# Patient Record
Sex: FEMALE | Race: WHITE | URBAN - METROPOLITAN AREA
[De-identification: names, ages, dates, MRNs, and addresses within clinical notes are randomized per-mention and may not be internally consistent; named-entity substitution may affect disease eponyms.]

---

## 2017-03-01 ENCOUNTER — OUTPATIENT (OUTPATIENT)
Dept: OUTPATIENT SERVICES | Facility: HOSPITAL | Age: 77
LOS: 1 days | Discharge: HOME | End: 2017-03-01
Payer: MEDICARE

## 2017-03-01 VITALS
SYSTOLIC BLOOD PRESSURE: 137 MMHG | OXYGEN SATURATION: 100 % | WEIGHT: 123.9 LBS | DIASTOLIC BLOOD PRESSURE: 84 MMHG | RESPIRATION RATE: 16 BRPM | HEIGHT: 64 IN | HEART RATE: 74 BPM | TEMPERATURE: 98 F

## 2017-03-01 DIAGNOSIS — Z01.818 ENCOUNTER FOR OTHER PREPROCEDURAL EXAMINATION: ICD-10-CM

## 2017-03-01 DIAGNOSIS — M06.9 RHEUMATOID ARTHRITIS, UNSPECIFIED: ICD-10-CM

## 2017-03-01 DIAGNOSIS — F41.9 ANXIETY DISORDER, UNSPECIFIED: ICD-10-CM

## 2017-03-01 DIAGNOSIS — M17.12 UNILATERAL PRIMARY OSTEOARTHRITIS, LEFT KNEE: ICD-10-CM

## 2017-03-01 DIAGNOSIS — Z98.890 OTHER SPECIFIED POSTPROCEDURAL STATES: Chronic | ICD-10-CM

## 2017-03-01 DIAGNOSIS — Z98.49 CATARACT EXTRACTION STATUS, UNSPECIFIED EYE: Chronic | ICD-10-CM

## 2017-03-01 DIAGNOSIS — Z98.89 OTHER SPECIFIED POSTPROCEDURAL STATES: Chronic | ICD-10-CM

## 2017-03-01 DIAGNOSIS — I51.81 TAKOTSUBO SYNDROME: ICD-10-CM

## 2017-03-01 DIAGNOSIS — Z90.89 ACQUIRED ABSENCE OF OTHER ORGANS: Chronic | ICD-10-CM

## 2017-03-01 DIAGNOSIS — F32.9 MAJOR DEPRESSIVE DISORDER, SINGLE EPISODE, UNSPECIFIED: ICD-10-CM

## 2017-03-01 DIAGNOSIS — I48.91 UNSPECIFIED ATRIAL FIBRILLATION: ICD-10-CM

## 2017-03-01 DIAGNOSIS — I25.10 ATHEROSCLEROTIC HEART DISEASE OF NATIVE CORONARY ARTERY WITHOUT ANGINA PECTORIS: ICD-10-CM

## 2017-03-01 DIAGNOSIS — Z98.62 PERIPHERAL VASCULAR ANGIOPLASTY STATUS: Chronic | ICD-10-CM

## 2017-03-01 DIAGNOSIS — Z90.5 ACQUIRED ABSENCE OF KIDNEY: Chronic | ICD-10-CM

## 2017-03-01 LAB
ABO RH CONFIRMATION: SIGNIFICANT CHANGE UP
ANION GAP SERPL CALC-SCNC: 10 MMOL/L — SIGNIFICANT CHANGE UP (ref 5–17)
APPEARANCE UR: CLEAR — SIGNIFICANT CHANGE UP
BASOPHILS # BLD AUTO: 0.1 K/UL — SIGNIFICANT CHANGE UP (ref 0–0.2)
BASOPHILS NFR BLD AUTO: 1 % — SIGNIFICANT CHANGE UP (ref 0–2)
BILIRUB UR-MCNC: NEGATIVE — SIGNIFICANT CHANGE UP
BUN SERPL-MCNC: 22 MG/DL — SIGNIFICANT CHANGE UP (ref 7–23)
CALCIUM SERPL-MCNC: 9.6 MG/DL — SIGNIFICANT CHANGE UP (ref 8.5–10.1)
CHLORIDE SERPL-SCNC: 102 MMOL/L — SIGNIFICANT CHANGE UP (ref 96–108)
CO2 SERPL-SCNC: 26 MMOL/L — SIGNIFICANT CHANGE UP (ref 22–31)
COLOR SPEC: YELLOW — SIGNIFICANT CHANGE UP
CREAT SERPL-MCNC: 1.06 MG/DL — SIGNIFICANT CHANGE UP (ref 0.5–1.3)
DIFF PNL FLD: NEGATIVE — SIGNIFICANT CHANGE UP
EOSINOPHIL # BLD AUTO: 0.3 K/UL — SIGNIFICANT CHANGE UP (ref 0–0.5)
EOSINOPHIL NFR BLD AUTO: 4 % — SIGNIFICANT CHANGE UP (ref 0–6)
GLUCOSE SERPL-MCNC: 90 MG/DL — SIGNIFICANT CHANGE UP (ref 70–99)
GLUCOSE UR QL: NEGATIVE MG/DL — SIGNIFICANT CHANGE UP
HCT VFR BLD CALC: 41.3 % — SIGNIFICANT CHANGE UP (ref 34.5–45)
HGB BLD-MCNC: 13.8 G/DL — SIGNIFICANT CHANGE UP (ref 11.5–15.5)
KETONES UR-MCNC: NEGATIVE — SIGNIFICANT CHANGE UP
LEUKOCYTE ESTERASE UR-ACNC: NEGATIVE — SIGNIFICANT CHANGE UP
LYMPHOCYTES # BLD AUTO: 1 K/UL — SIGNIFICANT CHANGE UP (ref 1–3.3)
LYMPHOCYTES # BLD AUTO: 12.5 % — LOW (ref 13–44)
MCHC RBC-ENTMCNC: 31.7 PG — SIGNIFICANT CHANGE UP (ref 27–34)
MCHC RBC-ENTMCNC: 33.5 GM/DL — SIGNIFICANT CHANGE UP (ref 32–36)
MCV RBC AUTO: 94.6 FL — SIGNIFICANT CHANGE UP (ref 80–100)
MONOCYTES # BLD AUTO: 0.7 K/UL — SIGNIFICANT CHANGE UP (ref 0–0.9)
MONOCYTES NFR BLD AUTO: 8.5 % — SIGNIFICANT CHANGE UP (ref 2–14)
MRSA PCR RESULT.: SIGNIFICANT CHANGE UP
NEUTROPHILS # BLD AUTO: 6 K/UL — SIGNIFICANT CHANGE UP (ref 1.8–7.4)
NEUTROPHILS NFR BLD AUTO: 74 % — SIGNIFICANT CHANGE UP (ref 43–77)
NITRITE UR-MCNC: NEGATIVE — SIGNIFICANT CHANGE UP
PH UR: 5 — SIGNIFICANT CHANGE UP (ref 4.8–8)
PLATELET # BLD AUTO: 216 K/UL — SIGNIFICANT CHANGE UP (ref 150–400)
POTASSIUM SERPL-MCNC: 4.3 MMOL/L — SIGNIFICANT CHANGE UP (ref 3.5–5.3)
POTASSIUM SERPL-SCNC: 4.3 MMOL/L — SIGNIFICANT CHANGE UP (ref 3.5–5.3)
PROT UR-MCNC: NEGATIVE MG/DL — SIGNIFICANT CHANGE UP
RBC # BLD: 4.37 M/UL — SIGNIFICANT CHANGE UP (ref 3.8–5.2)
RBC # FLD: 11.3 % — SIGNIFICANT CHANGE UP (ref 10.3–14.5)
S AUREUS DNA NOSE QL NAA+PROBE: SIGNIFICANT CHANGE UP
SODIUM SERPL-SCNC: 138 MMOL/L — SIGNIFICANT CHANGE UP (ref 135–145)
SP GR SPEC: 1.01 — SIGNIFICANT CHANGE UP (ref 1.01–1.02)
TYPE + AB SCN PNL BLD: SIGNIFICANT CHANGE UP
UROBILINOGEN FLD QL: NEGATIVE MG/DL — SIGNIFICANT CHANGE UP
WBC # BLD: 8.1 K/UL — SIGNIFICANT CHANGE UP (ref 3.8–10.5)
WBC # FLD AUTO: 8.1 K/UL — SIGNIFICANT CHANGE UP (ref 3.8–10.5)

## 2017-03-01 PROCEDURE — 93010 ELECTROCARDIOGRAM REPORT: CPT

## 2017-03-01 NOTE — H&P PST ADULT - FAMILY HISTORY
Father  Still living? No  Family history of MI (myocardial infarction), Age at diagnosis: Age Unknown     Mother  Still living? No  Family history of other heart disease, Age at diagnosis: Age Unknown

## 2017-03-01 NOTE — H&P PST ADULT - PSH
H/O breast biopsy  right?  H/O partial nephrectomy  right 2006  History of cataract surgery, unspecified laterality  b/l 2016  S/P angioplasty  1 stent 1998  S/P colonoscopy    S/P tonsillectomy  1948

## 2017-03-01 NOTE — H&P PST ADULT - ASSESSMENT
75 yo female scheduled for  left TKR on 3/7/17 with Dr winters    1. Labs as per surgeon  2. EKG  3. Medical clearance with PCP Dr Damian  4. discussed EZ sponges, mupirocin & day of procedure instructions  5. Cardiac clearance with Dr Ellis. instructed to discuss stop date for coumadin  6. Stat PT/INR on admit.

## 2017-03-01 NOTE — H&P PST ADULT - PMH
Anxiety disorder    Atrial fibrillation    CAD (coronary artery disease)    Depressive disorder    HTN (hypertension)    Kidney carcinoma    Knee pain, left    OP (osteoporosis)    RA (rheumatoid arthritis)    Stented coronary artery    Tachycardia    Takotsubo syndrome    Urge incontinence of urine

## 2017-03-01 NOTE — H&P PST ADULT - HISTORY OF PRESENT ILLNESS
77 yo female presents to PST prior to proposed procedure. c/o left knee pain x 5 years that increases with stairs & sitting> standing. States "i fall sometimes". Reports consulting with Dr Davis for xrays. Reports knee pain 0/10 at rest.   Now for said procedure.

## 2017-03-06 ENCOUNTER — RESULT REVIEW (OUTPATIENT)
Age: 77
End: 2017-03-06

## 2017-03-06 RX ORDER — OXYCODONE HYDROCHLORIDE 5 MG/1
10 TABLET ORAL ONCE
Qty: 0 | Refills: 0 | Status: DISCONTINUED | OUTPATIENT
Start: 2017-03-07 | End: 2017-03-07

## 2017-03-06 RX ORDER — OXYCODONE HYDROCHLORIDE 5 MG/1
5 TABLET ORAL EVERY 6 HOURS
Qty: 0 | Refills: 0 | Status: DISCONTINUED | OUTPATIENT
Start: 2017-03-07 | End: 2017-03-09

## 2017-03-06 RX ORDER — OXYCODONE HYDROCHLORIDE 5 MG/1
10 TABLET ORAL EVERY 12 HOURS
Qty: 0 | Refills: 0 | Status: DISCONTINUED | OUTPATIENT
Start: 2017-03-07 | End: 2017-03-09

## 2017-03-06 RX ORDER — SODIUM CHLORIDE 9 MG/ML
1000 INJECTION, SOLUTION INTRAVENOUS
Qty: 0 | Refills: 0 | Status: DISCONTINUED | OUTPATIENT
Start: 2017-03-07 | End: 2017-03-07

## 2017-03-06 RX ORDER — CELECOXIB 200 MG/1
200 CAPSULE ORAL
Qty: 0 | Refills: 0 | Status: DISCONTINUED | OUTPATIENT
Start: 2017-03-07 | End: 2017-03-09

## 2017-03-06 RX ORDER — OXYCODONE HYDROCHLORIDE 5 MG/1
10 TABLET ORAL EVERY 6 HOURS
Qty: 0 | Refills: 0 | Status: DISCONTINUED | OUTPATIENT
Start: 2017-03-07 | End: 2017-03-09

## 2017-03-06 RX ORDER — CELECOXIB 200 MG/1
200 CAPSULE ORAL ONCE
Qty: 0 | Refills: 0 | Status: COMPLETED | OUTPATIENT
Start: 2017-03-07 | End: 2017-03-07

## 2017-03-06 RX ORDER — ACETAMINOPHEN 500 MG
650 TABLET ORAL ONCE
Qty: 0 | Refills: 0 | Status: COMPLETED | OUTPATIENT
Start: 2017-03-07 | End: 2017-03-07

## 2017-03-06 RX ORDER — HYDROMORPHONE HYDROCHLORIDE 2 MG/ML
0.5 INJECTION INTRAMUSCULAR; INTRAVENOUS; SUBCUTANEOUS
Qty: 0 | Refills: 0 | Status: DISCONTINUED | OUTPATIENT
Start: 2017-03-07 | End: 2017-03-09

## 2017-03-06 RX ORDER — ACETAMINOPHEN 500 MG
650 TABLET ORAL EVERY 6 HOURS
Qty: 0 | Refills: 0 | Status: DISCONTINUED | OUTPATIENT
Start: 2017-03-07 | End: 2017-03-09

## 2017-03-07 ENCOUNTER — INPATIENT (INPATIENT)
Facility: HOSPITAL | Age: 77
LOS: 1 days | Discharge: NURSING, REHAB OR LONG-TERM CARE | End: 2017-03-09
Attending: ORTHOPAEDIC SURGERY | Admitting: ORTHOPAEDIC SURGERY
Payer: MEDICARE

## 2017-03-07 ENCOUNTER — TRANSCRIPTION ENCOUNTER (OUTPATIENT)
Age: 77
End: 2017-03-07

## 2017-03-07 VITALS
RESPIRATION RATE: 16 BRPM | TEMPERATURE: 98 F | DIASTOLIC BLOOD PRESSURE: 88 MMHG | HEART RATE: 83 BPM | SYSTOLIC BLOOD PRESSURE: 126 MMHG | WEIGHT: 123.9 LBS | OXYGEN SATURATION: 100 % | HEIGHT: 64 IN

## 2017-03-07 DIAGNOSIS — Z98.62 PERIPHERAL VASCULAR ANGIOPLASTY STATUS: Chronic | ICD-10-CM

## 2017-03-07 DIAGNOSIS — Z98.890 OTHER SPECIFIED POSTPROCEDURAL STATES: Chronic | ICD-10-CM

## 2017-03-07 DIAGNOSIS — Z98.89 OTHER SPECIFIED POSTPROCEDURAL STATES: Chronic | ICD-10-CM

## 2017-03-07 DIAGNOSIS — Z90.89 ACQUIRED ABSENCE OF OTHER ORGANS: Chronic | ICD-10-CM

## 2017-03-07 DIAGNOSIS — Z90.5 ACQUIRED ABSENCE OF KIDNEY: Chronic | ICD-10-CM

## 2017-03-07 DIAGNOSIS — M25.562 PAIN IN LEFT KNEE: ICD-10-CM

## 2017-03-07 DIAGNOSIS — Z98.49 CATARACT EXTRACTION STATUS, UNSPECIFIED EYE: Chronic | ICD-10-CM

## 2017-03-07 LAB
ANION GAP SERPL CALC-SCNC: 10 MMOL/L — SIGNIFICANT CHANGE UP (ref 5–17)
BUN SERPL-MCNC: 23 MG/DL — SIGNIFICANT CHANGE UP (ref 7–23)
CALCIUM SERPL-MCNC: 8.6 MG/DL — SIGNIFICANT CHANGE UP (ref 8.5–10.1)
CHLORIDE SERPL-SCNC: 106 MMOL/L — SIGNIFICANT CHANGE UP (ref 96–108)
CO2 SERPL-SCNC: 24 MMOL/L — SIGNIFICANT CHANGE UP (ref 22–31)
CREAT SERPL-MCNC: 1.21 MG/DL — SIGNIFICANT CHANGE UP (ref 0.5–1.3)
GLUCOSE SERPL-MCNC: 122 MG/DL — HIGH (ref 70–99)
HCT VFR BLD CALC: 39.1 % — SIGNIFICANT CHANGE UP (ref 34.5–45)
HGB BLD-MCNC: 13 G/DL — SIGNIFICANT CHANGE UP (ref 11.5–15.5)
INR BLD: 1.07 RATIO — SIGNIFICANT CHANGE UP (ref 0.88–1.16)
INR BLD: 1.12 RATIO — SIGNIFICANT CHANGE UP (ref 0.88–1.16)
MCHC RBC-ENTMCNC: 31.5 PG — SIGNIFICANT CHANGE UP (ref 27–34)
MCHC RBC-ENTMCNC: 33.3 GM/DL — SIGNIFICANT CHANGE UP (ref 32–36)
MCV RBC AUTO: 94.7 FL — SIGNIFICANT CHANGE UP (ref 80–100)
PLATELET # BLD AUTO: 214 K/UL — SIGNIFICANT CHANGE UP (ref 150–400)
POTASSIUM SERPL-MCNC: 4 MMOL/L — SIGNIFICANT CHANGE UP (ref 3.5–5.3)
POTASSIUM SERPL-SCNC: 4 MMOL/L — SIGNIFICANT CHANGE UP (ref 3.5–5.3)
PROTHROM AB SERPL-ACNC: 11.8 SEC — SIGNIFICANT CHANGE UP (ref 10–13.1)
PROTHROM AB SERPL-ACNC: 12.3 SEC — SIGNIFICANT CHANGE UP (ref 10–13.1)
RBC # BLD: 4.13 M/UL — SIGNIFICANT CHANGE UP (ref 3.8–5.2)
RBC # FLD: 11.6 % — SIGNIFICANT CHANGE UP (ref 10.3–14.5)
SODIUM SERPL-SCNC: 140 MMOL/L — SIGNIFICANT CHANGE UP (ref 135–145)
WBC # BLD: 6.5 K/UL — SIGNIFICANT CHANGE UP (ref 3.8–10.5)
WBC # FLD AUTO: 6.5 K/UL — SIGNIFICANT CHANGE UP (ref 3.8–10.5)

## 2017-03-07 PROCEDURE — 99223 1ST HOSP IP/OBS HIGH 75: CPT

## 2017-03-07 PROCEDURE — 88305 TISSUE EXAM BY PATHOLOGIST: CPT | Mod: 26

## 2017-03-07 PROCEDURE — 73560 X-RAY EXAM OF KNEE 1 OR 2: CPT | Mod: 26,LT

## 2017-03-07 RX ORDER — TRANEXAMIC ACID 100 MG/ML
562 INJECTION, SOLUTION INTRAVENOUS ONCE
Qty: 0 | Refills: 0 | Status: DISCONTINUED | OUTPATIENT
Start: 2017-03-07 | End: 2017-03-09

## 2017-03-07 RX ORDER — PANTOPRAZOLE SODIUM 20 MG/1
40 TABLET, DELAYED RELEASE ORAL
Qty: 0 | Refills: 0 | Status: DISCONTINUED | OUTPATIENT
Start: 2017-03-07 | End: 2017-03-09

## 2017-03-07 RX ORDER — HEPARIN SODIUM 5000 [USP'U]/ML
5000 INJECTION INTRAVENOUS; SUBCUTANEOUS EVERY 8 HOURS
Qty: 0 | Refills: 0 | Status: DISCONTINUED | OUTPATIENT
Start: 2017-03-08 | End: 2017-03-09

## 2017-03-07 RX ORDER — INFLUENZA VIRUS VACCINE 15; 15; 15; 15 UG/.5ML; UG/.5ML; UG/.5ML; UG/.5ML
0.5 SUSPENSION INTRAMUSCULAR ONCE
Qty: 0 | Refills: 0 | Status: DISCONTINUED | OUTPATIENT
Start: 2017-03-07 | End: 2017-03-09

## 2017-03-07 RX ORDER — ACETAMINOPHEN 500 MG
650 TABLET ORAL EVERY 6 HOURS
Qty: 0 | Refills: 0 | Status: DISCONTINUED | OUTPATIENT
Start: 2017-03-07 | End: 2017-03-09

## 2017-03-07 RX ORDER — ONDANSETRON 8 MG/1
4 TABLET, FILM COATED ORAL EVERY 6 HOURS
Qty: 0 | Refills: 0 | Status: DISCONTINUED | OUTPATIENT
Start: 2017-03-07 | End: 2017-03-09

## 2017-03-07 RX ORDER — SENNA PLUS 8.6 MG/1
2 TABLET ORAL AT BEDTIME
Qty: 0 | Refills: 0 | Status: DISCONTINUED | OUTPATIENT
Start: 2017-03-07 | End: 2017-03-09

## 2017-03-07 RX ORDER — MAGNESIUM HYDROXIDE 400 MG/1
30 TABLET, CHEWABLE ORAL DAILY
Qty: 0 | Refills: 0 | Status: DISCONTINUED | OUTPATIENT
Start: 2017-03-07 | End: 2017-03-09

## 2017-03-07 RX ORDER — POLYETHYLENE GLYCOL 3350 17 G/17G
17 POWDER, FOR SOLUTION ORAL DAILY
Qty: 0 | Refills: 0 | Status: DISCONTINUED | OUTPATIENT
Start: 2017-03-07 | End: 2017-03-09

## 2017-03-07 RX ORDER — ONDANSETRON 8 MG/1
4 TABLET, FILM COATED ORAL ONCE
Qty: 0 | Refills: 0 | Status: DISCONTINUED | OUTPATIENT
Start: 2017-03-07 | End: 2017-03-07

## 2017-03-07 RX ORDER — MORPHINE SULFATE 50 MG/1
2 CAPSULE, EXTENDED RELEASE ORAL EVERY 4 HOURS
Qty: 0 | Refills: 0 | Status: DISCONTINUED | OUTPATIENT
Start: 2017-03-07 | End: 2017-03-09

## 2017-03-07 RX ORDER — SERTRALINE 25 MG/1
25 TABLET, FILM COATED ORAL DAILY
Qty: 0 | Refills: 0 | Status: DISCONTINUED | OUTPATIENT
Start: 2017-03-07 | End: 2017-03-09

## 2017-03-07 RX ORDER — CEFAZOLIN SODIUM 1 G
2000 VIAL (EA) INJECTION EVERY 6 HOURS
Qty: 0 | Refills: 0 | Status: COMPLETED | OUTPATIENT
Start: 2017-03-07 | End: 2017-03-08

## 2017-03-07 RX ORDER — DIGOXIN 250 MCG
0.12 TABLET ORAL DAILY
Qty: 0 | Refills: 0 | Status: DISCONTINUED | OUTPATIENT
Start: 2017-03-07 | End: 2017-03-09

## 2017-03-07 RX ORDER — ASCORBIC ACID 60 MG
500 TABLET,CHEWABLE ORAL
Qty: 0 | Refills: 0 | Status: DISCONTINUED | OUTPATIENT
Start: 2017-03-07 | End: 2017-03-09

## 2017-03-07 RX ORDER — OXYCODONE HYDROCHLORIDE 5 MG/1
5 TABLET ORAL EVERY 4 HOURS
Qty: 0 | Refills: 0 | Status: DISCONTINUED | OUTPATIENT
Start: 2017-03-07 | End: 2017-03-09

## 2017-03-07 RX ORDER — OXYCODONE HYDROCHLORIDE 5 MG/1
10 TABLET ORAL EVERY 4 HOURS
Qty: 0 | Refills: 0 | Status: DISCONTINUED | OUTPATIENT
Start: 2017-03-07 | End: 2017-03-09

## 2017-03-07 RX ORDER — DOCUSATE SODIUM 100 MG
100 CAPSULE ORAL THREE TIMES A DAY
Qty: 0 | Refills: 0 | Status: DISCONTINUED | OUTPATIENT
Start: 2017-03-07 | End: 2017-03-09

## 2017-03-07 RX ORDER — SODIUM CHLORIDE 9 MG/ML
1000 INJECTION, SOLUTION INTRAVENOUS
Qty: 0 | Refills: 0 | Status: DISCONTINUED | OUTPATIENT
Start: 2017-03-07 | End: 2017-03-09

## 2017-03-07 RX ORDER — WARFARIN SODIUM 2.5 MG/1
5 TABLET ORAL DAILY
Qty: 0 | Refills: 0 | Status: DISCONTINUED | OUTPATIENT
Start: 2017-03-07 | End: 2017-03-09

## 2017-03-07 RX ORDER — PROCHLORPERAZINE MALEATE 5 MG
10 TABLET ORAL ONCE
Qty: 0 | Refills: 0 | Status: DISCONTINUED | OUTPATIENT
Start: 2017-03-07 | End: 2017-03-08

## 2017-03-07 RX ADMIN — OXYCODONE HYDROCHLORIDE 10 MILLIGRAM(S): 5 TABLET ORAL at 13:07

## 2017-03-07 RX ADMIN — Medication 100 MILLIGRAM(S): at 22:24

## 2017-03-07 RX ADMIN — CELECOXIB 200 MILLIGRAM(S): 200 CAPSULE ORAL at 13:08

## 2017-03-07 RX ADMIN — Medication 1 TABLET(S): at 22:27

## 2017-03-07 RX ADMIN — Medication 0.12 MILLIGRAM(S): at 22:27

## 2017-03-07 RX ADMIN — Medication 2.5 MILLIGRAM(S): at 22:27

## 2017-03-07 RX ADMIN — Medication 650 MILLIGRAM(S): at 13:08

## 2017-03-07 RX ADMIN — SENNA PLUS 2 TABLET(S): 8.6 TABLET ORAL at 22:26

## 2017-03-07 RX ADMIN — Medication 100 MILLIGRAM(S): at 22:26

## 2017-03-07 RX ADMIN — WARFARIN SODIUM 5 MILLIGRAM(S): 2.5 TABLET ORAL at 22:26

## 2017-03-07 RX ADMIN — SODIUM CHLORIDE 75 MILLILITER(S): 9 INJECTION, SOLUTION INTRAVENOUS at 19:17

## 2017-03-07 NOTE — PROGRESS NOTE ADULT - SUBJECTIVE AND OBJECTIVE BOX
pt seen at bedside pain controlled. denies CP, SOB, N/V.     PE Left knee   dressing c/d/i   drain intact   compartments soft non tender   decreased sensory/motor function secondary to adductor canal block per anesthesia   cap refill brisk   DP intact/ PT dopplerable

## 2017-03-07 NOTE — DISCHARGE NOTE ADULT - MEDICATION SUMMARY - MEDICATIONS TO TAKE
I will START or STAY ON the medications listed below when I get home from the hospital:    Percocet 5/325 oral tablet  -- 1 tab(s) by mouth every 4-6 hours prn pain  -- Indication: For pain    enalapril 2.5 mg oral tablet  -- 1 tab(s) by mouth once a day  -- Indication: For home med    digoxin 125 mcg (0.125 mg) oral tablet  -- 1 tab(s) by mouth once a day  -- Indication: For home med    warfarin 5 mg oral tablet  -- 1 tab(s) by mouth once a day  5mg  for 4 days a week  -- Indication: For DVT PPx    warfarin 5 mg oral tablet  -- 0.5 tab(s) by mouth once a day  (2.5 mg) 3 days a week  -- Indication: For DVT PPx    sertraline 25 mg oral tablet  -- 1 tab(s) by mouth once a day  -- Indication: For home med    ALPRAZolam 0.25 mg oral tablet  -- 1 tab(s) by mouth 4 times a day, As Needed  -- Indication: For home med    pantoprazole 40 mg oral delayed release tablet  -- 1 tab(s) by mouth once a day  -- Indication: For home med I will START or STAY ON the medications listed below when I get home from the hospital:    Percocet 5/325 oral tablet  -- 1 tab(s) by mouth every 4-6 hours prn pain  -- Indication: For pain    enalapril 2.5 mg oral tablet  -- 1 tab(s) by mouth once a day  -- Indication: For home med    digoxin 125 mcg (0.125 mg) oral tablet  -- 1 tab(s) by mouth once a day  -- Indication: For home med    warfarin 5 mg oral tablet  -- 1 tab(s) by mouth once a day  5mg  for 4 days a week  -- Indication: For DVT PPx    sertraline 25 mg oral tablet  -- 1 tab(s) by mouth once a day  -- Indication: For home med    ALPRAZolam 0.25 mg oral tablet  -- 1 tab(s) by mouth 4 times a day, As Needed  -- Indication: For home med    pantoprazole 40 mg oral delayed release tablet  -- 1 tab(s) by mouth once a day  -- Indication: For home med I will START or STAY ON the medications listed below when I get home from the hospital:    oxyCODONE 5 mg oral tablet  -- 1 tab(s) by mouth every 4 hours, As needed, Mild Pain  -- Indication: For pain    oxyCODONE 10 mg oral tablet  -- 1 tab(s) by mouth every 4 hours, As needed, Moderate Pain  -- Indication: For pain    enalapril 2.5 mg oral tablet  -- 1 tab(s) by mouth once a day  -- Indication: For prior med    digoxin 125 mcg (0.125 mg) oral tablet  -- 1 tab(s) by mouth once a day  -- Indication: For prior med    heparin  --   Continue heparin 5,000 units SQ Q8hr until INR 1.75 or >  -- Indication: For prior med    warfarin 5 mg oral tablet  -- 1 tab(s) by mouth once a day, adjust per INR. Goal range 1.75-2.75  -- Indication: For prior med    sertraline 25 mg oral tablet  -- 1 tab(s) by mouth once a day  -- Indication: For prior med    ALPRAZolam 0.25 mg oral tablet  -- 1 tab(s) by mouth 4 times a day, As Needed  -- Indication: For prior med    pantoprazole 40 mg oral delayed release tablet  -- 1 tab(s) by mouth once a day (before a meal)  -- Indication: For prior med

## 2017-03-07 NOTE — CONSULT NOTE ADULT - ASSESSMENT
77 yo female S/P Left TKR, POD#1, H/O afib on long term coumadin, Balaji vasc #5    Plan: resume coumadin 5 mg po tonight, adjust per INR  daily CBC, BMP, PT/INR  In am start heparin 5000 units sq q 8 h until INR > 2.0 x 2 days  Venodynes BLE  INC mobility as chris    Thank you for the consult, will follow

## 2017-03-07 NOTE — DISCHARGE NOTE ADULT - PATIENT PORTAL LINK FT
“You can access the Follow[facility] Patient Portal, offered by [facility], by registering with the following website: [email or web address]”

## 2017-03-07 NOTE — DISCHARGE NOTE ADULT - PLAN OF CARE
Return to ADLs 1.	Pain Control  2.	Walking with full weight bearing as tolerated, with assistive devices (walker/Cane as Needed)  3.	DVT Prophylaxis per anticoagulation team recommendations  4.	PT as needed  5.	Follow up with Dr. Davis as Outpatient in 10-14 Days after Discharge from the Hospital or Rehab. Call Office For Appointment.   6.	Remove Dressing Staples Post-Op Day 14  7.	Ice affected area as Needed  8.	Keep Dressing Clean and dry.

## 2017-03-07 NOTE — DISCHARGE NOTE ADULT - CARE PROVIDER_API CALL
Darien Davis (MD), Orthopaedic Surgery  [address] [place] [address]  Phone: [phone]  Fax: [phone]

## 2017-03-07 NOTE — DISCHARGE NOTE ADULT - CARE PLAN
Principal Discharge DX:	Knee pain, left  Goal:	Return to ADLs  Instructions for follow-up, activity and diet:	1.	Pain Control  2.	Walking with full weight bearing as tolerated, with assistive devices (walker/Cane as Needed)  3.	DVT Prophylaxis per anticoagulation team recommendations  4.	PT as needed  5.	Follow up with Dr. Davis as Outpatient in 10-14 Days after Discharge from the Hospital or Rehab. Call Office For Appointment.   6.	Remove Dressing Staples Post-Op Day 14  7.	Ice affected area as Needed  8.	Keep Dressing Clean and dry.

## 2017-03-07 NOTE — CONSULT NOTE ADULT - SUBJECTIVE AND OBJECTIVE BOX
HPI  75yo/F with PMH CAD s/p stents, HTN, hyperlipidemia, afib presented for elective LT TKR. Medical consult called for postop medical management.    PMH- as above  PSH- cardiac stents  Soc hx- denies smoking, no alcohol    3/7/17 pt seen in PACU, stable    Vital Signs Last 24 Hrs  T(C): 36.5, Max: 36.5 (03-07 @ 12:17)  T(F): 97.7, Max: 97.7 (03-07 @ 12:17)  HR: 83 (83 - 83)  BP: 126/88 (126/88 - 126/88)  BP(mean): --  RR: 16 (16 - 16)  SpO2: 100% (100% - 100%)    PT/INR - ( 07 Mar 2017 12:15 )   PT: 11.8 sec;   INR: 1.07 ratio      PHYSICAL EXAM:    GENERAL: NAD, well-groomed, well-developed  HEAD:  Atraumatic, Normocephalic  EYES: EOMI, PERRLA, conjunctiva and sclera clear  HEENT: Moist mucous membranes  NECK: Supple, No JVD  NERVOUS SYSTEM:  Alert & Oriented X3, Motor Strength 5/5 B/L upper and lower extremities; DTRs 2+ intact and symmetric  CHEST/LUNG: Clear to auscultation bilaterally; No rales, rhonchi, wheezing, or rubs  HEART: Regular rate and rhythm; No murmurs, rubs, or gallops  ABDOMEN: Soft, Nontender, Nondistended; Bowel sounds present  GENITOURINARY- Voiding, no palpable bladder  EXTREMITIES:  2+ Peripheral Pulses, No clubbing, cyanosis, or edema  MUSCULOSKELTAL- No muscle tenderness, Muscle tone normal, No joint tenderness, no Joint swelling, Joint range of motion-normal  SKIN-no rash, no lesion  CNS- sleeping postop      lactated ringers. 1000milliLiter(s) IV Continuous <Continuous>  ondansetron Injectable 4milliGRAM(s) IV Push once PRN  acetaminophen   Tablet. 650milliGRAM(s) Oral every 6 hours  oxyCODONE ER Tablet 10milliGRAM(s) Oral every 12 hours  celecoxib 200milliGRAM(s) Oral with breakfast  oxyCODONE IR 5milliGRAM(s) Oral every 6 hours PRN  oxyCODONE IR 10milliGRAM(s) Oral every 6 hours PRN  HYDROmorphone  Injectable 0.5milliGRAM(s) IV Push every 3 hours PRN  tranexamic acid IVPB 562milliGRAM(s) IV Intermittent once  tranexamic acid IVPB 562milliGRAM(s) IV Intermittent once  influenza   Vaccine 0.5milliLiter(s) IntraMuscular once      Assessment  75yo/F with PMH CAD s/p stents, HTN, hyperlipidemia, afib presented for elective LT TKR. Medical consult called for postop medical management.    Plan  #S/p LT TKR  Ortho f/u appreciated  Pain meds prn  Monitor HH  Bowel regimen  Incentive spirometry  PT as tolerated  AC consult    #CAD s/p stents  Resume card meds    #HTN/hyperlipidemia  Stable    #Dispo- thank you for consult, will follow with you

## 2017-03-07 NOTE — DISCHARGE NOTE ADULT - HOSPITAL COURSE
The patient is a 76 year old female status post elective total knee Arthroplasty to the left knee after failing outpatient nonoperative conservative management.  Patient presented to [facility] after being medically cleared for an elective surgical procedure. The patient was taken to the operating room on date mentioned above. Prophylactic antibiotics were started before the procedure and continued for 24 hours.  There were no complications during the procedure and patient tolerated the procedure well.  The patient was transferred to recovery room in stable condition and subsequently to surgical floor.  Patient was placed on Heparin/Coumadin for anticoagulation.  All home medications were continued.  The patient received physical therapy daily and daily labs were followed.  The dressing was kept clean, dry, intact.  The rest of the hospital stay was unremarkable.

## 2017-03-07 NOTE — CONSULT NOTE ADULT - SUBJECTIVE AND OBJECTIVE BOX
HPI:      Patient is a 76y old  Female who presents with a chief complaint of in left knee pain, H/O OA now S/P "left knee replacement" (07 Mar 2017 12:23)      Consulted by Dr. Davis    for VTE prophylaxis, risk stratification, and anticoagulation management.    PAST MEDICAL & SURGICAL HISTORY:  Takotsubo syndrome  Stented coronary artery  CAD (coronary artery disease)  Knee pain, left  Tachycardia  Atrial fibrillation  Urge incontinence of urine  Depressive disorder  Anxiety disorder  Kidney carcinoma  HTN (hypertension)  RA (rheumatoid arthritis)  OP (osteoporosis)  S/P colonoscopy  H/O breast biopsy: right?  S/P tonsillectomy: 1948  History of cataract surgery, unspecified laterality: b/l 2016  H/O partial nephrectomy: right 2006  S/P angioplasty: 1 stent 1998          CrCl: 38.67    Caprini VTE Risk Score: #9      [id number] Score: #5    IMPROVE Bleeding Risk Score: #2.5    Falls Risk:   High (X  )  Mod (  )  Low (  )      FAMILY HISTORY:  Family history of other heart disease (Mother)  Family history of MI (myocardial infarction) (Father)    Denies any personal or familial history of clotting or bleeding disorders.    Allergies    penicillin (Hives)    Intolerances        REVIEW OF SYSTEMS    (  )Fever	     (  )Constipation	(  )SOB				(  )Headache	(  )Dysuria  (  )Chills	     (  )Melena	(  )Dyspnea present on exertion	                    (  )Dizziness                    (  )Polyuria  (  )Nausea	     (  )Hematochezia	(  )Cough			                    (  )Syncope   	(  )Hematuria  (  )Vomiting    (  )Chest Pain	(  )Wheezing			(  )Weakness  (  )Diarrhea     (  )Palpitations	(  )Anorexia			(  )Myalgia    All other review of systems negative: Yes    PHYSICAL EXAM:    Constitutional: Appears Well    Neurological: A& O x 3    Skin: Warm    Respiratory and Thorax: normal effort; Breath sounds: normal; No rales/wheezing/rhonchi  	  Cardiovascular: S1, S2, regular, NMBR	    Gastrointestinal: BS + x 4Q, nontender	    Genitourinary:  Bladder nondistended, nontender    Musculoskeletal:   General Right:   no muscle/joint tenderness,   normal tone, no joint swelling,   ROM: full	    General Left:   = muscle/joint tenderness,   normal tone, no joint swelling,   ROM: limited      Knee:  Right: Incision: ; Dressing CDI; Drain: hemovac ; Type of drng.: serosang.; Amt. of drng: small                   Lower extrems:   Right: no calf tenderness              negative akin's sign               + pedal pulses    Left:   no calf tenderness              negative akin's sign               + pedal pulses                PT/INR - ( 07 Mar 2017 12:15 )   PT: 11.8 sec;   INR: 1.07 ratio         				    MEDICATIONS  (STANDING):  lactated ringers. 1000milliLiter(s) IV Continuous <Continuous>  acetaminophen   Tablet. 650milliGRAM(s) Oral every 6 hours  oxyCODONE ER Tablet 10milliGRAM(s) Oral every 12 hours  celecoxib 200milliGRAM(s) Oral with breakfast  tranexamic acid IVPB 562milliGRAM(s) IV Intermittent once  tranexamic acid IVPB 562milliGRAM(s) IV Intermittent once  influenza   Vaccine 0.5milliLiter(s) IntraMuscular once          DVT Prophylaxis:  LMWH                   (  )  Heparin SQ           ( X )  Coumadin             (X  )  Xarelto                  (  )  Eliquis                   (  )  Venodynes           ( X )  Ambulation          (X  )  UFH                       (  )  Contraindicated  (  )

## 2017-03-08 LAB
ANION GAP SERPL CALC-SCNC: 7 MMOL/L — SIGNIFICANT CHANGE UP (ref 5–17)
BUN SERPL-MCNC: 22 MG/DL — SIGNIFICANT CHANGE UP (ref 7–23)
CALCIUM SERPL-MCNC: 8.5 MG/DL — SIGNIFICANT CHANGE UP (ref 8.5–10.1)
CHLORIDE SERPL-SCNC: 100 MMOL/L — SIGNIFICANT CHANGE UP (ref 96–108)
CO2 SERPL-SCNC: 27 MMOL/L — SIGNIFICANT CHANGE UP (ref 22–31)
CREAT SERPL-MCNC: 1.1 MG/DL — SIGNIFICANT CHANGE UP (ref 0.5–1.3)
GLUCOSE SERPL-MCNC: 112 MG/DL — HIGH (ref 70–99)
HCT VFR BLD CALC: 34.2 % — LOW (ref 34.5–45)
HGB BLD-MCNC: 11.3 G/DL — LOW (ref 11.5–15.5)
INR BLD: 1.14 RATIO — SIGNIFICANT CHANGE UP (ref 0.88–1.16)
MCHC RBC-ENTMCNC: 31.2 PG — SIGNIFICANT CHANGE UP (ref 27–34)
MCHC RBC-ENTMCNC: 32.9 GM/DL — SIGNIFICANT CHANGE UP (ref 32–36)
MCV RBC AUTO: 94.7 FL — SIGNIFICANT CHANGE UP (ref 80–100)
PLATELET # BLD AUTO: 218 K/UL — SIGNIFICANT CHANGE UP (ref 150–400)
POTASSIUM SERPL-MCNC: 4.2 MMOL/L — SIGNIFICANT CHANGE UP (ref 3.5–5.3)
POTASSIUM SERPL-SCNC: 4.2 MMOL/L — SIGNIFICANT CHANGE UP (ref 3.5–5.3)
PROTHROM AB SERPL-ACNC: 12.6 SEC — SIGNIFICANT CHANGE UP (ref 10–13.1)
RBC # BLD: 3.62 M/UL — LOW (ref 3.8–5.2)
RBC # FLD: 11.5 % — SIGNIFICANT CHANGE UP (ref 10.3–14.5)
SODIUM SERPL-SCNC: 134 MMOL/L — LOW (ref 135–145)
WBC # BLD: 10.5 K/UL — SIGNIFICANT CHANGE UP (ref 3.8–10.5)
WBC # FLD AUTO: 10.5 K/UL — SIGNIFICANT CHANGE UP (ref 3.8–10.5)

## 2017-03-08 PROCEDURE — 99233 SBSQ HOSP IP/OBS HIGH 50: CPT

## 2017-03-08 RX ORDER — PROCHLORPERAZINE MALEATE 5 MG
10 TABLET ORAL ONCE
Qty: 0 | Refills: 0 | Status: COMPLETED | OUTPATIENT
Start: 2017-03-08 | End: 2017-03-08

## 2017-03-08 RX ADMIN — HEPARIN SODIUM 5000 UNIT(S): 5000 INJECTION INTRAVENOUS; SUBCUTANEOUS at 22:16

## 2017-03-08 RX ADMIN — ONDANSETRON 4 MILLIGRAM(S): 8 TABLET, FILM COATED ORAL at 08:44

## 2017-03-08 RX ADMIN — Medication 100 MILLIGRAM(S): at 06:14

## 2017-03-08 RX ADMIN — Medication 500 MILLIGRAM(S): at 06:17

## 2017-03-08 RX ADMIN — Medication 650 MILLIGRAM(S): at 17:20

## 2017-03-08 RX ADMIN — POLYETHYLENE GLYCOL 3350 17 GRAM(S): 17 POWDER, FOR SOLUTION ORAL at 13:18

## 2017-03-08 RX ADMIN — Medication 100 MILLIGRAM(S): at 13:17

## 2017-03-08 RX ADMIN — Medication 650 MILLIGRAM(S): at 13:18

## 2017-03-08 RX ADMIN — ONDANSETRON 4 MILLIGRAM(S): 8 TABLET, FILM COATED ORAL at 03:37

## 2017-03-08 RX ADMIN — PANTOPRAZOLE SODIUM 40 MILLIGRAM(S): 20 TABLET, DELAYED RELEASE ORAL at 06:15

## 2017-03-08 RX ADMIN — OXYCODONE HYDROCHLORIDE 5 MILLIGRAM(S): 5 TABLET ORAL at 17:25

## 2017-03-08 RX ADMIN — ONDANSETRON 4 MILLIGRAM(S): 8 TABLET, FILM COATED ORAL at 14:04

## 2017-03-08 RX ADMIN — OXYCODONE HYDROCHLORIDE 10 MILLIGRAM(S): 5 TABLET ORAL at 11:03

## 2017-03-08 RX ADMIN — Medication 100 MILLIGRAM(S): at 22:15

## 2017-03-08 RX ADMIN — Medication 1 TABLET(S): at 13:17

## 2017-03-08 RX ADMIN — Medication 650 MILLIGRAM(S): at 06:17

## 2017-03-08 RX ADMIN — Medication 10 MILLIGRAM(S): at 11:02

## 2017-03-08 RX ADMIN — CELECOXIB 200 MILLIGRAM(S): 200 CAPSULE ORAL at 13:18

## 2017-03-08 RX ADMIN — Medication 100 MILLIGRAM(S): at 03:37

## 2017-03-08 RX ADMIN — Medication 1 TABLET(S): at 06:18

## 2017-03-08 RX ADMIN — HEPARIN SODIUM 5000 UNIT(S): 5000 INJECTION INTRAVENOUS; SUBCUTANEOUS at 13:18

## 2017-03-08 RX ADMIN — Medication 1 TABLET(S): at 22:16

## 2017-03-08 RX ADMIN — OXYCODONE HYDROCHLORIDE 10 MILLIGRAM(S): 5 TABLET ORAL at 06:15

## 2017-03-08 RX ADMIN — SERTRALINE 25 MILLIGRAM(S): 25 TABLET, FILM COATED ORAL at 13:17

## 2017-03-08 RX ADMIN — WARFARIN SODIUM 5 MILLIGRAM(S): 2.5 TABLET ORAL at 22:16

## 2017-03-08 RX ADMIN — MORPHINE SULFATE 2 MILLIGRAM(S): 50 CAPSULE, EXTENDED RELEASE ORAL at 15:28

## 2017-03-08 RX ADMIN — HEPARIN SODIUM 5000 UNIT(S): 5000 INJECTION INTRAVENOUS; SUBCUTANEOUS at 06:15

## 2017-03-08 RX ADMIN — Medication 2.5 MILLIGRAM(S): at 17:20

## 2017-03-08 RX ADMIN — SODIUM CHLORIDE 75 MILLILITER(S): 9 INJECTION, SOLUTION INTRAVENOUS at 02:19

## 2017-03-08 RX ADMIN — Medication 0.12 MILLIGRAM(S): at 17:20

## 2017-03-08 RX ADMIN — Medication 500 MILLIGRAM(S): at 17:19

## 2017-03-08 NOTE — PROGRESS NOTE ADULT - SUBJECTIVE AND OBJECTIVE BOX
Patient seen and examined. Pain controlled. Resting comfortably. Block still in effect.      Vital Signs Last 24 Hrs  T(C): 36.4, Max: 36.6 (03-07 @ 16:15)  T(F): 97.6, Max: 97.9 (03-07 @ 16:15)  HR: 75 (75 - 108)  BP: 124/79 (93/59 - 129/77)  BP(mean): --  RR: 16 (13 - 24)  SpO2: 96% (96% - 100%)      Gen: NAD  LLE   KI and Ace wrap removed  dressing c/d/i   + HMV  compartments soft non tender   decreased sensory/motor function secondary to adductor canal block per anesthesia   cap refill brisk   DP intact

## 2017-03-08 NOTE — PROGRESS NOTE ADULT - PROBLEM SELECTOR PLAN 1
pain control  WBAT  PT   DVT ppx  post op abx   venodynes  incentive spirometer  follow labs   follow sensory motor exam
therapy   WBAT LLE   pain control   DVT ppx  post op abx   venodynes  incentive spirometer  follow labs   follow sensory motor exam

## 2017-03-08 NOTE — PROGRESS NOTE ADULT - SUBJECTIVE AND OBJECTIVE BOX
77yo/F with PMH CAD s/p stents, HTN, hyperlipidemia, Afib presented for elective LT TKR. Medical consult called for postop medical management.    3.8: pod#1  c/o pain in the left foot, no cp, no sob            REVIEW OF SYSTEMS:    CONSTITUTIONAL: No weakness, No fevers or chills  ENT: No ear ache, No sorethroat  NECK: No pain, No stiffness  RESPIRATORY: No cough, No wheezing, No hemoptysis; No dyspnea  CARDIOVASCULAR: No chest pain, No palpitations  GASTROINTESTINAL: No abd pain, No nausea, No vomiting, No hematemesis, No diarrhea or constipation. No melena, No hematochezia.  GENITOURINARY: No dysuria, No  hematuria  NEUROLOGICAL: No diplopia, No paresthesia, No motor dysfunction  SKIN: No rashes, or lesions   PSYCH: no anxiety, no suicidal ideation    All other review of systems is negative unless indicated above    Vital Signs Last 24 Hrs  T(C): 36.6, Max: 36.6 (03-07 @ 16:15)  T(F): 97.8, Max: 97.9 (03-07 @ 16:15)  HR: 78 (75 - 108)  BP: 104/71 (93/59 - 129/77)  BP(mean): --  RR: 18 (13 - 24)  SpO2: 96% (96% - 100%)    PHYSICAL EXAM:    GENERAL: NAD, Well nourished  HEENT:  NC/AT, EOMI, PERRLA, No scleral icterus, Moist mucous membranes  NECK: Supple, No JVD  CNS:  Alert & Oriented X3, Motor Strength 5/5 B/L upper and lower extremities; DTRs 2+ intact   LUNG: Normal Breath sounds, Clear to auscultation bilaterally, No rales, No rhonchi, No wheezing  HEART: RRR; No murmurs, No rubs  ABDOMEN: +BS, ST/ND/NT  GENITOURINARY- Voiding, Bladder not distended  EXTREMITIES:  2+ Peripheral Pulses, No clubbing, No cyanosis, No tibial edema  MUSCULOSKELTAL: Lt knee surgical dressing on, decreased ROM rt Knee  VAGINAL: deferred  RECTAL: deferred, not indicated  BREAST: deferred                          11.3   10.5  )-----------( 218      ( 08 Mar 2017 05:56 )             34.2     08 Mar 2017 05:56    134    |  100    |  22     ----------------------------<  112    4.2     |  27     |  1.10     Ca    8.5        08 Mar 2017 05:56      MEDICATIONS  (STANDING):  acetaminophen   Tablet. 650milliGRAM(s) Oral every 6 hours  oxyCODONE ER Tablet 10milliGRAM(s) Oral every 12 hours  celecoxib 200milliGRAM(s) Oral with breakfast  tranexamic acid IVPB 562milliGRAM(s) IV Intermittent once  tranexamic acid IVPB 562milliGRAM(s) IV Intermittent once  influenza   Vaccine 0.5milliLiter(s) IntraMuscular once  warfarin 5milliGRAM(s) Oral daily  heparin  Injectable 5000Unit(s) SubCutaneous every 8 hours  enalapril 2.5milliGRAM(s) Oral daily  digoxin     Tablet 0.125milliGRAM(s) Oral daily  sertraline 25milliGRAM(s) Oral daily  pantoprazole    Tablet 40milliGRAM(s) Oral before breakfast  lactated ringers. 1000milliLiter(s) IV Continuous <Continuous>  acetaminophen   Tablet. 650milliGRAM(s) Oral every 6 hours  calcium carbonate 1250 mG + Vitamin D (OsCal 500 + D) 1Tablet(s) Oral three times a day  polyethylene glycol 3350 17Gram(s) Oral daily  docusate sodium 100milliGRAM(s) Oral three times a day  multivitamin 1Tablet(s) Oral daily  ascorbic acid 500milliGRAM(s) Oral two times a day      A/P:    1. Lt TKR:  Analgesia prn with opiates  DVT prophy: Heparin Sq and coumadin loading  PT evaluation  Routine labs in Am  Incentive Spirometry    2. PAfib: rate controlled  cw Digoxin  Coumadin reloading    3. Anxiety:  Zoloft    4. Htn: stable  cw ACEinh

## 2017-03-08 NOTE — PHYSICAL THERAPY INITIAL EVALUATION ADULT - GENERAL OBSERVATIONS, REHAB EVAL
The pt was pleasant and cooperative, received on 2N, supine and with c/o left knee discomfort and nausea. IV, venodynes and pillow positioned under the left heel. The pt agreed to participate in therex review and PT eval in bed but requested to attempt ambulation at a later time.

## 2017-03-08 NOTE — PROGRESS NOTE ADULT - SUBJECTIVE AND OBJECTIVE BOX
HPI:  Patient is a 76y old  Female who presents with a chief complaint of in left knee pain, H/O OA now S/P "left knee replacement" (07 Mar 2017 12:23)  pt s/p left tkr on 3-7-17.  Hx of CORBY Waldron on coumadin.    Consulted by Dr. Davis    for VTE prophylaxis, risk stratification, and anticoagulation management.    PAST MEDICAL & SURGICAL HISTORY:  Takotsubo syndrome  Stented coronary artery  CAD (coronary artery disease)  Knee pain, left  Tachycardia  Atrial fibrillation  Urge incontinence of urine  Depressive disorder  Anxiety disorder  Kidney carcinoma  HTN (hypertension)  RA (rheumatoid arthritis)  OP (osteoporosis)  S/P colonoscopy  H/O breast biopsy: right?  S/P tonsillectomy: 1948  History of cataract surgery, unspecified laterality: b/l 2016  H/O partial nephrectomy: right 2006  S/P angioplasty: 1 stent 1998    CrCl: 38.67    Jenniferi VTE Risk Score: #9  [id number] Score: #5    IMPROVE Bleeding Risk Score: #2.5  3-8-17 pt seen at bedside daughter present.  discussed her anticoagulation with coumadin overlapping with hep sq.  pt v/u and state she has been on coumadin for a while will reinforce as needed.  Falls Risk:   High (X  )  Mod (  )  Low (  )      FAMILY HISTORY:  Family history of other heart disease (Mother)  Family history of MI (myocardial infarction) (Father)    Denies any personal or familial history of clotting or bleeding disorders.    Allergies    penicillin (Hives)    Intolerances        REVIEW OF SYSTEMS    (  )Fever	     (  )Constipation	(  )SOB				(  )Headache	(  )Dysuria  (  )Chills	     (  )Melena	(  )Dyspnea present on exertion	                    (  )Dizziness                    (  )Polyuria  (  )Nausea	     (  )Hematochezia	(  )Cough			                    (  )Syncope   	(  )Hematuria  (  )Vomiting    (  )Chest Pain	(  )Wheezing			(  )Weakness  (  )Diarrhea     (  )Palpitations	(  )Anorexia			(  )Myalgia    All other review of systems negative: Yes    PHYSICAL EXAM:    Constitutional: Appears Well    Neurological: A& O x 3    Skin: Warm    Respiratory and Thorax: normal effort; Breath sounds: normal; No rales/wheezing/rhonchi  	  Cardiovascular: S1, S2, regular, NMBR	    Gastrointestinal: BS + x 4Q, nontender	    Genitourinary:  Bladder nondistended, nontender    Musculoskeletal:   General Right:   no muscle/joint tenderness,   normal tone, no joint swelling,   ROM: full	    General Left:   = muscle/joint tenderness,   normal tone, no joint swelling,   ROM: limited      Knee:  Right: Incision: ; Dressing CDI; Drain: hemovac ; Type of drng.: serosang.; Amt. of drng: small                   Lower extrems:   Right: no calf tenderness              negative akin's sign               + pedal pulses    Left:   no calf tenderness              negative akin's sign               + pedal pulses                            11.3   10.5  )-----------( 218      ( 08 Mar 2017 05:56 )             34.2       08 Mar 2017 05:56    134    |  100    |  22     ----------------------------<  112    4.2     |  27     |  1.10     Ca    8.5        08 Mar 2017 05:56        PT/INR - ( 08 Mar 2017 07:59 )   PT: 12.6 sec;   INR: 1.14 ratio      PT/INR - ( 07 Mar 2017 12:15 )   PT: 11.8 sec;   INR: 1.07 ratio      MEDICATIONS  (STANDING):  acetaminophen   Tablet. 650milliGRAM(s) Oral every 6 hours  oxyCODONE ER Tablet 10milliGRAM(s) Oral every 12 hours  celecoxib 200milliGRAM(s) Oral with breakfast  tranexamic acid IVPB 562milliGRAM(s) IV Intermittent once  tranexamic acid IVPB 562milliGRAM(s) IV Intermittent once  influenza   Vaccine 0.5milliLiter(s) IntraMuscular once  warfarin 5milliGRAM(s) Oral daily  heparin  Injectable 5000Unit(s) SubCutaneous every 8 hours  enalapril 2.5milliGRAM(s) Oral daily  digoxin     Tablet 0.125milliGRAM(s) Oral daily  sertraline 25milliGRAM(s) Oral daily  pantoprazole    Tablet 40milliGRAM(s) Oral before breakfast  lactated ringers. 1000milliLiter(s) IV Continuous <Continuous>  acetaminophen   Tablet. 650milliGRAM(s) Oral every 6 hours  calcium carbonate 1250 mG + Vitamin D (OsCal 500 + D) 1Tablet(s) Oral three times a day  polyethylene glycol 3350 17Gram(s) Oral daily  docusate sodium 100milliGRAM(s) Oral three times a day  multivitamin 1Tablet(s) Oral daily  ascorbic acid 500milliGRAM(s) Oral two times a day      DVT Prophylaxis:  LMWH                   (  )  Heparin SQ           ( X )  Coumadin             (X  )  Xarelto                  (  )  Eliquis                   (  )  Venodynes           ( X )  Ambulation          (X  )  UFH                       (  )  Contraindicated  (  )

## 2017-03-08 NOTE — PROGRESS NOTE ADULT - ASSESSMENT
77 yo female S/P Left TKR, POD#1, H/O afib on long term coumadin, Balaji vasc #5  pt states she normally take 5mg daily and 2.5mg twice a week of coumadin.    Plan: resume coumadin 5 mg po tonight, adjust per INR  daily CBC, BMP, PT/INR  In am start heparin 5000 units sq q 8 h until INR > 2.0 x 2 days  Vendilcia BLE  [facility] mobility as chris    will cont to follow

## 2017-03-08 NOTE — PHYSICAL THERAPY INITIAL EVALUATION ADULT - PERTINENT HX OF CURRENT PROBLEM, REHAB EVAL
Progressive left knee pain with a hx of multiple falls every month while home. The pt and her dtr reports that the pt lives alone at home with several steps to negotiate.

## 2017-03-09 VITALS — HEART RATE: 92 BPM

## 2017-03-09 LAB
ANION GAP SERPL CALC-SCNC: 10 MMOL/L — SIGNIFICANT CHANGE UP (ref 5–17)
APTT BLD: 53.3 SEC — HIGH (ref 27.5–37.4)
BUN SERPL-MCNC: 15 MG/DL — SIGNIFICANT CHANGE UP (ref 7–23)
CALCIUM SERPL-MCNC: 8.9 MG/DL — SIGNIFICANT CHANGE UP (ref 8.5–10.1)
CHLORIDE SERPL-SCNC: 96 MMOL/L — SIGNIFICANT CHANGE UP (ref 96–108)
CO2 SERPL-SCNC: 29 MMOL/L — SIGNIFICANT CHANGE UP (ref 22–31)
CREAT SERPL-MCNC: 1.03 MG/DL — SIGNIFICANT CHANGE UP (ref 0.5–1.3)
GLUCOSE SERPL-MCNC: 93 MG/DL — SIGNIFICANT CHANGE UP (ref 70–99)
HCT VFR BLD CALC: 36.8 % — SIGNIFICANT CHANGE UP (ref 34.5–45)
HGB BLD-MCNC: 12.1 G/DL — SIGNIFICANT CHANGE UP (ref 11.5–15.5)
INR BLD: 1.68 RATIO — HIGH (ref 0.88–1.16)
MCHC RBC-ENTMCNC: 31.1 PG — SIGNIFICANT CHANGE UP (ref 27–34)
MCHC RBC-ENTMCNC: 33 GM/DL — SIGNIFICANT CHANGE UP (ref 32–36)
MCV RBC AUTO: 94.2 FL — SIGNIFICANT CHANGE UP (ref 80–100)
PLATELET # BLD AUTO: 211 K/UL — SIGNIFICANT CHANGE UP (ref 150–400)
POTASSIUM SERPL-MCNC: 4 MMOL/L — SIGNIFICANT CHANGE UP (ref 3.5–5.3)
POTASSIUM SERPL-SCNC: 4 MMOL/L — SIGNIFICANT CHANGE UP (ref 3.5–5.3)
PROTHROM AB SERPL-ACNC: 18.6 SEC — HIGH (ref 10–13.1)
RBC # BLD: 3.9 M/UL — SIGNIFICANT CHANGE UP (ref 3.8–5.2)
RBC # FLD: 11.6 % — SIGNIFICANT CHANGE UP (ref 10.3–14.5)
SODIUM SERPL-SCNC: 135 MMOL/L — SIGNIFICANT CHANGE UP (ref 135–145)
SURGICAL PATHOLOGY FINAL REPORT - CH: SIGNIFICANT CHANGE UP
WBC # BLD: 6.8 K/UL — SIGNIFICANT CHANGE UP (ref 3.8–10.5)
WBC # FLD AUTO: 6.8 K/UL — SIGNIFICANT CHANGE UP (ref 3.8–10.5)

## 2017-03-09 PROCEDURE — 99233 SBSQ HOSP IP/OBS HIGH 50: CPT

## 2017-03-09 RX ORDER — SERTRALINE 25 MG/1
1 TABLET, FILM COATED ORAL
Qty: 0 | Refills: 0 | COMMUNITY
Start: 2017-03-09

## 2017-03-09 RX ORDER — HEPARIN SODIUM 5000 [USP'U]/ML
5000 INJECTION INTRAVENOUS; SUBCUTANEOUS
Qty: 0 | Refills: 0 | COMMUNITY
Start: 2017-03-09

## 2017-03-09 RX ORDER — PANTOPRAZOLE SODIUM 20 MG/1
1 TABLET, DELAYED RELEASE ORAL
Qty: 0 | Refills: 0 | COMMUNITY

## 2017-03-09 RX ORDER — WARFARIN SODIUM 2.5 MG/1
1 TABLET ORAL
Qty: 0 | Refills: 0 | COMMUNITY
Start: 2017-03-09

## 2017-03-09 RX ORDER — DIGOXIN 250 MCG
1 TABLET ORAL
Qty: 0 | Refills: 0 | COMMUNITY
Start: 2017-03-09

## 2017-03-09 RX ORDER — WARFARIN SODIUM 2.5 MG/1
1 TABLET ORAL
Qty: 0 | Refills: 0 | COMMUNITY

## 2017-03-09 RX ORDER — PANTOPRAZOLE SODIUM 20 MG/1
1 TABLET, DELAYED RELEASE ORAL
Qty: 0 | Refills: 0 | DISCHARGE
Start: 2017-03-09

## 2017-03-09 RX ORDER — OXYCODONE HYDROCHLORIDE 5 MG/1
1 TABLET ORAL
Qty: 0 | Refills: 0 | COMMUNITY
Start: 2017-03-09

## 2017-03-09 RX ORDER — WARFARIN SODIUM 2.5 MG/1
1 TABLET ORAL
Qty: 0 | Refills: 0 | DISCHARGE
Start: 2017-03-09

## 2017-03-09 RX ORDER — DIGOXIN 250 MCG
1 TABLET ORAL
Qty: 0 | Refills: 0 | DISCHARGE
Start: 2017-03-09

## 2017-03-09 RX ORDER — DIGOXIN 250 MCG
1 TABLET ORAL
Qty: 0 | Refills: 0 | COMMUNITY

## 2017-03-09 RX ORDER — WARFARIN SODIUM 2.5 MG/1
0.5 TABLET ORAL
Qty: 0 | Refills: 0 | COMMUNITY

## 2017-03-09 RX ORDER — ALPRAZOLAM 0.25 MG
1 TABLET ORAL
Qty: 0 | Refills: 0 | COMMUNITY

## 2017-03-09 RX ORDER — SERTRALINE 25 MG/1
1 TABLET, FILM COATED ORAL
Qty: 0 | Refills: 0 | COMMUNITY

## 2017-03-09 RX ADMIN — CELECOXIB 200 MILLIGRAM(S): 200 CAPSULE ORAL at 11:25

## 2017-03-09 RX ADMIN — HEPARIN SODIUM 5000 UNIT(S): 5000 INJECTION INTRAVENOUS; SUBCUTANEOUS at 05:47

## 2017-03-09 RX ADMIN — OXYCODONE HYDROCHLORIDE 5 MILLIGRAM(S): 5 TABLET ORAL at 13:20

## 2017-03-09 RX ADMIN — PANTOPRAZOLE SODIUM 40 MILLIGRAM(S): 20 TABLET, DELAYED RELEASE ORAL at 05:45

## 2017-03-09 RX ADMIN — OXYCODONE HYDROCHLORIDE 5 MILLIGRAM(S): 5 TABLET ORAL at 09:10

## 2017-03-09 RX ADMIN — Medication 650 MILLIGRAM(S): at 11:25

## 2017-03-09 RX ADMIN — Medication 1 TABLET(S): at 11:25

## 2017-03-09 RX ADMIN — Medication 100 MILLIGRAM(S): at 05:44

## 2017-03-09 RX ADMIN — OXYCODONE HYDROCHLORIDE 5 MILLIGRAM(S): 5 TABLET ORAL at 17:10

## 2017-03-09 RX ADMIN — Medication 100 MILLIGRAM(S): at 13:20

## 2017-03-09 RX ADMIN — Medication 1 TABLET(S): at 05:45

## 2017-03-09 RX ADMIN — ONDANSETRON 4 MILLIGRAM(S): 8 TABLET, FILM COATED ORAL at 09:10

## 2017-03-09 RX ADMIN — Medication 650 MILLIGRAM(S): at 05:52

## 2017-03-09 RX ADMIN — Medication 650 MILLIGRAM(S): at 00:38

## 2017-03-09 RX ADMIN — Medication 650 MILLIGRAM(S): at 00:40

## 2017-03-09 RX ADMIN — Medication 2.5 MILLIGRAM(S): at 05:46

## 2017-03-09 RX ADMIN — POLYETHYLENE GLYCOL 3350 17 GRAM(S): 17 POWDER, FOR SOLUTION ORAL at 11:25

## 2017-03-09 RX ADMIN — Medication 650 MILLIGRAM(S): at 05:44

## 2017-03-09 RX ADMIN — SERTRALINE 25 MILLIGRAM(S): 25 TABLET, FILM COATED ORAL at 11:25

## 2017-03-09 RX ADMIN — Medication 500 MILLIGRAM(S): at 05:45

## 2017-03-09 RX ADMIN — HEPARIN SODIUM 5000 UNIT(S): 5000 INJECTION INTRAVENOUS; SUBCUTANEOUS at 13:20

## 2017-03-09 RX ADMIN — Medication 0.12 MILLIGRAM(S): at 05:46

## 2017-03-09 NOTE — PROGRESS NOTE ADULT - SUBJECTIVE AND OBJECTIVE BOX
75 yo/F with PMH CAD s/p stents, HTN, hyperlipidemia, Afib presented for elective LT TKR. Medical consult called for postop medical management.    3.8: pod#1  c/o pain in the left foot, no cp, no sob  3.9: pod#2, left foot pain has improfed    REVIEW OF SYSTEMS:    CONSTITUTIONAL: No weakness, No fevers or chills  ENT: No ear ache, No sorethroat  NECK: No pain, No stiffness  RESPIRATORY: No cough, No wheezing, No hemoptysis; No dyspnea  CARDIOVASCULAR: No chest pain, No palpitations  GASTROINTESTINAL: No abd pain, No nausea, No vomiting, No hematemesis, No diarrhea or constipation. No melena, No hematochezia.  GENITOURINARY: No dysuria, No  hematuria  NEUROLOGICAL: No diplopia, No paresthesia, No motor dysfunction  SKIN: No rashes, or lesions   PSYCH: no anxiety, no suicidal ideation    All other review of systems is negative unless indicated above    Vital Signs Last 24 Hrs  T(C): 36.9, Max: 36.9 (03-09 @ 05:38)  T(F): 98.5, Max: 98.5 (03-09 @ 05:38)  HR: 92 (62 - 92)  BP: 137/82 (106/55 - 137/82)  BP(mean): 68 (68 - 68)  RR: 16 (16 - 16)  SpO2: 96% (96% - 98%)    PHYSICAL EXAM:    GENERAL: NAD, Well nourished  HEENT:  NC/AT, EOMI, PERRLA, No scleral icterus, Moist mucous membranes  NECK: Supple, No JVD  CNS:  Alert & Oriented X3, Motor Strength 5/5 B/L upper and lower extremities; DTRs 2+ intact   LUNG: Normal Breath sounds, Clear to auscultation bilaterally, No rales, No rhonchi, No wheezing  HEART: RRR; No murmurs, No rubs  ABDOMEN: +BS, ST/ND/NT  GENITOURINARY- Voiding, Bladder not distended  EXTREMITIES:  2+ Peripheral Pulses, No clubbing, No cyanosis, No tibial edema  MUSCULOSKELTAL: L knee surgical dressing clean, decreased ROM  VAGINAL: deferred  RECTAL: deferred, not indicated  BREAST: deferred                          12.1   6.8   )-----------( 211      ( 09 Mar 2017 06:18 )             36.8     09 Mar 2017 06:18    135    |  96     |  15     ----------------------------<  93     4.0     |  29     |  1.03     Ca    8.9        09 Mar 2017 06:18        MEDICATIONS  (STANDING):  acetaminophen   Tablet. 650milliGRAM(s) Oral every 6 hours  oxyCODONE ER Tablet 10milliGRAM(s) Oral every 12 hours  celecoxib 200milliGRAM(s) Oral with breakfast  tranexamic acid IVPB 562milliGRAM(s) IV Intermittent once  tranexamic acid IVPB 562milliGRAM(s) IV Intermittent once  influenza   Vaccine 0.5milliLiter(s) IntraMuscular once  warfarin 5milliGRAM(s) Oral daily  heparin  Injectable 5000Unit(s) SubCutaneous every 8 hours  enalapril 2.5milliGRAM(s) Oral daily  digoxin     Tablet 0.125milliGRAM(s) Oral daily  sertraline 25milliGRAM(s) Oral daily  pantoprazole    Tablet 40milliGRAM(s) Oral before breakfast  lactated ringers. 1000milliLiter(s) IV Continuous <Continuous>  acetaminophen   Tablet. 650milliGRAM(s) Oral every 6 hours  calcium carbonate 1250 mG + Vitamin D (OsCal 500 + D) 1Tablet(s) Oral three times a day  polyethylene glycol 3350 17Gram(s) Oral daily  docusate sodium 100milliGRAM(s) Oral three times a day  multivitamin 1Tablet(s) Oral daily  ascorbic acid 500milliGRAM(s) Oral two times a day    MEDICATIONS  (PRN):  oxyCODONE IR 5milliGRAM(s) Oral every 6 hours PRN Mild Pain (1 - 3)  oxyCODONE IR 10milliGRAM(s) Oral every 6 hours PRN Moderate Pain (4 - 6)  HYDROmorphone  Injectable 0.5milliGRAM(s) IV Push every 3 hours PRN Severe Pain (7 - 10)  acetaminophen   Tablet 650milliGRAM(s) Oral every 6 hours PRN For Temp over 38.3 C (100.94 F)  oxyCODONE IR 5milliGRAM(s) Oral every 4 hours PRN Mild Pain  oxyCODONE IR 10milliGRAM(s) Oral every 4 hours PRN Moderate Pain  morphine  - Injectable 2milliGRAM(s) SubCutaneous every 4 hours PRN Severe Pain  aluminum hydroxide/magnesium hydroxide/simethicone Suspension 30milliLiter(s) Oral four times a day PRN Indigestion  ondansetron Injectable 4milliGRAM(s) IV Push every 6 hours PRN Nausea and/or Vomiting  magnesium hydroxide Suspension 30milliLiter(s) Oral daily PRN Constipation  bisacodyl Suppository 10milliGRAM(s) Rectal daily PRN If no bowel movement by postoperative day #2  senna 2Tablet(s) Oral at bedtime PRN Constipation        A/P:    1. Lt TKR: pod#2  Analgesia prn with opiates  DVT prophy: Heparin Sq and coumadin loading per anticoag team  PT evaluation  Incentive Spirometry    2. PAfib: rate controlled  cw Digoxin  Coumadin reloading; d/c heparin when INR>2    3. Anxiety:  Zoloft    4. Htn: stable  cw ACEinh

## 2017-03-09 NOTE — PROGRESS NOTE ADULT - SUBJECTIVE AND OBJECTIVE BOX
Pt S&E. Pain controlled. No acute events overnight  AVSS  Gen: NAD  LLE:  Dsg c/d/i  SILT L2-S1  +EHL/FHL/TA/Gastroc  DP+  Soft compartments, - calf ttp

## 2017-03-09 NOTE — PROGRESS NOTE ADULT - SUBJECTIVE AND OBJECTIVE BOX
HPI:  Patient is a 76y old  Female who presents with a chief complaint of in left knee pain, H/O OA now S/P "left knee replacement" (07 Mar 2017 12:23)  pt s/p left tkr on 3-7-17.  Hx of CORBY Waldron on coumadin.    Consulted by Dr. Davis    for VTE prophylaxis, risk stratification, and anticoagulation management.    PAST MEDICAL & SURGICAL HISTORY:  Takotsubo syndrome  Stented coronary artery  CAD (coronary artery disease)  Knee pain, left  Tachycardia  Atrial fibrillation  Urge incontinence of urine  Depressive disorder  Anxiety disorder  Kidney carcinoma  HTN (hypertension)  RA (rheumatoid arthritis)  OP (osteoporosis)  S/P colonoscopy  H/O breast biopsy: right?  S/P tonsillectomy: 1948  History of cataract surgery, unspecified laterality: b/l 2016  H/O partial nephrectomy: right 2006  S/P angioplasty: 1 stent 1998    CrCl: 38.67    [place] VTE Risk Score: #9  [id number] Score: #5    IMPROVE Bleeding Risk Score: #2.5  3-8-17 pt seen at bedside daughter present.  discussed her anticoagulation with coumadin overlapping with hep sq.  pt v/u and state she has been on coumadin for a while will reinforce as needed.  3-9-17: Patient seen at bedside still reporting significant amount of pain- to be dc'ed to rehab today- INR still subtherapeutic explained that will still need to overlap lovenox and coumarin until INR 2.0 or >    Falls Risk:   High (X  )  Mod (  )  Low (  )      FAMILY HISTORY:  Family history of other heart disease (Mother)  Family history of MI (myocardial infarction) (Father)    Denies any personal or familial history of clotting or bleeding disorders.    Allergies    penicillin (Hives)    Intolerances        REVIEW OF SYSTEMS    (  )Fever	     (  )Constipation	(  )SOB				(  )Headache	(  )Dysuria  (  )Chills	     (  )Melena	(  )Dyspnea present on exertion	                    (  )Dizziness                    (  )Polyuria  (  )Nausea	     (  )Hematochezia	(  )Cough			                    (  )Syncope   	(  )Hematuria  (  )Vomiting    (  )Chest Pain	(  )Wheezing			(  )Weakness  (  )Diarrhea     (  )Palpitations	(  )Anorexia			(  )Myalgia    All other review of systems negative: Yes    PHYSICAL EXAM:    Constitutional: Appears Well    Neurological: A& O x 3    Skin: Warm    Respiratory and Thorax: normal effort; Breath sounds: normal; No rales/wheezing/rhonchi  	  Cardiovascular: S1, S2, regular, NMBR	    Gastrointestinal: BS + x 4Q, nontender	    Genitourinary:  Bladder nondistended, nontender    Musculoskeletal:   General Right:   no muscle/joint tenderness,   normal tone, no joint swelling,   ROM: full	    General Left:   = muscle/joint tenderness,   normal tone, no joint swelling,   ROM: limited      Knee:  Right: Incision: ; Dressing CDI; Drain: hemovac ; Type of drng.: serosang.; Amt. of drng: small                   Lower extrems:   Right: no calf tenderness              negative akin's sign               + pedal pulses    Left:   no calf tenderness              negative akin's sign               + pedal pulses                                                 12.1   6.8   )-----------( 211      ( 09 Mar 2017 06:18 )             36.8       09 Mar 2017 06:18    135    |  96     |  15     ----------------------------<  93     4.0     |  29     |  1.03     Ca    8.9        09 Mar 2017 06:18        PT/INR - ( 09 Mar 2017 08:29 )   PT: 18.6 sec;   INR: 1.68 ratio         PTT - ( 09 Mar 2017 08:29 )  PTT:53.3 sec        PT/INR - ( 08 Mar 2017 07:59 )   PT: 12.6 sec;   INR: 1.14 ratio      PT/INR - ( 07 Mar 2017 12:15 )   PT: 11.8 sec;   INR: 1.07 ratio      MEDICATIONS  (STANDING):  acetaminophen   Tablet. 650milliGRAM(s) Oral every 6 hours  oxyCODONE ER Tablet 10milliGRAM(s) Oral every 12 hours  celecoxib 200milliGRAM(s) Oral with breakfast  tranexamic acid IVPB 562milliGRAM(s) IV Intermittent once  tranexamic acid IVPB 562milliGRAM(s) IV Intermittent once  influenza   Vaccine 0.5milliLiter(s) IntraMuscular once  warfarin 5milliGRAM(s) Oral daily  heparin  Injectable 5000Unit(s) SubCutaneous every 8 hours  enalapril 2.5milliGRAM(s) Oral daily  digoxin     Tablet 0.125milliGRAM(s) Oral daily  sertraline 25milliGRAM(s) Oral daily  pantoprazole    Tablet 40milliGRAM(s) Oral before breakfast  lactated ringers. 1000milliLiter(s) IV Continuous <Continuous>  acetaminophen   Tablet. 650milliGRAM(s) Oral every 6 hours  calcium carbonate 1250 mG + Vitamin D (OsCal 500 + D) 1Tablet(s) Oral three times a day  polyethylene glycol 3350 17Gram(s) Oral daily  docusate sodium 100milliGRAM(s) Oral three times a day  multivitamin 1Tablet(s) Oral daily  ascorbic acid 500milliGRAM(s) Oral two times a day      DVT Prophylaxis:  LMWH                   (  )  Heparin SQ           ( X )  Coumadin             (X  )  Xarelto                  (  )  Eliquis                   (  )  Venodynes           ( X )  Ambulation          (X  )  UFH                       (  )  Contraindicated  (  )

## 2017-03-09 NOTE — PROGRESS NOTE ADULT - ASSESSMENT
75 yo female S/P Left TKR, POD#1, H/O afib on long term coumadin, Balaji vasc #5  pt states she normally take 5mg daily and 2.5mg twice a week of coumadin.    Plan:   Cont. Coumadin 5 mg po tonight, adjust per INR  Lovenox 30 mg SQ BID until INR 2.0 or >  Enc ambulation    will cont to follow

## 2017-03-13 DIAGNOSIS — F41.9 ANXIETY DISORDER, UNSPECIFIED: ICD-10-CM

## 2017-03-13 DIAGNOSIS — M06.9 RHEUMATOID ARTHRITIS, UNSPECIFIED: ICD-10-CM

## 2017-03-13 DIAGNOSIS — M17.12 UNILATERAL PRIMARY OSTEOARTHRITIS, LEFT KNEE: ICD-10-CM

## 2017-03-13 DIAGNOSIS — Z95.5 PRESENCE OF CORONARY ANGIOPLASTY IMPLANT AND GRAFT: ICD-10-CM

## 2017-03-13 DIAGNOSIS — Z79.01 LONG TERM (CURRENT) USE OF ANTICOAGULANTS: ICD-10-CM

## 2017-03-13 DIAGNOSIS — Z88.0 ALLERGY STATUS TO PENICILLIN: ICD-10-CM

## 2017-03-13 DIAGNOSIS — I48.91 UNSPECIFIED ATRIAL FIBRILLATION: ICD-10-CM

## 2017-03-13 DIAGNOSIS — F32.9 MAJOR DEPRESSIVE DISORDER, SINGLE EPISODE, UNSPECIFIED: ICD-10-CM

## 2017-03-13 DIAGNOSIS — I51.81 TAKOTSUBO SYNDROME: ICD-10-CM

## 2017-03-13 DIAGNOSIS — I25.10 ATHEROSCLEROTIC HEART DISEASE OF NATIVE CORONARY ARTERY WITHOUT ANGINA PECTORIS: ICD-10-CM

## 2017-03-13 DIAGNOSIS — M81.0 AGE-RELATED OSTEOPOROSIS WITHOUT CURRENT PATHOLOGICAL FRACTURE: ICD-10-CM

## 2017-03-13 DIAGNOSIS — Z85.528 PERSONAL HISTORY OF OTHER MALIGNANT NEOPLASM OF KIDNEY: ICD-10-CM

## 2017-03-13 DIAGNOSIS — N39.41 URGE INCONTINENCE: ICD-10-CM

## 2017-03-29 ENCOUNTER — EMERGENCY (EMERGENCY)
Facility: HOSPITAL | Age: 77
LOS: 0 days | Discharge: HOME | End: 2017-03-29
Attending: EMERGENCY MEDICINE | Admitting: EMERGENCY MEDICINE
Payer: MEDICARE

## 2017-03-29 VITALS
DIASTOLIC BLOOD PRESSURE: 97 MMHG | OXYGEN SATURATION: 100 % | WEIGHT: 119.93 LBS | HEART RATE: 95 BPM | SYSTOLIC BLOOD PRESSURE: 132 MMHG | HEIGHT: 64 IN | TEMPERATURE: 98 F

## 2017-03-29 VITALS
DIASTOLIC BLOOD PRESSURE: 76 MMHG | HEART RATE: 86 BPM | OXYGEN SATURATION: 99 % | SYSTOLIC BLOOD PRESSURE: 129 MMHG | RESPIRATION RATE: 16 BRPM | TEMPERATURE: 98 F

## 2017-03-29 DIAGNOSIS — I25.10 ATHEROSCLEROTIC HEART DISEASE OF NATIVE CORONARY ARTERY WITHOUT ANGINA PECTORIS: ICD-10-CM

## 2017-03-29 DIAGNOSIS — S09.90XA UNSPECIFIED INJURY OF HEAD, INITIAL ENCOUNTER: ICD-10-CM

## 2017-03-29 DIAGNOSIS — Z96.652 PRESENCE OF LEFT ARTIFICIAL KNEE JOINT: ICD-10-CM

## 2017-03-29 DIAGNOSIS — I10 ESSENTIAL (PRIMARY) HYPERTENSION: ICD-10-CM

## 2017-03-29 DIAGNOSIS — Z90.89 ACQUIRED ABSENCE OF OTHER ORGANS: Chronic | ICD-10-CM

## 2017-03-29 DIAGNOSIS — Z79.899 OTHER LONG TERM (CURRENT) DRUG THERAPY: ICD-10-CM

## 2017-03-29 DIAGNOSIS — D68.9 COAGULATION DEFECT, UNSPECIFIED: ICD-10-CM

## 2017-03-29 DIAGNOSIS — Z98.890 OTHER SPECIFIED POSTPROCEDURAL STATES: Chronic | ICD-10-CM
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
## 2017-03-29 DIAGNOSIS — Z98.62 PERIPHERAL VASCULAR ANGIOPLASTY STATUS: Chronic | ICD-10-CM

## 2017-03-29 DIAGNOSIS — Z90.5 ACQUIRED ABSENCE OF KIDNEY: Chronic | ICD-10-CM

## 2017-03-29 DIAGNOSIS — Z98.89 OTHER SPECIFIED POSTPROCEDURAL STATES: Chronic | ICD-10-CM

## 2017-03-29 DIAGNOSIS — Z79.01 LONG TERM (CURRENT) USE OF ANTICOAGULANTS: ICD-10-CM

## 2017-03-29 DIAGNOSIS — Z98.49 CATARACT EXTRACTION STATUS, UNSPECIFIED EYE: Chronic | ICD-10-CM

## 2017-03-29 DIAGNOSIS — S93.402A SPRAIN OF UNSPECIFIED LIGAMENT OF LEFT ANKLE, INITIAL ENCOUNTER: ICD-10-CM

## 2017-03-29 DIAGNOSIS — I48.91 UNSPECIFIED ATRIAL FIBRILLATION: ICD-10-CM

## 2017-03-29 LAB
ALBUMIN SERPL ELPH-MCNC: 3.2 G/DL — LOW (ref 3.3–5)
ALP SERPL-CCNC: 77 U/L — SIGNIFICANT CHANGE UP (ref 40–120)
ALT FLD-CCNC: 32 U/L — SIGNIFICANT CHANGE UP (ref 12–78)
ANION GAP SERPL CALC-SCNC: 11 MMOL/L — SIGNIFICANT CHANGE UP (ref 5–17)
APTT BLD: 32.3 SEC — SIGNIFICANT CHANGE UP (ref 27.5–37.4)
AST SERPL-CCNC: 35 U/L — SIGNIFICANT CHANGE UP (ref 15–37)
BASOPHILS # BLD AUTO: 0.1 K/UL — SIGNIFICANT CHANGE UP (ref 0–0.2)
BASOPHILS NFR BLD AUTO: 1.1 % — SIGNIFICANT CHANGE UP (ref 0–2)
BILIRUB SERPL-MCNC: 0.4 MG/DL — SIGNIFICANT CHANGE UP (ref 0.2–1.2)
BUN SERPL-MCNC: 24 MG/DL — HIGH (ref 7–23)
CALCIUM SERPL-MCNC: 9.4 MG/DL — SIGNIFICANT CHANGE UP (ref 8.5–10.1)
CHLORIDE SERPL-SCNC: 104 MMOL/L — SIGNIFICANT CHANGE UP (ref 96–108)
CO2 SERPL-SCNC: 25 MMOL/L — SIGNIFICANT CHANGE UP (ref 22–31)
CREAT SERPL-MCNC: 1.19 MG/DL — SIGNIFICANT CHANGE UP (ref 0.5–1.3)
EOSINOPHIL # BLD AUTO: 0.2 K/UL — SIGNIFICANT CHANGE UP (ref 0–0.5)
EOSINOPHIL NFR BLD AUTO: 1.6 % — SIGNIFICANT CHANGE UP (ref 0–6)
GLUCOSE SERPL-MCNC: 98 MG/DL — SIGNIFICANT CHANGE UP (ref 70–99)
HCT VFR BLD CALC: 38.4 % — SIGNIFICANT CHANGE UP (ref 34.5–45)
HGB BLD-MCNC: 12.9 G/DL — SIGNIFICANT CHANGE UP (ref 11.5–15.5)
INR BLD: 1.64 RATIO — HIGH (ref 0.88–1.16)
LYMPHOCYTES # BLD AUTO: 1.4 K/UL — SIGNIFICANT CHANGE UP (ref 1–3.3)
LYMPHOCYTES # BLD AUTO: 14.1 % — SIGNIFICANT CHANGE UP (ref 13–44)
MCHC RBC-ENTMCNC: 31.3 PG — SIGNIFICANT CHANGE UP (ref 27–34)
MCHC RBC-ENTMCNC: 33.7 GM/DL — SIGNIFICANT CHANGE UP (ref 32–36)
MCV RBC AUTO: 92.9 FL — SIGNIFICANT CHANGE UP (ref 80–100)
MONOCYTES # BLD AUTO: 0.6 K/UL — SIGNIFICANT CHANGE UP (ref 0–0.9)
MONOCYTES NFR BLD AUTO: 5.6 % — SIGNIFICANT CHANGE UP (ref 2–14)
NEUTROPHILS # BLD AUTO: 7.6 K/UL — HIGH (ref 1.8–7.4)
NEUTROPHILS NFR BLD AUTO: 77.5 % — HIGH (ref 43–77)
PLATELET # BLD AUTO: 428 K/UL — HIGH (ref 150–400)
POTASSIUM SERPL-MCNC: 4 MMOL/L — SIGNIFICANT CHANGE UP (ref 3.5–5.3)
POTASSIUM SERPL-SCNC: 4 MMOL/L — SIGNIFICANT CHANGE UP (ref 3.5–5.3)
PROT SERPL-MCNC: 6.8 GM/DL — SIGNIFICANT CHANGE UP (ref 6–8.3)
PROTHROM AB SERPL-ACNC: 17.9 SEC — HIGH (ref 9.8–12.7)
RBC # BLD: 4.14 M/UL — SIGNIFICANT CHANGE UP (ref 3.8–5.2)
RBC # FLD: 11.8 % — SIGNIFICANT CHANGE UP (ref 10.3–14.5)
SODIUM SERPL-SCNC: 140 MMOL/L — SIGNIFICANT CHANGE UP (ref 135–145)
WBC # BLD: 9.8 K/UL — SIGNIFICANT CHANGE UP (ref 3.8–10.5)
WBC # FLD AUTO: 9.8 K/UL — SIGNIFICANT CHANGE UP (ref 3.8–10.5)

## 2017-03-29 PROCEDURE — 93010 ELECTROCARDIOGRAM REPORT: CPT

## 2017-03-29 PROCEDURE — 72125 CT NECK SPINE W/O DYE: CPT | Mod: 26

## 2017-03-29 PROCEDURE — 73610 X-RAY EXAM OF ANKLE: CPT | Mod: 26,RT

## 2017-03-29 PROCEDURE — 73562 X-RAY EXAM OF KNEE 3: CPT | Mod: 26,LT

## 2017-03-29 PROCEDURE — 70450 CT HEAD/BRAIN W/O DYE: CPT | Mod: 26

## 2017-03-29 PROCEDURE — 72220 X-RAY EXAM SACRUM TAILBONE: CPT | Mod: 26

## 2017-03-29 PROCEDURE — 99284 EMERGENCY DEPT VISIT MOD MDM: CPT

## 2017-03-29 PROCEDURE — 72190 X-RAY EXAM OF PELVIS: CPT | Mod: 26

## 2017-03-29 RX ORDER — ACETAMINOPHEN 500 MG
650 TABLET ORAL ONCE
Qty: 0 | Refills: 0 | Status: COMPLETED | OUTPATIENT
Start: 2017-03-29 | End: 2017-03-29

## 2017-03-29 RX ORDER — ONDANSETRON 8 MG/1
4 TABLET, FILM COATED ORAL ONCE
Qty: 0 | Refills: 0 | Status: COMPLETED | OUTPATIENT
Start: 2017-03-29 | End: 2017-03-29

## 2017-03-29 RX ADMIN — Medication 650 MILLIGRAM(S): at 12:38

## 2017-03-29 RX ADMIN — ONDANSETRON 4 MILLIGRAM(S): 8 TABLET, FILM COATED ORAL at 12:38

## 2017-03-29 NOTE — ED PROVIDER NOTE - MUSCULOSKELETAL, MLM
Spine appears normal, range of motion is not limited, no muscle or joint tenderness. No focal swelling. MAEx4. Spine appears normal, range of motion is not limited, no muscle or joint tenderness. No focal swelling. MAEx4. No midline CTL spine tenderness. No CVA tenderness.  Pelvis is stable and non-tender. Bilateral SLR 45-50 degrees without pain. Left knee with well-healing surgical scar, steri strips in place, no dehiscence, no tenderness, no effusion, AFROM. +Mild tenderness left lateral malleolus posterior aspect, AFROM with mild pain, no deformity.

## 2017-03-29 NOTE — ED PROVIDER NOTE - CONSTITUTIONAL, MLM
normal... Well appearing, well nourished elderly white female, awake, alert, oriented to person, place, time/situation and in no apparent distress. No respiratory discomfort. Well appearing, well nourished elderly white female, awake, alert, oriented to person, place, time/situation and in no apparent distress. No respiratory discomfort. No acutely ill.

## 2017-03-29 NOTE — ED PROVIDER NOTE - EYES, MLM
Clear bilaterally, pupils equal, round and reactive to light. EOMI. Clear bilaterally, pupils equal, round and reactive to light. EOMI. No raccoons sign.

## 2017-03-29 NOTE — ED PROVIDER NOTE - PROGRESS NOTE DETAILS
Dr. Lema:  Pt refused L ankle brace, Troncoso dressing.  I advised pt to use doughnut pillow when sitting down.

## 2017-03-29 NOTE — ED PROVIDER NOTE - DETAILS:
The scribe's documentation has been prepared under my direction and personally reviewed by me in its entirety. I confirm that the note above accurately reflects all work, treatment, procedures, and medical decision making performed by me (Dr. Lema).

## 2017-03-29 NOTE — ED PROVIDER NOTE - ENMT, MLM
Airway patent, Nasal mucosa clear. Mouth with normal mucosa. Throat has no vesicles, no oropharyngeal exudates and uvula is midline. Airway patent, Nasal mucosa clear. Mouth with slightly dry mucosa. Throat has no vesicles, no oropharyngeal exudates and uvula is midline. Negative battles sign. No clinical evidence of facial injury.

## 2017-03-29 NOTE — ED PROVIDER NOTE - NS ED MD SCRIBE ATTENDING SCRIBE SECTIONS
DISPOSITION/HISTORY OF PRESENT ILLNESS/RESULTS/REVIEW OF SYSTEMS/PAST MEDICAL/SURGICAL/SOCIAL HISTORY/PROGRESS NOTE/PHYSICAL EXAM

## 2017-03-29 NOTE — ED PROVIDER NOTE - CARDIAC, MLM
Normal rate, regular rhythm.  Heart sounds S1, S2.  No murmurs, rubs or gallops. Radial pulse is +2. Irregularly, irregular rate and rhythm.  Heart sounds S1, S2.  No murmurs, rubs or gallops. Radial pulse is +2.

## 2017-03-29 NOTE — ED PROVIDER NOTE - MEDICAL DECISION MAKING DETAILS
75 y/o F +afib on coumadin presents s/p minor head injury, recent left tkr with pain in coccyx and left ankle. CT head and c-spine, xr of left ankle, left knee, and sacral coccyx, check labs incliding inr, zofran, tylenol, observe and reassess

## 2017-03-29 NOTE — ED PROVIDER NOTE - OBJECTIVE STATEMENT
75 y/o F with hx of CAD with coronary stent, afib, HTN, kidney carcinoma s/p right partial nephrectomy, and s/p knee replacement presents to the ED 75 y/o F with hx of CAD with coronary stent, afib on coumadin, HTN, RA, kidney carcinoma s/p right partial nephrectomy, and s/p left knee replacement, discharged from rehab 2 days ago presents to the ED s/p mechanical fall. Pt was walking with her walker and went to  something from the floor and fell, hitting her head on the back of a sofa. Pt currently c/o left ankle pain and coccyx pain. She also notes mild nausea since going for CT in ED. Currently pt has no other complaints and denies LOC, HA, CP and SOB. PMD= Elfenbein. Cardiologist= Rhonda.

## 2017-03-29 NOTE — ED PROVIDER NOTE - DIAGNOSIS COUNSELING, MDM
conducted a detailed discussion... I had a detailed discussion with the patient and daughters regarding the historical points, exam findings, and any diagnostic results supporting the discharge diagnosis.

## 2017-03-29 NOTE — ED PROVIDER NOTE - CARE PLAN
Principal Discharge DX:	Minor head injury, initial encounter  Secondary Diagnosis:	Sprain of left ankle, unspecified ligament, initial encounter  Secondary Diagnosis:	INR (international normal ratio) abnormal

## 2017-03-29 NOTE — ED PROVIDER NOTE - SECONDARY DIAGNOSIS.
Sprain of left ankle, unspecified ligament, initial encounter INR (international normal ratio) abnormal

## 2017-10-13 ENCOUNTER — TRANSCRIPTION ENCOUNTER (OUTPATIENT)
Age: 77
End: 2017-10-13

## 2017-10-27 ENCOUNTER — TRANSCRIPTION ENCOUNTER (OUTPATIENT)
Age: 77
End: 2017-10-27

## 2017-11-08 ENCOUNTER — TRANSCRIPTION ENCOUNTER (OUTPATIENT)
Age: 77
End: 2017-11-08

## 2017-12-07 ENCOUNTER — EMERGENCY (EMERGENCY)
Facility: HOSPITAL | Age: 77
LOS: 0 days | Discharge: HOME | End: 2017-12-07
Attending: EMERGENCY MEDICINE | Admitting: EMERGENCY MEDICINE
Payer: MEDICARE

## 2017-12-07 VITALS
WEIGHT: 119.93 LBS | DIASTOLIC BLOOD PRESSURE: 102 MMHG | RESPIRATION RATE: 16 BRPM | OXYGEN SATURATION: 100 % | TEMPERATURE: 98 F | SYSTOLIC BLOOD PRESSURE: 162 MMHG | HEART RATE: 64 BPM

## 2017-12-07 VITALS
OXYGEN SATURATION: 100 % | SYSTOLIC BLOOD PRESSURE: 145 MMHG | RESPIRATION RATE: 16 BRPM | TEMPERATURE: 98 F | DIASTOLIC BLOOD PRESSURE: 97 MMHG | HEART RATE: 86 BPM

## 2017-12-07 DIAGNOSIS — Z98.89 OTHER SPECIFIED POSTPROCEDURAL STATES: Chronic | ICD-10-CM

## 2017-12-07 DIAGNOSIS — Z98.62 PERIPHERAL VASCULAR ANGIOPLASTY STATUS: Chronic | ICD-10-CM

## 2017-12-07 DIAGNOSIS — Z90.5 ACQUIRED ABSENCE OF KIDNEY: Chronic | ICD-10-CM

## 2017-12-07 DIAGNOSIS — Z98.890 OTHER SPECIFIED POSTPROCEDURAL STATES: Chronic | ICD-10-CM

## 2017-12-07 DIAGNOSIS — Z98.49 CATARACT EXTRACTION STATUS, UNSPECIFIED EYE: Chronic | ICD-10-CM

## 2017-12-07 DIAGNOSIS — Z90.89 ACQUIRED ABSENCE OF OTHER ORGANS: Chronic | ICD-10-CM

## 2017-12-07 LAB
ALBUMIN SERPL ELPH-MCNC: 3.8 G/DL — SIGNIFICANT CHANGE UP (ref 3.3–5)
ALP SERPL-CCNC: 87 U/L — SIGNIFICANT CHANGE UP (ref 40–120)
ALT FLD-CCNC: 36 U/L — SIGNIFICANT CHANGE UP (ref 12–78)
ANION GAP SERPL CALC-SCNC: 8 MMOL/L — SIGNIFICANT CHANGE UP (ref 5–17)
APPEARANCE UR: CLEAR — SIGNIFICANT CHANGE UP
APTT BLD: 37 SEC — SIGNIFICANT CHANGE UP (ref 27.5–37.4)
AST SERPL-CCNC: 32 U/L — SIGNIFICANT CHANGE UP (ref 15–37)
BACTERIA # UR AUTO: NEGATIVE — SIGNIFICANT CHANGE UP
BASOPHILS # BLD AUTO: 0.1 K/UL — SIGNIFICANT CHANGE UP (ref 0–0.2)
BASOPHILS NFR BLD AUTO: 1.1 % — SIGNIFICANT CHANGE UP (ref 0–2)
BILIRUB SERPL-MCNC: 0.5 MG/DL — SIGNIFICANT CHANGE UP (ref 0.2–1.2)
BILIRUB UR-MCNC: NEGATIVE — SIGNIFICANT CHANGE UP
BLD GP AB SCN SERPL QL: SIGNIFICANT CHANGE UP
BUN SERPL-MCNC: 23 MG/DL — SIGNIFICANT CHANGE UP (ref 7–23)
CALCIUM SERPL-MCNC: 9.9 MG/DL — SIGNIFICANT CHANGE UP (ref 8.5–10.1)
CHLORIDE SERPL-SCNC: 103 MMOL/L — SIGNIFICANT CHANGE UP (ref 96–108)
CO2 SERPL-SCNC: 26 MMOL/L — SIGNIFICANT CHANGE UP (ref 22–31)
COLOR SPEC: YELLOW — SIGNIFICANT CHANGE UP
CREAT SERPL-MCNC: 1.28 MG/DL — SIGNIFICANT CHANGE UP (ref 0.5–1.3)
DIFF PNL FLD: NEGATIVE — SIGNIFICANT CHANGE UP
EOSINOPHIL # BLD AUTO: 0.1 K/UL — SIGNIFICANT CHANGE UP (ref 0–0.5)
EOSINOPHIL NFR BLD AUTO: 1.1 % — SIGNIFICANT CHANGE UP (ref 0–6)
EPI CELLS # UR: SIGNIFICANT CHANGE UP
GLUCOSE SERPL-MCNC: 97 MG/DL — SIGNIFICANT CHANGE UP (ref 70–99)
GLUCOSE UR QL: NEGATIVE MG/DL — SIGNIFICANT CHANGE UP
HCT VFR BLD CALC: 47.7 % — HIGH (ref 34.5–45)
HGB BLD-MCNC: 15.8 G/DL — HIGH (ref 11.5–15.5)
INR BLD: 1.77 RATIO — HIGH (ref 0.88–1.16)
KETONES UR-MCNC: (no result)
LEUKOCYTE ESTERASE UR-ACNC: (no result)
LYMPHOCYTES # BLD AUTO: 2.3 K/UL — SIGNIFICANT CHANGE UP (ref 1–3.3)
LYMPHOCYTES # BLD AUTO: 30.7 % — SIGNIFICANT CHANGE UP (ref 13–44)
MCHC RBC-ENTMCNC: 30.3 PG — SIGNIFICANT CHANGE UP (ref 27–34)
MCHC RBC-ENTMCNC: 33.1 GM/DL — SIGNIFICANT CHANGE UP (ref 32–36)
MCV RBC AUTO: 91.7 FL — SIGNIFICANT CHANGE UP (ref 80–100)
MONOCYTES # BLD AUTO: 0.5 K/UL — SIGNIFICANT CHANGE UP (ref 0–0.9)
MONOCYTES NFR BLD AUTO: 6.9 % — SIGNIFICANT CHANGE UP (ref 2–14)
NEUTROPHILS # BLD AUTO: 4.5 K/UL — SIGNIFICANT CHANGE UP (ref 1.8–7.4)
NEUTROPHILS NFR BLD AUTO: 60.1 % — SIGNIFICANT CHANGE UP (ref 43–77)
NITRITE UR-MCNC: NEGATIVE — SIGNIFICANT CHANGE UP
PH UR: 7 — SIGNIFICANT CHANGE UP (ref 5–8)
PLATELET # BLD AUTO: 226 K/UL — SIGNIFICANT CHANGE UP (ref 150–400)
POTASSIUM SERPL-MCNC: 4.2 MMOL/L — SIGNIFICANT CHANGE UP (ref 3.5–5.3)
POTASSIUM SERPL-SCNC: 4.2 MMOL/L — SIGNIFICANT CHANGE UP (ref 3.5–5.3)
PROT SERPL-MCNC: 8.2 GM/DL — SIGNIFICANT CHANGE UP (ref 6–8.3)
PROT UR-MCNC: 15 MG/DL
PROTHROM AB SERPL-ACNC: 19.3 SEC — HIGH (ref 9.8–12.7)
RBC # BLD: 5.2 M/UL — SIGNIFICANT CHANGE UP (ref 3.8–5.2)
RBC # FLD: 11.6 % — SIGNIFICANT CHANGE UP (ref 10.3–14.5)
RBC CASTS # UR COMP ASSIST: NEGATIVE /HPF — SIGNIFICANT CHANGE UP (ref 0–4)
SODIUM SERPL-SCNC: 137 MMOL/L — SIGNIFICANT CHANGE UP (ref 135–145)
SP GR SPEC: 1 — LOW (ref 1.01–1.02)
TYPE + AB SCN PNL BLD: SIGNIFICANT CHANGE UP
UROBILINOGEN FLD QL: NEGATIVE MG/DL — SIGNIFICANT CHANGE UP
WBC # BLD: 7.5 K/UL — SIGNIFICANT CHANGE UP (ref 3.8–10.5)
WBC # FLD AUTO: 7.5 K/UL — SIGNIFICANT CHANGE UP (ref 3.8–10.5)
WBC UR QL: SIGNIFICANT CHANGE UP

## 2017-12-07 PROCEDURE — 70450 CT HEAD/BRAIN W/O DYE: CPT | Mod: 26

## 2017-12-07 PROCEDURE — 72131 CT LUMBAR SPINE W/O DYE: CPT | Mod: 26

## 2017-12-07 PROCEDURE — 72125 CT NECK SPINE W/O DYE: CPT | Mod: 26

## 2017-12-07 PROCEDURE — 71010: CPT | Mod: 26

## 2017-12-07 PROCEDURE — 99285 EMERGENCY DEPT VISIT HI MDM: CPT

## 2017-12-07 NOTE — ED PROVIDER NOTE - OBJECTIVE STATEMENT
Trauma alert called. Exam begun at 1507, approximately 20 minutes after Pt's arrival.78 y/o F with PSHx of left knee surgery and PMHx of OA, CAD, Afib on Coumadin presents to the ED with daughter at bedside s/p fall today with potential head trauma. Pt had surgery on her left knee in 03/2017. Pt was in the kitchen and states as she was opening her microwave she lost her balance and fell backwards hitting her head against tile floor. Denies LOC. Denies any bleeding. Pt states she had a HA initially after fall but is now resolved. Pt takes Coumadin and last had INR checked this morning, results pending. Pt was unable to stand up after fall and required assistance from EMS. +back pain and neck pain which is chronic. Trauma alert called. Exam begun at 1507, approximately 20 minutes after Pt's arrival.78 y/o F with PSHx of left knee surgery and PMHx of OA, CAD, Afib on Coumadin presents to the ED with daughter at bedside s/p fall today with potential head trauma. Pt had surgery on her left knee in 03/2017. Pt was in the kitchen and states as she was opening her microwave she lost her balance and fell backwards hitting her head against tile floor. Denies LOC. Denies any bleeding. Pt states she had a HA initially after fall but is now resolved. Pt takes Coumadin and last had INR checked this morning, results pending. Pt was unable to stand up after fall and required assistance from EMS. +back pain and neck pain which is chronic. Pt is unsure if pain is new or chronic.

## 2017-12-07 NOTE — ED PROVIDER NOTE - SKIN, MLM
Skin normal color for race, warm, dry and intact. No obvious external evidence of trauma. Skin normal color for race, warm, dry and intact.

## 2017-12-07 NOTE — ED PROVIDER NOTE - CARDIAC, MLM
Normal rate, regular rhythm.  Heart sounds S1, S2.  No murmurs, rubs or gallops. Normal rate, regular rhythm. Chest wall stable, non-tender.

## 2017-12-07 NOTE — ED PROVIDER NOTE - CONSTITUTIONAL, MLM
normal... Well appearing, well nourished elderly white F. Awake, alert, oriented to person, place, time/situation and in no apparent distress. Not acutely ill. Not in respiratory distress.

## 2017-12-07 NOTE — ED PROVIDER NOTE - MUSCULOSKELETAL, MLM
Back with no midline or posterior wall tenderness/deformity. Pelvis stable. MAEx4 with no focal tenderness or deformity and good FROM. Good SLR B/L. B/L hips AFROM without pain. Back with no midline or posterior wall tenderness/deformity. No step offs. No bruising. Pelvis stable, non-tender. MAEx4 with no focal tenderness or deformity and good FROM. Good SLR 45 degrees B/L. B/L hips AROM without pain.

## 2017-12-07 NOTE — ED PROVIDER NOTE - DIAGNOSIS COUNSELING, MDM
conducted a detailed discussion... I had a detailed discussion with the patient and daughter regarding the historical points, exam findings, and any diagnostic results supporting the discharge/admit diagnosis. I had a detailed discussion with the patient and daughter regarding the historical points, exam findings, and any diagnostic results supporting the discharge diagnosis.

## 2017-12-07 NOTE — ED PROVIDER NOTE - ENMT, MLM
Head is normocephalic. Airway patent, Nasal mucosa clear. Mouth with normal mucosa. Throat has no vesicles, no oropharyngeal exudates and uvula is midline. No Guerin's signs. Head is normocephalic. Airway patent, Nasal mucosa clear. Mouth with normal mucosa. Throat has no vesicles, no oropharyngeal exudates and uvula is midline. No Guerin's signs. +small localized scalp hematoma, overlying skin intact. Neck non-tender and supple.

## 2017-12-07 NOTE — ED PROVIDER NOTE - MEDICAL DECISION MAKING DETAILS
Elderly white F BIBA from home s/p lost balance, mechanical fall backwards hitting back of head. Pt on Warfarin. No LOC or neuro compromise. Exam unremarkable. Normal neuro exam.  Plan: trauma alert, labs, CT head, CXR, XR pelvis, observe and re-assess.

## 2017-12-07 NOTE — ED PROVIDER NOTE - CARE PLAN
Principal Discharge DX:	Injury of head, initial encounter  Secondary Diagnosis:	Back contusion, unspecified laterality, initial encounter  Secondary Diagnosis:	Fall, initial encounter

## 2017-12-07 NOTE — ED PROVIDER NOTE - PMH
Anxiety disorder    Atrial fibrillation    CAD (coronary artery disease)    CAD (coronary artery disease)    Depressive disorder    HTN (hypertension)    Kidney carcinoma    Knee pain, left    OP (osteoporosis)    RA (rheumatoid arthritis)    RA (rheumatoid arthritis)    Renal cancer, right    Stented coronary artery    Stented coronary artery    Tachycardia    Takotsubo syndrome    Urge incontinence of urine

## 2017-12-07 NOTE — ED PROVIDER NOTE - NEUROLOGICAL, MLM
Alert and oriented, no focal deficits, no motor or sensory deficits. Alert and oriented, no focal deficits, no motor or sensory deficits. Distal motor and sensory normal.

## 2017-12-08 DIAGNOSIS — M06.9 RHEUMATOID ARTHRITIS, UNSPECIFIED: ICD-10-CM

## 2017-12-08 DIAGNOSIS — Z85.528 PERSONAL HISTORY OF OTHER MALIGNANT NEOPLASM OF KIDNEY: ICD-10-CM

## 2017-12-08 DIAGNOSIS — R51 HEADACHE: ICD-10-CM

## 2017-12-08 DIAGNOSIS — M81.0 AGE-RELATED OSTEOPOROSIS WITHOUT CURRENT PATHOLOGICAL FRACTURE: ICD-10-CM

## 2017-12-08 DIAGNOSIS — Z79.82 LONG TERM (CURRENT) USE OF ASPIRIN: ICD-10-CM

## 2017-12-08 DIAGNOSIS — S30.0XXA CONTUSION OF LOWER BACK AND PELVIS, INITIAL ENCOUNTER: ICD-10-CM

## 2017-12-08 DIAGNOSIS — I51.81 TAKOTSUBO SYNDROME: ICD-10-CM

## 2017-12-08 DIAGNOSIS — Z98.61 CORONARY ANGIOPLASTY STATUS: ICD-10-CM

## 2017-12-08 DIAGNOSIS — Z98.41 CATARACT EXTRACTION STATUS, RIGHT EYE: ICD-10-CM

## 2017-12-08 DIAGNOSIS — I67.89 OTHER CEREBROVASCULAR DISEASE: ICD-10-CM
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
## 2017-12-08 DIAGNOSIS — F41.9 ANXIETY DISORDER, UNSPECIFIED: ICD-10-CM

## 2017-12-08 DIAGNOSIS — I10 ESSENTIAL (PRIMARY) HYPERTENSION: ICD-10-CM

## 2017-12-08 DIAGNOSIS — I25.10 ATHEROSCLEROTIC HEART DISEASE OF NATIVE CORONARY ARTERY WITHOUT ANGINA PECTORIS: ICD-10-CM

## 2017-12-08 DIAGNOSIS — Z98.42 CATARACT EXTRACTION STATUS, LEFT EYE: ICD-10-CM

## 2017-12-08 DIAGNOSIS — Z90.89 ACQUIRED ABSENCE OF OTHER ORGANS: ICD-10-CM

## 2017-12-08 DIAGNOSIS — Z79.01 LONG TERM (CURRENT) USE OF ANTICOAGULANTS: ICD-10-CM

## 2017-12-08 DIAGNOSIS — Z98.890 OTHER SPECIFIED POSTPROCEDURAL STATES: ICD-10-CM

## 2017-12-08 DIAGNOSIS — Z88.0 ALLERGY STATUS TO PENICILLIN: ICD-10-CM

## 2017-12-08 DIAGNOSIS — F32.9 MAJOR DEPRESSIVE DISORDER, SINGLE EPISODE, UNSPECIFIED: ICD-10-CM

## 2017-12-08 DIAGNOSIS — I48.91 UNSPECIFIED ATRIAL FIBRILLATION: ICD-10-CM

## 2017-12-08 DIAGNOSIS — Z88.8 ALLERGY STATUS TO OTHER DRUGS, MEDICAMENTS AND BIOLOGICAL SUBSTANCES STATUS: ICD-10-CM

## 2017-12-08 DIAGNOSIS — Z82.49 FAMILY HISTORY OF ISCHEMIC HEART DISEASE AND OTHER DISEASES OF THE CIRCULATORY SYSTEM: ICD-10-CM
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
## 2017-12-08 DIAGNOSIS — S09.90XA UNSPECIFIED INJURY OF HEAD, INITIAL ENCOUNTER: ICD-10-CM

## 2017-12-08 DIAGNOSIS — Z90.5 ACQUIRED ABSENCE OF KIDNEY: ICD-10-CM

## 2018-03-16 ENCOUNTER — TRANSCRIPTION ENCOUNTER (OUTPATIENT)
Age: 78
End: 2018-03-16

## 2018-03-30 ENCOUNTER — TRANSCRIPTION ENCOUNTER (OUTPATIENT)
Age: 78
End: 2018-03-30

## 2018-04-03 ENCOUNTER — APPOINTMENT (OUTPATIENT)
Dept: UROLOGY | Facility: CLINIC | Age: 78
End: 2018-04-03
Payer: MEDICARE

## 2018-04-03 DIAGNOSIS — R31.29 OTHER MICROSCOPIC HEMATURIA: ICD-10-CM

## 2018-04-03 PROCEDURE — 99214 OFFICE O/P EST MOD 30 MIN: CPT

## 2018-04-04 LAB
APPEARANCE: CLEAR
BACTERIA: NEGATIVE
BILIRUBIN URINE: NEGATIVE
BLOOD URINE: NEGATIVE
COLOR: ABNORMAL
CORE LAB FLUID CYTOLOGY: NORMAL
GLUCOSE QUALITATIVE U: NEGATIVE MG/DL
HYALINE CASTS: 9 /LPF
KETONES URINE: ABNORMAL
LEUKOCYTE ESTERASE URINE: ABNORMAL
MICROSCOPIC-UA: NORMAL
NITRITE URINE: NEGATIVE
PH URINE: 5
PROTEIN URINE: ABNORMAL MG/DL
RED BLOOD CELLS URINE: 8 /HPF
SPECIFIC GRAVITY URINE: 1.02
SQUAMOUS EPITHELIAL CELLS: 6 /HPF
UROBILINOGEN URINE: NEGATIVE MG/DL
WHITE BLOOD CELLS URINE: 15 /HPF

## 2018-04-06 ENCOUNTER — INPATIENT (INPATIENT)
Facility: HOSPITAL | Age: 78
LOS: 0 days | Discharge: HOME HEALTH | End: 2018-04-07
Attending: FAMILY MEDICINE | Admitting: FAMILY MEDICINE
Payer: MEDICARE

## 2018-04-06 VITALS
WEIGHT: 119.93 LBS | SYSTOLIC BLOOD PRESSURE: 145 MMHG | OXYGEN SATURATION: 100 % | HEIGHT: 64 IN | DIASTOLIC BLOOD PRESSURE: 79 MMHG | RESPIRATION RATE: 18 BRPM | TEMPERATURE: 99 F | HEART RATE: 74 BPM

## 2018-04-06 DIAGNOSIS — Z90.5 ACQUIRED ABSENCE OF KIDNEY: Chronic | ICD-10-CM

## 2018-04-06 DIAGNOSIS — Z98.890 OTHER SPECIFIED POSTPROCEDURAL STATES: Chronic | ICD-10-CM

## 2018-04-06 DIAGNOSIS — Z98.49 CATARACT EXTRACTION STATUS, UNSPECIFIED EYE: Chronic | ICD-10-CM

## 2018-04-06 DIAGNOSIS — Z90.89 ACQUIRED ABSENCE OF OTHER ORGANS: Chronic | ICD-10-CM

## 2018-04-06 DIAGNOSIS — Z98.62 PERIPHERAL VASCULAR ANGIOPLASTY STATUS: Chronic | ICD-10-CM

## 2018-04-06 DIAGNOSIS — Z98.89 OTHER SPECIFIED POSTPROCEDURAL STATES: Chronic | ICD-10-CM

## 2018-04-06 LAB
ALBUMIN SERPL ELPH-MCNC: 3.3 G/DL — SIGNIFICANT CHANGE UP (ref 3.3–5)
ALP SERPL-CCNC: 82 U/L — SIGNIFICANT CHANGE UP (ref 40–120)
ALT FLD-CCNC: 31 U/L — SIGNIFICANT CHANGE UP (ref 12–78)
ANION GAP SERPL CALC-SCNC: 8 MMOL/L — SIGNIFICANT CHANGE UP (ref 5–17)
APTT BLD: 27.7 SEC — SIGNIFICANT CHANGE UP (ref 27.5–37.4)
AST SERPL-CCNC: 22 U/L — SIGNIFICANT CHANGE UP (ref 15–37)
BASOPHILS # BLD AUTO: 0.04 K/UL — SIGNIFICANT CHANGE UP (ref 0–0.2)
BASOPHILS NFR BLD AUTO: 0.6 % — SIGNIFICANT CHANGE UP (ref 0–2)
BILIRUB SERPL-MCNC: 0.4 MG/DL — SIGNIFICANT CHANGE UP (ref 0.2–1.2)
BLD GP AB SCN SERPL QL: SIGNIFICANT CHANGE UP
BUN SERPL-MCNC: 35 MG/DL — HIGH (ref 7–23)
CALCIUM SERPL-MCNC: 9.3 MG/DL — SIGNIFICANT CHANGE UP (ref 8.5–10.1)
CHLORIDE SERPL-SCNC: 102 MMOL/L — SIGNIFICANT CHANGE UP (ref 96–108)
CK SERPL-CCNC: 79 U/L — SIGNIFICANT CHANGE UP (ref 26–192)
CO2 SERPL-SCNC: 27 MMOL/L — SIGNIFICANT CHANGE UP (ref 22–31)
CREAT SERPL-MCNC: 1.29 MG/DL — SIGNIFICANT CHANGE UP (ref 0.5–1.3)
DIGOXIN SERPL-MCNC: 0.85 NG/ML — SIGNIFICANT CHANGE UP (ref 0.8–2)
EOSINOPHIL # BLD AUTO: 0.09 K/UL — SIGNIFICANT CHANGE UP (ref 0–0.5)
EOSINOPHIL NFR BLD AUTO: 1.3 % — SIGNIFICANT CHANGE UP (ref 0–6)
GLUCOSE SERPL-MCNC: 85 MG/DL — SIGNIFICANT CHANGE UP (ref 70–99)
HCT VFR BLD CALC: 43.2 % — SIGNIFICANT CHANGE UP (ref 34.5–45)
HGB BLD-MCNC: 14.1 G/DL — SIGNIFICANT CHANGE UP (ref 11.5–15.5)
IMM GRANULOCYTES NFR BLD AUTO: 0.4 % — SIGNIFICANT CHANGE UP (ref 0–1.5)
INR BLD: 1.56 RATIO — HIGH (ref 0.88–1.16)
LYMPHOCYTES # BLD AUTO: 1.83 K/UL — SIGNIFICANT CHANGE UP (ref 1–3.3)
LYMPHOCYTES # BLD AUTO: 26 % — SIGNIFICANT CHANGE UP (ref 13–44)
MCHC RBC-ENTMCNC: 30.5 PG — SIGNIFICANT CHANGE UP (ref 27–34)
MCHC RBC-ENTMCNC: 32.6 GM/DL — SIGNIFICANT CHANGE UP (ref 32–36)
MCV RBC AUTO: 93.5 FL — SIGNIFICANT CHANGE UP (ref 80–100)
MONOCYTES # BLD AUTO: 0.75 K/UL — SIGNIFICANT CHANGE UP (ref 0–0.9)
MONOCYTES NFR BLD AUTO: 10.6 % — SIGNIFICANT CHANGE UP (ref 2–14)
NEUTROPHILS # BLD AUTO: 4.31 K/UL — SIGNIFICANT CHANGE UP (ref 1.8–7.4)
NEUTROPHILS NFR BLD AUTO: 61.1 % — SIGNIFICANT CHANGE UP (ref 43–77)
NRBC # BLD: 0 /100 WBCS — SIGNIFICANT CHANGE UP (ref 0–0)
PLATELET # BLD AUTO: 203 K/UL — SIGNIFICANT CHANGE UP (ref 150–400)
POTASSIUM SERPL-MCNC: 4.6 MMOL/L — SIGNIFICANT CHANGE UP (ref 3.5–5.3)
POTASSIUM SERPL-SCNC: 4.6 MMOL/L — SIGNIFICANT CHANGE UP (ref 3.5–5.3)
PROT SERPL-MCNC: 6.9 GM/DL — SIGNIFICANT CHANGE UP (ref 6–8.3)
PROTHROM AB SERPL-ACNC: 17 SEC — HIGH (ref 9.8–12.7)
RBC # BLD: 4.62 M/UL — SIGNIFICANT CHANGE UP (ref 3.8–5.2)
RBC # FLD: 12.4 % — SIGNIFICANT CHANGE UP (ref 10.3–14.5)
SODIUM SERPL-SCNC: 137 MMOL/L — SIGNIFICANT CHANGE UP (ref 135–145)
TROPONIN I SERPL-MCNC: <0.015 NG/ML — SIGNIFICANT CHANGE UP (ref 0.01–0.04)
TSH SERPL-MCNC: 0.95 UIU/ML — SIGNIFICANT CHANGE UP (ref 0.36–3.74)
TYPE + AB SCN PNL BLD: SIGNIFICANT CHANGE UP
WBC # BLD: 7.05 K/UL — SIGNIFICANT CHANGE UP (ref 3.8–10.5)
WBC # FLD AUTO: 7.05 K/UL — SIGNIFICANT CHANGE UP (ref 3.8–10.5)

## 2018-04-06 PROCEDURE — 70551 MRI BRAIN STEM W/O DYE: CPT | Mod: 26

## 2018-04-06 PROCEDURE — 99285 EMERGENCY DEPT VISIT HI MDM: CPT

## 2018-04-06 PROCEDURE — 70496 CT ANGIOGRAPHY HEAD: CPT | Mod: 26

## 2018-04-06 PROCEDURE — 71045 X-RAY EXAM CHEST 1 VIEW: CPT | Mod: 26

## 2018-04-06 PROCEDURE — 93010 ELECTROCARDIOGRAM REPORT: CPT

## 2018-04-06 RX ORDER — ATORVASTATIN CALCIUM 80 MG/1
20 TABLET, FILM COATED ORAL AT BEDTIME
Qty: 0 | Refills: 0 | Status: DISCONTINUED | OUTPATIENT
Start: 2018-04-06 | End: 2018-04-07

## 2018-04-06 RX ORDER — ENOXAPARIN SODIUM 100 MG/ML
60 INJECTION SUBCUTANEOUS
Qty: 0 | Refills: 0 | Status: DISCONTINUED | OUTPATIENT
Start: 2018-04-06 | End: 2018-04-07

## 2018-04-06 RX ORDER — DULOXETINE HYDROCHLORIDE 30 MG/1
20 CAPSULE, DELAYED RELEASE ORAL DAILY
Qty: 0 | Refills: 0 | Status: DISCONTINUED | OUTPATIENT
Start: 2018-04-06 | End: 2018-04-07

## 2018-04-06 RX ORDER — ACETAMINOPHEN 500 MG
650 TABLET ORAL EVERY 6 HOURS
Qty: 0 | Refills: 0 | Status: DISCONTINUED | OUTPATIENT
Start: 2018-04-06 | End: 2018-04-07

## 2018-04-06 RX ORDER — ASPIRIN/CALCIUM CARB/MAGNESIUM 324 MG
81 TABLET ORAL DAILY
Qty: 0 | Refills: 0 | Status: DISCONTINUED | OUTPATIENT
Start: 2018-04-06 | End: 2018-04-07

## 2018-04-06 RX ORDER — WARFARIN SODIUM 2.5 MG/1
7.5 TABLET ORAL ONCE
Qty: 0 | Refills: 0 | Status: COMPLETED | OUTPATIENT
Start: 2018-04-06 | End: 2018-04-06

## 2018-04-06 RX ORDER — ATENOLOL 25 MG/1
25 TABLET ORAL DAILY
Qty: 0 | Refills: 0 | Status: DISCONTINUED | OUTPATIENT
Start: 2018-04-06 | End: 2018-04-07

## 2018-04-06 RX ORDER — DIGOXIN 250 MCG
0.12 TABLET ORAL DAILY
Qty: 0 | Refills: 0 | Status: DISCONTINUED | OUTPATIENT
Start: 2018-04-06 | End: 2018-04-07

## 2018-04-06 RX ORDER — SERTRALINE 25 MG/1
25 TABLET, FILM COATED ORAL DAILY
Qty: 0 | Refills: 0 | Status: DISCONTINUED | OUTPATIENT
Start: 2018-04-06 | End: 2018-04-07

## 2018-04-06 RX ORDER — OXYBUTYNIN CHLORIDE 5 MG
5 TABLET ORAL DAILY
Qty: 0 | Refills: 0 | Status: DISCONTINUED | OUTPATIENT
Start: 2018-04-06 | End: 2018-04-07

## 2018-04-06 RX ORDER — PANTOPRAZOLE SODIUM 20 MG/1
40 TABLET, DELAYED RELEASE ORAL
Qty: 0 | Refills: 0 | Status: DISCONTINUED | OUTPATIENT
Start: 2018-04-06 | End: 2018-04-07

## 2018-04-06 RX ADMIN — Medication 81 MILLIGRAM(S): at 17:30

## 2018-04-06 RX ADMIN — ENOXAPARIN SODIUM 60 MILLIGRAM(S): 100 INJECTION SUBCUTANEOUS at 22:21

## 2018-04-06 RX ADMIN — WARFARIN SODIUM 7.5 MILLIGRAM(S): 2.5 TABLET ORAL at 22:21

## 2018-04-06 RX ADMIN — Medication 650 MILLIGRAM(S): at 13:37

## 2018-04-06 RX ADMIN — ATORVASTATIN CALCIUM 20 MILLIGRAM(S): 80 TABLET, FILM COATED ORAL at 22:21

## 2018-04-06 RX ADMIN — Medication 2.5 MILLIGRAM(S): at 17:31

## 2018-04-06 RX ADMIN — Medication 1 TABLET(S): at 17:30

## 2018-04-06 RX ADMIN — ATENOLOL 25 MILLIGRAM(S): 25 TABLET ORAL at 17:30

## 2018-04-06 RX ADMIN — Medication 0.12 MILLIGRAM(S): at 17:30

## 2018-04-06 NOTE — H&P ADULT - HISTORY OF PRESENT ILLNESS
78 yo female with PMH of A. fib (on coumadin), CAD s/p stent, HTN, RA (on prednisone), GERD, h/o left renal CA presents to ED with complaint of difficulty finding words. Pt states about 15-20 minutes prior to arrival to the ED she developed word finding difficulty. She knew what she wanted to say but could not get the words out. She was on her way to the [employer] [facility] when she was with her friend who is a EMT and was brought directly to the ED. She does not she had some blurry vision at the time and slightly slurred speech. No facial droop noted. Does have a mild posterior headache. No paresthesias or weakness in extremities. Denies any fevers, chills, chest pain, palpitations, abd pain, N/V, SOB, dysuria. All symptoms resolved while in ED. Episode last maybe 30-60 minutes as per patient. Of note pt was found to have a subtherapeutic INR of 1.24 on 4/5 and was advised to increase coumadin to 7.5mg for 2 nights. She took the increase dose of coumadin the night prior to admission.     In ED CT head negative negative for acute infarct. All symptoms resolved so no TPA was given.

## 2018-04-06 NOTE — ED PROVIDER NOTE - PROGRESS NOTE DETAILS
Cedric Dais on behalf of Attending Dr. Lu. Pt re-assessed. Pt states that speech is better than initial arrival in the ED. Cedric Dias on behalf of Attending Dr. Lu. Pt re-assessed. Expressive aphasia resolved. Speech normal. Pt seen by neuro. Hold off on tPA. If INR below 4.7 and sx return will administer tPA. Consider admission. Cedric Dias on behalf of Attending Dr. Lu. Pt re-assessed. Expressive aphasia resolved. Speech normal. Pt seen by neuro. Hold off on tPA. If INR below 1.7 and sx return will administer tPA. Consider admission. Cedric Dias on behalf of Attending Dr. Lu. Pt re-assessed. Expressive aphasia resolved. Speech normal. Pt seen by Dr. Wood, neuro. Hold off on tPA. If INR below 1.7 and sx return will administer tPA. Consider admission.

## 2018-04-06 NOTE — CONSULT NOTE ADULT - SUBJECTIVE AND OBJECTIVE BOX
Patient is a 77y old  Female who presents with a chief complaint of speech difficulties with expressive aphasia  called for Code stroke     HPI:  · HPI Objective Statement: Pt seen upon arrival at ED at 0950. 76 y/o F with PMHx of HTN, CAD, Afib on Coumadin, renal CA and RA BIB by daughter's friend and daughter for expressive aphasia started 15-20 minutes prior to arrival to the ED. Pt's friend is an EMT and noted expressive aphasia described as difficulty finding her words. Pt was driving to a [employer] with a friend of her daughter's when friend's daughter noted onset of sx. +HA. Pt takes Coumadin and last INR was last night at a level of 1.4. Pt was given a bigger dose of Coumadin. Denies hx of bleeding complications. Denies any recent trauma. Denies any hx of brain surgeries. Pt lives with her daughter s/p knee surgery. No PCP at this time. Pt scheduled to see Dr. Samson. Dr Devaughn Ellis, cardio at [facility]. Also seen by   Neurologist for memory problems last year had w/u including MRI's and other tests but not any meds given. Was told that she had mini strokes on MRI scan. Speech improved while examining her and pt able to talk better and c/o mild left sided headaches.	      PAST MEDICAL & SURGICAL HISTORY:  Takotsubo syndrome  Stented coronary artery  CAD (coronary artery disease)  Knee pain, left  Tachycardia  Atrial fibrillation  Urge incontinence of urine  Depressive disorder  Anxiety disorder  Kidney carcinoma  HTN (hypertension)  RA (rheumatoid arthritis)  OP (osteoporosis)  Renal cancer, right  RA (rheumatoid arthritis)  Stented coronary artery  CAD (coronary artery disease)  S/P colonoscopy  H/O breast biopsy: right?  S/P tonsillectomy: 1948  History of cataract surgery, unspecified laterality: b/l 2016  H/O partial nephrectomy: right 2006  S/P angioplasty: 1 stent 1998  S/P tonsillectomy  History of percutaneous coronary intervention: stent RCA      FAMILY HISTORY:  Family history of other heart disease (Mother)  Family history of MI (myocardial infarction) (Father)  No pertinent family history in first degree relatives      Social Hx:  Nonsmoker, no drug or alcohol use    MEDICATIONS  (STANDING):       Allergies    penicillin (Hives)  penicillin (Unknown)      statins (Muscle Pain)      ROS: Pertinent positives in HPI, all other ROS were reviewed and are negative.      Vital Signs Last 24 Hrs  T(C): 37.1 (06 Apr 2018 10:00), Max: 37.2 (06 Apr 2018 09:49)  T(F): 98.7 (06 Apr 2018 10:00), Max: 98.9 (06 Apr 2018 09:49)  HR: 89 (06 Apr 2018 12:01) (74 - 89)  BP: 137/99 (06 Apr 2018 12:01) (135/97 - 152/92)  BP(mean): --  RR: 20 (06 Apr 2018 12:01) (17 - 22)  SpO2: 100% (06 Apr 2018 12:01) (98% - 100%)        Constitutional: awake and alert.  HEENT: PERRLA, EOMI,   Neck: Supple.  Respiratory: Breath sounds are clear bilaterally  Cardiovascular: S1 and S2, irregular rhythm  Gastrointestinal: soft, nontender  Extremities:  no edema  Vascular:  Carotid Bruit - no  Musculoskeletal: no joint swelling/tenderness, no abnormal movements  Skin: No rashes    Neurological exam:  HF: A x O x 3. Anxious Appropriately interactive, normal affect. Speech fluent, No Aphasia  Naming /repetition intact   CN: ROB, EOMI, VFF, facial sensation normal, no NLFD, gag intact  Motor: No pronator drift, Strength 5/5 in all 4 ext, normal bulk and tone, no tremors.   Sens: Intact to light touch     Reflexes: Symmetric and normal . BJ 2+, BR 2+, KJ 2+, AJ 1+, downgoing toes b/l  Coord:  No FNFA, dysmetria .  Gait/Balance:  Cannot test    NIHSS: 0      Labs:   04-06    137  |  102  |  35<H>  ----------------------------<  85  4.6   |  27  |  1.29    Ca    9.3      06 Apr 2018 10:27    TPro  6.9  /  Alb  3.3  /  TBili  0.4  /  DBili  x   /  AST  22  /  ALT  31  /  AlkPhos  82  04-06                              14.1   7.05  )-----------( 203      ( 06 Apr 2018 10:27 )             43.2       Radiology:  - CT with CTA brain  Head:4/6/18  < from: CT Brain Stroke Protocol (04.06.18 @ 10:23) >  IMPRESSION:  Noncontrast head CT:   Stable exam. No acute intracranial hemorrhage, mass effect or evidence of   acute territorial infarct. Chronic findings as above.    CTA head and neck:   No major vessel occlusion, hemodynamically significant stenosis,   dissection or aneurysm.            No IV tpa given because… sx resolved rapidly

## 2018-04-06 NOTE — ED PROVIDER NOTE - OBJECTIVE STATEMENT
Pt seen upon arrival at ED at 0950. 78 y/o F with PMHx of HTN, CAD, Afib on Coumadin, renal CA and RA BIB by daughter's friend and daughter for expressive aphasia started 15-20 minutes prior to arrival to the ED. Pt's friend is an EMT and noted expressive aphasia described as difficulty finding her words. Pt was driving to a [employer] center with a friend of her daughter's when friend's daughter noted onset of sx. +HA. Pt takes Coumadin and last INR was last night at a level of 1.4. Pt was given a bigger dose of Coumadin. Denies hx of bleeding complications. Denies any recent trauma. Denies any hx of brain surgeries. Pt lives with her daughter s/p knee surgery. Pt seen upon arrival at ED at 0950. 78 y/o F with PMHx of HTN, CAD, Afib on Coumadin, renal CA and RA BIB by daughter's friend and daughter for expressive aphasia started 15-20 minutes prior to arrival to the ED. Pt's friend is an EMT and noted expressive aphasia described as difficulty finding her words. Pt was driving to a [employer] center with a friend of her daughter's when friend's daughter noted onset of sx. +HA. Pt takes Coumadin and last INR was last night at a level of 1.4. Pt was given a bigger dose of Coumadin. Denies hx of bleeding complications. Denies any recent trauma. Denies any hx of brain surgeries. Pt lives with her daughter s/p knee surgery. No PCP at this time. Pt scheduled to see Dr. Samson. Dr Devaughn Ellis, cardio at [facility].

## 2018-04-06 NOTE — ED PROVIDER NOTE - MEDICAL DECISION MAKING DETAILS
Code stroke protocol. Will check CT head w/o contrast and CTA head. Plan to check labs and re-assess.

## 2018-04-06 NOTE — ED ADULT TRIAGE NOTE - CHIEF COMPLAINT QUOTE
C/O difficulty finding words today. Patient reporting blurry vision x 20-30 minutes ago. Patient noted to be having a hard time talking.

## 2018-04-06 NOTE — H&P ADULT - FAMILY HISTORY
No pertinent family history in first degree relatives     Father  Still living? No  Family history of MI (myocardial infarction), Age at diagnosis: Age Unknown     Mother  Still living? No  Family history of other heart disease, Age at diagnosis: Age Unknown

## 2018-04-06 NOTE — CONSULT NOTE ADULT - ASSESSMENT
76 y/o F with PMHx of HTN, CAD, Afib on Coumadin, renal CA and RA BIB by daughter's friend and daughter for expressive aphasia with code stroke called with rapidly resolved sx   TPA was not given. as her NIH stroke scores were 0.   Most likely has TIA with sub therapeutic PT/INR 1.56  Admit to telemetry  REc Maintain adequate PT/INR bet 2.5-3.5   F/u with cardiology  MRI brain.  If no acute infarct bridge with Lovenox.  Correct underlying metabolic causes.  control HTN/BS.  adjust  statin.  F/u by On call neurology tomorrow.

## 2018-04-06 NOTE — H&P ADULT - ASSESSMENT
76 yo female with PMH of A. fib (on coumadin), CAD s/p stent, HTN, RA (on prednisone), GERD, h/o left renal CA presents to ED with complaint of difficulty finding words. Pt states about 15-20 minutes prior to arrival to the ED she developed word finding difficulty. She knew what she wanted to say but could not get the words out. She was on her way to the [employer] [place] when she was with her friend who is a EMT and was brought directly to the ED. She does not she had some blurry vision at the time and slightly slurred speech. No facial droop noted. Does have a mild posterior headache. No paresthesias or weakness in extremities. Denies any fevers, chills, chest pain, palpitations, abd pain, N/V, SOB, dysuria. All symptoms resolved while in ED. Episode last maybe 30-60 minutes as per patient. Of note pt was found to have a subtherapeutic INR of 1.24 on 4/5 and was advised to increase coumadin to 7.5mg for 2 nights. She took the increase dose of coumadin the night prior to admission.     In ED CT head negative negative for acute infarct. All symptoms resolved so no TPA was given.     #Expressive aphasia - resolved, likely TIA  - admit to med-surg  - CT head negative for acute infarct, CTA with no significant stenosis  - ASA daily  - start statin (pt listed with intolerance to statin, when questioned states she did have some body aches in the past but she did not believe it was from a statin)  - echo  - MRI head  - neuro checks q4h  - if symptoms recur pt candidate for TPA as INR <1.7 and no other contraindications    #A. fib  - currently rate controlled  - continue digoxin and atenolol  - check digoxin level  - continue coumadin with goal INR 2-3, will give increased dose of 7.5mg tonight (please redose in AM per INR), will bridge with lovenox until INR >2     #HTN  - continue enalapril, atenolol    #RA  - continue prednisone 5mg daily    #CAD s/p stent  - stable    #GERD  - continue PPI    #DVT prophylaxis  - on coumadin with lovenox bridging 78 yo female with PMH of A. fib (on coumadin), CAD s/p stent, HTN, RA (on prednisone), GERD, h/o left renal CA presents to ED with complaint of difficulty finding words. Pt states about 15-20 minutes prior to arrival to the ED she developed word finding difficulty. She knew what she wanted to say but could not get the words out. She was on her way to the [employer] group when she was with her friend who is a EMT and was brought directly to the ED. She does not she had some blurry vision at the time and slightly slurred speech. No facial droop noted. Does have a mild posterior headache. No paresthesias or weakness in extremities. Denies any fevers, chills, chest pain, palpitations, abd pain, N/V, SOB, dysuria. All symptoms resolved while in ED. Episode last maybe 30-60 minutes as per patient. Of note pt was found to have a subtherapeutic INR of 1.24 on 4/5 and was advised to increase coumadin to 7.5mg for 2 nights. She took the increase dose of coumadin the night prior to admission.     In ED CT head negative negative for acute infarct. All symptoms resolved so no TPA was given.     #Expressive aphasia - resolved, likely TIA  - admit to med-surg  - NIHSS = 0 at time of admission  - CT head negative for acute infarct, CTA with no significant stenosis  - ASA daily  - start statin (pt listed with intolerance to statin, when questioned states she did have some body aches in the past but she did not believe it was from a statin)  - echo  - MRI head  - neuro checks q4h  - if symptoms recur pt candidate for TPA as INR <1.7 and no other contraindications    #A. fib  - currently rate controlled  - continue digoxin and atenolol  - check digoxin level  - continue coumadin with goal INR 2-3, will give increased dose of 7.5mg tonight (please redose in AM per INR), will bridge with lovenox until INR >2 as no acute CVA on CT head (if acute CVA on MRI will stop lovenox and continue coumadin only)    #HTN  - continue enalapril, atenolol    #RA  - continue prednisone 5mg daily    #CAD s/p stent  - stable    #GERD  - continue PPI    #DVT prophylaxis  - on coumadin with lovenox bridging

## 2018-04-06 NOTE — H&P ADULT - PSH
H/O breast biopsy  right?  H/O partial nephrectomy  right 2006  History of cataract surgery, unspecified laterality  b/l 2016  History of percutaneous coronary intervention  stent RCA  S/P angioplasty  1 stent 1998  S/P colonoscopy    S/P tonsillectomy  1948  S/P tonsillectomy

## 2018-04-06 NOTE — H&P ADULT - NSHPPHYSICALEXAM_GEN_ALL_CORE
Vital Signs Last 24 Hrs  T(C): 37.1 (06 Apr 2018 10:00), Max: 37.2 (06 Apr 2018 09:49)  T(F): 98.7 (06 Apr 2018 10:00), Max: 98.9 (06 Apr 2018 09:49)  HR: 89 (06 Apr 2018 12:01) (74 - 89)  BP: 137/99 (06 Apr 2018 12:01) (135/97 - 152/92)  BP(mean): --  RR: 20 (06 Apr 2018 12:01) (17 - 22)  SpO2: 100% (06 Apr 2018 12:01) (98% - 100%)

## 2018-04-07 ENCOUNTER — TRANSCRIPTION ENCOUNTER (OUTPATIENT)
Age: 78
End: 2018-04-07

## 2018-04-07 VITALS
OXYGEN SATURATION: 99 % | HEART RATE: 54 BPM | SYSTOLIC BLOOD PRESSURE: 117 MMHG | RESPIRATION RATE: 18 BRPM | TEMPERATURE: 98 F | DIASTOLIC BLOOD PRESSURE: 72 MMHG

## 2018-04-07 LAB
ANION GAP SERPL CALC-SCNC: 7 MMOL/L — SIGNIFICANT CHANGE UP (ref 5–17)
BASOPHILS # BLD AUTO: 0.06 K/UL — SIGNIFICANT CHANGE UP (ref 0–0.2)
BASOPHILS NFR BLD AUTO: 0.9 % — SIGNIFICANT CHANGE UP (ref 0–2)
BUN SERPL-MCNC: 32 MG/DL — HIGH (ref 7–23)
CALCIUM SERPL-MCNC: 9 MG/DL — SIGNIFICANT CHANGE UP (ref 8.5–10.1)
CHLORIDE SERPL-SCNC: 105 MMOL/L — SIGNIFICANT CHANGE UP (ref 96–108)
CHOLEST SERPL-MCNC: 192 MG/DL — SIGNIFICANT CHANGE UP (ref 10–199)
CO2 SERPL-SCNC: 27 MMOL/L — SIGNIFICANT CHANGE UP (ref 22–31)
CREAT SERPL-MCNC: 1.19 MG/DL — SIGNIFICANT CHANGE UP (ref 0.5–1.3)
EOSINOPHIL # BLD AUTO: 0.28 K/UL — SIGNIFICANT CHANGE UP (ref 0–0.5)
EOSINOPHIL NFR BLD AUTO: 4.3 % — SIGNIFICANT CHANGE UP (ref 0–6)
GLUCOSE SERPL-MCNC: 89 MG/DL — SIGNIFICANT CHANGE UP (ref 70–99)
HBA1C BLD-MCNC: 6 % — HIGH (ref 4–5.6)
HCT VFR BLD CALC: 39.3 % — SIGNIFICANT CHANGE UP (ref 34.5–45)
HDLC SERPL-MCNC: 49 MG/DL — SIGNIFICANT CHANGE UP (ref 40–125)
HGB BLD-MCNC: 13.1 G/DL — SIGNIFICANT CHANGE UP (ref 11.5–15.5)
IMM GRANULOCYTES NFR BLD AUTO: 0.5 % — SIGNIFICANT CHANGE UP (ref 0–1.5)
INR BLD: 2.06 RATIO — HIGH (ref 0.88–1.16)
LIPID PNL WITH DIRECT LDL SERPL: 122 MG/DL — SIGNIFICANT CHANGE UP
LYMPHOCYTES # BLD AUTO: 2.21 K/UL — SIGNIFICANT CHANGE UP (ref 1–3.3)
LYMPHOCYTES # BLD AUTO: 34.2 % — SIGNIFICANT CHANGE UP (ref 13–44)
MCHC RBC-ENTMCNC: 31 PG — SIGNIFICANT CHANGE UP (ref 27–34)
MCHC RBC-ENTMCNC: 33.3 GM/DL — SIGNIFICANT CHANGE UP (ref 32–36)
MCV RBC AUTO: 92.9 FL — SIGNIFICANT CHANGE UP (ref 80–100)
MONOCYTES # BLD AUTO: 0.73 K/UL — SIGNIFICANT CHANGE UP (ref 0–0.9)
MONOCYTES NFR BLD AUTO: 11.3 % — SIGNIFICANT CHANGE UP (ref 2–14)
NEUTROPHILS # BLD AUTO: 3.16 K/UL — SIGNIFICANT CHANGE UP (ref 1.8–7.4)
NEUTROPHILS NFR BLD AUTO: 48.8 % — SIGNIFICANT CHANGE UP (ref 43–77)
NRBC # BLD: 0 /100 WBCS — SIGNIFICANT CHANGE UP (ref 0–0)
PLATELET # BLD AUTO: 203 K/UL — SIGNIFICANT CHANGE UP (ref 150–400)
POTASSIUM SERPL-MCNC: 4.3 MMOL/L — SIGNIFICANT CHANGE UP (ref 3.5–5.3)
POTASSIUM SERPL-SCNC: 4.3 MMOL/L — SIGNIFICANT CHANGE UP (ref 3.5–5.3)
PROTHROM AB SERPL-ACNC: 22.6 SEC — HIGH (ref 9.8–12.7)
RBC # BLD: 4.23 M/UL — SIGNIFICANT CHANGE UP (ref 3.8–5.2)
RBC # FLD: 12.6 % — SIGNIFICANT CHANGE UP (ref 10.3–14.5)
SODIUM SERPL-SCNC: 139 MMOL/L — SIGNIFICANT CHANGE UP (ref 135–145)
TOTAL CHOLESTEROL/HDL RATIO MEASUREMENT: 3.9 RATIO — SIGNIFICANT CHANGE UP (ref 3.3–7.1)
TRIGL SERPL-MCNC: 104 MG/DL — SIGNIFICANT CHANGE UP (ref 10–149)
WBC # BLD: 6.47 K/UL — SIGNIFICANT CHANGE UP (ref 3.8–10.5)
WBC # FLD AUTO: 6.47 K/UL — SIGNIFICANT CHANGE UP (ref 3.8–10.5)

## 2018-04-07 RX ORDER — WARFARIN SODIUM 2.5 MG/1
1 TABLET ORAL
Qty: 0 | Refills: 0 | COMMUNITY

## 2018-04-07 RX ORDER — ATORVASTATIN CALCIUM 80 MG/1
1 TABLET, FILM COATED ORAL
Qty: 30 | Refills: 0 | OUTPATIENT
Start: 2018-04-07 | End: 2018-05-06

## 2018-04-07 RX ADMIN — Medication 5 MILLIGRAM(S): at 11:17

## 2018-04-07 RX ADMIN — Medication 1 TABLET(S): at 11:17

## 2018-04-07 RX ADMIN — DULOXETINE HYDROCHLORIDE 20 MILLIGRAM(S): 30 CAPSULE, DELAYED RELEASE ORAL at 11:16

## 2018-04-07 RX ADMIN — Medication 2.5 MILLIGRAM(S): at 06:01

## 2018-04-07 RX ADMIN — Medication 81 MILLIGRAM(S): at 11:17

## 2018-04-07 RX ADMIN — Medication 0.12 MILLIGRAM(S): at 06:00

## 2018-04-07 RX ADMIN — PANTOPRAZOLE SODIUM 40 MILLIGRAM(S): 20 TABLET, DELAYED RELEASE ORAL at 06:00

## 2018-04-07 RX ADMIN — Medication 5 MILLIGRAM(S): at 06:01

## 2018-04-07 RX ADMIN — ENOXAPARIN SODIUM 60 MILLIGRAM(S): 100 INJECTION SUBCUTANEOUS at 06:00

## 2018-04-07 NOTE — DISCHARGE NOTE ADULT - CARE PROVIDER_API CALL
Flo Samson), Medicine  [address] [place] [address]  Phone: [phone]  Fax: [phone]

## 2018-04-07 NOTE — DISCHARGE NOTE ADULT - MEDICATION SUMMARY - MEDICATIONS TO CHANGE
I will SWITCH the dose or number of times a day I take the medications listed below when I get home from the hospital:    warfarin 5 mg oral tablet  -- 1 tab(s) by mouth once a day (at bedtime) -2.75 on monday and wednesday

## 2018-04-07 NOTE — DISCHARGE NOTE ADULT - MEDICATION SUMMARY - MEDICATIONS TO TAKE
I will START or STAY ON the medications listed below when I get home from the hospital:    predniSONE 5 mg oral tablet  -- 1 tab(s) by mouth once a day  -- Indication: For .    enalapril 2.5 mg oral tablet  -- 1 tab(s) by mouth once a day  -- Indication: For .    digoxin 125 mcg (0.125 mg) oral tablet  -- 1 tab(s) by mouth once a day  -- Indication: For .    warfarin 5 mg oral tablet  -- 1 tab(s) by mouth once a day (at bedtime)   -- Indication: For Take 5 mg for now    DULoxetine 20 mg oral delayed release capsule  -- 1 cap(s) by mouth once a day  -- Indication: For .    atorvastatin 20 mg oral tablet  -- 1 tab(s) by mouth once a day (at bedtime)  -- Indication: For ,    atenolol 25 mg oral tablet  -- 1 tab(s) by mouth once a day  -- Indication: For .    pantoprazole 40 mg oral delayed release tablet  -- 1 tab(s) by mouth once a day (before a meal)  -- Indication: For .    oxybutynin 5 mg/24 hours oral tablet, extended release  -- 1 tab(s) by mouth once a day  -- Indication: For .    Multiple Vitamins oral tablet  -- 1 tab(s) by mouth once a day  -- Indication: For .

## 2018-04-07 NOTE — DISCHARGE NOTE ADULT - CARE PLAN
Principal Discharge DX:	Aphasia  Goal:	resolved  Assessment and plan of treatment:	take 5 mg coumadin the next 2 days and have blood wk Mon to determine dosage; I will call Dr. Mendoaz but you must also call him and tell him that with an INR of 1.5 she had a TIA.

## 2018-04-07 NOTE — PHYSICAL THERAPY INITIAL EVALUATION ADULT - CRITERIA FOR SKILLED THERAPEUTIC INTERVENTIONS
impairments found/therapy frequency/anticipated equipment needs at discharge/anticipated discharge recommendation/risk reduction/prevention/rehab potential/functional limitations in following categories/predicted duration of therapy intervention

## 2018-04-07 NOTE — DISCHARGE NOTE ADULT - MEDICATION SUMMARY - MEDICATIONS TO STOP TAKING
I will STOP taking the medications listed below when I get home from the hospital:    warfarin 2.5 mg oral tablet  -- 1 tab(s) by mouth once a day tuesday, thursday, friday, sat, sun,

## 2018-04-07 NOTE — DISCHARGE NOTE ADULT - HOSPITAL COURSE
pt adm with aphasia while inr 1.5; was bridged, back to normal mri neg; case d/w daughter; message left on Dr. Samson cell: keep vka at 5 mg daily instead of the alternate dose; add statin if she can't chris can stop it; inr check Mon- VN and PT  at home pt refused rehab; PE is normal

## 2018-04-07 NOTE — DISCHARGE NOTE ADULT - NS AS DC STROKE ED MATERIALS
Stroke Education Booklet/Stroke Warning Signs and Symptoms/Risk Factors for Stroke/Prescribed Medications/Call 911 for Stroke/Need for Followup After Discharge

## 2018-04-07 NOTE — PHYSICAL THERAPY INITIAL EVALUATION ADULT - ADDITIONAL COMMENTS
pt reports stays primarily on ground floor, ~2 MIRIAN w/o rail-pt performs w/ assist, reports gait less steady lately (or since L TKR ~1 yr ago)

## 2018-04-07 NOTE — PROVIDER CONTACT NOTE (OTHER) - SITUATION
Spoke to Tiff Sue on-call for [place]. Spoke to Tiff @ [facility].  Vikram on-call for [place].  Cancelled Vikram. Patient already seen by [place].

## 2018-04-07 NOTE — DISCHARGE NOTE ADULT - PATIENT PORTAL LINK FT
You can access the [facility] Patient Portal, offered by [facility], by registering with the following website: [email or web address]

## 2018-04-07 NOTE — DISCHARGE NOTE ADULT - PLAN OF CARE
resolved take 5 mg coumadin the next 2 days and have blood wk Mon to determine dosage; I will call Dr. Mendoza but you must also call him and tell him that with an INR of 1.5 she had a TIA.

## 2018-04-07 NOTE — PHYSICAL THERAPY INITIAL EVALUATION ADULT - PERTINENT HX OF CURRENT PROBLEM, REHAB EVAL
complaint of difficulty finding words. (pt was found to have a subtherapeutic INR of 1.24 on 4/5 and was advised to increase coumadin.) CTH/CTA (-) ac findings, MRI: marked to severe diffuse involutional change with commensurate ventriculomegaly.

## 2018-04-09 LAB — BACTERIA UR CULT: NORMAL

## 2018-04-13 DIAGNOSIS — Z79.01 LONG TERM (CURRENT) USE OF ANTICOAGULANTS: ICD-10-CM

## 2018-04-13 DIAGNOSIS — M81.0 AGE-RELATED OSTEOPOROSIS WITHOUT CURRENT PATHOLOGICAL FRACTURE: ICD-10-CM

## 2018-04-13 DIAGNOSIS — Z90.5 ACQUIRED ABSENCE OF KIDNEY: ICD-10-CM

## 2018-04-13 DIAGNOSIS — M06.9 RHEUMATOID ARTHRITIS, UNSPECIFIED: ICD-10-CM

## 2018-04-13 DIAGNOSIS — I51.81 TAKOTSUBO SYNDROME: ICD-10-CM

## 2018-04-13 DIAGNOSIS — G45.9 TRANSIENT CEREBRAL ISCHEMIC ATTACK, UNSPECIFIED: ICD-10-CM

## 2018-04-13 DIAGNOSIS — Z85.528 PERSONAL HISTORY OF OTHER MALIGNANT NEOPLASM OF KIDNEY: ICD-10-CM

## 2018-04-13 DIAGNOSIS — Z88.0 ALLERGY STATUS TO PENICILLIN: ICD-10-CM

## 2018-04-13 DIAGNOSIS — K21.9 GASTRO-ESOPHAGEAL REFLUX DISEASE WITHOUT ESOPHAGITIS: ICD-10-CM

## 2018-04-13 DIAGNOSIS — F41.9 ANXIETY DISORDER, UNSPECIFIED: ICD-10-CM

## 2018-04-13 DIAGNOSIS — F32.9 MAJOR DEPRESSIVE DISORDER, SINGLE EPISODE, UNSPECIFIED: ICD-10-CM

## 2018-04-13 DIAGNOSIS — R47.01 APHASIA: ICD-10-CM

## 2018-04-13 DIAGNOSIS — I10 ESSENTIAL (PRIMARY) HYPERTENSION: ICD-10-CM

## 2018-04-13 DIAGNOSIS — I48.91 UNSPECIFIED ATRIAL FIBRILLATION: ICD-10-CM

## 2018-04-13 DIAGNOSIS — I25.10 ATHEROSCLEROTIC HEART DISEASE OF NATIVE CORONARY ARTERY WITHOUT ANGINA PECTORIS: ICD-10-CM

## 2018-04-13 DIAGNOSIS — Z95.5 PRESENCE OF CORONARY ANGIOPLASTY IMPLANT AND GRAFT: ICD-10-CM

## 2018-04-30 ENCOUNTER — FORM ENCOUNTER (OUTPATIENT)
Age: 78
End: 2018-04-30

## 2018-04-30 ENCOUNTER — EMERGENCY (EMERGENCY)
Facility: HOSPITAL | Age: 78
LOS: 0 days | Discharge: HOME | End: 2018-04-30
Attending: EMERGENCY MEDICINE | Admitting: EMERGENCY MEDICINE
Payer: MEDICARE

## 2018-04-30 VITALS — WEIGHT: 119.93 LBS

## 2018-04-30 VITALS
RESPIRATION RATE: 18 BRPM | TEMPERATURE: 98 F | HEART RATE: 60 BPM | SYSTOLIC BLOOD PRESSURE: 145 MMHG | OXYGEN SATURATION: 100 % | DIASTOLIC BLOOD PRESSURE: 96 MMHG

## 2018-04-30 DIAGNOSIS — M50.30 OTHER CERVICAL DISC DEGENERATION, UNSPECIFIED CERVICAL REGION: ICD-10-CM

## 2018-04-30 DIAGNOSIS — Z79.01 LONG TERM (CURRENT) USE OF ANTICOAGULANTS: ICD-10-CM

## 2018-04-30 DIAGNOSIS — Z04.3 ENCOUNTER FOR EXAMINATION AND OBSERVATION FOLLOWING OTHER ACCIDENT: ICD-10-CM

## 2018-04-30 DIAGNOSIS — Z88.0 ALLERGY STATUS TO PENICILLIN: ICD-10-CM

## 2018-04-30 DIAGNOSIS — Z88.8 ALLERGY STATUS TO OTHER DRUGS, MEDICAMENTS AND BIOLOGICAL SUBSTANCES: ICD-10-CM

## 2018-04-30 DIAGNOSIS — M81.0 AGE-RELATED OSTEOPOROSIS WITHOUT CURRENT PATHOLOGICAL FRACTURE: ICD-10-CM

## 2018-04-30 DIAGNOSIS — Z98.49 CATARACT EXTRACTION STATUS, UNSPECIFIED EYE: Chronic | ICD-10-CM

## 2018-04-30 DIAGNOSIS — Z98.62 PERIPHERAL VASCULAR ANGIOPLASTY STATUS: Chronic | ICD-10-CM

## 2018-04-30 DIAGNOSIS — I67.89 OTHER CEREBROVASCULAR DISEASE: ICD-10-CM

## 2018-04-30 DIAGNOSIS — Z98.61 CORONARY ANGIOPLASTY STATUS: ICD-10-CM

## 2018-04-30 DIAGNOSIS — Z90.5 ACQUIRED ABSENCE OF KIDNEY: Chronic | ICD-10-CM

## 2018-04-30 DIAGNOSIS — I25.10 ATHEROSCLEROTIC HEART DISEASE OF NATIVE CORONARY ARTERY WITHOUT ANGINA PECTORIS: ICD-10-CM

## 2018-04-30 DIAGNOSIS — I10 ESSENTIAL (PRIMARY) HYPERTENSION: ICD-10-CM

## 2018-04-30 DIAGNOSIS — I48.91 UNSPECIFIED ATRIAL FIBRILLATION: ICD-10-CM

## 2018-04-30 DIAGNOSIS — Z98.89 OTHER SPECIFIED POSTPROCEDURAL STATES: Chronic | ICD-10-CM

## 2018-04-30 DIAGNOSIS — Z85.528 PERSONAL HISTORY OF OTHER MALIGNANT NEOPLASM OF KIDNEY: ICD-10-CM

## 2018-04-30 DIAGNOSIS — Z90.89 ACQUIRED ABSENCE OF OTHER ORGANS: ICD-10-CM

## 2018-04-30 DIAGNOSIS — Z90.89 ACQUIRED ABSENCE OF OTHER ORGANS: Chronic | ICD-10-CM

## 2018-04-30 DIAGNOSIS — F41.9 ANXIETY DISORDER, UNSPECIFIED: ICD-10-CM

## 2018-04-30 DIAGNOSIS — M06.9 RHEUMATOID ARTHRITIS, UNSPECIFIED: ICD-10-CM

## 2018-04-30 DIAGNOSIS — Z98.890 OTHER SPECIFIED POSTPROCEDURAL STATES: Chronic | ICD-10-CM

## 2018-04-30 DIAGNOSIS — I51.81 TAKOTSUBO SYNDROME: ICD-10-CM

## 2018-04-30 DIAGNOSIS — Z82.49 FAMILY HISTORY OF ISCHEMIC HEART DISEASE AND OTHER DISEASES OF THE CIRCULATORY SYSTEM: ICD-10-CM

## 2018-04-30 DIAGNOSIS — F32.9 MAJOR DEPRESSIVE DISORDER, SINGLE EPISODE, UNSPECIFIED: ICD-10-CM

## 2018-04-30 DIAGNOSIS — Z86.73 PERSONAL HISTORY OF TRANSIENT ISCHEMIC ATTACK (TIA), AND CEREBRAL INFARCTION WITHOUT RESIDUAL DEFICITS: ICD-10-CM

## 2018-04-30 PROCEDURE — 93010 ELECTROCARDIOGRAM REPORT: CPT

## 2018-04-30 PROCEDURE — 70450 CT HEAD/BRAIN W/O DYE: CPT | Mod: 26

## 2018-04-30 PROCEDURE — 72125 CT NECK SPINE W/O DYE: CPT | Mod: 26

## 2018-04-30 PROCEDURE — 99285 EMERGENCY DEPT VISIT HI MDM: CPT

## 2018-04-30 PROCEDURE — 73562 X-RAY EXAM OF KNEE 3: CPT | Mod: 26,RT

## 2018-04-30 NOTE — ED ADULT NURSE NOTE - OBJECTIVE STATEMENT
unwitnessed mechanical fall in bathroom at doctors office, hit head on wall. Denies LOC, pt ambulatory after fall. Denies dizziness, HA, blurred vision.

## 2018-04-30 NOTE — ED ADULT TRIAGE NOTE - CHIEF COMPLAINT QUOTE
pt states at 1400 she had mechnical trip and fall and hit head, no LOC, no pain, pt on coumadin, pt denies neuro, motor and cognitive impairment. TA called at [phone]

## 2018-04-30 NOTE — ED PROVIDER NOTE - OBJECTIVE STATEMENT
76 y/o F with PMHx of Afib on Coumadin, CAD, HTN, RA and TIA a month ago presenting s/p mechanical fall at 1400 today. Pt normally ambulates with a walker. Pt was going to the bathroom when she fell backwards hitting the back of her head against the wall. Denies LOC. No neck pain. No chest pain. No pain in any extremities. No numbness or tingling. Pt ambulating normally s/p fall. Dr. Samson, PCP. 76 y/o F with PMHx of Afib on Coumadin, CAD, HTN, RA and TIA a month ago presenting s/p mechanical fall at 1400 today. Pt was going to the bathroom when she fell backwards hitting the back of her head against the wall. Denies LOC. No neck pain. No chest pain. No pain in any extremities. No numbness or tingling. Pt normally ambulates with a walker. Pt ambulating normally s/p fall. Dr. Samson, PCP. 78 y/o F with PMHx of Afib on Coumadin, CAD, HTN, RA and TIA a month ago presenting s/p mechanical fall at 1400 today. Pt was going to the bathroom when she fell backwards hitting the back of her head against the wall. Denies LOC. No neck pain. No chest pain. No pain in any extremities. No numbness or tingling. Pt normally ambulates with a walker. Pt ambulating normally s/p fall.

## 2018-05-01 ENCOUNTER — APPOINTMENT (OUTPATIENT)
Dept: UROLOGY | Facility: CLINIC | Age: 78
End: 2018-05-01
Payer: MEDICARE

## 2018-05-01 ENCOUNTER — OUTPATIENT (OUTPATIENT)
Dept: OUTPATIENT SERVICES | Facility: HOSPITAL | Age: 78
LOS: 1 days | End: 2018-05-01
Payer: MEDICARE

## 2018-05-01 ENCOUNTER — OUTPATIENT (OUTPATIENT)
Dept: OUTPATIENT SERVICES | Facility: HOSPITAL | Age: 78
LOS: 1 days | End: 2018-05-01

## 2018-05-01 ENCOUNTER — APPOINTMENT (OUTPATIENT)
Dept: CT IMAGING | Facility: IMAGING CENTER | Age: 78
End: 2018-05-01
Payer: MEDICARE

## 2018-05-01 VITALS
SYSTOLIC BLOOD PRESSURE: 143 MMHG | HEART RATE: 64 BPM | RESPIRATION RATE: 17 BRPM | DIASTOLIC BLOOD PRESSURE: 83 MMHG | TEMPERATURE: 97.8 F

## 2018-05-01 DIAGNOSIS — Z90.89 ACQUIRED ABSENCE OF OTHER ORGANS: Chronic | ICD-10-CM

## 2018-05-01 DIAGNOSIS — Z98.890 OTHER SPECIFIED POSTPROCEDURAL STATES: Chronic | ICD-10-CM

## 2018-05-01 DIAGNOSIS — Z98.89 OTHER SPECIFIED POSTPROCEDURAL STATES: Chronic | ICD-10-CM

## 2018-05-01 DIAGNOSIS — R35.0 FREQUENCY OF MICTURITION: ICD-10-CM

## 2018-05-01 DIAGNOSIS — Z98.62 PERIPHERAL VASCULAR ANGIOPLASTY STATUS: Chronic | ICD-10-CM

## 2018-05-01 DIAGNOSIS — Z98.49 CATARACT EXTRACTION STATUS, UNSPECIFIED EYE: Chronic | ICD-10-CM

## 2018-05-01 DIAGNOSIS — Z90.5 ACQUIRED ABSENCE OF KIDNEY: Chronic | ICD-10-CM

## 2018-05-01 DIAGNOSIS — C64.9 MALIGNANT NEOPLASM OF UNSPECIFIED KIDNEY, EXCEPT RENAL PELVIS: ICD-10-CM

## 2018-05-01 PROCEDURE — 74178 CT ABD&PLV WO CNTR FLWD CNTR: CPT | Mod: 26

## 2018-05-01 PROCEDURE — 52224 CYSTOSCOPY AND TREATMENT: CPT

## 2018-05-01 PROCEDURE — 74178 CT ABD&PLV WO CNTR FLWD CNTR: CPT

## 2018-05-02 DIAGNOSIS — R31.21 ASYMPTOMATIC MICROSCOPIC HEMATURIA: ICD-10-CM

## 2018-05-03 LAB — CORE LAB BIOPSY: NORMAL

## 2018-06-01 ENCOUNTER — EMERGENCY (EMERGENCY)
Facility: HOSPITAL | Age: 78
LOS: 0 days | Discharge: HOME | End: 2018-06-01
Attending: EMERGENCY MEDICINE | Admitting: EMERGENCY MEDICINE
Payer: MEDICARE

## 2018-06-01 VITALS
DIASTOLIC BLOOD PRESSURE: 85 MMHG | RESPIRATION RATE: 18 BRPM | HEART RATE: 56 BPM | OXYGEN SATURATION: 100 % | SYSTOLIC BLOOD PRESSURE: 171 MMHG | TEMPERATURE: 98 F

## 2018-06-01 VITALS — HEIGHT: 65 IN | WEIGHT: 119.93 LBS

## 2018-06-01 DIAGNOSIS — I48.91 UNSPECIFIED ATRIAL FIBRILLATION: ICD-10-CM

## 2018-06-01 DIAGNOSIS — Z90.89 ACQUIRED ABSENCE OF OTHER ORGANS: ICD-10-CM

## 2018-06-01 DIAGNOSIS — I10 ESSENTIAL (PRIMARY) HYPERTENSION: ICD-10-CM

## 2018-06-01 DIAGNOSIS — Z98.890 OTHER SPECIFIED POSTPROCEDURAL STATES: Chronic | ICD-10-CM

## 2018-06-01 DIAGNOSIS — Z85.528 PERSONAL HISTORY OF OTHER MALIGNANT NEOPLASM OF KIDNEY: ICD-10-CM

## 2018-06-01 DIAGNOSIS — I25.10 ATHEROSCLEROTIC HEART DISEASE OF NATIVE CORONARY ARTERY WITHOUT ANGINA PECTORIS: ICD-10-CM

## 2018-06-01 DIAGNOSIS — Z79.01 LONG TERM (CURRENT) USE OF ANTICOAGULANTS: ICD-10-CM

## 2018-06-01 DIAGNOSIS — Z98.89 OTHER SPECIFIED POSTPROCEDURAL STATES: Chronic | ICD-10-CM

## 2018-06-01 DIAGNOSIS — Z23 ENCOUNTER FOR IMMUNIZATION: ICD-10-CM

## 2018-06-01 DIAGNOSIS — Z98.61 CORONARY ANGIOPLASTY STATUS: ICD-10-CM

## 2018-06-01 DIAGNOSIS — M47.9 SPONDYLOSIS, UNSPECIFIED: ICD-10-CM

## 2018-06-01 DIAGNOSIS — Z90.89 ACQUIRED ABSENCE OF OTHER ORGANS: Chronic | ICD-10-CM

## 2018-06-01 DIAGNOSIS — Z90.5 ACQUIRED ABSENCE OF KIDNEY: ICD-10-CM

## 2018-06-01 DIAGNOSIS — M81.0 AGE-RELATED OSTEOPOROSIS WITHOUT CURRENT PATHOLOGICAL FRACTURE: ICD-10-CM

## 2018-06-01 DIAGNOSIS — S01.01XA LACERATION WITHOUT FOREIGN BODY OF SCALP, INITIAL ENCOUNTER: ICD-10-CM

## 2018-06-01 DIAGNOSIS — Z98.49 CATARACT EXTRACTION STATUS, UNSPECIFIED EYE: Chronic | ICD-10-CM

## 2018-06-01 DIAGNOSIS — I51.81 TAKOTSUBO SYNDROME: ICD-10-CM
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
## 2018-06-01 DIAGNOSIS — I67.89 OTHER CEREBROVASCULAR DISEASE: ICD-10-CM

## 2018-06-01 DIAGNOSIS — M06.80 OTHER SPECIFIED RHEUMATOID ARTHRITIS, UNSPECIFIED SITE: ICD-10-CM
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
## 2018-06-01 DIAGNOSIS — M50.30 OTHER CERVICAL DISC DEGENERATION, UNSPECIFIED CERVICAL REGION: ICD-10-CM

## 2018-06-01 DIAGNOSIS — Z98.62 PERIPHERAL VASCULAR ANGIOPLASTY STATUS: Chronic | ICD-10-CM

## 2018-06-01 DIAGNOSIS — Z90.5 ACQUIRED ABSENCE OF KIDNEY: Chronic | ICD-10-CM

## 2018-06-01 DIAGNOSIS — F32.9 MAJOR DEPRESSIVE DISORDER, SINGLE EPISODE, UNSPECIFIED: ICD-10-CM

## 2018-06-01 LAB
APTT BLD: 40.5 SEC — HIGH (ref 27.5–37.4)
INR BLD: 2.93 RATIO — HIGH (ref 0.88–1.16)
PROTHROM AB SERPL-ACNC: 32.3 SEC — HIGH (ref 9.8–12.7)

## 2018-06-01 PROCEDURE — 99285 EMERGENCY DEPT VISIT HI MDM: CPT | Mod: 25

## 2018-06-01 PROCEDURE — 12001 RPR S/N/AX/GEN/TRNK 2.5CM/<: CPT

## 2018-06-01 PROCEDURE — 70450 CT HEAD/BRAIN W/O DYE: CPT | Mod: 26

## 2018-06-01 PROCEDURE — 72190 X-RAY EXAM OF PELVIS: CPT | Mod: 26

## 2018-06-01 PROCEDURE — 72125 CT NECK SPINE W/O DYE: CPT | Mod: 26

## 2018-06-01 RX ORDER — TETANUS TOXOID, REDUCED DIPHTHERIA TOXOID AND ACELLULAR PERTUSSIS VACCINE, ADSORBED 5; 2.5; 8; 8; 2.5 [IU]/.5ML; [IU]/.5ML; UG/.5ML; UG/.5ML; UG/.5ML
0.5 SUSPENSION INTRAMUSCULAR ONCE
Qty: 0 | Refills: 0 | Status: COMPLETED | OUTPATIENT
Start: 2018-06-01 | End: 2018-06-01

## 2018-06-01 RX ORDER — ACETAMINOPHEN 500 MG
650 TABLET ORAL ONCE
Qty: 0 | Refills: 0 | Status: COMPLETED | OUTPATIENT
Start: 2018-06-01 | End: 2018-06-01

## 2018-06-01 RX ADMIN — TETANUS TOXOID, REDUCED DIPHTHERIA TOXOID AND ACELLULAR PERTUSSIS VACCINE, ADSORBED 0.5 MILLILITER(S): 5; 2.5; 8; 8; 2.5 SUSPENSION INTRAMUSCULAR at 11:12

## 2018-06-01 RX ADMIN — Medication 650 MILLIGRAM(S): at 11:27

## 2018-06-01 NOTE — ED ADULT NURSE REASSESSMENT NOTE - NS ED NURSE REASSESS COMMENT FT1
staples to head put by MD. pt was taken to the bathroom on w/c  with assistance.  pt has unsteady gait.

## 2018-06-01 NOTE — ED ADULT TRIAGE NOTE - CHIEF COMPLAINT QUOTE
pt ambulatory to ED for eval of head trauma after trip and fall when she was bending over to pick something from floor. No LOC. no dizziness. or lightheadedness. no changes to vision. pt is alert and oriented x3.

## 2018-06-01 NOTE — ED ADULT NURSE NOTE - OBJECTIVE STATEMENT
pt fell at home and hit her head on her recliner ,no loc. 2cm lac to the back of her head. Trauma alert d/c on arrival  by Dr Cabrera. ice applied.

## 2018-06-01 NOTE — ED PROVIDER NOTE - OBJECTIVE STATEMENT
76 y/o female with a PMHx of Afib on Coumadin, CAD, HTN, OP, and RA presents to the ED s/p mechanical fall today. Pt states she was trying to pick something up when she lost her balance and fell about 1 hour ago. Pt hit her head and is c/o laceration to head with pain. Denies LOC. No other complaints of pain at this time.

## 2018-06-01 NOTE — ED PROVIDER NOTE - SKIN, MLM
Skin normal color for race, warm, dry. No evidence of rash. +Laceration to L posterior occipital-parietal area.

## 2018-06-13 ENCOUNTER — INPATIENT (INPATIENT)
Facility: HOSPITAL | Age: 78
LOS: 2 days | Discharge: NURSING, REHAB OR LONG-TERM CARE | End: 2018-06-16
Attending: HOSPITALIST | Admitting: HOSPITALIST
Payer: MEDICARE

## 2018-06-13 VITALS — WEIGHT: 119.93 LBS | HEIGHT: 65 IN

## 2018-06-13 DIAGNOSIS — Z98.49 CATARACT EXTRACTION STATUS, UNSPECIFIED EYE: Chronic | ICD-10-CM

## 2018-06-13 DIAGNOSIS — Z90.89 ACQUIRED ABSENCE OF OTHER ORGANS: Chronic | ICD-10-CM

## 2018-06-13 DIAGNOSIS — Z98.62 PERIPHERAL VASCULAR ANGIOPLASTY STATUS: Chronic | ICD-10-CM

## 2018-06-13 DIAGNOSIS — Z98.890 OTHER SPECIFIED POSTPROCEDURAL STATES: Chronic | ICD-10-CM

## 2018-06-13 DIAGNOSIS — Z98.89 OTHER SPECIFIED POSTPROCEDURAL STATES: Chronic | ICD-10-CM

## 2018-06-13 DIAGNOSIS — Z90.5 ACQUIRED ABSENCE OF KIDNEY: Chronic | ICD-10-CM

## 2018-06-13 LAB
ADD ON TEST-SPECIMEN IN LAB: SIGNIFICANT CHANGE UP
ALBUMIN SERPL ELPH-MCNC: 3.4 G/DL — SIGNIFICANT CHANGE UP (ref 3.3–5)
ALP SERPL-CCNC: 80 U/L — SIGNIFICANT CHANGE UP (ref 40–120)
ALT FLD-CCNC: 39 U/L — SIGNIFICANT CHANGE UP (ref 12–78)
ANION GAP SERPL CALC-SCNC: 8 MMOL/L — SIGNIFICANT CHANGE UP (ref 5–17)
APPEARANCE UR: CLEAR — SIGNIFICANT CHANGE UP
AST SERPL-CCNC: 31 U/L — SIGNIFICANT CHANGE UP (ref 15–37)
BACTERIA # UR AUTO: ABNORMAL
BASOPHILS # BLD AUTO: 0.04 K/UL — SIGNIFICANT CHANGE UP (ref 0–0.2)
BASOPHILS NFR BLD AUTO: 0.5 % — SIGNIFICANT CHANGE UP (ref 0–2)
BILIRUB SERPL-MCNC: 0.3 MG/DL — SIGNIFICANT CHANGE UP (ref 0.2–1.2)
BILIRUB UR-MCNC: NEGATIVE — SIGNIFICANT CHANGE UP
BUN SERPL-MCNC: 27 MG/DL — HIGH (ref 7–23)
CALCIUM SERPL-MCNC: 9.3 MG/DL — SIGNIFICANT CHANGE UP (ref 8.5–10.1)
CHLORIDE SERPL-SCNC: 102 MMOL/L — SIGNIFICANT CHANGE UP (ref 96–108)
CO2 SERPL-SCNC: 27 MMOL/L — SIGNIFICANT CHANGE UP (ref 22–31)
COLOR SPEC: YELLOW — SIGNIFICANT CHANGE UP
CREAT SERPL-MCNC: 1.25 MG/DL — SIGNIFICANT CHANGE UP (ref 0.5–1.3)
DIFF PNL FLD: NEGATIVE — SIGNIFICANT CHANGE UP
EOSINOPHIL # BLD AUTO: 0.07 K/UL — SIGNIFICANT CHANGE UP (ref 0–0.5)
EOSINOPHIL NFR BLD AUTO: 0.8 % — SIGNIFICANT CHANGE UP (ref 0–6)
EPI CELLS # UR: SIGNIFICANT CHANGE UP
GLUCOSE SERPL-MCNC: 99 MG/DL — SIGNIFICANT CHANGE UP (ref 70–99)
GLUCOSE UR QL: NEGATIVE MG/DL — SIGNIFICANT CHANGE UP
HCT VFR BLD CALC: 40.5 % — SIGNIFICANT CHANGE UP (ref 34.5–45)
HGB BLD-MCNC: 13.6 G/DL — SIGNIFICANT CHANGE UP (ref 11.5–15.5)
IMM GRANULOCYTES NFR BLD AUTO: 0.5 % — SIGNIFICANT CHANGE UP (ref 0–1.5)
INR BLD: 2.24 RATIO — HIGH (ref 0.88–1.16)
KETONES UR-MCNC: NEGATIVE — SIGNIFICANT CHANGE UP
LEUKOCYTE ESTERASE UR-ACNC: ABNORMAL
LYMPHOCYTES # BLD AUTO: 1.63 K/UL — SIGNIFICANT CHANGE UP (ref 1–3.3)
LYMPHOCYTES # BLD AUTO: 18.5 % — SIGNIFICANT CHANGE UP (ref 13–44)
MCHC RBC-ENTMCNC: 30.8 PG — SIGNIFICANT CHANGE UP (ref 27–34)
MCHC RBC-ENTMCNC: 33.6 GM/DL — SIGNIFICANT CHANGE UP (ref 32–36)
MCV RBC AUTO: 91.8 FL — SIGNIFICANT CHANGE UP (ref 80–100)
MONOCYTES # BLD AUTO: 0.93 K/UL — HIGH (ref 0–0.9)
MONOCYTES NFR BLD AUTO: 10.6 % — SIGNIFICANT CHANGE UP (ref 2–14)
NEUTROPHILS # BLD AUTO: 6.09 K/UL — SIGNIFICANT CHANGE UP (ref 1.8–7.4)
NEUTROPHILS NFR BLD AUTO: 69.1 % — SIGNIFICANT CHANGE UP (ref 43–77)
NITRITE UR-MCNC: NEGATIVE — SIGNIFICANT CHANGE UP
NRBC # BLD: 0 /100 WBCS — SIGNIFICANT CHANGE UP (ref 0–0)
PH UR: 7 — SIGNIFICANT CHANGE UP (ref 5–8)
PLATELET # BLD AUTO: 232 K/UL — SIGNIFICANT CHANGE UP (ref 150–400)
POTASSIUM SERPL-MCNC: 4.4 MMOL/L — SIGNIFICANT CHANGE UP (ref 3.5–5.3)
POTASSIUM SERPL-SCNC: 4.4 MMOL/L — SIGNIFICANT CHANGE UP (ref 3.5–5.3)
PROT SERPL-MCNC: 6.9 GM/DL — SIGNIFICANT CHANGE UP (ref 6–8.3)
PROT UR-MCNC: NEGATIVE MG/DL — SIGNIFICANT CHANGE UP
PROTHROM AB SERPL-ACNC: 24.6 SEC — HIGH (ref 9.8–12.7)
RBC # BLD: 4.41 M/UL — SIGNIFICANT CHANGE UP (ref 3.8–5.2)
RBC # FLD: 12.7 % — SIGNIFICANT CHANGE UP (ref 10.3–14.5)
RBC CASTS # UR COMP ASSIST: SIGNIFICANT CHANGE UP /HPF (ref 0–4)
SODIUM SERPL-SCNC: 137 MMOL/L — SIGNIFICANT CHANGE UP (ref 135–145)
SP GR SPEC: 1.01 — SIGNIFICANT CHANGE UP (ref 1.01–1.02)
UROBILINOGEN FLD QL: NEGATIVE MG/DL — SIGNIFICANT CHANGE UP
WBC # BLD: 8.8 K/UL — SIGNIFICANT CHANGE UP (ref 3.8–10.5)
WBC # FLD AUTO: 8.8 K/UL — SIGNIFICANT CHANGE UP (ref 3.8–10.5)
WBC UR QL: >50

## 2018-06-13 PROCEDURE — 93010 ELECTROCARDIOGRAM REPORT: CPT

## 2018-06-13 PROCEDURE — 71045 X-RAY EXAM CHEST 1 VIEW: CPT | Mod: 26

## 2018-06-13 PROCEDURE — 70450 CT HEAD/BRAIN W/O DYE: CPT | Mod: 26

## 2018-06-13 PROCEDURE — 99285 EMERGENCY DEPT VISIT HI MDM: CPT

## 2018-06-13 RX ORDER — DIGOXIN 250 MCG
0.12 TABLET ORAL DAILY
Qty: 0 | Refills: 0 | Status: DISCONTINUED | OUTPATIENT
Start: 2018-06-13 | End: 2018-06-17

## 2018-06-13 RX ORDER — MAGNESIUM OXIDE 400 MG ORAL TABLET 241.3 MG
200 TABLET ORAL DAILY
Qty: 0 | Refills: 0 | Status: DISCONTINUED | OUTPATIENT
Start: 2018-06-13 | End: 2018-06-17

## 2018-06-13 RX ORDER — PANTOPRAZOLE SODIUM 20 MG/1
40 TABLET, DELAYED RELEASE ORAL
Qty: 0 | Refills: 0 | Status: DISCONTINUED | OUTPATIENT
Start: 2018-06-13 | End: 2018-06-17

## 2018-06-13 RX ORDER — ACETAMINOPHEN 500 MG
650 TABLET ORAL ONCE
Qty: 0 | Refills: 0 | Status: COMPLETED | OUTPATIENT
Start: 2018-06-13 | End: 2018-06-13

## 2018-06-13 RX ORDER — OXYBUTYNIN CHLORIDE 5 MG
5 TABLET ORAL DAILY
Qty: 0 | Refills: 0 | Status: DISCONTINUED | OUTPATIENT
Start: 2018-06-13 | End: 2018-06-17

## 2018-06-13 RX ORDER — ATENOLOL 25 MG/1
25 TABLET ORAL AT BEDTIME
Qty: 0 | Refills: 0 | Status: DISCONTINUED | OUTPATIENT
Start: 2018-06-13 | End: 2018-06-17

## 2018-06-13 RX ORDER — DULOXETINE HYDROCHLORIDE 30 MG/1
20 CAPSULE, DELAYED RELEASE ORAL DAILY
Qty: 0 | Refills: 0 | Status: DISCONTINUED | OUTPATIENT
Start: 2018-06-13 | End: 2018-06-17

## 2018-06-13 RX ORDER — SODIUM CHLORIDE 9 MG/ML
1000 INJECTION INTRAMUSCULAR; INTRAVENOUS; SUBCUTANEOUS
Qty: 0 | Refills: 0 | Status: DISCONTINUED | OUTPATIENT
Start: 2018-06-13 | End: 2018-06-17

## 2018-06-13 RX ADMIN — Medication 650 MILLIGRAM(S): at 23:21

## 2018-06-13 NOTE — H&P ADULT - NEUROLOGICAL DETAILS
alert and oriented x 3/responds to pain sensation intact/deep reflexes intact/cranial nerves intact/responds to verbal commands/alert and oriented x 3/responds to pain

## 2018-06-13 NOTE — ED ADULT NURSE NOTE - OBJECTIVE STATEMENT
presents with generalized weakness. denies fevers, chills, SOB, chest pain, nausea, vomting. states that shes unable to walk today

## 2018-06-13 NOTE — H&P ADULT - ASSESSMENT
78 yo female with PMH of A. fib (on coumadin), CAD s/p stent, HTN, RA (on prednisone), GERD, h/o left renal CA, TIA 76 yo female with PMH of A. fib (on coumadin), CAD s/p stent, HTN, RA (on prednisone), GERD, h/o left renal CA, TIA  Presented to  with weakness, urinary complaints  Admitted with UTI    #Weakness  -m/p due to UTI  -admit to med surg  -neurology consult  -PT    #UTI  -s/p Levaquin in the ED  -start rocephin  -send bcx, f/u Ucx    #AFIB  -rate controlled, cont atenolol, digoxin  on coumadin- check INR  -check dig levels    #DVT Prophylaxis  coumadin 78 yo female with PMH of A. fib (on coumadin), CAD s/p stent, HTN, RA (on prednisone), GERD, h/o left renal CA, TIA  Presented to  with weakness, urinary complaints  Admitted with UTI    #Weakness  -admit to med surg  -neurology consult  -PT    #UTI  -on Levaquin   -send bcx, f/u Ucx    #AFIB  -rate controlled, cont atenolol, digoxin  on coumadin- check INR  -check dig levels    #DVT Prophylaxis  coumadin

## 2018-06-13 NOTE — H&P ADULT - HISTORY OF PRESENT ILLNESS
76 yo female with PMH of A. fib (on coumadin), CAD s/p stent, HTN, RA (on prednisone), GERD, h/o left renal CA, TIA    In the ED pr treated with IV Levaquin 76 yo female with PMH of A. fib (on coumadin), CAD s/p stent, HTN, RA (on prednisone), GERD, h/o left renal CA, TIA  pt brought to  as she was c/o b/l lower extremity weakness since the day of admission. Reports that couldn't bare weight on her feet. No back pain.  Also feels generally fatigued . No fever, no n/v.  pt reports urinary frequency and urgency for the past week. No dysuria  In the ED pr treated with IV Levaquin 76 yo female with PMH of A. fib (on coumadin), CAD s/p stent, HTN, RA (on prednisone), GERD, h/o left renal CA, TIA, pt brought to  as she was c/o b/l lower extremity weakness since the day of admission. Reports that couldn't bare weight on her feet. No back pain.  Also feels generally fatigued . No fever, no n/v.   pt reports urinary frequency and urgency for the past week. No dysuria  In the ED pr treated with IV Levaquin.

## 2018-06-13 NOTE — ED PROVIDER NOTE - OBJECTIVE STATEMENT
78 y/o female with PMHx HTN, CAD, AFib, arthritis presents to ED c/o LE weakness. +Difficulty standing and bearing weight increased from baseline. Pt notes that both LE were shaking while ambulating with walker and purposely slide down against wall in order to avoid a fall.  Pt's daughter states that she had to hold up pt because pt was unable to stand up on her own. Denies slurred speech, cough, fever, abd pain, trouble speaking, N/V, LOC.

## 2018-06-13 NOTE — H&P ADULT - NECK DETAILS
How Many Skin Cancers Have You Had?: more than one
What Is The Reason For Today's Visit?: History of Non-Melanoma Skin Cancer
When Was Your Last Cancer Diagnosed?: 03/2015
no JVD/supple

## 2018-06-13 NOTE — ED STATDOCS - PROGRESS NOTE DETAILS
Larissa BERG for ED attending, Dr. Garcia: 78 y/o female with a PMHx of CAD, afib, RA, s/p knee replacement presents to the ED c/o difficulty walking since this morning. Pt uses a walker at baseline, now is unable to walk with it due to knee pain, weakness. No fever or any other acute complaints at this time. Will send pt to main ED for further evaluation. Larissa BERG for ED attending, Dr. Garcia: 78 y/o female with a PMHx of CAD, afib, RA, s/p knee replacement presents to the ED c/o difficulty walking since this morning. Pt uses a walker at baseline, now is unable to walk with it due to knee pain, generalized weakness. No fever or any other acute complaints at this time. Will send pt to main ED for further evaluation.

## 2018-06-13 NOTE — H&P ADULT - NSHPPHYSICALEXAM_GEN_ALL_CORE
Vital Signs Last 24 Hrs  T(C): 36.7 (13 Jun 2018 15:14), Max: 36.7 (13 Jun 2018 15:14)  T(F): 98.1 (13 Jun 2018 15:14), Max: 98.1 (13 Jun 2018 15:14)  HR: 60 (13 Jun 2018 21:35) (60 - 72)  BP: 130/84 (13 Jun 2018 21:35) (109/80 - 153/60)  BP(mean): --  RR: 16 (13 Jun 2018 21:35) (16 - 18)  SpO2: 100% (13 Jun 2018 21:35) (98% - 100%) Vital Signs Last 24 Hrs  T(C): 36.7 (13 Jun 2018 15:14), Max: 36.7 (13 Jun 2018 15:14)  T(F): 98.1 (13 Jun 2018 15:14), Max: 98.1 (13 Jun 2018 15:14)  HR: 60 (13 Jun 2018 21:35) (60 - 72)  BP: 130/84 (13 Jun 2018 21:35) (109/80 - 153/60)  RR: 16 (13 Jun 2018 21:35) (16 - 18)  SpO2: 100% (13 Jun 2018 21:35) (98% - 100%)

## 2018-06-13 NOTE — ED ADULT TRIAGE NOTE - CHIEF COMPLAINT QUOTE
Patient woke up this morning and was unable to walk. She states she has pain in her knees and feels weak. Denies fever or any injury. Patient has had a knee replacement 1 year ago. Denies pain in her legs when at rest. Denies being around anyone sick or international travel.

## 2018-06-13 NOTE — H&P ADULT - ATTENDING COMMENTS
78 yo female with PMH of A. fib (on coumadin), CAD s/p stent, HTN, RA (on prednisone), GERD, h/o left renal CA, TIA, pt brought to  as she was c/o b/l lower extremity weakness since the day of admission. Reports that couldn't bare weight on her feet. No back pain.  Also feels generally fatigued . No fever, no n/v.   pt reports urinary frequency and urgency for the past week. No dysuria  In the ED pr treated with IV Levaquin.  ROS: as above.  On Exam: as above.  A/P:    #Weakness  -admit to med surg  -neurology consult  -follow PT consult   -fall precaution     #UTI  -on Levaquin   -send bcx, f/u Ucx    #AFIB  -rate controlled, cont atenolol, digoxin  on coumadin- check INR  -check dig levels    #DVT Prophylaxis  coumadin

## 2018-06-14 DIAGNOSIS — N39.0 URINARY TRACT INFECTION, SITE NOT SPECIFIED: ICD-10-CM

## 2018-06-14 DIAGNOSIS — R53.1 WEAKNESS: ICD-10-CM

## 2018-06-14 DIAGNOSIS — I82.409 ACUTE EMBOLISM AND THROMBOSIS OF UNSPECIFIED DEEP VEINS OF UNSPECIFIED LOWER EXTREMITY: ICD-10-CM

## 2018-06-14 DIAGNOSIS — I48.91 UNSPECIFIED ATRIAL FIBRILLATION: ICD-10-CM

## 2018-06-14 LAB
ANION GAP SERPL CALC-SCNC: 6 MMOL/L — SIGNIFICANT CHANGE UP (ref 5–17)
BUN SERPL-MCNC: 20 MG/DL — SIGNIFICANT CHANGE UP (ref 7–23)
CALCIUM SERPL-MCNC: 8.8 MG/DL — SIGNIFICANT CHANGE UP (ref 8.5–10.1)
CHLORIDE SERPL-SCNC: 102 MMOL/L — SIGNIFICANT CHANGE UP (ref 96–108)
CO2 SERPL-SCNC: 30 MMOL/L — SIGNIFICANT CHANGE UP (ref 22–31)
CREAT SERPL-MCNC: 1.12 MG/DL — SIGNIFICANT CHANGE UP (ref 0.5–1.3)
CULTURE RESULTS: SIGNIFICANT CHANGE UP
DIGOXIN SERPL-MCNC: 0.79 NG/ML — LOW (ref 0.8–2)
GLUCOSE SERPL-MCNC: 95 MG/DL — SIGNIFICANT CHANGE UP (ref 70–99)
INR BLD: 2.18 RATIO — HIGH (ref 0.88–1.16)
MAGNESIUM SERPL-MCNC: 1.9 MG/DL — SIGNIFICANT CHANGE UP (ref 1.6–2.6)
POTASSIUM SERPL-MCNC: 4.1 MMOL/L — SIGNIFICANT CHANGE UP (ref 3.5–5.3)
POTASSIUM SERPL-SCNC: 4.1 MMOL/L — SIGNIFICANT CHANGE UP (ref 3.5–5.3)
PROTHROM AB SERPL-ACNC: 23.9 SEC — HIGH (ref 9.8–12.7)
SODIUM SERPL-SCNC: 138 MMOL/L — SIGNIFICANT CHANGE UP (ref 135–145)
SPECIMEN SOURCE: SIGNIFICANT CHANGE UP
T3 SERPL-MCNC: 93 NG/DL — SIGNIFICANT CHANGE UP (ref 80–200)
T4 AB SER-ACNC: 6.8 UG/DL — SIGNIFICANT CHANGE UP (ref 4.6–12)
TSH SERPL-MCNC: 1.14 UU/ML — SIGNIFICANT CHANGE UP (ref 0.36–3.74)

## 2018-06-14 PROCEDURE — 99222 1ST HOSP IP/OBS MODERATE 55: CPT

## 2018-06-14 RX ORDER — WARFARIN SODIUM 2.5 MG/1
2.5 TABLET ORAL ONCE
Qty: 0 | Refills: 0 | Status: COMPLETED | OUTPATIENT
Start: 2018-06-14 | End: 2018-06-14

## 2018-06-14 RX ORDER — ACETAMINOPHEN 500 MG
650 TABLET ORAL ONCE
Qty: 0 | Refills: 0 | Status: COMPLETED | OUTPATIENT
Start: 2018-06-14 | End: 2018-06-14

## 2018-06-14 RX ADMIN — Medication 5 MILLIGRAM(S): at 08:58

## 2018-06-14 RX ADMIN — WARFARIN SODIUM 2.5 MILLIGRAM(S): 2.5 TABLET ORAL at 21:44

## 2018-06-14 RX ADMIN — PANTOPRAZOLE SODIUM 40 MILLIGRAM(S): 20 TABLET, DELAYED RELEASE ORAL at 05:08

## 2018-06-14 RX ADMIN — DULOXETINE HYDROCHLORIDE 20 MILLIGRAM(S): 30 CAPSULE, DELAYED RELEASE ORAL at 10:48

## 2018-06-14 RX ADMIN — WARFARIN SODIUM 2.5 MILLIGRAM(S): 2.5 TABLET ORAL at 00:55

## 2018-06-14 RX ADMIN — Medication 0.12 MILLIGRAM(S): at 05:08

## 2018-06-14 RX ADMIN — Medication 2.5 MILLIGRAM(S): at 21:43

## 2018-06-14 RX ADMIN — SODIUM CHLORIDE 75 MILLILITER(S): 9 INJECTION INTRAMUSCULAR; INTRAVENOUS; SUBCUTANEOUS at 02:49

## 2018-06-14 RX ADMIN — Medication 650 MILLIGRAM(S): at 16:31

## 2018-06-14 RX ADMIN — ATENOLOL 25 MILLIGRAM(S): 25 TABLET ORAL at 21:43

## 2018-06-14 NOTE — PHYSICAL THERAPY INITIAL EVALUATION ADULT - ACTIVE RANGE OF MOTION EXAMINATION, REHAB EVAL
no Active ROM deficits were identified/Bilateral shoulder flexion ~ 120 degrees. Bilateral DF neutral.

## 2018-06-14 NOTE — PROGRESS NOTE ADULT - PROBLEM SELECTOR PLAN 1
#Weakness  - neurology consult: IF weakness persists, obtain MRI l/s spine  - PT: IAM #Weakness  - neurology consult: IF weakness persists, obtain MRI l/s spine  - PT: IAM seymour/molly 6/16

## 2018-06-14 NOTE — CONSULT NOTE ADULT - SUBJECTIVE AND OBJECTIVE BOX
CC: 77y old  Female who presents with a chief complaint of legs were weak (14 Jun 2018 02:36)      HPI:  76 yo female with PMH of A. fib (on coumadin), CAD s/p stent, HTN, RA (on prednisone), GERD, h/o left renal CA, TIA, was brought to  as she experienced lower extremity weakness resulting in a fall.    Pt states she has been using a walker since she had L KR a year ago, lately she has been feeling fatigued, on the day of admission she was walking, couldn't bear weight, felt weak and fell; denies focal weakness of either leg, reports urinary frequency and urgency for the past week as well. Pt has intermittent low back pains, no radiation to legs - this is chronic.    Pt denies paresthesias of upper/lower extremities, facial droop, dusarthria, HA, vertigo, visual blurring, N,V. CT head -   No fever or skin rashes. She was noted to have UTI, In the ED pr treated with IV Levaquin.     At present back to baseline      PAST MEDICAL & SURGICAL HISTORY:  Takotsubo syndrome  Stented coronary artery  CAD (coronary artery disease)  Knee pain, left  Tachycardia  Atrial fibrillation  Urge incontinence of urine  Depressive disorder  Anxiety disorder  Kidney carcinoma  HTN (hypertension)  RA (rheumatoid arthritis)  OP (osteoporosis)  Renal cancer, right  RA (rheumatoid arthritis)  Stented coronary artery  CAD (coronary artery disease)  S/P colonoscopy  H/O breast biopsy: right?  S/P tonsillectomy: 1948  History of cataract surgery, unspecified laterality: b/l 2016  H/O partial nephrectomy: right 2006  S/P angioplasty: 1 stent 1998  S/P tonsillectomy  History of percutaneous coronary intervention: stent RCA      FAMILY HISTORY:  Family history of other heart disease (Mother)  Family history of MI (myocardial infarction) (Father)  No pertinent family history in first degree relatives      Social Hx:  Former smoker, 1 glass of wine 2-3 glasses/week  No illicit drug use    MEDICATIONS  (STANDING):  ATENolol  Tablet 25 milliGRAM(s) Oral at bedtime  digoxin     Tablet 0.125 milliGRAM(s) Oral daily  DULoxetine 20 milliGRAM(s) Oral daily  enalapril 2.5 milliGRAM(s) Oral at bedtime  levoFLOXacin IVPB 750 milliGRAM(s) IV Intermittent every 48 hours  oxybutynin 5 milliGRAM(s) Oral daily  pantoprazole    Tablet 40 milliGRAM(s) Oral before breakfast  sodium chloride 0.9%. 1000 milliLiter(s) (75 mL/Hr) IV Continuous <Continuous>     Home Medications:   * Patient Currently Takes Medications as of 13-Jun-2018 21:37 documented in Structured Notes  · 	warfarin 5 mg oral tablet: 1 tab(s) orally 2 times a week MONDAY AND WEDNESDAY, Last Dose Taken:    · 	digoxin 125 mcg (0.125 mg) oral tablet: 1 tab(s) orally once a day (at bedtime), Last Dose Taken:    · 	enalapril 2.5 mg oral tablet: 1 tab(s) orally once a day (at bedtime), Last Dose Taken:    · 	pantoprazole 40 mg oral delayed release tablet: 1 tab(s) orally once a day (before a meal), Last Dose Taken:    · 	Multiple Vitamins oral tablet: 1 tab(s) orally once a day, Last Dose Taken:    · 	predniSONE: 1.25 milligram(s) orally once a day ** TAPERED OVER COURSE OF FOUR MONTHS FOR RA **, Last Dose Taken:    · 	atenolol 25 mg oral tablet: 1 tab(s) orally once a day (at bedtime), Last Dose Taken:    · 	warfarin 2.5 mg oral tablet: 1 tab(s) orally 5 times a week DAILY EXCEPT MONDAY AND WEDNESDAY, Last Dose Taken:    · 	magnesium oxide 200 mg oral tablet: orally once a day, Last Dose Taken:    · 	ascorbic acid 500 mg oral tablet: 1 tab(s) orally once a day, Last Dose Taken:    · 	Omega-3 1000 mg oral capsule: 1 cap(s) orally once a day, Last Dose Taken:    · 	cholecalciferol 1000 intl units oral tablet: 1 tab(s) orally once a day, Last Dose Taken:    · 	Probiotic Formula oral capsule: 1 cap(s) orally once a day, Last Dose Taken:    · 	DULoxetine 20 mg oral delayed release capsule: 1 cap(s) orally once a day, Last Dose Taken:    · 	oxybutynin 5 mg/24 hours oral tablet, extended release: 1 tab(s) orally once a day, Last Dose Taken:      Allergies  penicillin (Hives)  penicillin (Unknown)    Intolerances    statins (Muscle Pain)      ROS: GI discomfort, as in HPI, all other ROS were reviewed and are negative.      Vital Signs Last 24 Hrs  T(C): 36.4 (14 Jun 2018 04:57), Max: 36.7 (13 Jun 2018 15:14)  T(F): 97.6 (14 Jun 2018 04:57), Max: 98.1 (13 Jun 2018 15:14)  HR: 58 (14 Jun 2018 04:57) (57 - 72)  BP: 146/94 (14 Jun 2018 04:57) (109/80 - 164/78)  BP(mean): --  RR: 16 (14 Jun 2018 04:57) (16 - 18)  SpO2: 98% (14 Jun 2018 04:57) (98% - 100%)      GE:  Constitutional: awake and alert.  HEENT: PERRLA, EOMI,   Neck: Supple.  Respiratory: Breath sounds are clear bilaterally  Cardiovascular: S1 and S2, regular   Extremities:  no edema  Vascular: Caritid Bruit - no  Musculoskeletal: no joint swelling/tenderness, no abnormal movements  Skin: No rashes    Neurological exam:  HF: A x O x 3. Appropriately interactive. Speech fluent, No Aphasia or paraphasic errors. Naming /repetition intact   CN: ROB, EOMI, VFF, facial sensation normal, no NLFD, tongue midline, Palate moves equally, SCM equal bilaterally  Motor: No pronator drift, Strength 5/5 in all 4 ext, normal bulk and tone, no tremor, rigidity .    Sens: Intact to light touch / PP    Reflexes: BJ 2+, BR 2+, KJ 2+, AJ 0, downgoing toes b/l  Coord:  No FNFA   Gait/Balance: Not tested    NIHSS: 0      Labs:   06-14    138  |  102  |  20  ----------------------------<  95  4.1   |  30  |  1.12    Ca    8.8      14 Jun 2018 05:33  Mg     1.9     06-14    TPro  6.9  /  Alb  3.4  /  TBili  0.3  /  DBili  x   /  AST  31  /  ALT  39  /  AlkPhos  80  06-13                       13.6   8.80  )-----------( 232      ( 13 Jun 2018 16:35 )             40.5       Radiology:  - CT Head: Mild to moderate periventricular white matter ischemia.

## 2018-06-14 NOTE — PROGRESS NOTE ADULT - SUBJECTIVE AND OBJECTIVE BOX
HOSPITAL COURSE/CHART REVIEW: HPI:  76 yo female with PMH of A. fib (on coumadin), CAD s/p stent, HTN, RA (on prednisone), GERD, h/o left renal CA, TIA, pt brought to  as she was c/o b/l lower extremity weakness since the day of admission. Reports that couldn't bare weight on her feet. No back pain.    SUBJECTIVE: Pt seen and examined at bedside. States that she feels better. States she was able to participate in PT.   She is much    REVIEW OF SYSTEMS:  CONSTITUTIONAL: No weakness, fevers or chills  EYES/ENT: No visual changes;  No vertigo or throat pain   NECK: No pain or stiffness  RESPIRATORY: No cough, wheezing, hemoptysis; No shortness of breath  CARDIOVASCULAR: No chest pain or palpitations  GASTROINTESTINAL: No abdominal or epigastric pain. No nausea, vomiting, or hematemesis; No diarrhea or constipation. No melena or hematochezia.  GENITOURINARY: No dysuria, frequency or hematuria  NEUROLOGICAL: No numbness or weakness  SKIN: No itching, burning, rashes, or lesions   All other review of systems is negative unless indicated above    Vital Signs Last 24 Hrs  T(C): 36.5 (14 Jun 2018 11:03), Max: 36.7 (13 Jun 2018 15:14)  T(F): 97.7 (14 Jun 2018 11:03), Max: 98.1 (13 Jun 2018 15:14)  HR: 68 (14 Jun 2018 11:03) (57 - 72)  BP: 130/80 (14 Jun 2018 11:03) (109/80 - 164/78)  BP(mean): --  RR: 18 (14 Jun 2018 11:03) (16 - 18)  SpO2: 96% (14 Jun 2018 11:03) (96% - 100%)    I&O's Summary    13 Jun 2018 07:01  -  14 Jun 2018 07:00  --------------------------------------------------------  IN: 171 mL / OUT: 0 mL / NET: 171 mL        CAPILLARY BLOOD GLUCOSE          PHYSICAL EXAM:  Constitutional: NAD, awake and alert, well-developed  HEENT: PERR, EOMI, Normal Hearing, MMM  Neck: Soft and supple, No LAD, No JVD  Respiratory: Breath sounds are clear bilaterally, No wheezing, rales or rhonchi  Cardiovascular: S1 and S2, regular rate and rhythm, no Murmurs, gallops or rubs  Gastrointestinal: Bowel Sounds present, soft, nontender, nondistended, no guarding, no rebound  Extremities: No peripheral edema  Vascular: 2+ peripheral pulses  Neurological: A/O x 3, no focal deficits  Musculoskeletal: 5/5 strength b/l upper and lower extremities  Skin: No rashes    MEDICATIONS:  MEDICATIONS  (STANDING):  ATENolol  Tablet 25 milliGRAM(s) Oral at bedtime  digoxin     Tablet 0.125 milliGRAM(s) Oral daily  DULoxetine 20 milliGRAM(s) Oral daily  enalapril 2.5 milliGRAM(s) Oral at bedtime  levoFLOXacin IVPB 750 milliGRAM(s) IV Intermittent every 48 hours  oxybutynin 5 milliGRAM(s) Oral daily  pantoprazole    Tablet 40 milliGRAM(s) Oral before breakfast  sodium chloride 0.9%. 1000 milliLiter(s) (75 mL/Hr) IV Continuous <Continuous>      LABS: All Labs Reviewed:                        13.6   8.80  )-----------( 232      ( 13 Jun 2018 16:35 )             40.5     06-14    138  |  102  |  20  ----------------------------<  95  4.1   |  30  |  1.12    Ca    8.8      14 Jun 2018 05:33  Mg     1.9     06-14    TPro  6.9  /  Alb  3.4  /  TBili  0.3  /  DBili  x   /  AST  31  /  ALT  39  /  AlkPhos  80  06-13    PT/INR - ( 14 Jun 2018 05:33 )   PT: 23.9 sec;   INR: 2.18 ratio      Digoxin Level, Serum (06.13.18 @ 16:35)  Digoxin Level, Serum: .79 ng/mL      RADIOLOGY/EKG:  < from: CT Head No Cont (06.13.18 @ 16:29) >  IMPRESSION:   Mild to moderate periventricular white matter ischemia.         Xray Chest 1 View- PORTABLE-Urgent (06.13.18 @ 20:09)   IMPRESSION: Mild left infrahilar opacity could be due to atelectasis versus pneumonia. HOSPITAL COURSE/CHART REVIEW: HPI:  78 yo female with PMH of A. fib (on coumadin), CAD s/p stent, HTN, RA (on prednisone), GERD, h/o left renal CA, TIA, pt brought to  as she was c/o b/l lower extremity weakness since the day of admission. Reports that couldn't bare weight on her feet. No back pain.    SUBJECTIVE: Pt seen and examined at bedside. States that she feels better. States she was able to participate in PT.   She is amenable to IAM. Denies any HA, dizziness, malaise, changes in vision, CP, SOB, N/V/D/C, abdominal pain, numbness/tingling in extremities.     REVIEW OF SYSTEMS:  CONSTITUTIONAL: No weakness, fevers or chills  EYES/ENT: No visual changes;  No vertigo or throat pain   NECK: No pain or stiffness  RESPIRATORY: No cough, wheezing, hemoptysis; No shortness of breath  CARDIOVASCULAR: No chest pain or palpitations  GASTROINTESTINAL: No abdominal or epigastric pain. No nausea, vomiting, or hematemesis; No diarrhea or constipation. No melena or hematochezia.  GENITOURINARY: No dysuria, frequency or hematuria  NEUROLOGICAL: No numbness or weakness  SKIN: No itching, burning, rashes, or lesions   All other review of systems is negative unless indicated above    Vital Signs Last 24 Hrs  T(C): 36.5 (14 Jun 2018 11:03), Max: 36.7 (13 Jun 2018 15:14)  T(F): 97.7 (14 Jun 2018 11:03), Max: 98.1 (13 Jun 2018 15:14)  HR: 68 (14 Jun 2018 11:03) (57 - 72)  BP: 130/80 (14 Jun 2018 11:03) (109/80 - 164/78)  RR: 18 (14 Jun 2018 11:03) (16 - 18)  SpO2: 96% (14 Jun 2018 11:03) (96% - 100%)    I&O's Summary  13 Jun 2018 07:01  -  14 Jun 2018 07:00  --------------------------------------------------------  IN: 171 mL / OUT: 0 mL / NET: 171 mL    PHYSICAL EXAM:  Constitutional: NAD, awake and alert, well-developed  HEENT: EOMI, Normal Hearing, MMM  Neck: Soft and supple  Respiratory: Breath sounds are clear bilaterally, No wheezing, rales or rhonchi  Cardiovascular: S1 and S2, regular rate and rhythm  Gastrointestinal: Bowel Sounds present, soft, nontender, nondistended, no guarding, no rebound  Extremities: No peripheral edema  Vascular: 2+ peripheral pulses  Neurological: A/O x 3, no focal deficits  Musculoskeletal: 5/5 strength b/l upper and lower extremities    MEDICATIONS  (STANDING):  ATENolol  Tablet 25 milliGRAM(s) Oral at bedtime  digoxin     Tablet 0.125 milliGRAM(s) Oral daily  DULoxetine 20 milliGRAM(s) Oral daily  enalapril 2.5 milliGRAM(s) Oral at bedtime  levoFLOXacin IVPB 750 milliGRAM(s) IV Intermittent every 48 hours  oxybutynin 5 milliGRAM(s) Oral daily  pantoprazole    Tablet 40 milliGRAM(s) Oral before breakfast  sodium chloride 0.9%. 1000 milliLiter(s) (75 mL/Hr) IV Continuous <Continuous>      LABS: All Labs Reviewed:             13.6   8.80  )-----------( 232      ( 13 Jun 2018 16:35 )             40.5     06-14  138  |  102  |  20  ----------------------------<  95  4.1   |  30  |  1.12    Ca    8.8      14 Jun 2018 05:33  Mg     1.9     06-14    TPro  6.9  /  Alb  3.4  /  TBili  0.3  /  DBili  x   /  AST  31  /  ALT  39  /  AlkPhos  80  06-13    PT/INR - ( 14 Jun 2018 05:33 )   PT: 23.9 sec;   INR: 2.18 ratio      Digoxin Level, Serum (06.13.18 @ 16:35)  Digoxin Level, Serum: .79 ng/mL      RADIOLOGY/EKG:  < from: CT Head No Cont (06.13.18 @ 16:29) >  IMPRESSION:   Mild to moderate periventricular white matter ischemia.         Xray Chest 1 View- PORTABLE-Urgent (06.13.18 @ 20:09)   IMPRESSION: Mild left infrahilar opacity could be due to atelectasis versus pneumonia. HOSPITAL COURSE/CHART REVIEW: HPI:  76 yo female with PMH of A. fib (on coumadin), CAD s/p stent, HTN, RA (on prednisone), GERD, h/o left renal CA, TIA, pt brought to  as she was c/o b/l lower extremity weakness since the day of admission. Reports that couldn't bare weight on her feet. No back pain.    SUBJECTIVE: Pt seen and examined at bedside. States that she feels better. Has no complaints this a.m.  She is amenable to IAM. Denies any HA, dizziness, malaise, changes in vision, CP, SOB, N/V/D/C, abdominal pain, numbness/tingling in extremities.     REVIEW OF SYSTEMS:  CONSTITUTIONAL: No weakness, fevers or chills  EYES/ENT: No visual changes;  No vertigo or throat pain   NECK: No pain or stiffness  RESPIRATORY: No cough, wheezing, hemoptysis; No shortness of breath  CARDIOVASCULAR: No chest pain or palpitations  GASTROINTESTINAL: No abdominal or epigastric pain. No nausea, vomiting, or hematemesis; No diarrhea or constipation. No melena or hematochezia.  GENITOURINARY: No dysuria, frequency or hematuria  NEUROLOGICAL: No numbness or weakness  SKIN: No itching, burning, rashes, or lesions   All other review of systems is negative unless indicated above    Vital Signs Last 24 Hrs  T(C): 36.5 (14 Jun 2018 11:03), Max: 36.7 (13 Jun 2018 15:14)  T(F): 97.7 (14 Jun 2018 11:03), Max: 98.1 (13 Jun 2018 15:14)  HR: 68 (14 Jun 2018 11:03) (57 - 72)  BP: 130/80 (14 Jun 2018 11:03) (109/80 - 164/78)  RR: 18 (14 Jun 2018 11:03) (16 - 18)  SpO2: 96% (14 Jun 2018 11:03) (96% - 100%)    I&O's Summary  13 Jun 2018 07:01  -  14 Jun 2018 07:00  --------------------------------------------------------  IN: 171 mL / OUT: 0 mL / NET: 171 mL    PHYSICAL EXAM:  Constitutional: NAD, awake and alert, well-developed  HEENT: EOMI, Normal Hearing, MMM  Neck: Soft and supple  Respiratory: Breath sounds are clear bilaterally, No wheezing, rales or rhonchi  Cardiovascular: S1 and S2, regular rate and rhythm  Gastrointestinal: Bowel Sounds present, soft, nontender, nondistended, no guarding, no rebound  Extremities: No peripheral edema  Vascular: 2+ peripheral pulses  Neurological: A/O x 3, no focal deficits  Musculoskeletal: 5/5 strength b/l upper and lower extremities    MEDICATIONS  (STANDING):  ATENolol  Tablet 25 milliGRAM(s) Oral at bedtime  digoxin     Tablet 0.125 milliGRAM(s) Oral daily  DULoxetine 20 milliGRAM(s) Oral daily  enalapril 2.5 milliGRAM(s) Oral at bedtime  levoFLOXacin IVPB 750 milliGRAM(s) IV Intermittent every 48 hours  oxybutynin 5 milliGRAM(s) Oral daily  pantoprazole    Tablet 40 milliGRAM(s) Oral before breakfast  sodium chloride 0.9%. 1000 milliLiter(s) (75 mL/Hr) IV Continuous <Continuous>      LABS: All Labs Reviewed:             13.6   8.80  )-----------( 232      ( 13 Jun 2018 16:35 )             40.5     06-14  138  |  102  |  20  ----------------------------<  95  4.1   |  30  |  1.12    Ca    8.8      14 Jun 2018 05:33  Mg     1.9     06-14    TPro  6.9  /  Alb  3.4  /  TBili  0.3  /  DBili  x   /  AST  31  /  ALT  39  /  AlkPhos  80  06-13    PT/INR - ( 14 Jun 2018 05:33 )   PT: 23.9 sec;   INR: 2.18 ratio      Digoxin Level, Serum (06.13.18 @ 16:35)  Digoxin Level, Serum: .79 ng/mL    Culture Blood and urine: NGTD    RADIOLOGY/EKG:  < from: CT Head No Cont (06.13.18 @ 16:29) >  IMPRESSION:   Mild to moderate periventricular white matter ischemia.         Xray Chest 1 View- PORTABLE-Urgent (06.13.18 @ 20:09)   IMPRESSION: Mild left infrahilar opacity could be due to atelectasis versus pneumonia.

## 2018-06-14 NOTE — PROGRESS NOTE ADULT - ASSESSMENT
76 yo female with PMH of A. fib (on coumadin), CAD s/p stent, HTN, RA (on prednisone), GERD, h/o left renal CA, TIA  Presented to  with weakness, urinary complaints  Admitted with UTI 78 yo female with PMH of A. fib (on coumadin), CAD s/p stent, HTN, RA (on prednisone), GERD, h/o left renal CA, TIA  Presented to  with weakness, urinary complaints

## 2018-06-14 NOTE — CONSULT NOTE ADULT - ASSESSMENT
76 yo female with PMH of A. fib (on coumadin), CAD s/p stent, HTN, RA (on prednisone), GERD, h/o left renal CA, TIA, presented to  with lower extremity weakness resulting in a fall.  Pt states she has been using a walker since she had L KR a year ago, lately she has been feeling fatigued, on the day of admission she was walking, couldn't bear weight, felt weak and fell, no focal weakness of either leg, reports urinary frequency and urgency for the past week, has c/o intermittent chronic low back pains, no radiation to legs.  She was noted to have UTI, In the ED pr treated with IV Levaquin. At present back to baseline    # Generalized Weakness; likley resulting in a fall, no focal deficits at present.    # Lumbar DJD / RA - No radicular sz at present    # AFIB - rate controlled, no evidence of acute CVA    - PT eval, if pt at baseline, no further testing needed  - IF weakness persists, obtain MRI l/s spine  - INR to be therapeutic    D/W pt    Call neuro if needed

## 2018-06-15 LAB
ANION GAP SERPL CALC-SCNC: 6 MMOL/L — SIGNIFICANT CHANGE UP (ref 5–17)
BUN SERPL-MCNC: 19 MG/DL — SIGNIFICANT CHANGE UP (ref 7–23)
CALCIUM SERPL-MCNC: 9.2 MG/DL — SIGNIFICANT CHANGE UP (ref 8.5–10.1)
CHLORIDE SERPL-SCNC: 100 MMOL/L — SIGNIFICANT CHANGE UP (ref 96–108)
CO2 SERPL-SCNC: 28 MMOL/L — SIGNIFICANT CHANGE UP (ref 22–31)
CREAT SERPL-MCNC: 1.02 MG/DL — SIGNIFICANT CHANGE UP (ref 0.5–1.3)
GLUCOSE SERPL-MCNC: 97 MG/DL — SIGNIFICANT CHANGE UP (ref 70–99)
HCT VFR BLD CALC: 40.8 % — SIGNIFICANT CHANGE UP (ref 34.5–45)
HGB BLD-MCNC: 13.8 G/DL — SIGNIFICANT CHANGE UP (ref 11.5–15.5)
INR BLD: 1.99 RATIO — HIGH (ref 0.88–1.16)
MCHC RBC-ENTMCNC: 30.3 PG — SIGNIFICANT CHANGE UP (ref 27–34)
MCHC RBC-ENTMCNC: 33.8 GM/DL — SIGNIFICANT CHANGE UP (ref 32–36)
MCV RBC AUTO: 89.7 FL — SIGNIFICANT CHANGE UP (ref 80–100)
NRBC # BLD: 0 /100 WBCS — SIGNIFICANT CHANGE UP (ref 0–0)
PLATELET # BLD AUTO: 232 K/UL — SIGNIFICANT CHANGE UP (ref 150–400)
POTASSIUM SERPL-MCNC: 3.9 MMOL/L — SIGNIFICANT CHANGE UP (ref 3.5–5.3)
POTASSIUM SERPL-SCNC: 3.9 MMOL/L — SIGNIFICANT CHANGE UP (ref 3.5–5.3)
PROTHROM AB SERPL-ACNC: 21.8 SEC — HIGH (ref 9.8–12.7)
RBC # BLD: 4.55 M/UL — SIGNIFICANT CHANGE UP (ref 3.8–5.2)
RBC # FLD: 12.5 % — SIGNIFICANT CHANGE UP (ref 10.3–14.5)
SODIUM SERPL-SCNC: 134 MMOL/L — LOW (ref 135–145)
WBC # BLD: 7.79 K/UL — SIGNIFICANT CHANGE UP (ref 3.8–10.5)
WBC # FLD AUTO: 7.79 K/UL — SIGNIFICANT CHANGE UP (ref 3.8–10.5)

## 2018-06-15 RX ORDER — WARFARIN SODIUM 2.5 MG/1
2.5 TABLET ORAL DAILY
Qty: 0 | Refills: 0 | Status: DISCONTINUED | OUTPATIENT
Start: 2018-06-15 | End: 2018-06-17

## 2018-06-15 RX ADMIN — Medication 0.12 MILLIGRAM(S): at 06:28

## 2018-06-15 RX ADMIN — WARFARIN SODIUM 2.5 MILLIGRAM(S): 2.5 TABLET ORAL at 21:35

## 2018-06-15 RX ADMIN — DULOXETINE HYDROCHLORIDE 20 MILLIGRAM(S): 30 CAPSULE, DELAYED RELEASE ORAL at 12:31

## 2018-06-15 RX ADMIN — Medication 2.5 MILLIGRAM(S): at 21:36

## 2018-06-15 RX ADMIN — Medication 5 MILLIGRAM(S): at 12:31

## 2018-06-15 RX ADMIN — PANTOPRAZOLE SODIUM 40 MILLIGRAM(S): 20 TABLET, DELAYED RELEASE ORAL at 06:28

## 2018-06-15 RX ADMIN — ATENOLOL 25 MILLIGRAM(S): 25 TABLET ORAL at 21:36

## 2018-06-15 NOTE — PROGRESS NOTE ADULT - SUBJECTIVE AND OBJECTIVE BOX
CHIEF COMPLAINT:    SUBJECTIVE:     REVIEW OF SYSTEMS:  CONSTITUTIONAL: No weakness, fevers or chills  EYES/ENT: No visual changes;  No vertigo or throat pain   NECK: No pain or stiffness  RESPIRATORY: No cough, wheezing, hemoptysis; No shortness of breath  CARDIOVASCULAR: No chest pain or palpitations  GASTROINTESTINAL: No abdominal or epigastric pain. No nausea, vomiting, or hematemesis; No diarrhea or constipation. No melena or hematochezia.  GENITOURINARY: No dysuria, frequency or hematuria  NEUROLOGICAL: No numbness or weakness  SKIN: No itching, burning, rashes, or lesions   All other review of systems is negative unless indicated above    Vital Signs Last 24 Hrs  T(C): 36.9 (15 Lukas 2018 11:22), Max: 37 (15 Lukas 2018 05:34)  T(F): 98.5 (15 Lukas 2018 11:22), Max: 98.6 (15 Lukas 2018 05:34)  HR: 57 (15 Lukas 2018 11:22) (57 - 77)  BP: 125/69 (15 Lukas 2018 11:22) (125/69 - 151/85)  BP(mean): --  RR: 17 (15 Lukas 2018 11:22) (17 - 17)  SpO2: 96% (15 Lukas 2018 11:22) (96% - 98%)    I&O's Summary      CAPILLARY BLOOD GLUCOSE          PHYSICAL EXAM:  Constitutional: NAD, awake and alert, well-developed  HEENT: PERR, EOMI, Normal Hearing, MMM  Neck: Soft and supple, No LAD, No JVD  Respiratory: Breath sounds are clear bilaterally, No wheezing, rales or rhonchi  Cardiovascular: S1 and S2, regular rate and rhythm, no Murmurs, gallops or rubs  Gastrointestinal: Bowel Sounds present, soft, nontender, nondistended, no guarding, no rebound  Extremities: No peripheral edema  Vascular: 2+ peripheral pulses  Neurological: A/O x 3, no focal deficits  Musculoskeletal: 5/5 strength b/l upper and lower extremities  Skin: No rashes    MEDICATIONS:  MEDICATIONS  (STANDING):  ATENolol  Tablet 25 milliGRAM(s) Oral at bedtime  digoxin     Tablet 0.125 milliGRAM(s) Oral daily  DULoxetine 20 milliGRAM(s) Oral daily  enalapril 2.5 milliGRAM(s) Oral at bedtime  levoFLOXacin IVPB 750 milliGRAM(s) IV Intermittent every 48 hours  oxybutynin 5 milliGRAM(s) Oral daily  pantoprazole    Tablet 40 milliGRAM(s) Oral before breakfast  sodium chloride 0.9%. 1000 milliLiter(s) (75 mL/Hr) IV Continuous <Continuous>  warfarin 2.5 milliGRAM(s) Oral daily      LABS: All Labs Reviewed:                        13.8   7.79  )-----------( 232      ( 15 Lukas 2018 05:35 )             40.8     06-15    134<L>  |  100  |  19  ----------------------------<  97  3.9   |  28  |  1.02    Ca    9.2      15 Lukas 2018 05:35  Mg     1.9     06-14    TPro  6.9  /  Alb  3.4  /  TBili  0.3  /  DBili  x   /  AST  31  /  ALT  39  /  AlkPhos  80  06-13    PT/INR - ( 15 Lukas 2018 13:41 )   PT: 21.8 sec;   INR: 1.99 ratio               Blood Culture: 06-14 @ 00:04  Organism --  Gram Stain Blood -- Gram Stain --  Specimen Source .Blood None  Culture-Blood --    06-13 @ 23:59  Organism --  Gram Stain Blood -- Gram Stain --  Specimen Source .Blood None  Culture-Blood --    06-13 @ 18:11  Organism --  Gram Stain Blood -- Gram Stain --  Specimen Source .Urine None  Culture-Blood --        RADIOLOGY/EKG:    DVT PPX:    ADVANCED DIRECTIVE:    DISPOSITION: HOSPITAL COURSE/CHART REVIEW: HPI:  76 yo female with PMH of A. fib (on coumadin), CAD s/p stent, HTN, RA (on prednisone), GERD, h/o left renal CA, TIA, pt brought to  as she was c/o b/l lower extremity weakness since the day of admission. Reports that couldn't bare weight on her feet. No back pain.    SUBJECTIVE: Pt seen and examined at bedside. States that she feels better. Has no complaints this a.m.  She is amenable to IAM. Denies any HA, dizziness, malaise, changes in vision, CP, SOB, N/V/D/C, abdominal pain, numbness/tingling in extremities.       REVIEW OF SYSTEMS:  CONSTITUTIONAL: No weakness, fevers or chills  EYES/ENT: No visual changes;  No vertigo or throat pain   NECK: No pain or stiffness  RESPIRATORY: No cough, wheezing, hemoptysis; No shortness of breath  CARDIOVASCULAR: No chest pain or palpitations  GASTROINTESTINAL: No abdominal or epigastric pain. No nausea, vomiting, or hematemesis; No diarrhea or constipation. No melena or hematochezia.  GENITOURINARY: No dysuria, frequency or hematuria  NEUROLOGICAL: No numbness or weakness  SKIN: No itching, burning, rashes, or lesions   All other review of systems is negative unless indicated above      Vital Signs Last 24 Hrs  T(C): 36.9 (15 Lukas 2018 11:22), Max: 37 (15 Lukas 2018 05:34)  T(F): 98.5 (15 Lukas 2018 11:22), Max: 98.6 (15 Lukas 2018 05:34)  HR: 57 (15 Lukas 2018 11:22) (57 - 77)  BP: 125/69 (15 Lukas 2018 11:22) (125/69 - 151/85)  BP(mean): --  RR: 17 (15 Lukas 2018 11:22) (17 - 17)  SpO2: 96% (15 Lukas 2018 11:22) (96% - 98%)    PHYSICAL EXAM:  Constitutional: NAD, awake and alert, well-developed  HEENT: EOMI, Normal Hearing, MMM  Neck: Soft and supple  Respiratory: Breath sounds are clear bilaterally, No wheezing, rales or rhonchi  Cardiovascular: S1 and S2, regular rate and rhythm  Gastrointestinal: Bowel Sounds present, soft, nontender, nondistended, no guarding, no rebound  Extremities: No peripheral edema  Vascular: 2+ peripheral pulses  Neurological: A/O x 3, no focal deficits  Musculoskeletal: 5/5 strength b/l upper and lower extremities      MEDICATIONS  (STANDING):  ATENolol  Tablet 25 milliGRAM(s) Oral at bedtime  digoxin     Tablet 0.125 milliGRAM(s) Oral daily  DULoxetine 20 milliGRAM(s) Oral daily  enalapril 2.5 milliGRAM(s) Oral at bedtime  levoFLOXacin IVPB 750 milliGRAM(s) IV Intermittent every 48 hours  oxybutynin 5 milliGRAM(s) Oral daily  pantoprazole    Tablet 40 milliGRAM(s) Oral before breakfast  sodium chloride 0.9%. 1000 milliLiter(s) (75 mL/Hr) IV Continuous <Continuous>  warfarin 2.5 milliGRAM(s) Oral daily      LABS: All Labs Reviewed:                        13.8   7.79  )-----------( 232      ( 15 Lukas 2018 05:35 )             40.8     06-15    134<L>  |  100  |  19  ----------------------------<  97  3.9   |  28  |  1.02    Ca    9.2      15 Lukas 2018 05:35  Mg     1.9     06-14    TPro  6.9  /  Alb  3.4  /  TBili  0.3  /  DBili  x   /  AST  31  /  ALT  39  /  AlkPhos  80  06-13    PT/INR - ( 15 Lukas 2018 13:41 )   PT: 21.8 sec;   INR: 1.99 ratio       Blood Culture: 06-14 @ 00:04  Organism --  Gram Stain Blood -- Gram Stain --  Specimen Source .Blood None  Culture-Blood --    06-13 @ 23:59  Organism --  Gram Stain Blood -- Gram Stain --  Specimen Source .Blood None  Culture-Blood --    06-13 @ 18:11  Organism --  Gram Stain Blood -- Gram Stain --  Specimen Source .Urine None  Culture-Blood --    Digoxin Level, Serum (06.13.18 @ 16:35)  Digoxin Level, Serum: .79 ng/mL    Culture Blood and urine: NGTD    RADIOLOGY/EKG:  < from: CT Head No Cont (06.13.18 @ 16:29) >  IMPRESSION:   Mild to moderate periventricular white matter ischemia.       Xray Chest 1 View- PORTABLE-Urgent (06.13.18 @ 20:09)   IMPRESSION: Mild left infrahilar opacity could be due to atelectasis versus pneumonia.

## 2018-06-15 NOTE — PROGRESS NOTE ADULT - ATTENDING COMMENTS
on exam-comfortable  -rs-aeeb,cta    #MRI to follow     #ct abx, discharge plan
Patient seen and examined with Family Medicine Residents Jannette Eckert, Edward Kayserian and Asa Savage on the Family Medicine Teaching Service.  Agree with history, physical, labs and plan which were reviewed in detail.

## 2018-06-15 NOTE — PROGRESS NOTE ADULT - PROBLEM SELECTOR PLAN 1
#Weakness  - neurology consult: IF weakness persists, obtain MRI l/s spine  - PT: IAM seymour/molly 6/16  -  notified

## 2018-06-15 NOTE — PROGRESS NOTE ADULT - ASSESSMENT
76 yo female with PMH of A. fib (on coumadin), CAD s/p stent, HTN, RA (on prednisone), GERD, h/o left renal CA, TIA  Presented to  with weakness, urinary complaints

## 2018-06-16 ENCOUNTER — TRANSCRIPTION ENCOUNTER (OUTPATIENT)
Age: 78
End: 2018-06-16

## 2018-06-16 VITALS
DIASTOLIC BLOOD PRESSURE: 82 MMHG | RESPIRATION RATE: 18 BRPM | HEART RATE: 66 BPM | TEMPERATURE: 98 F | OXYGEN SATURATION: 97 % | SYSTOLIC BLOOD PRESSURE: 126 MMHG

## 2018-06-16 LAB
ANION GAP SERPL CALC-SCNC: 7 MMOL/L — SIGNIFICANT CHANGE UP (ref 5–17)
BUN SERPL-MCNC: 24 MG/DL — HIGH (ref 7–23)
CALCIUM SERPL-MCNC: 9.2 MG/DL — SIGNIFICANT CHANGE UP (ref 8.5–10.1)
CHLORIDE SERPL-SCNC: 99 MMOL/L — SIGNIFICANT CHANGE UP (ref 96–108)
CO2 SERPL-SCNC: 27 MMOL/L — SIGNIFICANT CHANGE UP (ref 22–31)
CREAT SERPL-MCNC: 1.06 MG/DL — SIGNIFICANT CHANGE UP (ref 0.5–1.3)
GLUCOSE SERPL-MCNC: 94 MG/DL — SIGNIFICANT CHANGE UP (ref 70–99)
HCT VFR BLD CALC: 40.1 % — SIGNIFICANT CHANGE UP (ref 34.5–45)
HGB BLD-MCNC: 13.6 G/DL — SIGNIFICANT CHANGE UP (ref 11.5–15.5)
INR BLD: 2.01 RATIO — HIGH (ref 0.88–1.16)
MCHC RBC-ENTMCNC: 30.6 PG — SIGNIFICANT CHANGE UP (ref 27–34)
MCHC RBC-ENTMCNC: 33.9 GM/DL — SIGNIFICANT CHANGE UP (ref 32–36)
MCV RBC AUTO: 90.1 FL — SIGNIFICANT CHANGE UP (ref 80–100)
NRBC # BLD: 0 /100 WBCS — SIGNIFICANT CHANGE UP (ref 0–0)
PLATELET # BLD AUTO: 238 K/UL — SIGNIFICANT CHANGE UP (ref 150–400)
POTASSIUM SERPL-MCNC: 4.2 MMOL/L — SIGNIFICANT CHANGE UP (ref 3.5–5.3)
POTASSIUM SERPL-SCNC: 4.2 MMOL/L — SIGNIFICANT CHANGE UP (ref 3.5–5.3)
PROTHROM AB SERPL-ACNC: 22 SEC — HIGH (ref 9.8–12.7)
RBC # BLD: 4.45 M/UL — SIGNIFICANT CHANGE UP (ref 3.8–5.2)
RBC # FLD: 12.6 % — SIGNIFICANT CHANGE UP (ref 10.3–14.5)
SODIUM SERPL-SCNC: 133 MMOL/L — LOW (ref 135–145)
WBC # BLD: 7.32 K/UL — SIGNIFICANT CHANGE UP (ref 3.8–10.5)
WBC # FLD AUTO: 7.32 K/UL — SIGNIFICANT CHANGE UP (ref 3.8–10.5)

## 2018-06-16 PROCEDURE — 99024 POSTOP FOLLOW-UP VISIT: CPT

## 2018-06-16 RX ADMIN — Medication 5 MILLIGRAM(S): at 12:06

## 2018-06-16 RX ADMIN — PANTOPRAZOLE SODIUM 40 MILLIGRAM(S): 20 TABLET, DELAYED RELEASE ORAL at 06:00

## 2018-06-16 RX ADMIN — DULOXETINE HYDROCHLORIDE 20 MILLIGRAM(S): 30 CAPSULE, DELAYED RELEASE ORAL at 12:06

## 2018-06-16 NOTE — DISCHARGE NOTE ADULT - CARE PROVIDER_API CALL
Flo Samson), Medicine  [address] [place] [address]  Phone: [phone]  Fax: [phone]    Devaughn Ellis), Cardiovascular Disease; Internal Medicine; Interventional Cardiology  [address] [place] [address]  Phone: [phone]  Fax: [phone]

## 2018-06-16 NOTE — CHART NOTE - NSCHARTNOTEFT_GEN_A_CORE
Surgical PA called to d/c stables from patients posterior scalp.  2 staples in place, laceration closed.  Staples were place 6/1 in the ED.  removed without difficulty.  laceration healing well.  pt without complaints.  no f/u needed

## 2018-06-16 NOTE — DISCHARGE NOTE ADULT - HOSPITAL COURSE
76 yo female with PMH of A. fib (on coumadin), CAD s/p stent, HTN, RA (on prednisone), GERD, h/o left renal CA, TIA, pt brought to  as she was c/o b/l lower extremity weakness since the day of admission. Reports that couldn't bare weight on her feet. No back pain. 78 yo female with PMH of A. fib (on coumadin), CAD s/p stent, HTN, RA (on prednisone), GERD, h/o left renal CA, TIA, pt brought to  as she was c/o b/l lower extremity weakness since the day of admission.Pt found to be +for UTI, started on renally dosed levaquin. Her weakness has since improved and she was able to walk with physical therapy. Pt is scheduled to be d/c to IAM today.     SUBJECTIVE: Pt seen and examined at bedside. States that she feels better. Has no complaints this a.m.  She is amenable to IAM. Denies any HA, dizziness, malaise, changes in vision, CP, SOB, N/V/D/C, abdominal pain, numbness/tingling in extremities.   Plan for the day discussed. Understanding assessed via teach back method. All other questions answered.       REVIEW OF SYSTEMS:  CONSTITUTIONAL: No weakness, fevers or chills  EYES/ENT: No visual changes;  No vertigo or throat pain   NECK: No pain or stiffness  RESPIRATORY: No cough, wheezing, hemoptysis; No shortness of breath  CARDIOVASCULAR: No chest pain or palpitations  GASTROINTESTINAL: No abdominal or epigastric pain. No nausea, vomiting, or hematemesis; No diarrhea or constipation. No melena or hematochezia.  GENITOURINARY: No dysuria, frequency or hematuria  NEUROLOGICAL: No numbness or weakness  SKIN: No itching, burning, rashes, or lesions   All other review of systems is negative unless indicated above      Vital Signs Last 24 Hrs  T(C): 36.8 (16 Jun 2018 05:25), Max: 36.9 (15 Lukas 2018 11:22)  T(F): 98.3 (16 Jun 2018 05:25), Max: 98.5 (15 Lukas 2018 11:22)  HR: 59 (16 Jun 2018 05:25) (57 - 76)  BP: 115/69 (16 Jun 2018 05:25) (108/89 - 132/77)  BP(mean): --  RR: 17 (15 Lukas 2018 20:46) (17 - 18)  SpO2: 98% (16 Jun 2018 05:25) (96% - 99%)    I&O's Summary    15 Lukas 2018 07:01  -  16 Jun 2018 07:00  --------------------------------------------------------  IN: 150 mL / OUT: 0 mL / NET: 150 mL      PHYSICAL EXAM:  Constitutional: NAD, awake and alert, well-developed  HEENT: EOMI, Normal Hearing, MMM  Neck: Soft and supple  Respiratory: Breath sounds are clear bilaterally, No wheezing, rales or rhonchi  Cardiovascular: S1 and S2, regular rate and rhythm  Gastrointestinal: Bowel Sounds present, soft, nontender, nondistended, no guarding, no rebound  Extremities: No peripheral edema  Vascular: 2+ peripheral pulses  Neurological: A/O x 3, no focal deficits  Musculoskeletal: 5/5 strength b/l upper and lower extremities    MEDICATIONS  (STANDING):  ATENolol  Tablet 25 milliGRAM(s) Oral at bedtime  digoxin     Tablet 0.125 milliGRAM(s) Oral daily  DULoxetine 20 milliGRAM(s) Oral daily  enalapril 2.5 milliGRAM(s) Oral at bedtime  levoFLOXacin IVPB 750 milliGRAM(s) IV Intermittent every 48 hours  oxybutynin 5 milliGRAM(s) Oral daily  pantoprazole    Tablet 40 milliGRAM(s) Oral before breakfast  sodium chloride 0.9%. 1000 milliLiter(s) (75 mL/Hr) IV Continuous <Continuous>  warfarin 2.5 milliGRAM(s) Oral daily    LABS: All Labs Reviewed:                      13.6   7.32  )-----------( 238      ( 16 Jun 2018 05:14 )             40.1     06-16  133<L>  |  99  |  24<H>  ----------------------------<  94  4.2   |  27  |  1.06    Ca    9.2      16 Jun 2018 05:14    PT/INR - ( 16 Jun 2018 05:14 )   PT: 22.0 sec;   INR: 2.01 ratio

## 2018-06-16 NOTE — DISCHARGE NOTE ADULT - SECONDARY DIAGNOSIS.
Atrial fibrillation DVT (deep venous thrombosis) HTN (hypertension) CAD (coronary artery disease) Depressive disorder

## 2018-06-16 NOTE — DISCHARGE NOTE ADULT - CARE PLAN
Principal Discharge DX:	Weakness  Goal:	Improvement  Assessment and plan of treatment:	- If you experience weakness, causing inability to walk or move  your extremities, or trouble breathing, please call  your 9-1-1 or go to the emergency room immediately  - If you are unable to carry out daily activities, eating, bathing, dressing, using the bathroom because of weakness, please call your doctor or go the emergency room immediately  - Please follow up with the neurologist  - Jacinta follow up with your primary care doctor  Secondary Diagnosis:	Atrial fibrillation  Goal:	To monitor and control  Assessment and plan of treatment:	- If you experience palpitations associated with lightheadedness, weakness, fainting, or almost-fainting, please have your care taker call 9-1-1 or come to the Emergency department right away.  - If you experience weakness/inability to move extremities, loss of vison, swelling/warmth/redness/loss of sensation of your lower extremities, chest pain, shortness of breath, or facial droop, call 9-1-1 or go to the ED immediately.  - Take your medication as prescribed  - Follow up with your primary care physician within 5-7 days of discharge  - Follow up with your Cardiologist within 2-7 days of discharge  Secondary Diagnosis:	DVT (deep venous thrombosis)  Goal:	Remain thrombus/emboli free and Prevent  future thrombotic/embolic events.  Assessment and plan of treatment:	- If you experience weakness/inability to move extremities, loss of vison, swelling/warmth/redness/loss of sensation of your lower extremities, chest pain, shortness of breath, or facial droop, call 9-1-1 or go to the ED immediately.  - Take your medication as prescribed  - Follow up with your primary care physician.  Secondary Diagnosis:	HTN (hypertension)  Goal:	To maintain normal blood pressures  Assessment and plan of treatment:	- If you experience headache, blurry vision, chest pain, shortness of breath, numbness/tingling in your extremities, facial droop, call 9-1-1 or go the emergency room immediately.  - Monitor your blood pressure at home and keep a log  - Take your medication as prescribed  - Follow up with your primary care doctor  Secondary Diagnosis:	CAD (coronary artery disease)  Goal:	To prevent/slow down rate of disease progression  Assessment and plan of treatment:	- If you experience chest pain/pressure, shortness of breath, sweating, your heart beating very fast, dizziness, fainting/almost fainting, please call/have your care giver call 9-1-1 immediately.  - Please take your medication as prescribed  - Please f/u w/your primary care doctor and your cardiologist  Secondary Diagnosis:	Depressive disorder  Goal:	To improve mood  Assessment and plan of treatment:	If you feel sad and the urge to hurt yourself or others please call 9-1-1 or go to the emergency room immediately.   Please follow up with your primary care doctor

## 2018-06-16 NOTE — DISCHARGE NOTE ADULT - FINDINGS/TREATMENT
< from: CT Head No Cont (06.13.18 @ 16:29) >  IMPRESSION:   Mild to moderate periventricular white matter ischemia. Xray Chest 1 View- PORTABLE-Urgent (06.13.18 @ 20:09)   IMPRESSION: Mild left infrahilar opacity could be due to atelectasis versus pneumonia.

## 2018-06-16 NOTE — DISCHARGE NOTE ADULT - MEDICATION SUMMARY - MEDICATIONS TO TAKE
I will START or STAY ON the medications listed below when I get home from the hospital:    enalapril 2.5 mg oral tablet  -- 1 tab(s) by mouth once a day (at bedtime)  -- Indication: For HTN (hypertension)    digoxin 125 mcg (0.125 mg) oral tablet  -- 1 tab(s) by mouth once a day (at bedtime)  -- Indication: For Afib    warfarin 5 mg oral tablet  -- 1 tab(s) by mouth 2 times a week MONDAY AND WEDNESDAY  -- Indication: For Afib    warfarin 2.5 mg oral tablet  -- 1 tab(s) by mouth 5 times a week DAILY EXCEPT MONDAY AND WEDNESDAY  -- Indication: For Afib    DULoxetine 20 mg oral delayed release capsule  -- 1 cap(s) by mouth once a day  -- Indication: For Depressive disorder    atenolol 25 mg oral tablet  -- 1 tab(s) by mouth once a day (at bedtime)  -- Indication: For HTN (hypertension)    magnesium oxide 200 mg oral tablet  -- orally once a day  -- Indication: For SUPPLEMENT    Omega-3 1000 mg oral capsule  -- 1 cap(s) by mouth once a day  -- Indication: For SUPPLEMENT    Probiotic Formula oral capsule  -- 1 cap(s) by mouth once a day  -- Indication: For Probiotic    pantoprazole 40 mg oral delayed release tablet  -- 1 tab(s) by mouth once a day (before a meal)  -- Indication: For Acid Reflux    Levaquin 750 mg oral tablet  -- 1 tab(s) by mouth once a day   -- Indication: For UTI (urinary tract infection)    oxybutynin 5 mg/24 hours oral tablet, extended release  -- 1 tab(s) by mouth once a day  -- Indication: For Urinary incontinence    Multiple Vitamins oral tablet  -- 1 tab(s) by mouth once a day  -- Indication: For supplement    ascorbic acid 500 mg oral tablet  -- 1 tab(s) by mouth once a day  -- Indication: For supplement    cholecalciferol 1000 intl units oral tablet  -- 1 tab(s) by mouth once a day  -- Indication: For supplement

## 2018-06-16 NOTE — DISCHARGE NOTE ADULT - PLAN OF CARE
Improvement - If you experience weakness, causing inability to walk or move  your extremities, or trouble breathing, please call  your 9-1-1 or go to the emergency room immediately  - If you are unable to carry out daily activities, eating, bathing, dressing, using the bathroom because of weakness, please call your doctor or go the emergency room immediately  - Please follow up with the neurologist  - Jacinta follow up with your primary care doctor To monitor and control - If you experience palpitations associated with lightheadedness, weakness, fainting, or almost-fainting, please have your care taker call 9-1-1 or come to the Emergency department right away.  - If you experience weakness/inability to move extremities, loss of vison, swelling/warmth/redness/loss of sensation of your lower extremities, chest pain, shortness of breath, or facial droop, call 9-1-1 or go to the ED immediately.  - Take your medication as prescribed  - Follow up with your primary care physician within 5-7 days of discharge  - Follow up with your Cardiologist within 2-7 days of discharge Remain thrombus/emboli free and Prevent  future thrombotic/embolic events. - If you experience weakness/inability to move extremities, loss of vison, swelling/warmth/redness/loss of sensation of your lower extremities, chest pain, shortness of breath, or facial droop, call 9-1-1 or go to the ED immediately.  - Take your medication as prescribed  - Follow up with your primary care physician. To maintain normal blood pressures - If you experience headache, blurry vision, chest pain, shortness of breath, numbness/tingling in your extremities, facial droop, call 9-1-1 or go the emergency room immediately.  - Monitor your blood pressure at home and keep a log  - Take your medication as prescribed  - Follow up with your primary care doctor To prevent/slow down rate of disease progression - If you experience chest pain/pressure, shortness of breath, sweating, your heart beating very fast, dizziness, fainting/almost fainting, please call/have your care giver call 9-1-1 immediately.  - Please take your medication as prescribed  - Please f/u w/your primary care doctor and your cardiologist To improve mood If you feel sad and the urge to hurt yourself or others please call 9-1-1 or go to the emergency room immediately.   Please follow up with your primary care doctor

## 2018-06-16 NOTE — PROGRESS NOTE ADULT - SUBJECTIVE AND OBJECTIVE BOX
Pt has been seen and examined with FP resident, resident supervised agree with a/p       Patient is a 77y old  Female who presents with a chief complaint of legs were weak (16 Jun 2018 08:09)        HPI:  78 yo female with PMH of A. fib (on coumadin), CAD s/p stent, HTN, RA (on prednisone), GERD, h/o left renal CA, TIA, pt brought to  as she was c/o b/l lower extremity weakness since the day of admission      PHYSICAL EXAM:  Vital Signs Last 24 Hrs  T(C): 36.7 (16 Jun 2018 10:42), Max: 36.9 (15 Lukas 2018 16:41)  T(F): 98 (16 Jun 2018 10:42), Max: 98.5 (15 Lukas 2018 20:46)  HR: 57 (16 Jun 2018 10:42) (57 - 76)  BP: 121/80 (16 Jun 2018 10:42) (108/89 - 132/77)  BP(mean): --  RR: 18 (16 Jun 2018 10:42) (17 - 18)  SpO2: 96% (16 Jun 2018 10:42) (96% - 99%)  general- comfortable   -rs-aeeb,cta  -cvs-s1s2 normal   -p/a-soft,bs+  -extremity- no asymmetrical swelling noted   -cns- non focal         A/P    #d/c today to rehab, time spent 65 minutes

## 2018-06-19 LAB
CULTURE RESULTS: SIGNIFICANT CHANGE UP
CULTURE RESULTS: SIGNIFICANT CHANGE UP
SPECIMEN SOURCE: SIGNIFICANT CHANGE UP
SPECIMEN SOURCE: SIGNIFICANT CHANGE UP

## 2018-06-20 DIAGNOSIS — M47.816 SPONDYLOSIS WITHOUT MYELOPATHY OR RADICULOPATHY, LUMBAR REGION: ICD-10-CM

## 2018-06-20 DIAGNOSIS — Z95.5 PRESENCE OF CORONARY ANGIOPLASTY IMPLANT AND GRAFT: ICD-10-CM

## 2018-06-20 DIAGNOSIS — K21.9 GASTRO-ESOPHAGEAL REFLUX DISEASE WITHOUT ESOPHAGITIS: ICD-10-CM

## 2018-06-20 DIAGNOSIS — N39.0 URINARY TRACT INFECTION, SITE NOT SPECIFIED: ICD-10-CM

## 2018-06-20 DIAGNOSIS — M06.9 RHEUMATOID ARTHRITIS, UNSPECIFIED: ICD-10-CM

## 2018-06-20 DIAGNOSIS — M81.0 AGE-RELATED OSTEOPOROSIS WITHOUT CURRENT PATHOLOGICAL FRACTURE: ICD-10-CM

## 2018-06-20 DIAGNOSIS — Z86.73 PERSONAL HISTORY OF TRANSIENT ISCHEMIC ATTACK (TIA), AND CEREBRAL INFARCTION WITHOUT RESIDUAL DEFICITS: ICD-10-CM

## 2018-06-20 DIAGNOSIS — F32.9 MAJOR DEPRESSIVE DISORDER, SINGLE EPISODE, UNSPECIFIED: ICD-10-CM

## 2018-06-20 DIAGNOSIS — I25.10 ATHEROSCLEROTIC HEART DISEASE OF NATIVE CORONARY ARTERY WITHOUT ANGINA PECTORIS: ICD-10-CM

## 2018-06-20 DIAGNOSIS — F41.9 ANXIETY DISORDER, UNSPECIFIED: ICD-10-CM

## 2018-06-20 DIAGNOSIS — Z85.528 PERSONAL HISTORY OF OTHER MALIGNANT NEOPLASM OF KIDNEY: ICD-10-CM

## 2018-06-20 DIAGNOSIS — I48.91 UNSPECIFIED ATRIAL FIBRILLATION: ICD-10-CM

## 2018-06-20 DIAGNOSIS — I10 ESSENTIAL (PRIMARY) HYPERTENSION: ICD-10-CM

## 2018-07-12 ENCOUNTER — EMERGENCY (EMERGENCY)
Facility: HOSPITAL | Age: 78
LOS: 0 days | Discharge: HOME | End: 2018-07-12
Attending: EMERGENCY MEDICINE | Admitting: EMERGENCY MEDICINE
Payer: MEDICARE

## 2018-07-12 VITALS
RESPIRATION RATE: 14 BRPM | OXYGEN SATURATION: 100 % | SYSTOLIC BLOOD PRESSURE: 136 MMHG | HEART RATE: 57 BPM | TEMPERATURE: 98 F | DIASTOLIC BLOOD PRESSURE: 66 MMHG

## 2018-07-12 VITALS
DIASTOLIC BLOOD PRESSURE: 83 MMHG | WEIGHT: 125 LBS | RESPIRATION RATE: 12 BRPM | SYSTOLIC BLOOD PRESSURE: 152 MMHG | OXYGEN SATURATION: 100 % | HEART RATE: 58 BPM | TEMPERATURE: 98 F | HEIGHT: 67 IN

## 2018-07-12 DIAGNOSIS — I48.91 UNSPECIFIED ATRIAL FIBRILLATION: ICD-10-CM

## 2018-07-12 DIAGNOSIS — Z98.62 PERIPHERAL VASCULAR ANGIOPLASTY STATUS: Chronic | ICD-10-CM

## 2018-07-12 DIAGNOSIS — Z90.89 ACQUIRED ABSENCE OF OTHER ORGANS: Chronic | ICD-10-CM

## 2018-07-12 DIAGNOSIS — M47.9 SPONDYLOSIS, UNSPECIFIED: ICD-10-CM

## 2018-07-12 DIAGNOSIS — I25.10 ATHEROSCLEROTIC HEART DISEASE OF NATIVE CORONARY ARTERY WITHOUT ANGINA PECTORIS: ICD-10-CM

## 2018-07-12 DIAGNOSIS — Z79.01 LONG TERM (CURRENT) USE OF ANTICOAGULANTS: ICD-10-CM

## 2018-07-12 DIAGNOSIS — R79.1 ABNORMAL COAGULATION PROFILE: ICD-10-CM

## 2018-07-12 DIAGNOSIS — Z90.5 ACQUIRED ABSENCE OF KIDNEY: ICD-10-CM

## 2018-07-12 DIAGNOSIS — I10 ESSENTIAL (PRIMARY) HYPERTENSION: ICD-10-CM

## 2018-07-12 DIAGNOSIS — Z90.89 ACQUIRED ABSENCE OF OTHER ORGANS: ICD-10-CM
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
## 2018-07-12 DIAGNOSIS — M12.9 ARTHROPATHY, UNSPECIFIED: ICD-10-CM

## 2018-07-12 DIAGNOSIS — Z98.890 OTHER SPECIFIED POSTPROCEDURAL STATES: Chronic | ICD-10-CM

## 2018-07-12 DIAGNOSIS — I67.89 OTHER CEREBROVASCULAR DISEASE: ICD-10-CM

## 2018-07-12 DIAGNOSIS — M81.0 AGE-RELATED OSTEOPOROSIS WITHOUT CURRENT PATHOLOGICAL FRACTURE: ICD-10-CM

## 2018-07-12 DIAGNOSIS — Z90.5 ACQUIRED ABSENCE OF KIDNEY: Chronic | ICD-10-CM

## 2018-07-12 DIAGNOSIS — Z85.528 PERSONAL HISTORY OF OTHER MALIGNANT NEOPLASM OF KIDNEY: ICD-10-CM
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
## 2018-07-12 DIAGNOSIS — I51.81 TAKOTSUBO SYNDROME: ICD-10-CM

## 2018-07-12 DIAGNOSIS — Z98.49 CATARACT EXTRACTION STATUS, UNSPECIFIED EYE: Chronic | ICD-10-CM

## 2018-07-12 DIAGNOSIS — M50.30 OTHER CERVICAL DISC DEGENERATION, UNSPECIFIED CERVICAL REGION: ICD-10-CM

## 2018-07-12 DIAGNOSIS — Z98.89 OTHER SPECIFIED POSTPROCEDURAL STATES: Chronic | ICD-10-CM

## 2018-07-12 DIAGNOSIS — Z98.41 CATARACT EXTRACTION STATUS, RIGHT EYE: ICD-10-CM

## 2018-07-12 DIAGNOSIS — S09.90XA UNSPECIFIED INJURY OF HEAD, INITIAL ENCOUNTER: ICD-10-CM

## 2018-07-12 DIAGNOSIS — Z98.42 CATARACT EXTRACTION STATUS, LEFT EYE: ICD-10-CM

## 2018-07-12 DIAGNOSIS — Z88.0 ALLERGY STATUS TO PENICILLIN: ICD-10-CM

## 2018-07-12 DIAGNOSIS — Z98.61 CORONARY ANGIOPLASTY STATUS: ICD-10-CM

## 2018-07-12 LAB
ALBUMIN SERPL ELPH-MCNC: 3.9 G/DL — SIGNIFICANT CHANGE UP (ref 3.3–5)
ALP SERPL-CCNC: 92 U/L — SIGNIFICANT CHANGE UP (ref 40–120)
ALT FLD-CCNC: 51 U/L — SIGNIFICANT CHANGE UP (ref 12–78)
ANION GAP SERPL CALC-SCNC: 7 MMOL/L — SIGNIFICANT CHANGE UP (ref 5–17)
APTT BLD: 46.3 SEC — HIGH (ref 27.5–37.4)
AST SERPL-CCNC: 39 U/L — HIGH (ref 15–37)
BASOPHILS # BLD AUTO: 0.04 K/UL — SIGNIFICANT CHANGE UP (ref 0–0.2)
BASOPHILS NFR BLD AUTO: 0.7 % — SIGNIFICANT CHANGE UP (ref 0–2)
BILIRUB SERPL-MCNC: 0.4 MG/DL — SIGNIFICANT CHANGE UP (ref 0.2–1.2)
BUN SERPL-MCNC: 20 MG/DL — SIGNIFICANT CHANGE UP (ref 7–23)
CALCIUM SERPL-MCNC: 9.9 MG/DL — SIGNIFICANT CHANGE UP (ref 8.5–10.1)
CHLORIDE SERPL-SCNC: 99 MMOL/L — SIGNIFICANT CHANGE UP (ref 96–108)
CO2 SERPL-SCNC: 28 MMOL/L — SIGNIFICANT CHANGE UP (ref 22–31)
CREAT SERPL-MCNC: 1.11 MG/DL — SIGNIFICANT CHANGE UP (ref 0.5–1.3)
EOSINOPHIL # BLD AUTO: 0.07 K/UL — SIGNIFICANT CHANGE UP (ref 0–0.5)
EOSINOPHIL NFR BLD AUTO: 1.2 % — SIGNIFICANT CHANGE UP (ref 0–6)
GLUCOSE SERPL-MCNC: 88 MG/DL — SIGNIFICANT CHANGE UP (ref 70–99)
HCT VFR BLD CALC: 42.9 % — SIGNIFICANT CHANGE UP (ref 34.5–45)
HGB BLD-MCNC: 14.5 G/DL — SIGNIFICANT CHANGE UP (ref 11.5–15.5)
IMM GRANULOCYTES NFR BLD AUTO: 0.3 % — SIGNIFICANT CHANGE UP (ref 0–1.5)
INR BLD: 4.07 RATIO — HIGH (ref 0.88–1.16)
LYMPHOCYTES # BLD AUTO: 1.5 K/UL — SIGNIFICANT CHANGE UP (ref 1–3.3)
LYMPHOCYTES # BLD AUTO: 26 % — SIGNIFICANT CHANGE UP (ref 13–44)
MCHC RBC-ENTMCNC: 30.3 PG — SIGNIFICANT CHANGE UP (ref 27–34)
MCHC RBC-ENTMCNC: 33.8 GM/DL — SIGNIFICANT CHANGE UP (ref 32–36)
MCV RBC AUTO: 89.7 FL — SIGNIFICANT CHANGE UP (ref 80–100)
MONOCYTES # BLD AUTO: 0.49 K/UL — SIGNIFICANT CHANGE UP (ref 0–0.9)
MONOCYTES NFR BLD AUTO: 8.5 % — SIGNIFICANT CHANGE UP (ref 2–14)
NEUTROPHILS # BLD AUTO: 3.65 K/UL — SIGNIFICANT CHANGE UP (ref 1.8–7.4)
NEUTROPHILS NFR BLD AUTO: 63.3 % — SIGNIFICANT CHANGE UP (ref 43–77)
NRBC # BLD: 0 /100 WBCS — SIGNIFICANT CHANGE UP (ref 0–0)
PLATELET # BLD AUTO: 279 K/UL — SIGNIFICANT CHANGE UP (ref 150–400)
POTASSIUM SERPL-MCNC: 4.6 MMOL/L — SIGNIFICANT CHANGE UP (ref 3.5–5.3)
POTASSIUM SERPL-SCNC: 4.6 MMOL/L — SIGNIFICANT CHANGE UP (ref 3.5–5.3)
PROT SERPL-MCNC: 7.9 GM/DL — SIGNIFICANT CHANGE UP (ref 6–8.3)
PROTHROM AB SERPL-ACNC: 45.2 SEC — HIGH (ref 9.8–12.7)
RBC # BLD: 4.78 M/UL — SIGNIFICANT CHANGE UP (ref 3.8–5.2)
RBC # FLD: 12.5 % — SIGNIFICANT CHANGE UP (ref 10.3–14.5)
SODIUM SERPL-SCNC: 134 MMOL/L — LOW (ref 135–145)
WBC # BLD: 5.77 K/UL — SIGNIFICANT CHANGE UP (ref 3.8–10.5)
WBC # FLD AUTO: 5.77 K/UL — SIGNIFICANT CHANGE UP (ref 3.8–10.5)

## 2018-07-12 PROCEDURE — 99285 EMERGENCY DEPT VISIT HI MDM: CPT

## 2018-07-12 PROCEDURE — 70450 CT HEAD/BRAIN W/O DYE: CPT | Mod: 26

## 2018-07-12 PROCEDURE — 72125 CT NECK SPINE W/O DYE: CPT | Mod: 26

## 2018-07-12 NOTE — ED PROVIDER NOTE - OBJECTIVE STATEMENT
78 y/o F with PMHx of A-fib (on Coumadin), CAD, HTN, Renal CA, and RA presenting to the ED from [facility] s/p fall today. Pt reports that she opened the automatic doors to the bathroom at [facility] when she got distracted talking to someone and the doors began to close in on her, knocking over her walker and causing her to fall to the floor. Pt hit he posterior head on the floor. Reports mild HA and presents with contusion to posterior head. No LOC. Denies any other acute pain.

## 2018-07-12 NOTE — ED PROVIDER NOTE - ENMT, MLM
Airway patent, Nasal mucosa clear. Mouth with normal mucosa. Throat has no vesicles, no oropharyngeal exudates and uvula is midline. No crepitus.

## 2018-07-12 NOTE — ED PROVIDER NOTE - PROGRESS NOTE DETAILS
Attending Zach, d/w daughter and pt results of tests.  pt with elevated INR - will hold coumadin for 1 dose and then restart.  Recheck INR on Monday.  Instructed pt to call and follow up with PMD.

## 2018-07-12 NOTE — ED PROVIDER NOTE - MUSCULOSKELETAL, MLM
Spine appears normal, range of motion is not limited, no muscle or joint tenderness. Non-tender to palpation of extremities.

## 2018-07-12 NOTE — ED PROVIDER NOTE - CARE PLAN
Principal Discharge DX:	Injury of head, initial encounter  Secondary Diagnosis:	Fall, initial encounter  Secondary Diagnosis:	INR (international normal ratio) abnormal

## 2018-07-12 NOTE — ED ADULT TRIAGE NOTE - CHIEF COMPLAINT QUOTE
Patient BIBA. S/P unwinessed fall at the [facility]. Patient on Coumadin. Patient hit her head but denies any LOC. Trauma alert called at triage

## 2018-07-17 ENCOUNTER — EMERGENCY (EMERGENCY)
Facility: HOSPITAL | Age: 78
LOS: 0 days | Discharge: NURSING, REHAB OR LONG-TERM CARE | End: 2018-07-17
Attending: EMERGENCY MEDICINE | Admitting: EMERGENCY MEDICINE
Payer: MEDICARE

## 2018-07-17 VITALS
OXYGEN SATURATION: 99 % | SYSTOLIC BLOOD PRESSURE: 159 MMHG | TEMPERATURE: 98 F | DIASTOLIC BLOOD PRESSURE: 81 MMHG | RESPIRATION RATE: 16 BRPM | HEART RATE: 56 BPM

## 2018-07-17 VITALS
WEIGHT: 119.93 LBS | RESPIRATION RATE: 18 BRPM | TEMPERATURE: 98 F | SYSTOLIC BLOOD PRESSURE: 165 MMHG | HEART RATE: 66 BPM | DIASTOLIC BLOOD PRESSURE: 108 MMHG | OXYGEN SATURATION: 97 %

## 2018-07-17 DIAGNOSIS — Z98.890 OTHER SPECIFIED POSTPROCEDURAL STATES: Chronic | ICD-10-CM

## 2018-07-17 DIAGNOSIS — Z98.41 CATARACT EXTRACTION STATUS, RIGHT EYE: ICD-10-CM

## 2018-07-17 DIAGNOSIS — M47.9 SPONDYLOSIS, UNSPECIFIED: ICD-10-CM
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
## 2018-07-17 DIAGNOSIS — Z90.89 ACQUIRED ABSENCE OF OTHER ORGANS: ICD-10-CM

## 2018-07-17 DIAGNOSIS — Z98.49 CATARACT EXTRACTION STATUS, UNSPECIFIED EYE: Chronic | ICD-10-CM

## 2018-07-17 DIAGNOSIS — Z98.61 CORONARY ANGIOPLASTY STATUS: ICD-10-CM

## 2018-07-17 DIAGNOSIS — Z88.0 ALLERGY STATUS TO PENICILLIN: ICD-10-CM

## 2018-07-17 DIAGNOSIS — Z98.62 PERIPHERAL VASCULAR ANGIOPLASTY STATUS: Chronic | ICD-10-CM

## 2018-07-17 DIAGNOSIS — S20.20XS: ICD-10-CM

## 2018-07-17 DIAGNOSIS — Z85.528 PERSONAL HISTORY OF OTHER MALIGNANT NEOPLASM OF KIDNEY: ICD-10-CM

## 2018-07-17 DIAGNOSIS — Z98.89 OTHER SPECIFIED POSTPROCEDURAL STATES: Chronic | ICD-10-CM

## 2018-07-17 DIAGNOSIS — I51.81 TAKOTSUBO SYNDROME: ICD-10-CM

## 2018-07-17 DIAGNOSIS — F41.9 ANXIETY DISORDER, UNSPECIFIED: ICD-10-CM

## 2018-07-17 DIAGNOSIS — I48.91 UNSPECIFIED ATRIAL FIBRILLATION: ICD-10-CM

## 2018-07-17 DIAGNOSIS — S09.90XA UNSPECIFIED INJURY OF HEAD, INITIAL ENCOUNTER: ICD-10-CM

## 2018-07-17 DIAGNOSIS — Z90.89 ACQUIRED ABSENCE OF OTHER ORGANS: Chronic | ICD-10-CM

## 2018-07-17 DIAGNOSIS — Z90.5 ACQUIRED ABSENCE OF KIDNEY: Chronic | ICD-10-CM

## 2018-07-17 DIAGNOSIS — R94.31 ABNORMAL ELECTROCARDIOGRAM [ECG] [EKG]: ICD-10-CM

## 2018-07-17 DIAGNOSIS — I25.10 ATHEROSCLEROTIC HEART DISEASE OF NATIVE CORONARY ARTERY WITHOUT ANGINA PECTORIS: ICD-10-CM

## 2018-07-17 DIAGNOSIS — I67.89 OTHER CEREBROVASCULAR DISEASE: ICD-10-CM

## 2018-07-17 DIAGNOSIS — Z98.42 CATARACT EXTRACTION STATUS, LEFT EYE: ICD-10-CM

## 2018-07-17 DIAGNOSIS — M19.90 UNSPECIFIED OSTEOARTHRITIS, UNSPECIFIED SITE: ICD-10-CM
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
## 2018-07-17 DIAGNOSIS — I10 ESSENTIAL (PRIMARY) HYPERTENSION: ICD-10-CM

## 2018-07-17 DIAGNOSIS — R29.6 REPEATED FALLS: ICD-10-CM

## 2018-07-17 DIAGNOSIS — Z79.01 LONG TERM (CURRENT) USE OF ANTICOAGULANTS: ICD-10-CM

## 2018-07-17 DIAGNOSIS — Z90.5 ACQUIRED ABSENCE OF KIDNEY: ICD-10-CM

## 2018-07-17 DIAGNOSIS — Z82.49 FAMILY HISTORY OF ISCHEMIC HEART DISEASE AND OTHER DISEASES OF THE CIRCULATORY SYSTEM: ICD-10-CM

## 2018-07-17 DIAGNOSIS — S00.03XA CONTUSION OF SCALP, INITIAL ENCOUNTER: ICD-10-CM

## 2018-07-17 DIAGNOSIS — M12.9 ARTHROPATHY, UNSPECIFIED: ICD-10-CM
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
## 2018-07-17 PROCEDURE — 71045 X-RAY EXAM CHEST 1 VIEW: CPT | Mod: 26,59

## 2018-07-17 PROCEDURE — 93010 ELECTROCARDIOGRAM REPORT: CPT

## 2018-07-17 PROCEDURE — 71100 X-RAY EXAM RIBS UNI 2 VIEWS: CPT | Mod: 26,LT

## 2018-07-17 PROCEDURE — 72125 CT NECK SPINE W/O DYE: CPT | Mod: 26

## 2018-07-17 PROCEDURE — 72190 X-RAY EXAM OF PELVIS: CPT | Mod: 26

## 2018-07-17 PROCEDURE — 70450 CT HEAD/BRAIN W/O DYE: CPT | Mod: 26

## 2018-07-17 PROCEDURE — 99285 EMERGENCY DEPT VISIT HI MDM: CPT | Mod: 25

## 2018-07-17 NOTE — ED PROVIDER NOTE - ENMT, MLM
Airway patent, Nasal mucosa clear. Mouth with normal mucosa. Throat has no vesicles, no oropharyngeal exudates and uvula is midline. Oropharynx clear, mucous membranes slightly dry. Throat has no vesicles, no oropharyngeal exudates and uvula is midline.

## 2018-07-17 NOTE — ED PROVIDER NOTE - PROGRESS NOTE DETAILS
Larissa MASON for ED attending, Dr. Lema: Pt now reports some L sided rib/chest wall pain she noticed this morning upon waking. Pt believes this pain is from a previous fall earlier this week. Larissa MASON for ED attending, Dr. Lema: D/c trauma alert.

## 2018-07-17 NOTE — ED ADULT NURSE REASSESSMENT NOTE - NS ED NURSE REASSESS COMMENT FT1
received report from sharon arthur, pt a+o son at bedside, hematoma noted on posterior part of head. Denies pain / blurry vision at this time.

## 2018-07-17 NOTE — ED PROVIDER NOTE - MUSCULOSKELETAL, MLM
Spine appears normal, range of motion is not limited, no muscle or joint tenderness Spine appears normal, range of motion is not limited, no muscle or joint tenderness. Neck nontender, supple without pain. +L lower anterior chest wall TTP, no discoloration. Pelvis nontender, stable. MYLES x4, no focal swelling/tenderness of extremities. B/l SLR 45 degrees.

## 2018-07-17 NOTE — ED PROVIDER NOTE - SKIN, MLM
Skin normal color for race, warm, dry and intact. No evidence of rash. Skin normal color for race, warm, dry and intact. No evidence of rash. +Ecchymosis superficial abrasion R forearm dorsum, nontender. +Occipital scalp hematoma, tender, overlying skin intact.

## 2018-07-17 NOTE — ED PROVIDER NOTE - CARDIAC, MLM
Normal rate, regular rhythm.  Heart sounds S1, S2.  No murmurs, rubs or gallops. Normal rate, regular rhythm.  Heart sounds S1, S2.  No murmurs, rubs or gallops. Normal radial pulse.

## 2018-07-17 NOTE — ED PROVIDER NOTE - RESPIRATORY, MLM
Breath sounds clear and equal bilaterally. Breath sounds clear and equal bilaterally. Normal respirations.

## 2018-07-17 NOTE — ED PROVIDER NOTE - OBJECTIVE STATEMENT
78 y/o female with a PMHx of Afib on Coumadin, CAD, HTN, RA, and kidney cancer presents to the ED s/p unwitnessed fall today on Coumadin. Pt states she has a rolling walker and has fallen multiple times because the walker frequently slips out from under her. At time of eval, pt states she remembers falling but does not remember striking the ground. Reports HA. Denies extremity pain. No other complaints at time of eval. 78 y/o female with a PMHx of Afib on Coumadin, CAD, HTN, RA, and kidney cancer presents to the ED s/p unwitnessed fall today on Coumadin. Pt states she has a rolling walker and has fallen multiple times because the walker frequently slips out from under her. At time of eval, pt states she remembers event of fall in its entirety, denies LOC. Pt states she struck her head on the ground. At time of eval, reports HA and bruising to the head. Denies SOB, abd pain, extremity pain. No other complaints at time of eval. PCP: Dr. Flo Samson.

## 2018-07-17 NOTE — ED PROVIDER NOTE - MEDICAL DECISION MAKING DETAILS
76 y/o F with PMHx of Afib on Coumadin BIBA from nursing home s/p fall while walking with walker assistance. Pt reports walker slipped out from her, causing her to lose balance and fall over. +Hit head, no LOC. Pt remembers incident in entirety. Neuro exam intact. Plan for CT head/c-spine, labs, observe, reassess.

## 2018-07-18 PROBLEM — M25.562 PAIN IN LEFT KNEE: Chronic | Status: ACTIVE | Noted: 2017-03-01

## 2018-07-18 PROBLEM — F32.9 MAJOR DEPRESSIVE DISORDER, SINGLE EPISODE, UNSPECIFIED: Chronic | Status: ACTIVE | Noted: 2017-03-01

## 2018-07-18 PROBLEM — F41.9 ANXIETY DISORDER, UNSPECIFIED: Chronic | Status: ACTIVE | Noted: 2017-03-01

## 2018-07-18 PROBLEM — Z95.5 PRESENCE OF CORONARY ANGIOPLASTY IMPLANT AND GRAFT: Chronic | Status: ACTIVE | Noted: 2017-03-01

## 2018-07-18 PROBLEM — I25.10 ATHEROSCLEROTIC HEART DISEASE OF NATIVE CORONARY ARTERY WITHOUT ANGINA PECTORIS: Chronic | Status: ACTIVE | Noted: 2017-03-01

## 2018-07-18 PROBLEM — I48.91 UNSPECIFIED ATRIAL FIBRILLATION: Chronic | Status: ACTIVE | Noted: 2017-03-01

## 2018-07-18 PROBLEM — I51.81 TAKOTSUBO SYNDROME: Chronic | Status: ACTIVE | Noted: 2017-03-01

## 2018-07-18 PROBLEM — N39.41 URGE INCONTINENCE: Chronic | Status: ACTIVE | Noted: 2017-03-01

## 2018-11-02 ENCOUNTER — TRANSCRIPTION ENCOUNTER (OUTPATIENT)
Age: 78
End: 2018-11-02

## 2018-11-07 ENCOUNTER — EMERGENCY (EMERGENCY)
Facility: HOSPITAL | Age: 78
LOS: 0 days | Discharge: HOME | End: 2018-11-07
Attending: STUDENT IN AN ORGANIZED HEALTH CARE EDUCATION/TRAINING PROGRAM | Admitting: STUDENT IN AN ORGANIZED HEALTH CARE EDUCATION/TRAINING PROGRAM
Payer: MEDICARE

## 2018-11-07 VITALS
HEART RATE: 95 BPM | WEIGHT: 154.1 LBS | HEIGHT: 65 IN | TEMPERATURE: 98 F | OXYGEN SATURATION: 99 % | RESPIRATION RATE: 17 BRPM | DIASTOLIC BLOOD PRESSURE: 79 MMHG | SYSTOLIC BLOOD PRESSURE: 142 MMHG

## 2018-11-07 VITALS
OXYGEN SATURATION: 98 % | DIASTOLIC BLOOD PRESSURE: 75 MMHG | SYSTOLIC BLOOD PRESSURE: 145 MMHG | HEART RATE: 82 BPM | RESPIRATION RATE: 16 BRPM

## 2018-11-07 DIAGNOSIS — M81.0 AGE-RELATED OSTEOPOROSIS WITHOUT CURRENT PATHOLOGICAL FRACTURE: ICD-10-CM

## 2018-11-07 DIAGNOSIS — Z04.3 ENCOUNTER FOR EXAMINATION AND OBSERVATION FOLLOWING OTHER ACCIDENT: ICD-10-CM

## 2018-11-07 DIAGNOSIS — Z98.89 OTHER SPECIFIED POSTPROCEDURAL STATES: Chronic | ICD-10-CM

## 2018-11-07 DIAGNOSIS — M50.30 OTHER CERVICAL DISC DEGENERATION, UNSPECIFIED CERVICAL REGION: ICD-10-CM

## 2018-11-07 DIAGNOSIS — Z98.890 OTHER SPECIFIED POSTPROCEDURAL STATES: Chronic | ICD-10-CM

## 2018-11-07 DIAGNOSIS — Z90.5 ACQUIRED ABSENCE OF KIDNEY: Chronic | ICD-10-CM

## 2018-11-07 DIAGNOSIS — M12.9 ARTHROPATHY, UNSPECIFIED: ICD-10-CM

## 2018-11-07 DIAGNOSIS — Z88.0 ALLERGY STATUS TO PENICILLIN: ICD-10-CM

## 2018-11-07 DIAGNOSIS — Z98.49 CATARACT EXTRACTION STATUS, UNSPECIFIED EYE: Chronic | ICD-10-CM

## 2018-11-07 DIAGNOSIS — I48.91 UNSPECIFIED ATRIAL FIBRILLATION: ICD-10-CM

## 2018-11-07 DIAGNOSIS — Z79.01 LONG TERM (CURRENT) USE OF ANTICOAGULANTS: ICD-10-CM

## 2018-11-07 DIAGNOSIS — Z85.528 PERSONAL HISTORY OF OTHER MALIGNANT NEOPLASM OF KIDNEY: ICD-10-CM

## 2018-11-07 DIAGNOSIS — R94.31 ABNORMAL ELECTROCARDIOGRAM [ECG] [EKG]: ICD-10-CM

## 2018-11-07 DIAGNOSIS — Z98.62 PERIPHERAL VASCULAR ANGIOPLASTY STATUS: Chronic | ICD-10-CM

## 2018-11-07 DIAGNOSIS — I25.10 ATHEROSCLEROTIC HEART DISEASE OF NATIVE CORONARY ARTERY WITHOUT ANGINA PECTORIS: ICD-10-CM

## 2018-11-07 DIAGNOSIS — Z98.61 CORONARY ANGIOPLASTY STATUS: ICD-10-CM

## 2018-11-07 DIAGNOSIS — I10 ESSENTIAL (PRIMARY) HYPERTENSION: ICD-10-CM

## 2018-11-07 DIAGNOSIS — Z90.89 ACQUIRED ABSENCE OF OTHER ORGANS: Chronic | ICD-10-CM

## 2018-11-07 DIAGNOSIS — I67.89 OTHER CEREBROVASCULAR DISEASE: ICD-10-CM

## 2018-11-07 DIAGNOSIS — F41.9 ANXIETY DISORDER, UNSPECIFIED: ICD-10-CM

## 2018-11-07 LAB
ANION GAP SERPL CALC-SCNC: 7 MMOL/L — SIGNIFICANT CHANGE UP (ref 5–17)
APTT BLD: 34.3 SEC — SIGNIFICANT CHANGE UP (ref 27.5–36.3)
BASOPHILS # BLD AUTO: 0.03 K/UL — SIGNIFICANT CHANGE UP (ref 0–0.2)
BASOPHILS NFR BLD AUTO: 0.4 % — SIGNIFICANT CHANGE UP (ref 0–2)
BUN SERPL-MCNC: 26 MG/DL — HIGH (ref 7–23)
CALCIUM SERPL-MCNC: 9.1 MG/DL — SIGNIFICANT CHANGE UP (ref 8.5–10.1)
CHLORIDE SERPL-SCNC: 104 MMOL/L — SIGNIFICANT CHANGE UP (ref 96–108)
CO2 SERPL-SCNC: 25 MMOL/L — SIGNIFICANT CHANGE UP (ref 22–31)
CREAT SERPL-MCNC: 1.49 MG/DL — HIGH (ref 0.5–1.3)
EOSINOPHIL # BLD AUTO: 0.32 K/UL — SIGNIFICANT CHANGE UP (ref 0–0.5)
EOSINOPHIL NFR BLD AUTO: 4.7 % — SIGNIFICANT CHANGE UP (ref 0–6)
GLUCOSE SERPL-MCNC: 96 MG/DL — SIGNIFICANT CHANGE UP (ref 70–99)
HCT VFR BLD CALC: 39.6 % — SIGNIFICANT CHANGE UP (ref 34.5–45)
HGB BLD-MCNC: 13.2 G/DL — SIGNIFICANT CHANGE UP (ref 11.5–15.5)
IMM GRANULOCYTES NFR BLD AUTO: 0.6 % — SIGNIFICANT CHANGE UP (ref 0–1.5)
INR BLD: 2.28 RATIO — HIGH (ref 0.88–1.16)
LYMPHOCYTES # BLD AUTO: 1.23 K/UL — SIGNIFICANT CHANGE UP (ref 1–3.3)
LYMPHOCYTES # BLD AUTO: 18 % — SIGNIFICANT CHANGE UP (ref 13–44)
MCHC RBC-ENTMCNC: 30.7 PG — SIGNIFICANT CHANGE UP (ref 27–34)
MCHC RBC-ENTMCNC: 33.3 GM/DL — SIGNIFICANT CHANGE UP (ref 32–36)
MCV RBC AUTO: 92.1 FL — SIGNIFICANT CHANGE UP (ref 80–100)
MONOCYTES # BLD AUTO: 0.96 K/UL — HIGH (ref 0–0.9)
MONOCYTES NFR BLD AUTO: 14.1 % — HIGH (ref 2–14)
NEUTROPHILS # BLD AUTO: 4.25 K/UL — SIGNIFICANT CHANGE UP (ref 1.8–7.4)
NEUTROPHILS NFR BLD AUTO: 62.2 % — SIGNIFICANT CHANGE UP (ref 43–77)
NRBC # BLD: 0 /100 WBCS — SIGNIFICANT CHANGE UP (ref 0–0)
PLATELET # BLD AUTO: 230 K/UL — SIGNIFICANT CHANGE UP (ref 150–400)
POTASSIUM SERPL-MCNC: 4.7 MMOL/L — SIGNIFICANT CHANGE UP (ref 3.5–5.3)
POTASSIUM SERPL-SCNC: 4.7 MMOL/L — SIGNIFICANT CHANGE UP (ref 3.5–5.3)
PROTHROM AB SERPL-ACNC: 26 SEC — HIGH (ref 10–12.9)
RBC # BLD: 4.3 M/UL — SIGNIFICANT CHANGE UP (ref 3.8–5.2)
RBC # FLD: 13.2 % — SIGNIFICANT CHANGE UP (ref 10.3–14.5)
SODIUM SERPL-SCNC: 136 MMOL/L — SIGNIFICANT CHANGE UP (ref 135–145)
WBC # BLD: 6.83 K/UL — SIGNIFICANT CHANGE UP (ref 3.8–10.5)
WBC # FLD AUTO: 6.83 K/UL — SIGNIFICANT CHANGE UP (ref 3.8–10.5)

## 2018-11-07 PROCEDURE — 72125 CT NECK SPINE W/O DYE: CPT | Mod: 26

## 2018-11-07 PROCEDURE — 93010 ELECTROCARDIOGRAM REPORT: CPT

## 2018-11-07 PROCEDURE — 70450 CT HEAD/BRAIN W/O DYE: CPT | Mod: 26

## 2018-11-07 PROCEDURE — 99285 EMERGENCY DEPT VISIT HI MDM: CPT

## 2018-11-07 NOTE — ED ADULT TRIAGE NOTE - CHIEF COMPLAINT QUOTE
Pt presents s/p witnessed fall hitting back of head, pt now with +abrasion to back of head. Pt denies loc, states she does take coumadin. Trauma alert activated at 1106am.

## 2018-11-07 NOTE — ED PROVIDER NOTE - OBJECTIVE STATEMENT
78 y/o female with a PMHx of Afib (Coumadin), anxiety, CAD, HTN, OP, RA, stented coronary artery, presents to the ED from [facility] s/p mechanical fall. Pt notes she was leaving her table, went to get her walker, behind her, and lost balance. Pt able to get up with assistance after. Pt hit head on tile floor. No LOC. +lower neck pain. Denies weakness in arms and legs. Last INR was checked today. Allergies: Penicillin.

## 2018-11-07 NOTE — ED PROVIDER NOTE - SKIN, MLM
Skin normal color for race, warm, dry and intact. No evidence of rash. 2x2 cm hematoma to left parietal temporal area.  Abrasion left posterior elbow

## 2018-11-07 NOTE — ED PROVIDER NOTE - PROGRESS NOTE DETAILS
Radha DO: Cr: 1.49; baseline: 1.1- results given to patient's daughter- made aware; INR therapeutic; all results given to patient's daughter and discussed at bedside; patient feeling better at this time; f/u with pmd in 1-2 days without fail; strict return precautions given.

## 2018-11-07 NOTE — ED ADULT NURSE NOTE - NSIMPLEMENTINTERV_GEN_ALL_ED
Implemented All Fall with Harm Risk Interventions:  [place] to call system. Call bell, personal items and telephone within reach. Instruct patient to call for assistance. Room bathroom lighting operational. Non-slip footwear when patient is off stretcher. Physically safe environment: no spills, clutter or unnecessary equipment. Stretcher in lowest position, wheels locked, appropriate side rails in place. Provide visual cue, wrist band, yellow gown, etc. Monitor gait and stability. Monitor for mental status changes and reorient to person, place, and time. Review medications for side effects contributing to fall risk. Reinforce activity limits and safety measures with patient and family. Provide visual clues: red socks.

## 2018-11-26 ENCOUNTER — TRANSCRIPTION ENCOUNTER (OUTPATIENT)
Age: 78
End: 2018-11-26

## 2018-11-26 ENCOUNTER — EMERGENCY (EMERGENCY)
Facility: HOSPITAL | Age: 78
LOS: 0 days | Discharge: HOME | End: 2018-11-26
Attending: EMERGENCY MEDICINE
Payer: MEDICARE

## 2018-11-26 VITALS
HEART RATE: 58 BPM | SYSTOLIC BLOOD PRESSURE: 158 MMHG | TEMPERATURE: 98 F | DIASTOLIC BLOOD PRESSURE: 76 MMHG | OXYGEN SATURATION: 100 % | RESPIRATION RATE: 16 BRPM

## 2018-11-26 VITALS — WEIGHT: 119.93 LBS | HEIGHT: 64 IN

## 2018-11-26 DIAGNOSIS — Z98.49 CATARACT EXTRACTION STATUS, UNSPECIFIED EYE: Chronic | ICD-10-CM

## 2018-11-26 DIAGNOSIS — Z90.5 ACQUIRED ABSENCE OF KIDNEY: Chronic | ICD-10-CM

## 2018-11-26 DIAGNOSIS — Z98.62 PERIPHERAL VASCULAR ANGIOPLASTY STATUS: Chronic | ICD-10-CM

## 2018-11-26 DIAGNOSIS — M50.30 OTHER CERVICAL DISC DEGENERATION, UNSPECIFIED CERVICAL REGION: ICD-10-CM

## 2018-11-26 DIAGNOSIS — Z90.5 ACQUIRED ABSENCE OF KIDNEY: ICD-10-CM

## 2018-11-26 DIAGNOSIS — Z90.89 ACQUIRED ABSENCE OF OTHER ORGANS: Chronic | ICD-10-CM

## 2018-11-26 DIAGNOSIS — Z98.890 OTHER SPECIFIED POSTPROCEDURAL STATES: Chronic | ICD-10-CM

## 2018-11-26 DIAGNOSIS — I51.81 TAKOTSUBO SYNDROME: ICD-10-CM

## 2018-11-26 DIAGNOSIS — Z04.3 ENCOUNTER FOR EXAMINATION AND OBSERVATION FOLLOWING OTHER ACCIDENT: ICD-10-CM

## 2018-11-26 DIAGNOSIS — I10 ESSENTIAL (PRIMARY) HYPERTENSION: ICD-10-CM

## 2018-11-26 DIAGNOSIS — Z88.0 ALLERGY STATUS TO PENICILLIN: ICD-10-CM

## 2018-11-26 DIAGNOSIS — I25.10 ATHEROSCLEROTIC HEART DISEASE OF NATIVE CORONARY ARTERY WITHOUT ANGINA PECTORIS: ICD-10-CM

## 2018-11-26 DIAGNOSIS — M81.0 AGE-RELATED OSTEOPOROSIS WITHOUT CURRENT PATHOLOGICAL FRACTURE: ICD-10-CM

## 2018-11-26 DIAGNOSIS — Z98.61 CORONARY ANGIOPLASTY STATUS: ICD-10-CM

## 2018-11-26 DIAGNOSIS — Z98.89 OTHER SPECIFIED POSTPROCEDURAL STATES: Chronic | ICD-10-CM

## 2018-11-26 DIAGNOSIS — I48.91 UNSPECIFIED ATRIAL FIBRILLATION: ICD-10-CM

## 2018-11-26 DIAGNOSIS — Z79.01 LONG TERM (CURRENT) USE OF ANTICOAGULANTS: ICD-10-CM

## 2018-11-26 DIAGNOSIS — Z85.528 PERSONAL HISTORY OF OTHER MALIGNANT NEOPLASM OF KIDNEY: ICD-10-CM

## 2018-11-26 DIAGNOSIS — I67.89 OTHER CEREBROVASCULAR DISEASE: ICD-10-CM

## 2018-11-26 DIAGNOSIS — Z82.49 FAMILY HISTORY OF ISCHEMIC HEART DISEASE AND OTHER DISEASES OF THE CIRCULATORY SYSTEM: ICD-10-CM

## 2018-11-26 DIAGNOSIS — M12.9 ARTHROPATHY, UNSPECIFIED: ICD-10-CM

## 2018-11-26 DIAGNOSIS — R94.31 ABNORMAL ELECTROCARDIOGRAM [ECG] [EKG]: ICD-10-CM

## 2018-11-26 LAB
ALBUMIN SERPL ELPH-MCNC: 3.4 G/DL — SIGNIFICANT CHANGE UP (ref 3.3–5)
ALP SERPL-CCNC: 105 U/L — SIGNIFICANT CHANGE UP (ref 40–120)
ALT FLD-CCNC: 33 U/L — SIGNIFICANT CHANGE UP (ref 12–78)
ANION GAP SERPL CALC-SCNC: 7 MMOL/L — SIGNIFICANT CHANGE UP (ref 5–17)
APTT BLD: 35.4 SEC — SIGNIFICANT CHANGE UP (ref 27.5–36.3)
AST SERPL-CCNC: 32 U/L — SIGNIFICANT CHANGE UP (ref 15–37)
BASOPHILS # BLD AUTO: 0.05 K/UL — SIGNIFICANT CHANGE UP (ref 0–0.2)
BASOPHILS NFR BLD AUTO: 0.6 % — SIGNIFICANT CHANGE UP (ref 0–2)
BILIRUB SERPL-MCNC: 0.4 MG/DL — SIGNIFICANT CHANGE UP (ref 0.2–1.2)
BUN SERPL-MCNC: 28 MG/DL — HIGH (ref 7–23)
CALCIUM SERPL-MCNC: 8.8 MG/DL — SIGNIFICANT CHANGE UP (ref 8.5–10.1)
CHLORIDE SERPL-SCNC: 105 MMOL/L — SIGNIFICANT CHANGE UP (ref 96–108)
CO2 SERPL-SCNC: 26 MMOL/L — SIGNIFICANT CHANGE UP (ref 22–31)
CREAT SERPL-MCNC: 1.28 MG/DL — SIGNIFICANT CHANGE UP (ref 0.5–1.3)
EOSINOPHIL # BLD AUTO: 0.15 K/UL — SIGNIFICANT CHANGE UP (ref 0–0.5)
EOSINOPHIL NFR BLD AUTO: 1.8 % — SIGNIFICANT CHANGE UP (ref 0–6)
GLUCOSE SERPL-MCNC: 112 MG/DL — HIGH (ref 70–99)
HCT VFR BLD CALC: 37.5 % — SIGNIFICANT CHANGE UP (ref 34.5–45)
HGB BLD-MCNC: 12.2 G/DL — SIGNIFICANT CHANGE UP (ref 11.5–15.5)
IMM GRANULOCYTES NFR BLD AUTO: 0.2 % — SIGNIFICANT CHANGE UP (ref 0–1.5)
INR BLD: 1.97 RATIO — HIGH (ref 0.88–1.16)
LYMPHOCYTES # BLD AUTO: 1.44 K/UL — SIGNIFICANT CHANGE UP (ref 1–3.3)
LYMPHOCYTES # BLD AUTO: 17.1 % — SIGNIFICANT CHANGE UP (ref 13–44)
MCHC RBC-ENTMCNC: 31 PG — SIGNIFICANT CHANGE UP (ref 27–34)
MCHC RBC-ENTMCNC: 32.5 GM/DL — SIGNIFICANT CHANGE UP (ref 32–36)
MCV RBC AUTO: 95.2 FL — SIGNIFICANT CHANGE UP (ref 80–100)
MONOCYTES # BLD AUTO: 0.86 K/UL — SIGNIFICANT CHANGE UP (ref 0–0.9)
MONOCYTES NFR BLD AUTO: 10.2 % — SIGNIFICANT CHANGE UP (ref 2–14)
NEUTROPHILS # BLD AUTO: 5.89 K/UL — SIGNIFICANT CHANGE UP (ref 1.8–7.4)
NEUTROPHILS NFR BLD AUTO: 70.1 % — SIGNIFICANT CHANGE UP (ref 43–77)
PLATELET # BLD AUTO: 232 K/UL — SIGNIFICANT CHANGE UP (ref 150–400)
POTASSIUM SERPL-MCNC: 3.9 MMOL/L — SIGNIFICANT CHANGE UP (ref 3.5–5.3)
POTASSIUM SERPL-SCNC: 3.9 MMOL/L — SIGNIFICANT CHANGE UP (ref 3.5–5.3)
PROT SERPL-MCNC: 6.9 GM/DL — SIGNIFICANT CHANGE UP (ref 6–8.3)
PROTHROM AB SERPL-ACNC: 22.3 SEC — HIGH (ref 10–12.9)
RBC # BLD: 3.94 M/UL — SIGNIFICANT CHANGE UP (ref 3.8–5.2)
RBC # FLD: 13.2 % — SIGNIFICANT CHANGE UP (ref 10.3–14.5)
SODIUM SERPL-SCNC: 138 MMOL/L — SIGNIFICANT CHANGE UP (ref 135–145)
WBC # BLD: 8.41 K/UL — SIGNIFICANT CHANGE UP (ref 3.8–10.5)
WBC # FLD AUTO: 8.41 K/UL — SIGNIFICANT CHANGE UP (ref 3.8–10.5)

## 2018-11-26 PROCEDURE — 70450 CT HEAD/BRAIN W/O DYE: CPT | Mod: 26

## 2018-11-26 PROCEDURE — 72125 CT NECK SPINE W/O DYE: CPT | Mod: 26

## 2018-11-26 PROCEDURE — 93010 ELECTROCARDIOGRAM REPORT: CPT

## 2018-11-26 PROCEDURE — 99285 EMERGENCY DEPT VISIT HI MDM: CPT

## 2018-11-26 PROCEDURE — 76376 3D RENDER W/INTRP POSTPROCES: CPT | Mod: 26

## 2018-11-26 NOTE — ED PROVIDER NOTE - NEUROLOGICAL, MLM
Alert and oriented, no focal deficits, no motor or sensory deficits. 5/5 strength in all 4 extremities. Cranial nerves 2-12 intact. Sensation intact. No dysmetria.

## 2018-11-26 NOTE — ED PROVIDER NOTE - ATTENDING CONTRIBUTION TO CARE
I, Latia Pace MD, personally saw the patient with resident.  I have personally performed a face to face diagnostic evaluation on this patient.  I have reviewed the resident note and agree with the history, exam, and plan of care, except as noted.

## 2018-11-26 NOTE — ED ADULT TRIAGE NOTE - CHIEF COMPLAINT QUOTE
Pt lost balance while leaning and fell onto face. Periorbital ecchymosis noted. Denies LOC. Takes daily coumadin.  TA called.

## 2018-11-26 NOTE — ED PROVIDER NOTE - PROGRESS NOTE DETAILS
Carp: 77F on coumadin for afib p/w ecchymosis to R inferior orbital rim and forehead s/p mechanical fall this afternoon. Bent over to pick something up, lost balance and fell forward onto face. No vision changes, no LOC, ambulatory after fall. No complaints at this time. Last coumadin dose last night. Exam: NAD, 2cm ecchymosis to R inferior orbit, EOMI, no eye pain or visual change, PERRLA. No midline cervical tenderness, sensation intact throughout, 5/5 motor strength throughout. Plan: basic labs, inr given on coumadin, ct head/orbit/cspine to eval for bleeding/fx. ekg. if all WNL, can dc home. The patient has expressed that they feel better and would like to go home.  I have explained all lab and imaging results to patient and encouraged strict follow up with their primary doctor or specialist.  I have encouraged strict return to the ED for worsening symptoms at any time.

## 2018-11-26 NOTE — ED ADULT NURSE NOTE - OBJECTIVE STATEMENT
SEE TRAUMA FLOWSHEET.  Pt mechanical fall on coumadin, hematoma to right side of face.  Denies pain, -LOC at this time. VSS

## 2018-11-26 NOTE — ED PROVIDER NOTE - OBJECTIVE STATEMENT
78 y/o female with a PMHx of Afib on coumadin, anxiety and depression, CAD s/p stent, kidney carcinoma, OP, RA, Takotsubo syndrome presents to the ED s/p mechanical fall. Pt kneeled down to pick something up and lost balance falling earlier today. John LOC, N/V. Pt now c/o bruising to right upper forehead. No precipitating exacerbating or alleviating factors. No pain

## 2018-11-29 ENCOUNTER — EMERGENCY (EMERGENCY)
Facility: HOSPITAL | Age: 78
LOS: 0 days | Discharge: HOME | End: 2018-11-29
Attending: EMERGENCY MEDICINE | Admitting: EMERGENCY MEDICINE
Payer: MEDICARE

## 2018-11-29 VITALS
HEART RATE: 62 BPM | TEMPERATURE: 98 F | DIASTOLIC BLOOD PRESSURE: 76 MMHG | SYSTOLIC BLOOD PRESSURE: 153 MMHG | HEIGHT: 64 IN | OXYGEN SATURATION: 99 % | WEIGHT: 119.93 LBS | RESPIRATION RATE: 18 BRPM

## 2018-11-29 VITALS
OXYGEN SATURATION: 100 % | TEMPERATURE: 98 F | SYSTOLIC BLOOD PRESSURE: 168 MMHG | DIASTOLIC BLOOD PRESSURE: 85 MMHG | HEART RATE: 65 BPM | RESPIRATION RATE: 16 BRPM

## 2018-11-29 DIAGNOSIS — Z88.0 ALLERGY STATUS TO PENICILLIN: ICD-10-CM
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
## 2018-11-29 DIAGNOSIS — I10 ESSENTIAL (PRIMARY) HYPERTENSION: ICD-10-CM

## 2018-11-29 DIAGNOSIS — I50.9 HEART FAILURE, UNSPECIFIED: ICD-10-CM

## 2018-11-29 DIAGNOSIS — Z85.528 PERSONAL HISTORY OF OTHER MALIGNANT NEOPLASM OF KIDNEY: ICD-10-CM
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
## 2018-11-29 DIAGNOSIS — Z79.01 LONG TERM (CURRENT) USE OF ANTICOAGULANTS: ICD-10-CM

## 2018-11-29 DIAGNOSIS — Z90.89 ACQUIRED ABSENCE OF OTHER ORGANS: Chronic | ICD-10-CM
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
## 2018-11-29 DIAGNOSIS — Z98.890 OTHER SPECIFIED POSTPROCEDURAL STATES: Chronic | ICD-10-CM

## 2018-11-29 DIAGNOSIS — I67.89 OTHER CEREBROVASCULAR DISEASE: ICD-10-CM

## 2018-11-29 DIAGNOSIS — Z98.89 OTHER SPECIFIED POSTPROCEDURAL STATES: Chronic | ICD-10-CM

## 2018-11-29 DIAGNOSIS — Z98.49 CATARACT EXTRACTION STATUS, UNSPECIFIED EYE: Chronic | ICD-10-CM

## 2018-11-29 DIAGNOSIS — S09.90XA UNSPECIFIED INJURY OF HEAD, INITIAL ENCOUNTER: ICD-10-CM
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
## 2018-11-29 DIAGNOSIS — M50.30 OTHER CERVICAL DISC DEGENERATION, UNSPECIFIED CERVICAL REGION: ICD-10-CM

## 2018-11-29 DIAGNOSIS — Z98.62 PERIPHERAL VASCULAR ANGIOPLASTY STATUS: Chronic | ICD-10-CM

## 2018-11-29 DIAGNOSIS — F32.9 MAJOR DEPRESSIVE DISORDER, SINGLE EPISODE, UNSPECIFIED: ICD-10-CM

## 2018-11-29 DIAGNOSIS — I48.91 UNSPECIFIED ATRIAL FIBRILLATION: ICD-10-CM

## 2018-11-29 DIAGNOSIS — I25.10 ATHEROSCLEROTIC HEART DISEASE OF NATIVE CORONARY ARTERY WITHOUT ANGINA PECTORIS: ICD-10-CM

## 2018-11-29 DIAGNOSIS — Z98.61 CORONARY ANGIOPLASTY STATUS: ICD-10-CM

## 2018-11-29 DIAGNOSIS — M12.9 ARTHROPATHY, UNSPECIFIED: ICD-10-CM

## 2018-11-29 DIAGNOSIS — Z90.5 ACQUIRED ABSENCE OF KIDNEY: Chronic | ICD-10-CM

## 2018-11-29 DIAGNOSIS — M81.0 AGE-RELATED OSTEOPOROSIS WITHOUT CURRENT PATHOLOGICAL FRACTURE: ICD-10-CM

## 2018-11-29 LAB
ALBUMIN SERPL ELPH-MCNC: 3.4 G/DL — SIGNIFICANT CHANGE UP (ref 3.3–5)
ALP SERPL-CCNC: 105 U/L — SIGNIFICANT CHANGE UP (ref 40–120)
ALT FLD-CCNC: 36 U/L — SIGNIFICANT CHANGE UP (ref 12–78)
ANION GAP SERPL CALC-SCNC: 10 MMOL/L — SIGNIFICANT CHANGE UP (ref 5–17)
APTT BLD: 38.6 SEC — HIGH (ref 27.5–36.3)
AST SERPL-CCNC: 30 U/L — SIGNIFICANT CHANGE UP (ref 15–37)
BASOPHILS # BLD AUTO: 0.04 K/UL — SIGNIFICANT CHANGE UP (ref 0–0.2)
BASOPHILS NFR BLD AUTO: 0.6 % — SIGNIFICANT CHANGE UP (ref 0–2)
BILIRUB SERPL-MCNC: 0.3 MG/DL — SIGNIFICANT CHANGE UP (ref 0.2–1.2)
BUN SERPL-MCNC: 29 MG/DL — HIGH (ref 7–23)
CALCIUM SERPL-MCNC: 8.6 MG/DL — SIGNIFICANT CHANGE UP (ref 8.5–10.1)
CHLORIDE SERPL-SCNC: 105 MMOL/L — SIGNIFICANT CHANGE UP (ref 96–108)
CK SERPL-CCNC: 241 U/L — HIGH (ref 26–192)
CO2 SERPL-SCNC: 22 MMOL/L — SIGNIFICANT CHANGE UP (ref 22–31)
CREAT SERPL-MCNC: 1.65 MG/DL — HIGH (ref 0.5–1.3)
EOSINOPHIL # BLD AUTO: 0.45 K/UL — SIGNIFICANT CHANGE UP (ref 0–0.5)
EOSINOPHIL NFR BLD AUTO: 6.3 % — HIGH (ref 0–6)
GLUCOSE SERPL-MCNC: 100 MG/DL — HIGH (ref 70–99)
HCT VFR BLD CALC: 38.2 % — SIGNIFICANT CHANGE UP (ref 34.5–45)
HGB BLD-MCNC: 12.5 G/DL — SIGNIFICANT CHANGE UP (ref 11.5–15.5)
IMM GRANULOCYTES NFR BLD AUTO: 0.4 % — SIGNIFICANT CHANGE UP (ref 0–1.5)
INR BLD: 3.2 RATIO — HIGH (ref 0.88–1.16)
LYMPHOCYTES # BLD AUTO: 1.57 K/UL — SIGNIFICANT CHANGE UP (ref 1–3.3)
LYMPHOCYTES # BLD AUTO: 22 % — SIGNIFICANT CHANGE UP (ref 13–44)
MCHC RBC-ENTMCNC: 30.9 PG — SIGNIFICANT CHANGE UP (ref 27–34)
MCHC RBC-ENTMCNC: 32.7 GM/DL — SIGNIFICANT CHANGE UP (ref 32–36)
MCV RBC AUTO: 94.6 FL — SIGNIFICANT CHANGE UP (ref 80–100)
MONOCYTES # BLD AUTO: 1.02 K/UL — HIGH (ref 0–0.9)
MONOCYTES NFR BLD AUTO: 14.3 % — HIGH (ref 2–14)
NEUTROPHILS # BLD AUTO: 4.04 K/UL — SIGNIFICANT CHANGE UP (ref 1.8–7.4)
NEUTROPHILS NFR BLD AUTO: 56.4 % — SIGNIFICANT CHANGE UP (ref 43–77)
NRBC # BLD: 0 /100 WBCS — SIGNIFICANT CHANGE UP (ref 0–0)
PLATELET # BLD AUTO: 230 K/UL — SIGNIFICANT CHANGE UP (ref 150–400)
POTASSIUM SERPL-MCNC: 4.2 MMOL/L — SIGNIFICANT CHANGE UP (ref 3.5–5.3)
POTASSIUM SERPL-SCNC: 4.2 MMOL/L — SIGNIFICANT CHANGE UP (ref 3.5–5.3)
PROT SERPL-MCNC: 7.3 GM/DL — SIGNIFICANT CHANGE UP (ref 6–8.3)
PROTHROM AB SERPL-ACNC: 36.8 SEC — HIGH (ref 10–12.9)
RBC # BLD: 4.04 M/UL — SIGNIFICANT CHANGE UP (ref 3.8–5.2)
RBC # FLD: 13.5 % — SIGNIFICANT CHANGE UP (ref 10.3–14.5)
SODIUM SERPL-SCNC: 137 MMOL/L — SIGNIFICANT CHANGE UP (ref 135–145)
TROPONIN I SERPL-MCNC: <0.015 NG/ML — SIGNIFICANT CHANGE UP (ref 0.01–0.04)
TROPONIN I SERPL-MCNC: <0.015 NG/ML — SIGNIFICANT CHANGE UP (ref 0.01–0.04)
WBC # BLD: 7.15 K/UL — SIGNIFICANT CHANGE UP (ref 3.8–10.5)
WBC # FLD AUTO: 7.15 K/UL — SIGNIFICANT CHANGE UP (ref 3.8–10.5)

## 2018-11-29 PROCEDURE — 70450 CT HEAD/BRAIN W/O DYE: CPT | Mod: 26

## 2018-11-29 PROCEDURE — 72125 CT NECK SPINE W/O DYE: CPT | Mod: 26

## 2018-11-29 PROCEDURE — 99285 EMERGENCY DEPT VISIT HI MDM: CPT | Mod: 25

## 2018-11-29 PROCEDURE — 72170 X-RAY EXAM OF PELVIS: CPT | Mod: 26

## 2018-11-29 RX ORDER — ACETAMINOPHEN 500 MG
650 TABLET ORAL ONCE
Qty: 0 | Refills: 0 | Status: COMPLETED | OUTPATIENT
Start: 2018-11-29 | End: 2018-11-29

## 2018-11-29 RX ADMIN — Medication 650 MILLIGRAM(S): at 04:26

## 2018-11-29 NOTE — ED PROVIDER NOTE - MEDICAL DECISION MAKING DETAILS
Unwitnessed fall on coumadin ; will get labs, EKG, CXR, pelvis XR and CTs of head and C spine. If all negative, will ambulate patient and discharge if ambulating at baseline.

## 2018-11-29 NOTE — ED PROVIDER NOTE - PROGRESS NOTE DETAILS
Per daughter pt. currently on an antibiotic for UTI which she got from urgent care a few days ago. pt. ambulated with walker independently.  Labs and CTs within normal limits.  Pelvis XR normal.  2nd troponin pending and if negative, pt. may be discharged back to [facility]. CC:  Signout received from Dr. Thurston to f/u 2nd troponin result: negative.  Pt stable for D/C back to A/L facility.

## 2018-11-29 NOTE — ED ADULT TRIAGE NOTE - CHIEF COMPLAINT QUOTE
Unwitnessed fall in bathroom. -LOC. Complaining of pain to back of head. On Coumadin. Trauma alert called. Old hematoma to right eye from a fall on Monday.

## 2018-11-29 NOTE — ED PROVIDER NOTE - OBJECTIVE STATEMENT
Pt. is a 78 yo F with a hx of dementia, HTN, CAD, cardiac stent, CHF, atrial fibrillation on coumadin (last approx INR 3.5) BIB ambulance from assisted living after a fall in the bathroom.  Pt. states she slipped on the floor and fell and hit the back of her head.  She has soreness on posterior scalp and a bump.  She denies LOC and denies vomiting.  She denies headache, weakness or numbness.  Pts daughter states pt. has been unsteady and walks with a walker.  She fell last week as well and has old bruising on right face.  She was in  last week for the same.  Fall was unwitnessed.  Pt. denies neck or back pain, or trouble moving arms or legs.  Daughter states INR was elevated to 3.5 and coumadin was held last night. PMD [place] Cards: Rh[facility] at [facility].

## 2018-11-29 NOTE — ED PROVIDER NOTE - ENMT, MLM
FACE:  Old healing bruising on right cheek and forehead; Airway patent, Nasal mucosa clear. Mouth with normal mucosa. Throat has no vesicles, no oropharyngeal exudates and uvula is midline.

## 2018-11-29 NOTE — ED ADULT NURSE NOTE - NSIMPLEMENTINTERV_GEN_ALL_ED
Implemented All Fall with Harm Risk Interventions:  [place] to call system. Call bell, personal items and telephone within reach. Instruct patient to call for assistance. Room bathroom lighting operational. Non-slip footwear when patient is off stretcher. Physically safe environment: no spills, clutter or unnecessary equipment. Stretcher in lowest position, wheels locked, appropriate side rails in place. Provide visual cue, wrist band, yellow gown, etc. Monitor gait and stability. Monitor for mental status changes and reorient to person, place, and time. Review medications for side effects contributing to fall risk. Reinforce activity limits and safety measures with patient and family. Provide visual clues: red socks.

## 2018-11-29 NOTE — ED PROVIDER NOTE - CONSTITUTIONAL, MLM
normal... Well appearing, well nourished, awake, alert, oriented to person, place, time/situation; HEAD:  Tender palpable 2cm hematoma at midline of occiput.

## 2018-11-29 NOTE — ED PROVIDER NOTE - CARDIAC, MLM
Normal rate, irregular rhythm.  Heart sounds S1, S2.  No murmurs, rubs or gallops.  No Chest wall tenderness.

## 2018-11-29 NOTE — ED ADULT NURSE REASSESSMENT NOTE - NS ED NURSE REASSESS COMMENT FT1
pt ambulated w/ walker and standby assist, gait steady, no complaints at this time. MD Thurston aware.

## 2019-01-04 ENCOUNTER — TRANSCRIPTION ENCOUNTER (OUTPATIENT)
Age: 79
End: 2019-01-04

## 2019-01-23 ENCOUNTER — INPATIENT (INPATIENT)
Facility: HOSPITAL | Age: 79
LOS: 2 days | Discharge: HOME HEALTH | End: 2019-01-26
Attending: INTERNAL MEDICINE | Admitting: INTERNAL MEDICINE
Payer: MEDICARE

## 2019-01-23 VITALS — WEIGHT: 119.93 LBS | HEIGHT: 64 IN

## 2019-01-23 DIAGNOSIS — Z98.89 OTHER SPECIFIED POSTPROCEDURAL STATES: Chronic | ICD-10-CM

## 2019-01-23 DIAGNOSIS — Z98.49 CATARACT EXTRACTION STATUS, UNSPECIFIED EYE: Chronic | ICD-10-CM

## 2019-01-23 DIAGNOSIS — Z98.890 OTHER SPECIFIED POSTPROCEDURAL STATES: Chronic | ICD-10-CM

## 2019-01-23 DIAGNOSIS — Z98.62 PERIPHERAL VASCULAR ANGIOPLASTY STATUS: Chronic | ICD-10-CM

## 2019-01-23 DIAGNOSIS — Z90.89 ACQUIRED ABSENCE OF OTHER ORGANS: Chronic | ICD-10-CM

## 2019-01-23 DIAGNOSIS — Z90.5 ACQUIRED ABSENCE OF KIDNEY: Chronic | ICD-10-CM

## 2019-01-23 LAB
ALBUMIN SERPL ELPH-MCNC: 3.7 G/DL — SIGNIFICANT CHANGE UP (ref 3.3–5)
ALP SERPL-CCNC: 109 U/L — SIGNIFICANT CHANGE UP (ref 40–120)
ALT FLD-CCNC: 31 U/L — SIGNIFICANT CHANGE UP (ref 12–78)
ANION GAP SERPL CALC-SCNC: 8 MMOL/L — SIGNIFICANT CHANGE UP (ref 5–17)
APPEARANCE UR: CLEAR — SIGNIFICANT CHANGE UP
APTT BLD: 38.7 SEC — HIGH (ref 27.5–36.3)
AST SERPL-CCNC: 31 U/L — SIGNIFICANT CHANGE UP (ref 15–37)
BACTERIA # UR AUTO: ABNORMAL
BASOPHILS # BLD AUTO: 0.04 K/UL — SIGNIFICANT CHANGE UP (ref 0–0.2)
BASOPHILS NFR BLD AUTO: 0.2 % — SIGNIFICANT CHANGE UP (ref 0–2)
BILIRUB SERPL-MCNC: 0.3 MG/DL — SIGNIFICANT CHANGE UP (ref 0.2–1.2)
BILIRUB UR-MCNC: NEGATIVE — SIGNIFICANT CHANGE UP
BUN SERPL-MCNC: 32 MG/DL — HIGH (ref 7–23)
CALCIUM SERPL-MCNC: 9.6 MG/DL — SIGNIFICANT CHANGE UP (ref 8.5–10.1)
CHLORIDE SERPL-SCNC: 102 MMOL/L — SIGNIFICANT CHANGE UP (ref 96–108)
CO2 SERPL-SCNC: 26 MMOL/L — SIGNIFICANT CHANGE UP (ref 22–31)
COLOR SPEC: YELLOW — SIGNIFICANT CHANGE UP
CREAT SERPL-MCNC: 1.38 MG/DL — HIGH (ref 0.5–1.3)
DIFF PNL FLD: NEGATIVE — SIGNIFICANT CHANGE UP
EOSINOPHIL # BLD AUTO: 0.03 K/UL — SIGNIFICANT CHANGE UP (ref 0–0.5)
EOSINOPHIL NFR BLD AUTO: 0.2 % — SIGNIFICANT CHANGE UP (ref 0–6)
EPI CELLS # UR: ABNORMAL
GLUCOSE SERPL-MCNC: 126 MG/DL — HIGH (ref 70–99)
GLUCOSE UR QL: NEGATIVE MG/DL — SIGNIFICANT CHANGE UP
HCT VFR BLD CALC: 43.2 % — SIGNIFICANT CHANGE UP (ref 34.5–45)
HGB BLD-MCNC: 14.1 G/DL — SIGNIFICANT CHANGE UP (ref 11.5–15.5)
IMM GRANULOCYTES NFR BLD AUTO: 0.6 % — SIGNIFICANT CHANGE UP (ref 0–1.5)
INR BLD: 2.16 RATIO — HIGH (ref 0.88–1.16)
KETONES UR-MCNC: NEGATIVE — SIGNIFICANT CHANGE UP
LACTATE SERPL-SCNC: 1.3 MMOL/L — SIGNIFICANT CHANGE UP (ref 0.7–2)
LEUKOCYTE ESTERASE UR-ACNC: ABNORMAL
LYMPHOCYTES # BLD AUTO: 0.8 K/UL — LOW (ref 1–3.3)
LYMPHOCYTES # BLD AUTO: 4.6 % — LOW (ref 13–44)
MCHC RBC-ENTMCNC: 31 PG — SIGNIFICANT CHANGE UP (ref 27–34)
MCHC RBC-ENTMCNC: 32.6 GM/DL — SIGNIFICANT CHANGE UP (ref 32–36)
MCV RBC AUTO: 94.9 FL — SIGNIFICANT CHANGE UP (ref 80–100)
MONOCYTES # BLD AUTO: 1.15 K/UL — HIGH (ref 0–0.9)
MONOCYTES NFR BLD AUTO: 6.6 % — SIGNIFICANT CHANGE UP (ref 2–14)
NEUTROPHILS # BLD AUTO: 15.25 K/UL — HIGH (ref 1.8–7.4)
NEUTROPHILS NFR BLD AUTO: 87.8 % — HIGH (ref 43–77)
NITRITE UR-MCNC: NEGATIVE — SIGNIFICANT CHANGE UP
NRBC # BLD: 0 /100 WBCS — SIGNIFICANT CHANGE UP (ref 0–0)
PH UR: 6.5 — SIGNIFICANT CHANGE UP (ref 5–8)
PLATELET # BLD AUTO: 261 K/UL — SIGNIFICANT CHANGE UP (ref 150–400)
POTASSIUM SERPL-MCNC: 4.4 MMOL/L — SIGNIFICANT CHANGE UP (ref 3.5–5.3)
POTASSIUM SERPL-SCNC: 4.4 MMOL/L — SIGNIFICANT CHANGE UP (ref 3.5–5.3)
PROT SERPL-MCNC: 7.9 GM/DL — SIGNIFICANT CHANGE UP (ref 6–8.3)
PROT UR-MCNC: 15 MG/DL
PROTHROM AB SERPL-ACNC: 24.5 SEC — HIGH (ref 10–12.9)
RBC # BLD: 4.55 M/UL — SIGNIFICANT CHANGE UP (ref 3.8–5.2)
RBC # FLD: 13 % — SIGNIFICANT CHANGE UP (ref 10.3–14.5)
RBC CASTS # UR COMP ASSIST: SIGNIFICANT CHANGE UP /HPF (ref 0–4)
SODIUM SERPL-SCNC: 136 MMOL/L — SIGNIFICANT CHANGE UP (ref 135–145)
SP GR SPEC: 1.01 — SIGNIFICANT CHANGE UP (ref 1.01–1.02)
UROBILINOGEN FLD QL: NEGATIVE MG/DL — SIGNIFICANT CHANGE UP
WBC # BLD: 17.37 K/UL — HIGH (ref 3.8–10.5)
WBC # FLD AUTO: 17.37 K/UL — HIGH (ref 3.8–10.5)
WBC UR QL: ABNORMAL

## 2019-01-23 PROCEDURE — 99285 EMERGENCY DEPT VISIT HI MDM: CPT

## 2019-01-23 PROCEDURE — 93010 ELECTROCARDIOGRAM REPORT: CPT

## 2019-01-23 RX ORDER — SODIUM CHLORIDE 9 MG/ML
1650 INJECTION INTRAMUSCULAR; INTRAVENOUS; SUBCUTANEOUS ONCE
Qty: 0 | Refills: 0 | Status: COMPLETED | OUTPATIENT
Start: 2019-01-23 | End: 2019-01-23

## 2019-01-23 RX ORDER — IBUPROFEN 200 MG
600 TABLET ORAL ONCE
Qty: 0 | Refills: 0 | Status: COMPLETED | OUTPATIENT
Start: 2019-01-23 | End: 2019-01-23

## 2019-01-23 RX ORDER — CEFTRIAXONE 500 MG/1
1000 INJECTION, POWDER, FOR SOLUTION INTRAMUSCULAR; INTRAVENOUS ONCE
Qty: 0 | Refills: 0 | Status: COMPLETED | OUTPATIENT
Start: 2019-01-23 | End: 2019-01-23

## 2019-01-23 RX ADMIN — SODIUM CHLORIDE 825 MILLILITER(S): 9 INJECTION INTRAMUSCULAR; INTRAVENOUS; SUBCUTANEOUS at 20:45

## 2019-01-23 RX ADMIN — Medication 600 MILLIGRAM(S): at 21:28

## 2019-01-23 RX ADMIN — CEFTRIAXONE 1000 MILLIGRAM(S): 500 INJECTION, POWDER, FOR SOLUTION INTRAMUSCULAR; INTRAVENOUS at 20:50

## 2019-01-23 NOTE — ED PROVIDER NOTE - MEDICAL DECISION MAKING DETAILS
79 y/o F with UTI with worsening systemic symptoms of fever and fatigue. On Macrobid one day. Currently febrile, otherwise not meeting sepsis criteria. However, will give fluids, check lactate, cultures, and empiric Abx.

## 2019-01-23 NOTE — ED PROVIDER NOTE - NS_ ATTENDINGSCRIBEDETAILS _ED_A_ED_FT
Michael Mars DO (Attending): The history, relevant review of systems, past medical and surgical history, medical decision making, and physical examination was documented by the scribe in my presence and I attest to the accuracy of the documentation.

## 2019-01-23 NOTE — ED ADULT NURSE NOTE - OBJECTIVE STATEMENT
Patient presents to ED complaining of fever. Patient states she has been feeling unwellx2 days, patient has had fever, chills, generalized weakness and aches. Patient has been taking Tylenol at home, last dose 100mg @630pm. Patient states she was seen by PCP yesterday and started Macrobid for UTI. Patient denies CP, SOB, NVD. Patient complaining of difficulty urinating. PMHx AFIB, CAD with 1 stent, HTN, on warfarin. Patient wheelchair bound

## 2019-01-23 NOTE — ED STATDOCS - PROGRESS NOTE DETAILS
Larissa Vera for Dr Ben Cadena : Pt is an 77 y/o F, with PMHx of anxiety, afib, CAD s/p stents, HTN, RA, renal CA, presenting to the ED with intermittent fevers since last night. Per daughter, pt was dx'd with a UTI yesterday by her PMD. Started on macrobid. However, pt began having fever last night with Tmax 102F. Pt reports feeling fatigued and more tired than usual. Daughter states the pt is more confused than usual. Pt also has been having cough for the last few days. Took 2 tylenol at 1830. Pending urine culture from sample taken yesterday. Will send to [facility]

## 2019-01-23 NOTE — ED PROVIDER NOTE - OBJECTIVE STATEMENT
79 y/o F with PMHx of A-fib, CAD s/p stents, Anxiety, HTN, RA, and Renal CA s/p partial right nephrectomy presenting to the ED with daughter c/o fevers since last night. Per daughter, pt seen at PMD Dr. Linder yesterday for frequent urination and placed on Macrobid for UTI. Last night, pt began experiencing fevers with TMax 102F. +generalized fatigue. Daughter also reports that pt is more confused than usual. Took 2 Tylenol at 1830. Temp 100.3F orally in triage vitals. Pt denies any CP, abd pain, N/V/D. Allergic to Penicillin. PMD: Dr. Linder.

## 2019-01-23 NOTE — ED ADULT NURSE NOTE - NSIMPLEMENTINTERV_GEN_ALL_ED
Implemented All Fall with Harm Risk Interventions:  [place] to call system. Call bell, personal items and telephone within reach. Instruct patient to call for assistance. Room bathroom lighting operational. Non-slip footwear when patient is off stretcher. Physically safe environment: no spills, clutter or unnecessary equipment. Stretcher in lowest position, wheels locked, appropriate side rails in place. Provide visual cue, wrist band, yellow gown, etc. Monitor gait and stability. Monitor for mental status changes and reorient to person, place, and time. Review medications for side effects contributing to fall risk. Reinforce activity limits and safety measures with patient and family. Provide visual clues: red socks.

## 2019-01-23 NOTE — ED ADULT TRIAGE NOTE - CHIEF COMPLAINT QUOTE
pt was diagnosed with UTI and started on Macrobid yesterday for UTI, c/o weakness, altered mental status Fever 101.2 started today.

## 2019-01-24 LAB
ANION GAP SERPL CALC-SCNC: 7 MMOL/L — SIGNIFICANT CHANGE UP (ref 5–17)
APTT BLD: 42 SEC — HIGH (ref 27.5–36.3)
BASOPHILS # BLD AUTO: 0.04 K/UL — SIGNIFICANT CHANGE UP (ref 0–0.2)
BASOPHILS NFR BLD AUTO: 0.2 % — SIGNIFICANT CHANGE UP (ref 0–2)
BUN SERPL-MCNC: 23 MG/DL — SIGNIFICANT CHANGE UP (ref 7–23)
CALCIUM SERPL-MCNC: 8.9 MG/DL — SIGNIFICANT CHANGE UP (ref 8.5–10.1)
CHLORIDE SERPL-SCNC: 109 MMOL/L — HIGH (ref 96–108)
CO2 SERPL-SCNC: 26 MMOL/L — SIGNIFICANT CHANGE UP (ref 22–31)
CREAT SERPL-MCNC: 1 MG/DL — SIGNIFICANT CHANGE UP (ref 0.5–1.3)
DIGOXIN SERPL-MCNC: 0.76 NG/ML — LOW (ref 0.8–2)
EOSINOPHIL # BLD AUTO: 0.28 K/UL — SIGNIFICANT CHANGE UP (ref 0–0.5)
EOSINOPHIL NFR BLD AUTO: 1.4 % — SIGNIFICANT CHANGE UP (ref 0–6)
GLUCOSE SERPL-MCNC: 103 MG/DL — HIGH (ref 70–99)
HCT VFR BLD CALC: 38.8 % — SIGNIFICANT CHANGE UP (ref 34.5–45)
HGB BLD-MCNC: 12.7 G/DL — SIGNIFICANT CHANGE UP (ref 11.5–15.5)
IMM GRANULOCYTES NFR BLD AUTO: 0.7 % — SIGNIFICANT CHANGE UP (ref 0–1.5)
INR BLD: 2.92 RATIO — HIGH (ref 0.88–1.16)
LYMPHOCYTES # BLD AUTO: 0.66 K/UL — LOW (ref 1–3.3)
LYMPHOCYTES # BLD AUTO: 3.3 % — LOW (ref 13–44)
MCHC RBC-ENTMCNC: 31.1 PG — SIGNIFICANT CHANGE UP (ref 27–34)
MCHC RBC-ENTMCNC: 32.7 GM/DL — SIGNIFICANT CHANGE UP (ref 32–36)
MCV RBC AUTO: 94.9 FL — SIGNIFICANT CHANGE UP (ref 80–100)
MONOCYTES # BLD AUTO: 1 K/UL — HIGH (ref 0–0.9)
MONOCYTES NFR BLD AUTO: 4.9 % — SIGNIFICANT CHANGE UP (ref 2–14)
NEUTROPHILS # BLD AUTO: 18.14 K/UL — HIGH (ref 1.8–7.4)
NEUTROPHILS NFR BLD AUTO: 89.5 % — HIGH (ref 43–77)
NRBC # BLD: 0 /100 WBCS — SIGNIFICANT CHANGE UP (ref 0–0)
PLATELET # BLD AUTO: 199 K/UL — SIGNIFICANT CHANGE UP (ref 150–400)
POTASSIUM SERPL-MCNC: 4.1 MMOL/L — SIGNIFICANT CHANGE UP (ref 3.5–5.3)
POTASSIUM SERPL-SCNC: 4.1 MMOL/L — SIGNIFICANT CHANGE UP (ref 3.5–5.3)
PROTHROM AB SERPL-ACNC: 33.5 SEC — HIGH (ref 10–12.9)
RAPID RVP RESULT: SIGNIFICANT CHANGE UP
RBC # BLD: 4.09 M/UL — SIGNIFICANT CHANGE UP (ref 3.8–5.2)
RBC # FLD: 13.1 % — SIGNIFICANT CHANGE UP (ref 10.3–14.5)
SODIUM SERPL-SCNC: 142 MMOL/L — SIGNIFICANT CHANGE UP (ref 135–145)
WBC # BLD: 20.26 K/UL — HIGH (ref 3.8–10.5)
WBC # FLD AUTO: 20.26 K/UL — HIGH (ref 3.8–10.5)

## 2019-01-24 PROCEDURE — 71045 X-RAY EXAM CHEST 1 VIEW: CPT | Mod: 26

## 2019-01-24 RX ORDER — VANCOMYCIN HCL 1 G
750 VIAL (EA) INTRAVENOUS ONCE
Qty: 0 | Refills: 0 | Status: COMPLETED | OUTPATIENT
Start: 2019-01-24 | End: 2019-01-24

## 2019-01-24 RX ORDER — WARFARIN SODIUM 2.5 MG/1
1 TABLET ORAL
Qty: 0 | Refills: 0 | COMMUNITY

## 2019-01-24 RX ORDER — DULOXETINE HYDROCHLORIDE 30 MG/1
20 CAPSULE, DELAYED RELEASE ORAL DAILY
Qty: 0 | Refills: 0 | Status: DISCONTINUED | OUTPATIENT
Start: 2019-01-24 | End: 2019-01-26

## 2019-01-24 RX ORDER — L.ACIDOPH/B.ANIMALIS/B.LONGUM 15B CELL
1 CAPSULE ORAL
Qty: 0 | Refills: 0 | COMMUNITY

## 2019-01-24 RX ORDER — MAGNESIUM OXIDE 400 MG ORAL TABLET 241.3 MG
0 TABLET ORAL
Qty: 0 | Refills: 0 | COMMUNITY

## 2019-01-24 RX ORDER — WARFARIN SODIUM 2.5 MG/1
5 TABLET ORAL
Qty: 0 | Refills: 0 | Status: DISCONTINUED | OUTPATIENT
Start: 2019-01-24 | End: 2019-01-26

## 2019-01-24 RX ORDER — OMEGA-3 ACID ETHYL ESTERS 1 G
1 CAPSULE ORAL
Qty: 0 | Refills: 0 | COMMUNITY

## 2019-01-24 RX ORDER — CEFTRIAXONE 500 MG/1
1000 INJECTION, POWDER, FOR SOLUTION INTRAMUSCULAR; INTRAVENOUS EVERY 24 HOURS
Qty: 0 | Refills: 0 | Status: DISCONTINUED | OUTPATIENT
Start: 2019-01-24 | End: 2019-01-26

## 2019-01-24 RX ORDER — SODIUM CHLORIDE 9 MG/ML
1000 INJECTION INTRAMUSCULAR; INTRAVENOUS; SUBCUTANEOUS
Qty: 0 | Refills: 0 | Status: DISCONTINUED | OUTPATIENT
Start: 2019-01-24 | End: 2019-01-26

## 2019-01-24 RX ORDER — ATENOLOL 25 MG/1
25 TABLET ORAL AT BEDTIME
Qty: 0 | Refills: 0 | Status: DISCONTINUED | OUTPATIENT
Start: 2019-01-24 | End: 2019-01-26

## 2019-01-24 RX ORDER — ACETAMINOPHEN 500 MG
650 TABLET ORAL EVERY 6 HOURS
Qty: 0 | Refills: 0 | Status: DISCONTINUED | OUTPATIENT
Start: 2019-01-24 | End: 2019-01-26

## 2019-01-24 RX ORDER — DIGOXIN 250 MCG
0.06 TABLET ORAL DAILY
Qty: 0 | Refills: 0 | Status: DISCONTINUED | OUTPATIENT
Start: 2019-01-24 | End: 2019-01-25

## 2019-01-24 RX ORDER — PANTOPRAZOLE SODIUM 20 MG/1
40 TABLET, DELAYED RELEASE ORAL
Qty: 0 | Refills: 0 | Status: DISCONTINUED | OUTPATIENT
Start: 2019-01-24 | End: 2019-01-26

## 2019-01-24 RX ORDER — ACETAMINOPHEN 500 MG
650 TABLET ORAL ONCE
Qty: 0 | Refills: 0 | Status: COMPLETED | OUTPATIENT
Start: 2019-01-24 | End: 2019-01-24

## 2019-01-24 RX ORDER — OXYBUTYNIN CHLORIDE 5 MG
5 TABLET ORAL DAILY
Qty: 0 | Refills: 0 | Status: DISCONTINUED | OUTPATIENT
Start: 2019-01-24 | End: 2019-01-26

## 2019-01-24 RX ORDER — WARFARIN SODIUM 2.5 MG/1
2.5 TABLET ORAL
Qty: 0 | Refills: 0 | Status: DISCONTINUED | OUTPATIENT
Start: 2019-01-24 | End: 2019-01-26

## 2019-01-24 RX ADMIN — WARFARIN SODIUM 2.5 MILLIGRAM(S): 2.5 TABLET ORAL at 21:18

## 2019-01-24 RX ADMIN — ATENOLOL 25 MILLIGRAM(S): 25 TABLET ORAL at 21:18

## 2019-01-24 RX ADMIN — Medication 650 MILLIGRAM(S): at 01:04

## 2019-01-24 RX ADMIN — Medication 1 TABLET(S): at 14:08

## 2019-01-24 RX ADMIN — CEFTRIAXONE 1000 MILLIGRAM(S): 500 INJECTION, POWDER, FOR SOLUTION INTRAMUSCULAR; INTRAVENOUS at 21:18

## 2019-01-24 RX ADMIN — Medication 5 MILLIGRAM(S): at 14:10

## 2019-01-24 RX ADMIN — SODIUM CHLORIDE 60 MILLILITER(S): 9 INJECTION INTRAMUSCULAR; INTRAVENOUS; SUBCUTANEOUS at 19:10

## 2019-01-24 RX ADMIN — DULOXETINE HYDROCHLORIDE 20 MILLIGRAM(S): 30 CAPSULE, DELAYED RELEASE ORAL at 14:07

## 2019-01-24 RX ADMIN — Medication 250 MILLIGRAM(S): at 10:38

## 2019-01-24 NOTE — ED ADULT NURSE REASSESSMENT NOTE - NS ED NURSE REASSESS COMMENT FT1
assisted patient on bedpan and changing, Patient aware of admission status and process. Patient has no complaints or questions at this time. Will continue to monitor

## 2019-01-24 NOTE — ED ADULT NURSE REASSESSMENT NOTE - NS ED NURSE REASSESS COMMENT FT1
Patient refuses Xray of xhest. Patient educate don the importance of scan, patient verbally understood. Will continue to monitor

## 2019-01-24 NOTE — PROGRESS NOTE ADULT - ASSESSMENT
79 y/o F with PMHx of A-fib on coumadin, CAD s/p stents, Anxiety, HTN, RA, and left  Renal CA s/p partial right nephrectomy, TIA, who present with fever, urinary frequency and altered mental status, found to have urosepsis.       Toxic metabolic encephalopathy 2/2 to urosepsis  - improving  - continue IV ceftriaxone  - s/p vancomycin  - s/p 1.65 liters IV fluids   - will follow up blood and urine cultures  - lactate WNL  - CXR clear  - ID consult appreciated    MK on CKD stage 3  - resolved  - Cr now 1 from 1.38  - continue IV fluids  - monitor BMP  - hold enalapril for now will resume if BP and Cr allows    Afib  - CHADs VASC of 6  - continue coumadin  - INR daily  - continue atenolol   - continue digoxin    CAD   - s/p stent  - allergic to statin  - continue atenolol   - not on antiplatelet therapy    DVT prophylaxis   - on coumadin

## 2019-01-24 NOTE — PROGRESS NOTE ADULT - SUBJECTIVE AND OBJECTIVE BOX
77 y/o F with PMHx of A-fib on coumadin, CAD s/p stents, Anxiety, HTN, RA, and left  Renal CA s/p partial right nephrectomy, TIA, last admission at  was jun 2018 for generalised weakness from uti , This time  presented to ED accompanied by daughter  c/o fevers Tmax 102, associated with generalized weakness and confusion more than usual  which started night prior to ED arrival . Per daughter, pt seen at PMD office  1/22 for frequent urination and placed on Macrobid for UTI.   Temp 100.3F orally in triage vitals. Pt denies any CP, abd pain, N/V/D, no SOB .    1/24: Patient evaluated at bedside. States that she is feeling better. AOx3. Denies abdominal pain, back pain, nausea, vomiting.       MEDICATIONS  (STANDING):  ATENolol  Tablet 25 milliGRAM(s) Oral at bedtime  cefTRIAXone Injectable. 1000 milliGRAM(s) IV Push every 24 hours  digoxin     Tablet 0.0625 milliGRAM(s) Oral daily  DULoxetine 20 milliGRAM(s) Oral daily  multivitamin 1 Tablet(s) Oral daily  oxybutynin 5 milliGRAM(s) Oral daily  pantoprazole    Tablet 40 milliGRAM(s) Oral before breakfast  sodium chloride 0.9%. 1000 milliLiter(s) (60 mL/Hr) IV Continuous <Continuous>  warfarin 2.5 milliGRAM(s) Oral <User Schedule>  warfarin 5 milliGRAM(s) Oral <User Schedule>    MEDICATIONS  (PRN):  acetaminophen   Tablet .. 650 milliGRAM(s) Oral every 6 hours PRN Temp greater or equal to 38C (100.4F), Mild Pain (1 - 3)    Vital Signs (24 Hrs):  T(C): 37.1 (01-24-19 @ 12:14), Max: 38.4 (01-23-19 @ 20:47)  HR: 60 (01-24-19 @ 12:14) (60 - 80)  BP: 114/58 (01-24-19 @ 12:14) (114/58 - 156/95)  RR: 19 (01-24-19 @ 12:14) (16 - 19)  SpO2: 95% (01-24-19 @ 12:14) (94% - 97%)  Wt(kg): --  Daily Height in cm: 162.56 (23 Jan 2019 19:45)    Daily     I&O's Summary    PHYSICAL EXAM:  GENERAL: NAD, well-developed,   HEAD:  Atraumatic, Normocephalic  EYES: EOMI, PERRLA, conjunctiva and sclera clear  NECK: Supple, No JVD  CHEST/LUNG: Clear to auscultation bilaterally; No wheeze, ronchi or rales  HEART: Regular rate and rhythm; No murmurs, rubs, or gallops  ABDOMEN: Soft, Nontender, Nondistended; Bowel sounds present  EXTREMITIES:  2+ Peripheral Pulses, No clubbing, cyanosis, or edema  PSYCH: AAOx3  NEUROLOGY: non-focal  SKIN: No rashes or lesions    LABS:                        12.7   20.26 )-----------( 199      ( 24 Jan 2019 09:13 )             38.8     24 Jan 2019 09:13    142    |  109    |  23     ----------------------------<  103    4.1     |  26     |  1.00     Ca    8.9        24 Jan 2019 09:13    TPro  7.9    /  Alb  3.7    /  TBili  0.3    /  DBili  x      /  AST  31     /  ALT  31     /  AlkPhos  109    23 Jan 2019 21:00    PT/INR - ( 24 Jan 2019 09:13 )   PT: 33.5 sec;   INR: 2.92 ratio         PTT - ( 24 Jan 2019 09:13 )  PTT:42.0 sec    < from: Xray Chest 1 View- PORTABLE-Urgent (01.24.19 @ 09:23) >  Impression: No active pulmonary disease.    < end of copied text >

## 2019-01-24 NOTE — H&P ADULT - NSHPPHYSICALEXAM_GEN_ALL_CORE
PHYSICAL EXAM:    Daily Height in cm: 162.56 (23 Jan 2019 19:45)    Daily     ICU Vital Signs Last 24 Hrs  T(C): 36.7 (24 Jan 2019 07:22), Max: 38.4 (23 Jan 2019 20:47)  T(F): 98.1 (24 Jan 2019 07:22), Max: 101.2 (23 Jan 2019 20:47)  HR: 63 (24 Jan 2019 07:22) (63 - 80)  BP: 130/64 (24 Jan 2019 07:22) (117/67 - 156/95)  BP(mean): --  ABP: --  ABP(mean): --  RR: 17 (24 Jan 2019 07:22) (16 - 18)  SpO2: 94% (24 Jan 2019 07:22) (94% - 97%)      Constitutional: NAD  HEENT: Atraumatic, AAN, Normal, No congestion  Respiratory: Breath Sounds normal, no rhonchi/wheeze  Cardiovascular: N S1S2;   Gastrointestinal: Abdomen soft, non tender, Bowel Ssounds present  Extremities: No edema  Neurological: AA oriented to person, place and month, not year, no gross focal motor deficits, follows all commands  Back: No CVA tenderness   Musculoskeletal: non tender  Breasts: Deferred  Genitourinary: deferred  Rectal: Deferred

## 2019-01-24 NOTE — H&P ADULT - HISTORY OF PRESENT ILLNESS
79 y/o F with PMHx of A-fib on coumadin, CAD s/p stents, Anxiety, HTN, RA, and left  Renal CA s/p partial right nephrectomy, TIA, last admission at  was jun 2018 for generalised weakness from uti , This time  presented to ED accompanied by daughter  c/o fevers Tmax 102, asociated with generalised weakness and confusion more than usual  which started night prior to ED arrival . Per daughter, pt seen at PMD office  1/22 for frequent urination and placed on Macrobid for UTI.   Temp 100.3F orally in triage vitals. Pt denies any CP, abd pain, N/V/D, no SOB . Allergic to Penicillin   In ED patient received IVF bolus per sepsis protocol 1650cc in ed  received IV ceftriaxone  Currently awake and oriented to person, place , month , follows all commands , appeasr comfortable and is hemodynamically stable  has intermittent occasional cough    fam hx- denies family hx of cardiac problems in fisrt degree relatives  social hx- past smoker, takes 2 to3 glasses of wine per week, denies recreational drug use

## 2019-01-24 NOTE — CONSULT NOTE ADULT - SUBJECTIVE AND OBJECTIVE BOX
Patient is a 78y old  Female who presents with a chief complaint of fever, confusion, urinary frequency, generalised weakness (2019 08:35)    HPI:  77 y/o F with PMHx of A-fib on coumadin, CAD s/p stents, Anxiety, HTN, RA, and left  Renal CA s/p partial right nephrectomy, TIA, last admission at  was 2018 for generalised weakness from uti , admitted on  for evaluation of increased urinary urgency and frequency associated with fevers to 102, weakness and confusion. Patient notes remote history to penicillin about 30 years ago. In ED awake, alert and able to provide history. No other specific complaints and significant other at bedside also notes patient mental status much improved.        PMH: as above  PSH: as above  Meds: per reconciliation sheet, noted below  MEDICATIONS  (STANDING):  ATENolol  Tablet 25 milliGRAM(s) Oral at bedtime  cefTRIAXone Injectable. 1000 milliGRAM(s) IV Push every 24 hours  digoxin     Tablet 0.0625 milliGRAM(s) Oral daily  DULoxetine 20 milliGRAM(s) Oral daily  multivitamin 1 Tablet(s) Oral daily  oxybutynin 5 milliGRAM(s) Oral daily  pantoprazole    Tablet 40 milliGRAM(s) Oral before breakfast  sodium chloride 0.9%. 1000 milliLiter(s) (60 mL/Hr) IV Continuous <Continuous>  warfarin 2.5 milliGRAM(s) Oral <User Schedule>  warfarin 5 milliGRAM(s) Oral <User Schedule>    MEDICATIONS  (PRN):  acetaminophen   Tablet .. 650 milliGRAM(s) Oral every 6 hours PRN Temp greater or equal to 38C (100.4F), Mild Pain (1 - 3)    Allergies    penicillin (Hives)  penicillin (Unknown)    Intolerances    statins (Muscle Pain)    Social: no smoking, no alcohol, no illegal drugs; no recent travel, no exposure to TB  FAMILY HISTORY:  Family history of other heart disease (Mother)  Family history of MI (myocardial infarction) (Father)  No pertinent family history in first degree relatives     no history of premature cardiovascular disease in first degree relatives  ROS: the patient denies fever, no chills, no HA, no dizziness, no sore throat, no blurry vision, no CP, no palpitations, no SOB, no cough, no abdominal pain, no diarrhea, no N/V, no leg pain, no claudication, no rash, no joint aches, no rectal pain or bleeding, no night sweats  All other systems reviewed and are negative    Vital Signs Last 24 Hrs  T(C): 36.7 (2019 07:22), Max: 38.4 (2019 20:47)  T(F): 98.1 (2019 07:22), Max: 101.2 (2019 20:47)  HR: 63 (2019 07:22) (63 - 80)  BP: 130/64 (2019 07:22) (117/67 - 156/95)  BP(mean): --  RR: 17 (2019 07:22) (16 - 18)  SpO2: 94% (2019 07:22) (94% - 97%)  Daily Height in cm: 162.56 (2019 19:45)    Daily     PE:    Constitutional: frail looking  HEENT: NC/AT, EOMI, PERRLA, conjunctivae clear; ears and nose atraumatic; pharynx clear  Neck: supple; thyroid not palpable  Back: no tenderness  Respiratory: respiratory effort normal; clear to auscultation  Cardiovascular: S1S2 regular, no murmurs  Abdomen: soft, not tender, not distended, positive BS; no liver or spleen organomegaly  Genitourinary: no suprapubic tenderness  Musculoskeletal: no muscle tenderness, no joint swelling or tenderness  Neurological/ Psychiatric: AxOx3, judgement and insight normal;  moving all extremities  Skin: no rashes; no palpable lesions    Labs: all available labs reviewed                        12.7   20.26 )-----------( 199      ( 2019 09:13 )             38.8         142  |  109<H>  |  23  ----------------------------<  103<H>  4.1   |  26  |  1.00    Ca    8.9      2019 09:13    TPro  7.9  /  Alb  3.7  /  TBili  0.3  /  DBili  x   /  AST  31  /  ALT  31  /  AlkPhos  109  -     LIVER FUNCTIONS - ( 2019 21:00 )  Alb: 3.7 g/dL / Pro: 7.9 gm/dL / ALK PHOS: 109 U/L / ALT: 31 U/L / AST: 31 U/L / GGT: x           Urinalysis Basic - ( 2019 22:02 )    Color: Yellow / Appearance: Clear / S.010 / pH: x  Gluc: x / Ketone: Negative  / Bili: Negative / Urobili: Negative mg/dL   Blood: x / Protein: 15 mg/dL / Nitrite: Negative   Leuk Esterase: Small / RBC: 0-2 /HPF / WBC 6-10   Sq Epi: x / Non Sq Epi: Moderate / Bacteria: Occasional          Radiology: all available radiological tests reviewed    Advanced directives addressed: full resuscitation

## 2019-01-24 NOTE — H&P ADULT - NSHPLABSRESULTS_GEN_ALL_CORE
14.1   17.37 )-----------( 261      ( 2019 21:00 )             43.2       CBC Full  -  ( 2019 21:00 )  WBC Count : 17.37 K/uL  Hemoglobin : 14.1 g/dL  Hematocrit : 43.2 %  Platelet Count - Automated : 261 K/uL  Mean Cell Volume : 94.9 fl  Mean Cell Hemoglobin : 31.0 pg  Mean Cell Hemoglobin Concentration : 32.6 gm/dL  Auto Neutrophil # : 15.25 K/uL  Auto Lymphocyte # : 0.80 K/uL  Auto Monocyte # : 1.15 K/uL  Auto Eosinophil # : 0.03 K/uL  Auto Basophil # : 0.04 K/uL  Auto Neutrophil % : 87.8 %  Auto Lymphocyte % : 4.6 %  Auto Monocyte % : 6.6 %  Auto Eosinophil % : 0.2 %  Auto Basophil % : 0.2 %          136  |  102  |  32<H>  ----------------------------<  126<H>  4.4   |  26  |  1.38<H>    Ca    9.6      2019 21:00    TPro  7.9  /  Alb  3.7  /  TBili  0.3  /  DBili  x   /  AST  31  /  ALT  31  /  AlkPhos  109        LIVER FUNCTIONS - ( 2019 21:00 )  Alb: 3.7 g/dL / Pro: 7.9 gm/dL / ALK PHOS: 109 U/L / ALT: 31 U/L / AST: 31 U/L / GGT: x             PT/INR - ( 2019 21:00 )   PT: 24.5 sec;   INR: 2.16 ratio         PTT - ( 2019 21:00 )  PTT:38.7 sec          Urinalysis Basic - ( 2019 22:02 )    Color: Yellow / Appearance: Clear / S.010 / pH: x  Gluc: x / Ketone: Negative  / Bili: Negative / Urobili: Negative mg/dL   Blood: x / Protein: 15 mg/dL / Nitrite: Negative   Leuk Esterase: Small / RBC: 0-2 /HPF / WBC 6-10   Sq Epi: x / Non Sq Epi: Moderate / Bacteria: Occasional            MEDICATIONS  (STANDING):  ATENolol  Tablet 25 milliGRAM(s) Oral at bedtime  DULoxetine 20 milliGRAM(s) Oral daily  multivitamin 1 Tablet(s) Oral daily  oxybutynin 5 milliGRAM(s) Oral daily  pantoprazole    Tablet 40 milliGRAM(s) Oral before breakfast  sodium chloride 0.9%. 1000 milliLiter(s) (60 mL/Hr) IV Continuous <Continuous>  warfarin 2.5 milliGRAM(s) Oral <User Schedule>  warfarin 5 milliGRAM(s) Oral <User Schedule>

## 2019-01-24 NOTE — H&P ADULT - ASSESSMENT
79 y/o F with PMHx of A-fib on coumadin, CAD s/p stents, Anxiety, HTN, RA, and left  Renal CA s/p partial right nephrectomy, TIA, last admission at  was jun 2018 for generalised weakness from uti ,   This time admitted for fever, generalised weakness, urinary frequency and increased confusion  A/P  # Suspected urosepsis  # Acute toxic metabolic encephalopathy  - admit to med surg  - IV ceftriaxone, IV vanco x1  -follow Blood cx, urine cx  -ordered CXR and RVP pending  -ID consult    # CKD stage 3 /baseline Cr 1.2 to 1.6  Today Cr 1.3 cannot exclude acute  mild prerenal azotemia  -slow IVF  -monitor BMP  -serum digoxin level prior to resuming renally dosed digoxin    #hx afib/hx TIA  continue coumadin   INR daily  continue BB   -serum digoxin level prior to resuming renally dosed digoxin    #hx CAD s/p stent  allergic to statin  continue BB  not on asa or plavix at home     # DVT prophylaxis   on coumadin 77 y/o F with PMHx of A-fib on coumadin, CAD s/p stents, Anxiety, HTN, RA, and left  Renal CA s/p partial right nephrectomy, TIA, last admission at  was jun 2018 for generalised weakness from uti ,   This time admitted for fever, generalised weakness, urinary frequency and increased confusion  A/P  # Suspected urosepsis  # Acute toxic metabolic encephalopathy  - admit to med surg  - IV ceftriaxone, IV vanco x1  -follow Blood cx, urine cx  -ordered CXR and RVP pending  -ID consult    # CKD stage 3 /baseline Cr 1.2 to 1.6  Today Cr 1.3 cannot exclude acute  mild prerenal azotemia  -slow IVF  -monitor BMP  -serum digoxin level prior to resuming renally dosed digoxin  - hold enalapril for today and resume in 1 day if Cr remains stable or improves     #hx afib/hx TIA  continue coumadin   INR daily  continue BB   -serum digoxin level prior to resuming renally dosed digoxin    #hx CAD s/p stent  allergic to statin  continue BB  not on asa or plavix at home     # DVT prophylaxis   on coumadin

## 2019-01-24 NOTE — CONSULT NOTE ADULT - ASSESSMENT
79 y/o F with PMHx of A-fib on coumadin, CAD s/p stents, Anxiety, HTN, RA, and left  Renal CA s/p partial right nephrectomy, TIA, last admission at  was jun 2018 for generalised weakness from uti , admitted on 1/23 for evaluation of increased urinary urgency and frequency associated with fevers to 102, weakness and confusion. Patient notes remote history to penicillin about 30 years ago. In ED awake, alert and able to provide history. No other specific complaints and significant other at bedside also notes patient mental status much improved.  1. Patient admitted with sepsis secondary to urinary origin; also noted with leukocytosis most likely secondary to infection  - follow up cultures   - iv hydration and supportive care   - serial cbc and monitor temperature   - reviewed prior medical records to evaluate for resistant or atypical pathogens   - will continue antibiotics with ceftriaxone as many patient allergy to penicillin was due to cogener of older formulations of penicillin  - Monitor closely in view of history of penicillin allergy   2. other issues;  A-fib on coumadin, CAD s/p stents, Anxiety, HTN, RA, and left  Renal CA s/p partial right nephrectomy, TIA  - per medicine

## 2019-01-25 LAB
ANION GAP SERPL CALC-SCNC: 9 MMOL/L — SIGNIFICANT CHANGE UP (ref 5–17)
APTT BLD: 39.7 SEC — HIGH (ref 27.5–36.3)
BASOPHILS # BLD AUTO: 0.02 K/UL — SIGNIFICANT CHANGE UP (ref 0–0.2)
BASOPHILS NFR BLD AUTO: 0.2 % — SIGNIFICANT CHANGE UP (ref 0–2)
BUN SERPL-MCNC: 15 MG/DL — SIGNIFICANT CHANGE UP (ref 7–23)
CALCIUM SERPL-MCNC: 8.9 MG/DL — SIGNIFICANT CHANGE UP (ref 8.5–10.1)
CHLORIDE SERPL-SCNC: 105 MMOL/L — SIGNIFICANT CHANGE UP (ref 96–108)
CO2 SERPL-SCNC: 24 MMOL/L — SIGNIFICANT CHANGE UP (ref 22–31)
CREAT SERPL-MCNC: 0.86 MG/DL — SIGNIFICANT CHANGE UP (ref 0.5–1.3)
CULTURE RESULTS: SIGNIFICANT CHANGE UP
EOSINOPHIL # BLD AUTO: 0.39 K/UL — SIGNIFICANT CHANGE UP (ref 0–0.5)
EOSINOPHIL NFR BLD AUTO: 3.9 % — SIGNIFICANT CHANGE UP (ref 0–6)
GLUCOSE SERPL-MCNC: 98 MG/DL — SIGNIFICANT CHANGE UP (ref 70–99)
HCT VFR BLD CALC: 39.7 % — SIGNIFICANT CHANGE UP (ref 34.5–45)
HGB BLD-MCNC: 12.8 G/DL — SIGNIFICANT CHANGE UP (ref 11.5–15.5)
IMM GRANULOCYTES NFR BLD AUTO: 0.5 % — SIGNIFICANT CHANGE UP (ref 0–1.5)
INR BLD: 2.8 RATIO — HIGH (ref 0.88–1.16)
LYMPHOCYTES # BLD AUTO: 1.29 K/UL — SIGNIFICANT CHANGE UP (ref 1–3.3)
LYMPHOCYTES # BLD AUTO: 12.9 % — LOW (ref 13–44)
MCHC RBC-ENTMCNC: 30.7 PG — SIGNIFICANT CHANGE UP (ref 27–34)
MCHC RBC-ENTMCNC: 32.2 GM/DL — SIGNIFICANT CHANGE UP (ref 32–36)
MCV RBC AUTO: 95.2 FL — SIGNIFICANT CHANGE UP (ref 80–100)
MONOCYTES # BLD AUTO: 0.7 K/UL — SIGNIFICANT CHANGE UP (ref 0–0.9)
MONOCYTES NFR BLD AUTO: 7 % — SIGNIFICANT CHANGE UP (ref 2–14)
NEUTROPHILS # BLD AUTO: 7.55 K/UL — HIGH (ref 1.8–7.4)
NEUTROPHILS NFR BLD AUTO: 75.5 % — SIGNIFICANT CHANGE UP (ref 43–77)
NRBC # BLD: 0 /100 WBCS — SIGNIFICANT CHANGE UP (ref 0–0)
PLATELET # BLD AUTO: 204 K/UL — SIGNIFICANT CHANGE UP (ref 150–400)
POTASSIUM SERPL-MCNC: 3.9 MMOL/L — SIGNIFICANT CHANGE UP (ref 3.5–5.3)
POTASSIUM SERPL-SCNC: 3.9 MMOL/L — SIGNIFICANT CHANGE UP (ref 3.5–5.3)
PROTHROM AB SERPL-ACNC: 32.1 SEC — HIGH (ref 10–12.9)
RBC # BLD: 4.17 M/UL — SIGNIFICANT CHANGE UP (ref 3.8–5.2)
RBC # FLD: 13.1 % — SIGNIFICANT CHANGE UP (ref 10.3–14.5)
SODIUM SERPL-SCNC: 138 MMOL/L — SIGNIFICANT CHANGE UP (ref 135–145)
SPECIMEN SOURCE: SIGNIFICANT CHANGE UP
WBC # BLD: 10 K/UL — SIGNIFICANT CHANGE UP (ref 3.8–10.5)
WBC # FLD AUTO: 10 K/UL — SIGNIFICANT CHANGE UP (ref 3.8–10.5)

## 2019-01-25 RX ORDER — ASCORBIC ACID 60 MG
1 TABLET,CHEWABLE ORAL
Qty: 0 | Refills: 0 | COMMUNITY

## 2019-01-25 RX ORDER — DIGOXIN 250 MCG
0.12 TABLET ORAL DAILY
Qty: 0 | Refills: 0 | Status: DISCONTINUED | OUTPATIENT
Start: 2019-01-26 | End: 2019-01-26

## 2019-01-25 RX ORDER — ALPRAZOLAM 0.25 MG
1 TABLET ORAL
Qty: 0 | Refills: 0 | COMMUNITY

## 2019-01-25 RX ORDER — CHOLECALCIFEROL (VITAMIN D3) 125 MCG
1 CAPSULE ORAL
Qty: 0 | Refills: 0 | COMMUNITY

## 2019-01-25 RX ADMIN — Medication 0.06 MILLIGRAM(S): at 05:27

## 2019-01-25 RX ADMIN — CEFTRIAXONE 1000 MILLIGRAM(S): 500 INJECTION, POWDER, FOR SOLUTION INTRAMUSCULAR; INTRAVENOUS at 21:35

## 2019-01-25 RX ADMIN — Medication 1 TABLET(S): at 12:41

## 2019-01-25 RX ADMIN — Medication 5 MILLIGRAM(S): at 12:41

## 2019-01-25 RX ADMIN — DULOXETINE HYDROCHLORIDE 20 MILLIGRAM(S): 30 CAPSULE, DELAYED RELEASE ORAL at 12:41

## 2019-01-25 RX ADMIN — ATENOLOL 25 MILLIGRAM(S): 25 TABLET ORAL at 21:36

## 2019-01-25 RX ADMIN — PANTOPRAZOLE SODIUM 40 MILLIGRAM(S): 20 TABLET, DELAYED RELEASE ORAL at 05:27

## 2019-01-25 RX ADMIN — WARFARIN SODIUM 5 MILLIGRAM(S): 2.5 TABLET ORAL at 21:36

## 2019-01-25 RX ADMIN — Medication 2.5 MILLIGRAM(S): at 10:05

## 2019-01-25 NOTE — PHYSICAL THERAPY INITIAL EVALUATION ADULT - CRITERIA FOR SKILLED THERAPEUTIC INTERVENTIONS
functional limitations in following categories/therapy frequency/anticipated discharge recommendation/impairments found/anticipated equipment needs at discharge/risk reduction/prevention/predicted duration of therapy intervention

## 2019-01-25 NOTE — PROGRESS NOTE ADULT - SUBJECTIVE AND OBJECTIVE BOX
Patient is a 78y old  Female who presents with a chief complaint of fever, confusion, urinary frequency, generalised weakness (24 Jan 2019 08:35)    Date of service: 01-25-19 @ 10:49      Patient lying in bed  Still with urinary frequency, no dysuria  Afebrile      ROS: no fever or chills; denies dizziness, no HA, no SOB or cough, no abdominal pain, no diarrhea or constipation; no dysuria,  no legs pain, no rashes    MEDICATIONS  (STANDING):  ATENolol  Tablet 25 milliGRAM(s) Oral at bedtime  cefTRIAXone Injectable. 1000 milliGRAM(s) IV Push every 24 hours  digoxin     Tablet 0.0625 milliGRAM(s) Oral daily  DULoxetine 20 milliGRAM(s) Oral daily  enalapril 2.5 milliGRAM(s) Oral daily  multivitamin 1 Tablet(s) Oral daily  oxybutynin 5 milliGRAM(s) Oral daily  pantoprazole    Tablet 40 milliGRAM(s) Oral before breakfast  sodium chloride 0.9%. 1000 milliLiter(s) (60 mL/Hr) IV Continuous <Continuous>  warfarin 2.5 milliGRAM(s) Oral <User Schedule>  warfarin 5 milliGRAM(s) Oral <User Schedule>    MEDICATIONS  (PRN):  acetaminophen   Tablet .. 650 milliGRAM(s) Oral every 6 hours PRN Temp greater or equal to 38C (100.4F), Mild Pain (1 - 3)      Vital Signs Last 24 Hrs  T(C): 36.9 (25 Jan 2019 06:02), Max: 37.1 (24 Jan 2019 12:14)  T(F): 98.4 (25 Jan 2019 06:02), Max: 98.7 (24 Jan 2019 12:14)  HR: 66 (25 Jan 2019 06:47) (60 - 75)  BP: 170/80 (25 Jan 2019 06:47) (114/58 - 175/80)  BP(mean): --  RR: 17 (25 Jan 2019 06:02) (17 - 19)  SpO2: 97% (25 Jan 2019 06:02) (95% - 98%)    Physical Exam:    PE:    Constitutional: frail looking  HEENT: NC/AT, EOMI, PERRLA, conjunctivae clear; ears and nose atraumatic; pharynx clear  Neck: supple; thyroid not palpable  Back: no tenderness  Respiratory: respiratory effort normal; clear to auscultation  Cardiovascular: S1S2 regular, no murmurs  Abdomen: soft, not tender, not distended, positive BS; no liver or spleen organomegaly  Genitourinary: no suprapubic tenderness  Musculoskeletal: no muscle tenderness, no joint swelling or tenderness  Neurological/ Psychiatric: AxOx3, judgement and insight normal;  moving all extremities  Skin: no rashes; no palpable lesions    Labs: all available labs reviewed                       Labs:                        12.8   10.00 )-----------( 204      ( 25 Jan 2019 07:16 )             39.7     01-25    138  |  105  |  15  ----------------------------<  98  3.9   |  24  |  0.86    Ca    8.9      25 Jan 2019 07:16    TPro  7.9  /  Alb  3.7  /  TBili  0.3  /  DBili  x   /  AST  31  /  ALT  31  /  AlkPhos  109  01-23           Cultures:       Culture - Blood (collected 01-23-19 @ 21:00)  Source: .Blood None  Preliminary Report (01-25-19 @ 01:04):    No growth to date.    Culture - Blood (collected 01-23-19 @ 21:00)  Source: .Blood None  Preliminary Report (01-25-19 @ 01:04):    No growth to date.            Radiology: all available radiological tests reviewed    Advanced directives addressed: full resuscitation

## 2019-01-25 NOTE — PROGRESS NOTE ADULT - ASSESSMENT
77 y/o F with PMHx of A-fib on coumadin, CAD s/p stents, Anxiety, HTN, RA, and left  Renal CA s/p partial right nephrectomy, TIA, who present with fever, urinary frequency and altered mental status, found to have urosepsis.     Toxic metabolic encephalopathy 2/2 to urosepsis  - improving  - continue IV ceftriaxone  - s/p vancomycin  - s/p 1.65 liters IV fluids   - will follow up blood and urine cultures  - lactate WNL  - CXR clear  - ID consult appreciated    MK on CKD stage 3  - resolved  - continue IV fluids  - monitor BMP    HTN  - enalapril    Afib  - CHADs VASC of 6  - continue coumadin  - INR daily  - continue atenolol   - continue digoxin    CAD   - s/p stent  - allergic to statins  - continue atenolol   - not on antiplatelet therapy    DVT prophylaxis   - on coumadin

## 2019-01-25 NOTE — PROGRESS NOTE ADULT - SUBJECTIVE AND OBJECTIVE BOX
Pt has been seen and examined with FP resident, resident supervised agree with a/p       Patient is a 78y old  Female who presents with a chief complaint of fever, confusion, urinary frequency, generalised weakness (25 Jan 2019 10:49)      PHYSICAL EXAM:  Vital Signs Last 24 Hrs  T(C): 36.3 (25 Jan 2019 10:52), Max: 37.1 (24 Jan 2019 16:11)  T(F): 97.3 (25 Jan 2019 10:52), Max: 98.7 (24 Jan 2019 16:11)  HR: 62 (25 Jan 2019 10:52) (62 - 75)  BP: 139/71 (25 Jan 2019 10:52) (123/69 - 175/80)  BP(mean): --  RR: 16 (25 Jan 2019 10:52) (16 - 18)  SpO2: 97% (25 Jan 2019 10:52) (95% - 98%)  general- comfortable   -rs-aeeb,cta  -cvs-s1s2 normal   -p/a-soft,bs+  -extremity- no asymmetrical swelling noted   -cns- non focal         A/P    #ct abx for presumptive uti and monitor her closely     #d/c today if no social issue, time spent 64 minutes     #Discussed with Dr. Scruggs

## 2019-01-25 NOTE — PROGRESS NOTE ADULT - ASSESSMENT
79 y/o F with PMHx of A-fib on coumadin, CAD s/p stents, Anxiety, HTN, RA, and left  Renal CA s/p partial right nephrectomy, TIA, last admission at  was jun 2018 for generalised weakness from uti , admitted on 1/23 for evaluation of increased urinary urgency and frequency associated with fevers to 102, weakness and confusion. Patient notes remote history to penicillin about 30 years ago. In ED awake, alert and able to provide history. No other specific complaints and significant other at bedside also notes patient mental status much improved.  1. Patient admitted with sepsis secondary to urinary origin; also noted with leukocytosis most likely secondary to infection  - follow up cultures --- urine culture is still pending  - leukocytosis is improved  - iv hydration and supportive care   - serial cbc and monitor temperature   - reviewed prior medical records to evaluate for resistant or atypical pathogens   - day #2 ceftriaxone  - tolerating antibiotics without rashes or side effects   - Monitor closely in view of history of penicillin allergy   2. other issues;  A-fib on coumadin, CAD s/p stents, Anxiety, HTN, RA, and left  Renal CA s/p partial right nephrectomy, TIA  - per medicine

## 2019-01-25 NOTE — PHARMACOTHERAPY INTERVENTION NOTE - COMMENTS
Spoke to Dr. Daniels and suggesting increasing digoxin to match home dose
med rec completed from records in chart and Dr. First

## 2019-01-25 NOTE — PHYSICAL THERAPY INITIAL EVALUATION ADULT - PERTINENT HX OF CURRENT PROBLEM, REHAB EVAL
Pt presented to ED accompanied by daughter  c/o fevers Tmax 102, associated with generalized weakness and confusion more than usual  which started night prior to ED arrival , fever, confusion, urinary frequency, generalised weakness

## 2019-01-25 NOTE — PROGRESS NOTE ADULT - SUBJECTIVE AND OBJECTIVE BOX
77 y/o F with PMHx of A-fib on coumadin, CAD s/p stents, Anxiety, HTN, RA, and left  Renal CA s/p partial right nephrectomy, TIA, last admission at  was jun 2018 for generalised weakness from uti , This time  presented to ED accompanied by daughter  c/o fevers Tmax 102, associated with generalized weakness and confusion more than usual  which started night prior to ED arrival . Per daughter, pt seen at PMD office  1/22 for frequent urination and placed on Macrobid for UTI.   Temp 100.3F orally in triage vitals. Pt denies any CP, abd pain, N/V/D, no SOB .    1/25: Patient evaluated at bedside. Sitting up in bed eating breakfast. States that she is feeling better. AOx3. Denies abdominal pain, back pain, nausea, vomiting.  Discharged likely tomorrow.   MEDICATIONS  (STANDING):  ATENolol  Tablet 25 milliGRAM(s) Oral at bedtime  cefTRIAXone Injectable. 1000 milliGRAM(s) IV Push every 24 hours  digoxin     Tablet 0.125 milliGRAM(s) Oral daily  DULoxetine 20 milliGRAM(s) Oral daily  enalapril 2.5 milliGRAM(s) Oral daily  multivitamin 1 Tablet(s) Oral daily  oxybutynin 5 milliGRAM(s) Oral daily  pantoprazole    Tablet 40 milliGRAM(s) Oral before breakfast  sodium chloride 0.9%. 1000 milliLiter(s) (60 mL/Hr) IV Continuous <Continuous>  warfarin 2.5 milliGRAM(s) Oral <User Schedule>  warfarin 5 milliGRAM(s) Oral <User Schedule>    MEDICATIONS  (PRN):  acetaminophen   Tablet .. 650 milliGRAM(s) Oral every 6 hours PRN Temp greater or equal to 38C (100.4F), Mild Pain (1 - 3)    Vital Signs (24 Hrs):  T(C): 36.3 (01-25-19 @ 10:52), Max: 37.1 (01-24-19 @ 18:19)  HR: 62 (01-25-19 @ 10:52) (62 - 75)  BP: 139/71 (01-25-19 @ 10:52) (123/69 - 175/80)  RR: 16 (01-25-19 @ 10:52) (16 - 18)  SpO2: 97% (01-25-19 @ 10:52) (95% - 98%)  Wt(kg): --  Daily     Daily     I&O's Summary    24 Jan 2019 07:01  -  25 Jan 2019 07:00  --------------------------------------------------------  IN: 0 mL / OUT: 1 mL / NET: -1 mL    25 Jan 2019 07:01  -  25 Jan 2019 17:55  --------------------------------------------------------  IN: 360 mL / OUT: 0 mL / NET: 360 mL        PHYSICAL EXAM:  GENERAL: NAD, well-developed,   HEAD:  Atraumatic, Normocephalic  EYES: EOMI, PERRLA, conjunctiva and sclera clear  NECK: Supple, No JVD  CHEST/LUNG: Clear to auscultation bilaterally; No wheeze, ronchi or rales  HEART: Regular rate and rhythm; No murmurs, rubs, or gallops  ABDOMEN: Soft, Nontender, Nondistended; Bowel sounds present  EXTREMITIES:  2+ Peripheral Pulses, No clubbing, cyanosis, or edema  PSYCH: AAOx3  NEUROLOGY: non-focal  SKIN: No rashes or lesions    LABS:                        12.8   10.00 )-----------( 204      ( 25 Jan 2019 07:16 )             39.7     25 Jan 2019 07:16    138    |  105    |  15     ----------------------------<  98     3.9     |  24     |  0.86     Ca    8.9        25 Jan 2019 07:16      PT/INR - ( 25 Jan 2019 07:16 )   PT: 32.1 sec;   INR: 2.80 ratio         PTT - ( 25 Jan 2019 07:16 )  PTT:39.7 sec    Ca    8.9        24 Jan 2019 09:13    TPro  7.9    /  Alb  3.7    /  TBili  0.3    /  DBili  x      /  AST  31     /  ALT  31     /  AlkPhos  109    23 Jan 2019 21:00    PT/INR - ( 24 Jan 2019 09:13 )   PT: 33.5 sec;   INR: 2.92 ratio         PTT - ( 24 Jan 2019 09:13 )  PTT:42.0 sec    < from: Xray Chest 1 View- PORTABLE-Urgent (01.24.19 @ 09:23) >  Impression: No active pulmonary disease.    < end of copied text >

## 2019-01-26 ENCOUNTER — TRANSCRIPTION ENCOUNTER (OUTPATIENT)
Age: 79
End: 2019-01-26

## 2019-01-26 VITALS
TEMPERATURE: 99 F | OXYGEN SATURATION: 97 % | SYSTOLIC BLOOD PRESSURE: 144 MMHG | RESPIRATION RATE: 19 BRPM | DIASTOLIC BLOOD PRESSURE: 80 MMHG | HEART RATE: 54 BPM

## 2019-01-26 LAB
INR BLD: 2.06 RATIO — HIGH (ref 0.88–1.16)
PROTHROM AB SERPL-ACNC: 23.4 SEC — HIGH (ref 10–12.9)

## 2019-01-26 RX ADMIN — PANTOPRAZOLE SODIUM 40 MILLIGRAM(S): 20 TABLET, DELAYED RELEASE ORAL at 05:18

## 2019-01-26 RX ADMIN — Medication 1 TABLET(S): at 11:43

## 2019-01-26 RX ADMIN — DULOXETINE HYDROCHLORIDE 20 MILLIGRAM(S): 30 CAPSULE, DELAYED RELEASE ORAL at 11:42

## 2019-01-26 RX ADMIN — Medication 2.5 MILLIGRAM(S): at 05:18

## 2019-01-26 RX ADMIN — Medication 5 MILLIGRAM(S): at 11:42

## 2019-01-26 RX ADMIN — Medication 0.12 MILLIGRAM(S): at 05:18

## 2019-01-26 NOTE — PROGRESS NOTE ADULT - REASON FOR ADMISSION
fever, confusion, urinary frequency, generalized weakness
fever, confusion, urinary frequency, generalised weakness
fever, confusion, urinary frequency, generalized weakness

## 2019-01-26 NOTE — PROGRESS NOTE ADULT - ASSESSMENT
77 y/o F with PMHx of A-fib on coumadin, CAD s/p stents, Anxiety, HTN, RA, and left  Renal CA s/p partial right nephrectomy, TIA, who present with fever, urinary frequency and altered mental status, found to have urosepsis.     Toxic metabolic encephalopathy 2/2 to urosepsis  - resolved  - s/p IV ceftriaxone x 3 days  - s/p vancomycin  - s/p 1.65 liters IV fluids   - blood cultures NGTD  - lactate WNL  - CXR clear  - ID consult appreciated    MK on CKD stage 3  - resolved  - continue IV fluids  - monitor BMP    HTN  - enalapril    Afib  - CHADs VASC of 6  - continue coumadin  - INR daily  - continue atenolol   - continue digoxin    CAD   - s/p stent  - allergic to statins  - continue atenolol   - not on antiplatelet therapy    DVT prophylaxis   - on coumadin

## 2019-01-26 NOTE — DISCHARGE NOTE ADULT - PATIENT PORTAL LINK FT
You can access the [facility] Portal, offered by [facility], by registering with the following website: [email or web address]

## 2019-01-26 NOTE — DISCHARGE NOTE ADULT - HOSPITAL COURSE
79 y/o F with PMHx of A-fib on coumadin, CAD s/p stents, Anxiety, HTN, RA, and left  Renal CA s/p partial right nephrectomy, TIA, last admission at  was jun 2018 for generalised weakness from uti , This time  presented to ED accompanied by daughter  c/o fevers Tmax 102, associated with generalized weakness and confusion more than usual  which started night prior to ED arrival . Per daughter, pt seen at PMD office  1/22 for frequent urination and placed on Macrobid for UTI.   Temp 100.3F orally in triage vitals. Pt denies any CP, abd pain, N/V/D, no SOB. Pt admitted for toxic metabolic encephalopathy 2/2 to UTI. Received 3 days of antibiotics with improvement of symptoms. Pt is now AOx3. Denies dysuria, urinary urgency and frequency. On day of discharge patient is hemodynamically stable and afebrile.    1/26: Patient evaluated at bedside. States that she is feeling better. AOx3. Denies abdominal pain, back pain, nausea, vomiting. All questions answered.

## 2019-01-26 NOTE — PROGRESS NOTE ADULT - ATTENDING COMMENTS
Patient seen and examined with Family Medicine Residents Jannette Daniels and Marck Bain and student Bradford Barnes on the Family Medicine Teaching Service.  Agree with history, physical, labs and plan which were reviewed in detail after a face to face encounter with the patient.

## 2019-01-26 NOTE — DISCHARGE NOTE ADULT - PLAN OF CARE
Stable patient - Toxic metabolic encephalopathy due to urosepsis  - improved s/p completing your course of antibiotics  - blood cultures negative  - Follow up with your primary care doctor within 2-3 days  - If you experience fever, chills, nausea, vomiting go the ER for further management. - CHADs VASC of 6  - continue coumadin  - INR daily  - continue atenolol   - continue digoxin - continue enalapril - s/p stent  - allergic to statins  - continue atenolol   - consider taking daily dose of aspirin if approved by you primary care physician  - follow up with your primary care doctor in 2-3 days - CHADs VASC of 6  - continue coumadin  - follow up with your primary care doctor to have your INR monitored in 2-3 days.  - continue atenolol   - continue digoxin

## 2019-01-26 NOTE — PROGRESS NOTE ADULT - SUBJECTIVE AND OBJECTIVE BOX
77 y/o F with PMHx of A-fib on coumadin, CAD s/p stents, Anxiety, HTN, RA, and left  Renal CA s/p partial right nephrectomy, TIA, last admission at  was jun 2018 for generalised weakness from uti , This time  presented to ED accompanied by daughter  c/o fevers Tmax 102, associated with generalized weakness and confusion more than usual  which started night prior to ED arrival . Per daughter, pt seen at PMD office  1/22 for frequent urination and placed on Macrobid for UTI.   Temp 100.3F orally in triage vitals. Pt denies any CP, abd pain, N/V/D, no SOB .    1/26: Patient evaluated at bedside. Sitting up in bed eating breakfast. States that she is feeling better. AOx3. Denies abdominal pain, back pain, nausea, vomiting. All questions answered. Discharge likely today.     MEDICATIONS  (STANDING):  ATENolol  Tablet 25 milliGRAM(s) Oral at bedtime  cefTRIAXone Injectable. 1000 milliGRAM(s) IV Push every 24 hours  digoxin     Tablet 0.125 milliGRAM(s) Oral daily  DULoxetine 20 milliGRAM(s) Oral daily  enalapril 2.5 milliGRAM(s) Oral daily  multivitamin 1 Tablet(s) Oral daily  oxybutynin 5 milliGRAM(s) Oral daily  pantoprazole    Tablet 40 milliGRAM(s) Oral before breakfast  sodium chloride 0.9%. 1000 milliLiter(s) (60 mL/Hr) IV Continuous <Continuous>  warfarin 2.5 milliGRAM(s) Oral <User Schedule>  warfarin 5 milliGRAM(s) Oral <User Schedule>    MEDICATIONS  (PRN):  acetaminophen   Tablet .. 650 milliGRAM(s) Oral every 6 hours PRN Temp greater or equal to 38C (100.4F), Mild Pain (1 - 3)      Vital Signs (24 Hrs):  T(C): 37.2 (01-26-19 @ 12:01), Max: 37.2 (01-26-19 @ 12:01)  HR: 54 (01-26-19 @ 12:01) (54 - 65)  BP: 144/80 (01-26-19 @ 12:01) (136/69 - 148/88)  RR: 19 (01-26-19 @ 12:01) (16 - 19)  SpO2: 97% (01-26-19 @ 12:01) (97% - 98%)  Wt(kg): --  Daily     Daily     I&O's Summary    25 Jan 2019 07:01  -  26 Jan 2019 07:00  --------------------------------------------------------  IN: 1768 mL / OUT: 0 mL / NET: 1768 mL            PHYSICAL EXAM:  GENERAL: NAD, well-developed,   HEAD:  Atraumatic, Normocephalic  EYES: EOMI, PERRLA, conjunctiva and sclera clear  NECK: Supple, No JVD  CHEST/LUNG: Clear to auscultation bilaterally; No wheeze, ronchi or rales  HEART: Regular rate and rhythm; No murmurs, rubs, or gallops  ABDOMEN: Soft, Nontender, Nondistended; Bowel sounds present  EXTREMITIES:  2+ Peripheral Pulses, No clubbing, cyanosis, or edema  PSYCH: AAOx3  NEUROLOGY: non-focal  SKIN: No rashes or lesions    LABS:                        12.8   10.00 )-----------( 204      ( 25 Jan 2019 07:16 )             39.7     25 Jan 2019 07:16    138    |  105    |  15     ----------------------------<  98     3.9     |  24     |  0.86     Ca    8.9        25 Jan 2019 07:16      PT/INR - ( 25 Jan 2019 07:16 )   PT: 32.1 sec;   INR: 2.80 ratio         PTT - ( 25 Jan 2019 07:16 )  PTT:39.7 sec    Ca    8.9        24 Jan 2019 09:13    TPro  7.9    /  Alb  3.7    /  TBili  0.3    /  DBili  x      /  AST  31     /  ALT  31     /  AlkPhos  109    23 Jan 2019 21:00    PT/INR - ( 24 Jan 2019 09:13 )   PT: 33.5 sec;   INR: 2.92 ratio         PTT - ( 24 Jan 2019 09:13 )  PTT:42.0 sec    < from: Xray Chest 1 View- PORTABLE-Urgent (01.24.19 @ 09:23) >  Impression: No active pulmonary disease.    < end of copied text >

## 2019-01-26 NOTE — DISCHARGE NOTE ADULT - CARE PROVIDER_API CALL
Flo Samson), Medicine  [address] [place] [address]  Phone: [phone]  Fax: [phone]

## 2019-01-26 NOTE — DISCHARGE NOTE ADULT - REASON FOR ADMISSION
fever, confusion, urinary frequency, generalised weakness fever, confusion, urinary frequency, generalized weakness

## 2019-01-26 NOTE — DISCHARGE NOTE ADULT - MEDICATION SUMMARY - MEDICATIONS TO STOP TAKING
I will STOP taking the medications listed below when I get home from the hospital:    Levaquin 750 mg oral tablet  -- 1 tab(s) by mouth once a day

## 2019-01-26 NOTE — DISCHARGE NOTE ADULT - CARE PLAN
Principal Discharge DX:	UTI (urinary tract infection)  Secondary Diagnosis:	Atrial fibrillation  Secondary Diagnosis:	HTN (hypertension) Principal Discharge DX:	Sepsis  Goal:	Stable patient  Assessment and plan of treatment:	- Toxic metabolic encephalopathy due to urosepsis  - improved s/p completing your course of antibiotics  - blood cultures negative  - Follow up with your primary care doctor within 2-3 days  - If you experience fever, chills, nausea, vomiting go the ER for further management.  Secondary Diagnosis:	Atrial fibrillation  Assessment and plan of treatment:	- CHADs VASC of 6  - continue coumadin  - INR daily  - continue atenolol   - continue digoxin  Secondary Diagnosis:	HTN (hypertension)  Assessment and plan of treatment:	- continue enalapril  Secondary Diagnosis:	CAD (coronary artery disease)  Assessment and plan of treatment:	- s/p stent  - allergic to statins  - continue atenolol   - consider taking daily dose of aspirin if approved by you primary care physician  - follow up with your primary care doctor in 2-3 days Principal Discharge DX:	Sepsis  Goal:	Stable patient  Assessment and plan of treatment:	- Toxic metabolic encephalopathy due to urosepsis  - improved s/p completing your course of antibiotics  - blood cultures negative  - Follow up with your primary care doctor within 2-3 days  - If you experience fever, chills, nausea, vomiting go the ER for further management.  Secondary Diagnosis:	Atrial fibrillation  Assessment and plan of treatment:	- CHADs VASC of 6  - continue coumadin  - follow up with your primary care doctor to have your INR monitored in 2-3 days.  - continue atenolol   - continue digoxin  Secondary Diagnosis:	HTN (hypertension)  Assessment and plan of treatment:	- continue enalapril  Secondary Diagnosis:	CAD (coronary artery disease)  Assessment and plan of treatment:	- s/p stent  - allergic to statins  - continue atenolol   - consider taking daily dose of aspirin if approved by you primary care physician  - follow up with your primary care doctor in 2-3 days

## 2019-01-30 DIAGNOSIS — Z95.5 PRESENCE OF CORONARY ANGIOPLASTY IMPLANT AND GRAFT: ICD-10-CM

## 2019-01-30 DIAGNOSIS — G92 TOXIC ENCEPHALOPATHY: ICD-10-CM

## 2019-01-30 DIAGNOSIS — I48.91 UNSPECIFIED ATRIAL FIBRILLATION: ICD-10-CM

## 2019-01-30 DIAGNOSIS — N17.9 ACUTE KIDNEY FAILURE, UNSPECIFIED: ICD-10-CM

## 2019-01-30 DIAGNOSIS — F41.9 ANXIETY DISORDER, UNSPECIFIED: ICD-10-CM

## 2019-01-30 DIAGNOSIS — Z85.528 PERSONAL HISTORY OF OTHER MALIGNANT NEOPLASM OF KIDNEY: ICD-10-CM

## 2019-01-30 DIAGNOSIS — N18.3 CHRONIC KIDNEY DISEASE, STAGE 3 (MODERATE): ICD-10-CM

## 2019-01-30 DIAGNOSIS — N39.0 URINARY TRACT INFECTION, SITE NOT SPECIFIED: ICD-10-CM

## 2019-01-30 DIAGNOSIS — Z79.01 LONG TERM (CURRENT) USE OF ANTICOAGULANTS: ICD-10-CM

## 2019-01-30 DIAGNOSIS — F33.9 MAJOR DEPRESSIVE DISORDER, RECURRENT, UNSPECIFIED: ICD-10-CM

## 2019-01-30 DIAGNOSIS — A41.9 SEPSIS, UNSPECIFIED ORGANISM: ICD-10-CM

## 2019-01-30 DIAGNOSIS — Z87.891 PERSONAL HISTORY OF NICOTINE DEPENDENCE: ICD-10-CM

## 2019-01-30 DIAGNOSIS — Z90.5 ACQUIRED ABSENCE OF KIDNEY: ICD-10-CM

## 2019-01-30 DIAGNOSIS — I25.10 ATHEROSCLEROTIC HEART DISEASE OF NATIVE CORONARY ARTERY WITHOUT ANGINA PECTORIS: ICD-10-CM

## 2019-01-30 DIAGNOSIS — I12.9 HYPERTENSIVE CHRONIC KIDNEY DISEASE WITH STAGE 1 THROUGH STAGE 4 CHRONIC KIDNEY DISEASE, OR UNSPECIFIED CHRONIC KIDNEY DISEASE: ICD-10-CM

## 2019-01-30 DIAGNOSIS — Z88.0 ALLERGY STATUS TO PENICILLIN: ICD-10-CM

## 2019-02-16 ENCOUNTER — INPATIENT (INPATIENT)
Facility: HOSPITAL | Age: 79
LOS: 4 days | Discharge: HOME HEALTH | End: 2019-02-21
Attending: INTERNAL MEDICINE | Admitting: INTERNAL MEDICINE
Payer: MEDICARE

## 2019-02-16 VITALS
SYSTOLIC BLOOD PRESSURE: 146 MMHG | WEIGHT: 126.1 LBS | OXYGEN SATURATION: 96 % | RESPIRATION RATE: 18 BRPM | DIASTOLIC BLOOD PRESSURE: 68 MMHG | HEART RATE: 72 BPM | HEIGHT: 64 IN | TEMPERATURE: 99 F

## 2019-02-16 DIAGNOSIS — Z98.89 OTHER SPECIFIED POSTPROCEDURAL STATES: Chronic | ICD-10-CM

## 2019-02-16 DIAGNOSIS — Z98.49 CATARACT EXTRACTION STATUS, UNSPECIFIED EYE: Chronic | ICD-10-CM

## 2019-02-16 DIAGNOSIS — Z98.890 OTHER SPECIFIED POSTPROCEDURAL STATES: Chronic | ICD-10-CM

## 2019-02-16 DIAGNOSIS — Z98.62 PERIPHERAL VASCULAR ANGIOPLASTY STATUS: Chronic | ICD-10-CM

## 2019-02-16 DIAGNOSIS — Z90.89 ACQUIRED ABSENCE OF OTHER ORGANS: Chronic | ICD-10-CM

## 2019-02-16 DIAGNOSIS — Z90.5 ACQUIRED ABSENCE OF KIDNEY: Chronic | ICD-10-CM

## 2019-02-16 LAB
ALBUMIN SERPL ELPH-MCNC: 3.3 G/DL — SIGNIFICANT CHANGE UP (ref 3.3–5)
ALP SERPL-CCNC: 83 U/L — SIGNIFICANT CHANGE UP (ref 40–120)
ALT FLD-CCNC: 24 U/L — SIGNIFICANT CHANGE UP (ref 12–78)
ANION GAP SERPL CALC-SCNC: 9 MMOL/L — SIGNIFICANT CHANGE UP (ref 5–17)
APPEARANCE UR: CLEAR — SIGNIFICANT CHANGE UP
APTT BLD: 34.6 SEC — SIGNIFICANT CHANGE UP (ref 27.5–36.3)
AST SERPL-CCNC: 26 U/L — SIGNIFICANT CHANGE UP (ref 15–37)
BACTERIA # UR AUTO: ABNORMAL
BASOPHILS # BLD AUTO: 0.06 K/UL — SIGNIFICANT CHANGE UP (ref 0–0.2)
BASOPHILS NFR BLD AUTO: 0.2 % — SIGNIFICANT CHANGE UP (ref 0–2)
BILIRUB SERPL-MCNC: 0.7 MG/DL — SIGNIFICANT CHANGE UP (ref 0.2–1.2)
BILIRUB UR-MCNC: NEGATIVE — SIGNIFICANT CHANGE UP
BUN SERPL-MCNC: 27 MG/DL — HIGH (ref 7–23)
CALCIUM SERPL-MCNC: 9 MG/DL — SIGNIFICANT CHANGE UP (ref 8.5–10.1)
CHLORIDE SERPL-SCNC: 104 MMOL/L — SIGNIFICANT CHANGE UP (ref 96–108)
CO2 SERPL-SCNC: 25 MMOL/L — SIGNIFICANT CHANGE UP (ref 22–31)
COLOR SPEC: YELLOW — SIGNIFICANT CHANGE UP
CREAT SERPL-MCNC: 1.08 MG/DL — SIGNIFICANT CHANGE UP (ref 0.5–1.3)
DIFF PNL FLD: ABNORMAL
EOSINOPHIL # BLD AUTO: 0 K/UL — SIGNIFICANT CHANGE UP (ref 0–0.5)
EOSINOPHIL NFR BLD AUTO: 0 % — SIGNIFICANT CHANGE UP (ref 0–6)
EPI CELLS # UR: SIGNIFICANT CHANGE UP
GLUCOSE SERPL-MCNC: 138 MG/DL — HIGH (ref 70–99)
GLUCOSE UR QL: NEGATIVE MG/DL — SIGNIFICANT CHANGE UP
HCT VFR BLD CALC: 40.6 % — SIGNIFICANT CHANGE UP (ref 34.5–45)
HGB BLD-MCNC: 13.3 G/DL — SIGNIFICANT CHANGE UP (ref 11.5–15.5)
IMM GRANULOCYTES NFR BLD AUTO: 0.7 % — SIGNIFICANT CHANGE UP (ref 0–1.5)
INR BLD: 2.25 RATIO — HIGH (ref 0.88–1.16)
KETONES UR-MCNC: NEGATIVE — SIGNIFICANT CHANGE UP
LACTATE SERPL-SCNC: 1.2 MMOL/L — SIGNIFICANT CHANGE UP (ref 0.7–2)
LEUKOCYTE ESTERASE UR-ACNC: ABNORMAL
LYMPHOCYTES # BLD AUTO: 1.14 K/UL — SIGNIFICANT CHANGE UP (ref 1–3.3)
LYMPHOCYTES # BLD AUTO: 4.5 % — LOW (ref 13–44)
MANUAL SMEAR VERIFICATION: SIGNIFICANT CHANGE UP
MCHC RBC-ENTMCNC: 30.8 PG — SIGNIFICANT CHANGE UP (ref 27–34)
MCHC RBC-ENTMCNC: 32.8 GM/DL — SIGNIFICANT CHANGE UP (ref 32–36)
MCV RBC AUTO: 94 FL — SIGNIFICANT CHANGE UP (ref 80–100)
MONOCYTES # BLD AUTO: 0.96 K/UL — HIGH (ref 0–0.9)
MONOCYTES NFR BLD AUTO: 3.8 % — SIGNIFICANT CHANGE UP (ref 2–14)
NEUTROPHILS # BLD AUTO: 22.99 K/UL — HIGH (ref 1.8–7.4)
NEUTROPHILS NFR BLD AUTO: 90.8 % — HIGH (ref 43–77)
NITRITE UR-MCNC: NEGATIVE — SIGNIFICANT CHANGE UP
NRBC # BLD: 0 /100 WBCS — SIGNIFICANT CHANGE UP (ref 0–0)
PH UR: 6 — SIGNIFICANT CHANGE UP (ref 5–8)
PLAT MORPH BLD: NORMAL — SIGNIFICANT CHANGE UP
PLATELET # BLD AUTO: 213 K/UL — SIGNIFICANT CHANGE UP (ref 150–400)
POTASSIUM SERPL-MCNC: 4 MMOL/L — SIGNIFICANT CHANGE UP (ref 3.5–5.3)
POTASSIUM SERPL-SCNC: 4 MMOL/L — SIGNIFICANT CHANGE UP (ref 3.5–5.3)
PROT SERPL-MCNC: 7.4 GM/DL — SIGNIFICANT CHANGE UP (ref 6–8.3)
PROT UR-MCNC: 30 MG/DL
PROTHROM AB SERPL-ACNC: 25.6 SEC — HIGH (ref 10–12.9)
RAPID RVP RESULT: SIGNIFICANT CHANGE UP
RBC # BLD: 4.32 M/UL — SIGNIFICANT CHANGE UP (ref 3.8–5.2)
RBC # FLD: 12.3 % — SIGNIFICANT CHANGE UP (ref 10.3–14.5)
RBC BLD AUTO: NORMAL — SIGNIFICANT CHANGE UP
RBC CASTS # UR COMP ASSIST: ABNORMAL /HPF (ref 0–4)
SODIUM SERPL-SCNC: 138 MMOL/L — SIGNIFICANT CHANGE UP (ref 135–145)
SP GR SPEC: 1.01 — SIGNIFICANT CHANGE UP (ref 1.01–1.02)
UROBILINOGEN FLD QL: NEGATIVE MG/DL — SIGNIFICANT CHANGE UP
WBC # BLD: 25.32 K/UL — HIGH (ref 3.8–10.5)
WBC # FLD AUTO: 25.32 K/UL — HIGH (ref 3.8–10.5)
WBC UR QL: ABNORMAL

## 2019-02-16 PROCEDURE — 74176 CT ABD & PELVIS W/O CONTRAST: CPT | Mod: 26

## 2019-02-16 PROCEDURE — 71250 CT THORAX DX C-: CPT | Mod: 26

## 2019-02-16 PROCEDURE — 93010 ELECTROCARDIOGRAM REPORT: CPT

## 2019-02-16 PROCEDURE — 99285 EMERGENCY DEPT VISIT HI MDM: CPT

## 2019-02-16 PROCEDURE — 70450 CT HEAD/BRAIN W/O DYE: CPT | Mod: 26

## 2019-02-16 PROCEDURE — 71045 X-RAY EXAM CHEST 1 VIEW: CPT | Mod: 26

## 2019-02-16 RX ORDER — PIPERACILLIN AND TAZOBACTAM 4; .5 G/20ML; G/20ML
3.38 INJECTION, POWDER, LYOPHILIZED, FOR SOLUTION INTRAVENOUS ONCE
Qty: 0 | Refills: 0 | Status: COMPLETED | OUTPATIENT
Start: 2019-02-16 | End: 2019-02-16

## 2019-02-16 RX ORDER — MORPHINE SULFATE 50 MG/1
2 CAPSULE, EXTENDED RELEASE ORAL ONCE
Qty: 0 | Refills: 0 | Status: DISCONTINUED | OUTPATIENT
Start: 2019-02-16 | End: 2019-02-16

## 2019-02-16 RX ORDER — CIPROFLOXACIN LACTATE 400MG/40ML
400 VIAL (ML) INTRAVENOUS EVERY 12 HOURS
Qty: 0 | Refills: 0 | Status: DISCONTINUED | OUTPATIENT
Start: 2019-02-16 | End: 2019-02-17

## 2019-02-16 RX ORDER — CEFTRIAXONE 500 MG/1
1 INJECTION, POWDER, FOR SOLUTION INTRAMUSCULAR; INTRAVENOUS ONCE
Qty: 0 | Refills: 0 | Status: DISCONTINUED | OUTPATIENT
Start: 2019-02-16 | End: 2019-02-16

## 2019-02-16 RX ORDER — CEFTRIAXONE 500 MG/1
1000 INJECTION, POWDER, FOR SOLUTION INTRAMUSCULAR; INTRAVENOUS ONCE
Qty: 0 | Refills: 0 | Status: COMPLETED | OUTPATIENT
Start: 2019-02-16 | End: 2019-02-16

## 2019-02-16 RX ORDER — SODIUM CHLORIDE 9 MG/ML
1000 INJECTION INTRAMUSCULAR; INTRAVENOUS; SUBCUTANEOUS ONCE
Qty: 0 | Refills: 0 | Status: COMPLETED | OUTPATIENT
Start: 2019-02-16 | End: 2019-02-16

## 2019-02-16 RX ADMIN — SODIUM CHLORIDE 1000 MILLILITER(S): 9 INJECTION INTRAMUSCULAR; INTRAVENOUS; SUBCUTANEOUS at 14:16

## 2019-02-16 RX ADMIN — MORPHINE SULFATE 2 MILLIGRAM(S): 50 CAPSULE, EXTENDED RELEASE ORAL at 14:16

## 2019-02-16 RX ADMIN — CEFTRIAXONE 1000 MILLIGRAM(S): 500 INJECTION, POWDER, FOR SOLUTION INTRAMUSCULAR; INTRAVENOUS at 19:08

## 2019-02-16 RX ADMIN — Medication 200 MILLIGRAM(S): at 21:56

## 2019-02-16 RX ADMIN — MORPHINE SULFATE 2 MILLIGRAM(S): 50 CAPSULE, EXTENDED RELEASE ORAL at 19:08

## 2019-02-16 NOTE — H&P ADULT - PSH
H/O breast biopsy  right?  H/O partial nephrectomy  right 2006  History of cataract surgery, unspecified laterality  b/l 2016  History of percutaneous coronary intervention  stent RCA  S/P angioplasty  1 stent 1998  S/P colonoscopy    S/P tonsillectomy  1948

## 2019-02-16 NOTE — H&P ADULT - HISTORY OF PRESENT ILLNESS
77 y/o F PMHx significant for A-fib on coumadin, CAD s/p stents, Anxiety, HTN, RA, and left  Renal CA s/p partial right nephrectomy, TIA, BIBA from [facility] for further evaluation of nausea, headache, and subjective fevers over the last several "days." Labs => WBC 25.32, INR 2.25BUN/Cr 27/1.08, RVP (-). CT Chest => Left lower lobe infiltrate with parapneumonic effusion. CT ABD/Pelvis => Incompletely characterized postoperative right kidney due to noncontrast technique and cannot exclude presence of recurrent tumor on this exam. In the ED the patient received Ceftriaxone 1g IVP x 1, Ciprofloxacin 400mg IVPB x 1, Morphine 2mg IVP x 1, and NS x 1L.

## 2019-02-16 NOTE — ED ADULT NURSE NOTE - NSIMPLEMENTINTERV_GEN_ALL_ED
Implemented All Fall with Harm Risk Interventions:  [place] to call system. Call bell, personal items and telephone within reach. Instruct patient to call for assistance. Room bathroom lighting operational. Non-slip footwear when patient is off stretcher. Physically safe environment: no spills, clutter or unnecessary equipment. Stretcher in lowest position, wheels locked, appropriate side rails in place. Provide visual cue, wrist band, yellow gown, etc. Monitor gait and stability. Monitor for mental status changes and reorient to person, place, and time. Review medications for side effects contributing to fall risk. Reinforce activity limits and safety measures with patient and family. Provide visual clues: red socks.

## 2019-02-16 NOTE — H&P ADULT - PROBLEM SELECTOR PLAN 6
IMPROVE VTE Risk Score is 1  [  ] Previous VTE                                                  3  [  ] Thrombophilia                                               2  [  ] Lower limb paralysis                                      2        (unable to hold up >15 seconds)    [  ] Current Cancer                                              2         (within 6 months)  [  ] Immobilization > 24 hrs                                1  [  ] ICU/CCU stay > 24 hours                              1  [X] Age > 60                                                      1  ~patient already on VKA therapy as noted above

## 2019-02-16 NOTE — H&P ADULT - PROBLEM SELECTOR PLAN 1
~admit to Medicine  ~f/u PAN C+S  ~cont. IV abx  ~cont. anti-pyretics prn  ~cont. anti-tussives prn ~admit to Medicine  ~Encephalopathy due to infectious etiology (PNA)  ~f/u PAN C+S  ~cont. IV abx  ~cont. anti-pyretics prn  ~cont. anti-tussives prn

## 2019-02-16 NOTE — ED PROVIDER NOTE - OBJECTIVE STATEMENT
79 y/o female with a PMHx of anxiety, Afib, CAD, depressive disorder, HTN, kidney carcinoma, RA, stented coronary artery, tachycardia presents to the ED c/o headache starting last night. Pt reports nausea last night, now resolved. Pt took Tylenol this morning. No other complaints at this time.

## 2019-02-16 NOTE — H&P ADULT - NSHPPHYSICALEXAM_GEN_ALL_CORE
Vital Signs Last 24 Hrs  T(C): 37.1 (16 Feb 2019 12:06), Max: 37.1 (16 Feb 2019 12:06)  T(F): 98.8 (16 Feb 2019 12:06), Max: 98.8 (16 Feb 2019 12:06)  HR: 72 (16 Feb 2019 12:06) (72 - 72)  BP: 146/68 (16 Feb 2019 12:06) (146/68 - 146/68)  RR: 18 (16 Feb 2019 12:06) (18 - 18)  SpO2: 96% (16 Feb 2019 12:06) (96% - 96%)

## 2019-02-16 NOTE — H&P ADULT - PMH
Anxiety disorder    Atrial fibrillation    CAD (coronary artery disease)    Depressive disorder    HTN (hypertension)    Knee pain, left    OP (osteoporosis)    RA (rheumatoid arthritis)    Renal cancer, right    Stented coronary artery    Takotsubo syndrome    Urge incontinence of urine

## 2019-02-16 NOTE — H&P ADULT - PROBLEM SELECTOR PLAN 3
IMPROVE VTE Risk Score is 1  [  ] Previous VTE                                                  3  [  ] Thrombophilia                                               2  [  ] Lower limb paralysis                                      2        (unable to hold up >15 seconds)    [  ] Current Cancer                                              2         (within 6 months)  [  ] Immobilization > 24 hrs                                1  [  ] ICU/CCU stay > 24 hours                              1  [X] Age > 60                                                      1  ~patient already on VKA therapy as noted above ~cont. Enalapril 2.5mg po daily

## 2019-02-16 NOTE — H&P ADULT - PROBLEM SELECTOR PLAN 2
~patient's status for resuscitative measures were discussed extensively at the bedside as is agreeable to Full Code/CPR. MOLST Form completed at the bedside per protocol.  Total time; 16 minutes. ~cont. Digoxin 125mcg po daily  ~f/u Digoxin level in the am  ~cont. Atenolol 25mg po daily  ~cont. VKA therapy per protocol  ~please reconcile home medications/dosages further in the am

## 2019-02-16 NOTE — ED PROVIDER NOTE - CARE PLAN
Principal Discharge DX:	UTI (urinary tract infection) Principal Discharge DX:	HCAP (healthcare-associated pneumonia)

## 2019-02-16 NOTE — H&P ADULT - PROBLEM SELECTOR PLAN 5
~patient's status for resuscitative measures were discussed extensively at the bedside as is agreeable to Full Code/CPR. MOLST Form completed at the bedside per protocol.  Total time; 16 minutes.

## 2019-02-16 NOTE — ED ADULT NURSE NOTE - OBJECTIVE STATEMENT
(0) independent Patient of complaining of headache that became worse today. Patient denies head trauma.

## 2019-02-17 DIAGNOSIS — F41.9 ANXIETY DISORDER, UNSPECIFIED: ICD-10-CM

## 2019-02-17 DIAGNOSIS — I48.91 UNSPECIFIED ATRIAL FIBRILLATION: ICD-10-CM

## 2019-02-17 DIAGNOSIS — Z29.9 ENCOUNTER FOR PROPHYLACTIC MEASURES, UNSPECIFIED: ICD-10-CM

## 2019-02-17 DIAGNOSIS — I10 ESSENTIAL (PRIMARY) HYPERTENSION: ICD-10-CM

## 2019-02-17 DIAGNOSIS — Z71.89 OTHER SPECIFIED COUNSELING: ICD-10-CM

## 2019-02-17 DIAGNOSIS — J18.9 PNEUMONIA, UNSPECIFIED ORGANISM: ICD-10-CM

## 2019-02-17 PROBLEM — R00.0 TACHYCARDIA, UNSPECIFIED: Chronic | Status: INACTIVE | Noted: 2017-03-01 | Resolved: 2019-02-16

## 2019-02-17 LAB
ALBUMIN SERPL ELPH-MCNC: 2.7 G/DL — LOW (ref 3.3–5)
ALP SERPL-CCNC: 69 U/L — SIGNIFICANT CHANGE UP (ref 40–120)
ALT FLD-CCNC: 19 U/L — SIGNIFICANT CHANGE UP (ref 12–78)
ANION GAP SERPL CALC-SCNC: 8 MMOL/L — SIGNIFICANT CHANGE UP (ref 5–17)
APTT BLD: 33.8 SEC — SIGNIFICANT CHANGE UP (ref 27.5–36.3)
AST SERPL-CCNC: 24 U/L — SIGNIFICANT CHANGE UP (ref 15–37)
BASOPHILS # BLD AUTO: 0.02 K/UL — SIGNIFICANT CHANGE UP (ref 0–0.2)
BASOPHILS NFR BLD AUTO: 0.2 % — SIGNIFICANT CHANGE UP (ref 0–2)
BILIRUB SERPL-MCNC: 0.5 MG/DL — SIGNIFICANT CHANGE UP (ref 0.2–1.2)
BUN SERPL-MCNC: 21 MG/DL — SIGNIFICANT CHANGE UP (ref 7–23)
CALCIUM SERPL-MCNC: 8.5 MG/DL — SIGNIFICANT CHANGE UP (ref 8.5–10.1)
CHLORIDE SERPL-SCNC: 106 MMOL/L — SIGNIFICANT CHANGE UP (ref 96–108)
CO2 SERPL-SCNC: 25 MMOL/L — SIGNIFICANT CHANGE UP (ref 22–31)
CREAT SERPL-MCNC: 1.03 MG/DL — SIGNIFICANT CHANGE UP (ref 0.5–1.3)
CULTURE RESULTS: SIGNIFICANT CHANGE UP
DIGOXIN SERPL-MCNC: 0.71 NG/ML — LOW (ref 0.8–2)
EOSINOPHIL # BLD AUTO: 0.12 K/UL — SIGNIFICANT CHANGE UP (ref 0–0.5)
EOSINOPHIL NFR BLD AUTO: 1.4 % — SIGNIFICANT CHANGE UP (ref 0–6)
GLUCOSE SERPL-MCNC: 121 MG/DL — HIGH (ref 70–99)
HCT VFR BLD CALC: 36.3 % — SIGNIFICANT CHANGE UP (ref 34.5–45)
HGB BLD-MCNC: 11.8 G/DL — SIGNIFICANT CHANGE UP (ref 11.5–15.5)
IMM GRANULOCYTES NFR BLD AUTO: 0.2 % — SIGNIFICANT CHANGE UP (ref 0–1.5)
INR BLD: 2.22 RATIO — HIGH (ref 0.88–1.16)
LYMPHOCYTES # BLD AUTO: 1.71 K/UL — SIGNIFICANT CHANGE UP (ref 1–3.3)
LYMPHOCYTES # BLD AUTO: 19.8 % — SIGNIFICANT CHANGE UP (ref 13–44)
MAGNESIUM SERPL-MCNC: 1.9 MG/DL — SIGNIFICANT CHANGE UP (ref 1.6–2.6)
MCHC RBC-ENTMCNC: 30.9 PG — SIGNIFICANT CHANGE UP (ref 27–34)
MCHC RBC-ENTMCNC: 32.5 GM/DL — SIGNIFICANT CHANGE UP (ref 32–36)
MCV RBC AUTO: 95 FL — SIGNIFICANT CHANGE UP (ref 80–100)
MONOCYTES # BLD AUTO: 0.67 K/UL — SIGNIFICANT CHANGE UP (ref 0–0.9)
MONOCYTES NFR BLD AUTO: 7.8 % — SIGNIFICANT CHANGE UP (ref 2–14)
NEUTROPHILS # BLD AUTO: 6.08 K/UL — SIGNIFICANT CHANGE UP (ref 1.8–7.4)
NEUTROPHILS NFR BLD AUTO: 70.6 % — SIGNIFICANT CHANGE UP (ref 43–77)
NRBC # BLD: 0 /100 WBCS — SIGNIFICANT CHANGE UP (ref 0–0)
PLATELET # BLD AUTO: 191 K/UL — SIGNIFICANT CHANGE UP (ref 150–400)
POTASSIUM SERPL-MCNC: 3.5 MMOL/L — SIGNIFICANT CHANGE UP (ref 3.5–5.3)
POTASSIUM SERPL-SCNC: 3.5 MMOL/L — SIGNIFICANT CHANGE UP (ref 3.5–5.3)
PROCALCITONIN SERPL-MCNC: 1.37 NG/ML — HIGH (ref 0.02–0.1)
PROT SERPL-MCNC: 6.3 GM/DL — SIGNIFICANT CHANGE UP (ref 6–8.3)
PROTHROM AB SERPL-ACNC: 25.2 SEC — HIGH (ref 10–12.9)
RBC # BLD: 3.82 M/UL — SIGNIFICANT CHANGE UP (ref 3.8–5.2)
RBC # FLD: 12.7 % — SIGNIFICANT CHANGE UP (ref 10.3–14.5)
SODIUM SERPL-SCNC: 139 MMOL/L — SIGNIFICANT CHANGE UP (ref 135–145)
SPECIMEN SOURCE: SIGNIFICANT CHANGE UP
WBC # BLD: 8.62 K/UL — SIGNIFICANT CHANGE UP (ref 3.8–10.5)
WBC # FLD AUTO: 8.62 K/UL — SIGNIFICANT CHANGE UP (ref 3.8–10.5)

## 2019-02-17 RX ORDER — ATENOLOL 25 MG/1
25 TABLET ORAL AT BEDTIME
Qty: 0 | Refills: 0 | Status: DISCONTINUED | OUTPATIENT
Start: 2019-02-17 | End: 2019-02-21

## 2019-02-17 RX ORDER — WARFARIN SODIUM 2.5 MG/1
2.5 TABLET ORAL ONCE
Qty: 0 | Refills: 0 | Status: COMPLETED | OUTPATIENT
Start: 2019-02-17 | End: 2019-02-17

## 2019-02-17 RX ORDER — ONDANSETRON 8 MG/1
4 TABLET, FILM COATED ORAL EVERY 6 HOURS
Qty: 0 | Refills: 0 | Status: DISCONTINUED | OUTPATIENT
Start: 2019-02-17 | End: 2019-02-21

## 2019-02-17 RX ORDER — DIGOXIN 250 MCG
0.12 TABLET ORAL AT BEDTIME
Qty: 0 | Refills: 0 | Status: DISCONTINUED | OUTPATIENT
Start: 2019-02-17 | End: 2019-02-21

## 2019-02-17 RX ORDER — PANTOPRAZOLE SODIUM 20 MG/1
40 TABLET, DELAYED RELEASE ORAL
Qty: 0 | Refills: 0 | Status: DISCONTINUED | OUTPATIENT
Start: 2019-02-17 | End: 2019-02-21

## 2019-02-17 RX ORDER — OXYBUTYNIN CHLORIDE 5 MG
5 TABLET ORAL DAILY
Qty: 0 | Refills: 0 | Status: DISCONTINUED | OUTPATIENT
Start: 2019-02-17 | End: 2019-02-21

## 2019-02-17 RX ORDER — SENNA PLUS 8.6 MG/1
2 TABLET ORAL AT BEDTIME
Qty: 0 | Refills: 0 | Status: DISCONTINUED | OUTPATIENT
Start: 2019-02-17 | End: 2019-02-21

## 2019-02-17 RX ORDER — CEFEPIME 1 G/1
2000 INJECTION, POWDER, FOR SOLUTION INTRAMUSCULAR; INTRAVENOUS EVERY 24 HOURS
Qty: 0 | Refills: 0 | Status: DISCONTINUED | OUTPATIENT
Start: 2019-02-17 | End: 2019-02-20

## 2019-02-17 RX ORDER — DULOXETINE HYDROCHLORIDE 30 MG/1
20 CAPSULE, DELAYED RELEASE ORAL DAILY
Qty: 0 | Refills: 0 | Status: DISCONTINUED | OUTPATIENT
Start: 2019-02-17 | End: 2019-02-21

## 2019-02-17 RX ORDER — ACETAMINOPHEN 500 MG
650 TABLET ORAL EVERY 6 HOURS
Qty: 0 | Refills: 0 | Status: DISCONTINUED | OUTPATIENT
Start: 2019-02-17 | End: 2019-02-21

## 2019-02-17 RX ORDER — AZITHROMYCIN 500 MG/1
500 TABLET, FILM COATED ORAL DAILY
Qty: 0 | Refills: 0 | Status: COMPLETED | OUTPATIENT
Start: 2019-02-17 | End: 2019-02-19

## 2019-02-17 RX ORDER — DOCUSATE SODIUM 100 MG
100 CAPSULE ORAL THREE TIMES A DAY
Qty: 0 | Refills: 0 | Status: DISCONTINUED | OUTPATIENT
Start: 2019-02-17 | End: 2019-02-21

## 2019-02-17 RX ORDER — VANCOMYCIN HCL 1 G
1000 VIAL (EA) INTRAVENOUS ONCE
Qty: 0 | Refills: 0 | Status: COMPLETED | OUTPATIENT
Start: 2019-02-17 | End: 2019-02-17

## 2019-02-17 RX ADMIN — Medication 250 MILLIGRAM(S): at 04:41

## 2019-02-17 RX ADMIN — Medication 200 MILLIGRAM(S): at 06:57

## 2019-02-17 RX ADMIN — Medication 2.5 MILLIGRAM(S): at 22:38

## 2019-02-17 RX ADMIN — AZITHROMYCIN 500 MILLIGRAM(S): 500 TABLET, FILM COATED ORAL at 17:32

## 2019-02-17 RX ADMIN — Medication 0.12 MILLIGRAM(S): at 22:38

## 2019-02-17 RX ADMIN — WARFARIN SODIUM 2.5 MILLIGRAM(S): 2.5 TABLET ORAL at 02:46

## 2019-02-17 RX ADMIN — CEFEPIME 100 MILLIGRAM(S): 1 INJECTION, POWDER, FOR SOLUTION INTRAMUSCULAR; INTRAVENOUS at 17:32

## 2019-02-17 RX ADMIN — ATENOLOL 25 MILLIGRAM(S): 25 TABLET ORAL at 22:38

## 2019-02-17 RX ADMIN — Medication 5 MILLIGRAM(S): at 12:29

## 2019-02-17 RX ADMIN — DULOXETINE HYDROCHLORIDE 20 MILLIGRAM(S): 30 CAPSULE, DELAYED RELEASE ORAL at 12:29

## 2019-02-17 RX ADMIN — WARFARIN SODIUM 2.5 MILLIGRAM(S): 2.5 TABLET ORAL at 22:38

## 2019-02-17 RX ADMIN — PANTOPRAZOLE SODIUM 40 MILLIGRAM(S): 20 TABLET, DELAYED RELEASE ORAL at 06:57

## 2019-02-17 NOTE — CONSULT NOTE ADULT - SUBJECTIVE AND OBJECTIVE BOX
Patient is a 78y old  Female who presents with a chief complaint of Headache, Subjective fevers (2019 21:27)    HPI:  77 y/o F PMHx significant for A-fib on coumadin, CAD s/p stents, Anxiety, HTN, RA, and left  Renal CA s/p partial right nephrectomy, TIA, BIBA from [facility] for further evaluation of nausea, headache, and subjective fevers over the last several "days." Labs => WBC 25.32, INR 2.25BUN/Cr 27/1.08, RVP (-). CT Chest => Left lower lobe infiltrate with parapneumonic effusion. CT ABD/Pelvis => Incompletely characterized postoperative right kidney due to noncontrast technique and cannot exclude presence of recurrent tumor on this exam. In the ED the patient received Ceftriaxone 1g IVP x 1, Ciprofloxacin 400mg IVPB x 1, started on IV cefepime.       PMH: as above  PSH: as above  Meds: per reconciliation sheet, noted below  MEDICATIONS  (STANDING):  ATENolol  Tablet 25 milliGRAM(s) Oral at bedtime  cefepime   IVPB 2000 milliGRAM(s) IV Intermittent every 24 hours  ciprofloxacin   IVPB 400 milliGRAM(s) IV Intermittent every 12 hours  digoxin     Tablet 0.125 milliGRAM(s) Oral at bedtime  DULoxetine 20 milliGRAM(s) Oral daily  enalapril 2.5 milliGRAM(s) Oral at bedtime  oxybutynin 5 milliGRAM(s) Oral daily  pantoprazole    Tablet 40 milliGRAM(s) Oral before breakfast      Allergies    penicillin (Hives)    Intolerances    statins (Muscle Pain)    Social: no smoking, no alcohol, no illegal drugs; no recent travel, no exposure to TB  FAMILY HISTORY:  Family history of other heart disease (Mother)  Family history of MI (myocardial infarction) (Father)     no history of premature cardiovascular disease in first degree relatives    ROS: the patient denies fever, no chills, no HA, no dizziness, no sore throat, no blurry vision, no CP, no palpitations, no abdominal pain, no diarrhea, no N/V, no dysuria, no leg pain, no claudication, no rash, no joint aches, no rectal pain or bleeding, no night sweats  All other systems reviewed and are negative    Vital Signs Last 24 Hrs  T(C): 36.8 (2019 11:35), Max: 37 (2019 01:49)  T(F): 98.2 (2019 11:35), Max: 98.6 (2019 01:49)  HR: 63 (2019 11:35) (55 - 64)  BP: 135/61 (2019 11:35) (100/55 - 138/73)  BP(mean): --  RR: 17 (2019 11:35) (17 - 18)  SpO2: 100% (2019 11:35) (96% - 100%)  Daily         PE:  Constitutional: frail looking  HEENT: NC/AT, EOMI, PERRLA, conjunctivae clear; ears and nose atraumatic; pharynx benign  Neck: supple; thyroid not palpable  Back: no tenderness  Respiratory: decreased breath sounds  Cardiovascular: S1S2 regular, no murmurs  Abdomen: soft, not tender, not distended, positive BS; liver and spleen WNL  Genitourinary: no suprapubic tenderness  Lymphatic: no LN palpable  Musculoskeletal: no muscle tenderness, no joint swelling or tenderness  Extremities: no pedal edema  Neurological/ Psychiatric:  moving all extremities  Skin: no rashes; no palpable lesions    Labs: all available labs reviewed                        11.8   8.62  )-----------( 191      ( 2019 06:46 )             36.3         139  |  106  |  21  ----------------------------<  121<H>  3.5   |  25  |  1.03    Ca    8.5      2019 06:46  Mg     1.9         TPro  6.3  /  Alb  2.7<L>  /  TBili  0.5  /  DBili  x   /  AST  24  /  ALT  19  /  AlkPhos  69       LIVER FUNCTIONS - ( 2019 06:46 )  Alb: 2.7 g/dL / Pro: 6.3 gm/dL / ALK PHOS: 69 U/L / ALT: 19 U/L / AST: 24 U/L / GGT: x           Urinalysis Basic - ( 2019 18:08 )    Color: Yellow / Appearance: Clear / S.015 / pH: x  Gluc: x / Ketone: Negative  / Bili: Negative / Urobili: Negative mg/dL   Blood: x / Protein: 30 mg/dL / Nitrite: Negative   Leuk Esterase: Trace / RBC: 3-5 /HPF / WBC 11-25   Sq Epi: x / Non Sq Epi: Occasional / Bacteria: Occasional          Radiology: all available radiological tests reviewed    EXAM:  CT ABDOMEN AND PELVIS                          EXAM:  CT CHEST                            PROCEDURE DATE:  2019          INTERPRETATION:  CLINICAL INDICATION: Leukocytosis. Headache and   tachycardia. Streak of renal cell carcinoma.    COMPARISON:        PROCEDURE: CT of the chest abdomen and pelvis was performed without the   administration of intravenous contrast. Coronal and sagittal reformatted   images were submitted.    FINDINGS:    Solid organ evaluation is suboptimal inherent to noncontrast CT   technique.     CT CHEST:     Small left basilar infiltrate with left pleural effusion is evident.    The thoracic aorta and main pulmonary artery are normal in caliber. The   heart is normal size. Coronary artery and aortic arteriosclerotic   calcifications are present.    There is no pericardial effusion. There is no pneumothorax or   pneumomediastinum.    There is no axillary, mediastinal, or hilar lymphadenopathy.     CT ABDOMEN PELVIS:     LIVER/GALLBLADDER: Subcentimeter hypodense structure in the medial   segment of the left hepatic lobe) due to a 62), too small to   characterize. Unremarkable gallbladder.    PANCREAS: No pancreatic ductal dilatation, peripancreatic fluid   collection, or focal pancreatic mass.    SPLEEN: Normal size.    KIDNEYS: Surgical clips associated with the lower right kidney and   adjacent retroperitoneum. No hydronephrosis, perinephric stranding, or   fluid collection.    ADRENAL GLANDS: Unremarkable.    BOWEL: No bowel obstruction or free intraperitoneal air.     URINARY BLADDER: Mildly distended.    PELVIC ORGANS: Unremarkable.    BONES/SOFT TISSUES: Advanced multilevel spondylosis.    AORTA: Atheromatous calcification.    IMPRESSION:      CT CHEST: Left lower lobe infiltrate with parapneumonic effusion.   Follow-up to resolution.    CT ABDOMEN PELVIS: Incompletely characterized postoperative right kidney   due to noncontrast technique and cannot exclude presence of recurrent   tumor on this exam.        Advanced directives addressed: full resuscitation

## 2019-02-17 NOTE — CONSULT NOTE ADULT - ASSESSMENT
79 y/o F PMHx significant for A-fib on coumadin, CAD s/p stents, Anxiety, HTN, RA, and left Renal CA s/p partial right nephrectomy, TIA, BIBA from [facility] for further evaluation of nausea, headache, and subjective fevers over the last several "days." Labs => WBC 25.32, INR 2.25BUN/Cr 27/1.08, RVP (-). CT Chest => Left lower lobe infiltrate with parapneumonic effusion. CT ABD/Pelvis => Incompletely characterized postoperative right kidney due to noncontrast technique and cannot exclude presence of recurrent tumor on this exam. In the ED the patient received Ceftriaxone 1g IVP x 1, Ciprofloxacin 400mg IVPB x 1, started on IV cefepime.    1. LLL PNA/probable parapneumonic effusion/HCAP/pyuria/PCN allergy  - pcn allergy but tolerating cephalosporins without rash/side effect  - agree with cefepime renally dose adjusted 2gm daily  - add azithromycin for atypical coverage  - dc ciprofloxacin  - monitor temps  - f/u urine cx/blood cultures, check sputum cx/legionella ag  - tolerating abx well so far; no side effects noted  - reason for abx use and side effects reviewed with patient  - supportive care  - fu cbc    2. other issues - care per medicine

## 2019-02-17 NOTE — PROGRESS NOTE ADULT - SUBJECTIVE AND OBJECTIVE BOX
cc: Headache, Subjective fevers	  History of Present Illness: 	  79 y/o F PMHx significant for A-fib on coumadin, CAD s/p stents, Anxiety, HTN, RA, and left  Renal CA s/p partial right nephrectomy, TIA, BIBA from [facility] for further evaluation of nausea, headache, and subjective fevers over the last several "days." Labs => WBC 25.32, INR 2.25BUN/Cr 27/1.08, RVP (-). CT Chest => Left lower lobe infiltrate with parapneumonic effusion. CT ABD/Pelvis => Incompletely characterized postoperative right kidney due to noncontrast technique and cannot exclude presence of recurrent tumor on this exam. In the ED the patient received Ceftriaxone 1g IVP x 1, Ciprofloxacin 400mg IVPB x 1, Morphine 2mg IVP x 1, and NS x 1L.    : Pt feeling better, denies fever, chills, n, v, sob, cp.    REVIEW OF SYSTEMS: All other review of systems is negative unless indicated above.    Vital Signs Last 24 Hrs  T(C): 36.8 (2019 11:35), Max: 37 (2019 01:49)  T(F): 98.2 (2019 11:35), Max: 98.6 (2019 01:49)  HR: 63 (2019 11:35) (55 - 64)  BP: 135/61 (2019 11:35) (100/55 - 138/73)  BP(mean): --  RR: 17 (2019 11:35) (17 - 18)  SpO2: 100% (2019 11:35) (96% - 100%)    PHYSICAL EXAM:    Constitutional: NAD, awake and alert, well-developed  HEENT: PERR, EOMI, Normal Hearing, MMM  Neck: Soft and supple  Respiratory: Breath sounds are clear bilaterally, No wheezing, rales or rhonchi  Cardiovascular: S1 and S2, regular rate and rhythm, no Murmurs, gallops or rubs  Gastrointestinal: Bowel Sounds present, soft, nontender, nondistended, no guarding, no rebound  Extremities: No peripheral edema  Neurological: A/O x 3, no focal deficits in my limited exam  Skin: No rashes        MEDICATIONS  (STANDING):  ATENolol  Tablet 25 milliGRAM(s) Oral at bedtime  azithromycin   Tablet 500 milliGRAM(s) Oral daily  cefepime   IVPB 2000 milliGRAM(s) IV Intermittent every 24 hours  digoxin     Tablet 0.125 milliGRAM(s) Oral at bedtime  DULoxetine 20 milliGRAM(s) Oral daily  enalapril 2.5 milliGRAM(s) Oral at bedtime  oxybutynin 5 milliGRAM(s) Oral daily  pantoprazole    Tablet 40 milliGRAM(s) Oral before breakfast    MEDICATIONS  (PRN):  acetaminophen   Tablet .. 650 milliGRAM(s) Oral every 6 hours PRN Temp greater or equal to 38C (100.4F), Mild Pain (1 - 3)  docusate sodium 100 milliGRAM(s) Oral three times a day PRN Constipation  ondansetron Injectable 4 milliGRAM(s) IV Push every 6 hours PRN Nausea  senna 2 Tablet(s) Oral at bedtime PRN Constipation                              11.8   8.62  )-----------( 191      ( 2019 06:46 )             36.3     02-    139  |  106  |  21  ----------------------------<  121<H>  3.5   |  25  |  1.03    Ca    8.5      2019 06:46  Mg     1.9         TPro  6.3  /  Alb  2.7<L>  /  TBili  0.5  /  DBili  x   /  AST  24  /  ALT  19  /  AlkPhos  69  02-17    CAPILLARY BLOOD GLUCOSE        LIVER FUNCTIONS - ( 2019 06:46 )  Alb: 2.7 g/dL / Pro: 6.3 gm/dL / ALK PHOS: 69 U/L / ALT: 19 U/L / AST: 24 U/L / GGT: x           PT/INR - ( 2019 06:46 )   PT: 25.2 sec;   INR: 2.22 ratio         PTT - ( 2019 06:46 )  PTT:33.8 sec  Urinalysis Basic - ( 2019 18:08 )    Color: Yellow / Appearance: Clear / S.015 / pH: x  Gluc: x / Ketone: Negative  / Bili: Negative / Urobili: Negative mg/dL   Blood: x / Protein: 30 mg/dL / Nitrite: Negative   Leuk Esterase: Trace / RBC: 3-5 /HPF / WBC 11-25   Sq Epi: x / Non Sq Epi: Occasional / Bacteria: Occasional      Assessment and Plan:  79 y/o F PMHx significant for A-fib on coumadin, CAD s/p stents, Anxiety, HTN, RA, and left  Renal CA s/p partial right nephrectomy, TIA, BIBA from [facility] for further evaluation of nausea, headache, and subjective fevers, found to have Left lower lobe infiltrate with parapneumonic effusion.       HCAP (healthcare-associated pneumonia) with metabolic encephalopathy:   - Left lower lobe infiltrate with parapneumonic effusion.   -mentation resolving to baseline   -f/u cultures  -per ID --> cefepime and azithromycin for gram neg bacteria   -supportive care  -rvp negative    Atrial fibrillation:   -cont. Digoxin 125mcg po daily  -cont. Atenolol 25mg po daily  -cont. VKA therapy per protocol    HTN (hypertension):   -cont. Enalapril 2.5mg po daily.     Anxiety disorder:   -cont. Duloxetine 20mg po daily.     Advance care planning:   -Full Code    Prophylactic measure. Plan:   -patient already on VKA therapy as noted above.

## 2019-02-18 ENCOUNTER — TRANSCRIPTION ENCOUNTER (OUTPATIENT)
Age: 79
End: 2019-02-18

## 2019-02-18 LAB
INR BLD: 1.7 RATIO — HIGH (ref 0.88–1.16)
PROTHROM AB SERPL-ACNC: 19.2 SEC — HIGH (ref 10–12.9)

## 2019-02-18 RX ORDER — CEFUROXIME AXETIL 250 MG
1 TABLET ORAL
Qty: 10 | Refills: 0 | OUTPATIENT
Start: 2019-02-18 | End: 2019-02-22

## 2019-02-18 RX ORDER — WARFARIN SODIUM 2.5 MG/1
2.5 TABLET ORAL DAILY
Qty: 0 | Refills: 0 | Status: DISCONTINUED | OUTPATIENT
Start: 2019-02-18 | End: 2019-02-19

## 2019-02-18 RX ADMIN — PANTOPRAZOLE SODIUM 40 MILLIGRAM(S): 20 TABLET, DELAYED RELEASE ORAL at 05:47

## 2019-02-18 RX ADMIN — WARFARIN SODIUM 2.5 MILLIGRAM(S): 2.5 TABLET ORAL at 22:39

## 2019-02-18 RX ADMIN — Medication 5 MILLIGRAM(S): at 12:08

## 2019-02-18 RX ADMIN — ATENOLOL 25 MILLIGRAM(S): 25 TABLET ORAL at 22:36

## 2019-02-18 RX ADMIN — Medication 0.12 MILLIGRAM(S): at 22:37

## 2019-02-18 RX ADMIN — Medication 2.5 MILLIGRAM(S): at 22:38

## 2019-02-18 RX ADMIN — DULOXETINE HYDROCHLORIDE 20 MILLIGRAM(S): 30 CAPSULE, DELAYED RELEASE ORAL at 12:08

## 2019-02-18 RX ADMIN — CEFEPIME 100 MILLIGRAM(S): 1 INJECTION, POWDER, FOR SOLUTION INTRAMUSCULAR; INTRAVENOUS at 17:24

## 2019-02-18 RX ADMIN — AZITHROMYCIN 500 MILLIGRAM(S): 500 TABLET, FILM COATED ORAL at 12:08

## 2019-02-18 NOTE — DISCHARGE NOTE ADULT - HOSPITAL COURSE
CC: PNA  HPI:  79 y/o F with PMH of A-fib on coumadin, CAD s/p stents, Anxiety, HTN, RA, and left Renal CA s/p partial right nephrectomy, TIA, BIBA from [facility] for further evaluation of nausea, headache, and subjective fevers over the last several "days." In ED patient found to have WBC 25.32, INR 2.25BUN/Cr 27/1.08, RVP (-),  CT Chest with Left lower lobe infiltrate with parapneumonic effusion. Patient received Ceftriaxone 1g IVP x 1, Ciprofloxacin 400mg IVPB x 1, Morphine 2mg IVP x 1, and NS x 1L.   Pt treated with IV antibiotics for pneumonia. She is HD stable, doing well and offers no complaints.  She is to complete abx as outpatient and resume home homeds.    REVIEW OF SYSTEMS: All other review of systems is negative unless indicated above.    PHYSICAL EXAM:  Vital Signs Last 24 Hrs  T(C): 37.1 (18 Feb 2019 10:39), Max: 37.1 (17 Feb 2019 22:36)  T(F): 98.7 (18 Feb 2019 10:39), Max: 98.7 (17 Feb 2019 22:36)  HR: 53 (18 Feb 2019 10:39) (53 - 66)  BP: 129/68 (18 Feb 2019 10:39) (129/68 - 155/73)  BP(mean): --  RR: 18 (18 Feb 2019 10:39) (16 - 18)  SpO2: 99% (18 Feb 2019 10:39) (97% - 100%)    GENERAL: Comfortable, NAD   HEAD:  Atraumatic, Normocephalic  EYES: EOMI, PERRL, conjunctiva and sclera clear  HEENT: Moist mucous membranes  NECK: Supple, No JVD  NERVOUS SYSTEM:  Alert & Oriented X2/3 - forgetful, Motor Strength 5/5 B/L upper and lower extremities; DTRs 2+ intact and symmetric  CHEST/LUNG: Crackles bilaterally to lower lobes; No wheezing   HEART: S1 S2 normal, no murmur  ABDOMEN: Soft, Nontender and nondistended; Bowel sounds present in all 4 quadrants   GENITOURINARY: Voiding, nonpalpable and nontender  EXTREMITIES:  2+ Peripheral Pulses, No clubbing, cyanosis, or edema  MUSCULOSKELETAL: No muscle tenderness, Muscle tone normal, No joint tenderness, no Joint swelling, Joint range of motion-normal  SKIN: No rash, no lesion  PSYCH: Mood stable    meds/labs: Reviewed    Assessment and Plan: 79 y/o F with PMH significant for A-fib on coumadin, CAD s/p stents, TIA BIBA from [facility] found to have Left lower lobe infiltrate with parapneumonic effusion     HCAP (healthcare-associated pneumonia) with metabolic encephalopathy:   - Afebrile   - Leucocytosis resolved 25.32 --> 8.62   - Left lower lobe infiltrate with parapneumonic effusion   - Mentation resolved to baseline   - BC/UC (-), RVP (-)  - Continue cefepime and azithromycin per ID recs  - Continue supportive care    Atrial fibrillation:   -cont. Digoxin 125mcg po daily  -cont. Atenolol 25mg po daily  -cont. Coumadin     HTN  -cont. Enalapril 2.5mg po daily    Anxiety disorder  - Cont. Duloxetine 20mg po daily    Code Status  - Full Code    DVT Prophylaxis   - On Coumadin    Attending Statement: 40 minutes spent on total encounter and discharge planning. CC: PNA  HPI:  77 y/o F with PMH of A-fib on coumadin, CAD s/p stents, Anxiety, HTN, RA, and left Renal CA s/p partial right nephrectomy, TIA, BIBA from [facility] for further evaluation of nausea, headache, and subjective fevers over the last several "days." In ED patient found to have WBC 25.32, INR 2.25BUN/Cr 27/1.08, RVP (-),  CT Chest with Left lower lobe infiltrate with parapneumonic effusion. Patient received Ceftriaxone 1g IVP x 1, Ciprofloxacin 400mg IVPB x 1, Morphine 2mg IVP x 1, and NS x 1L.   Pt treated with IV antibiotics for pneumonia. She is HD stable, doing well and offers no complaints.  She is to complete abx as outpatient and resume home homeds.    REVIEW OF SYSTEMS: All other review of systems is negative unless indicated above.    PHYSICAL EXAM:    Vital Signs Last 24 Hrs  T(C): 36.5 (21 Feb 2019 10:38), Max: 36.9 (20 Feb 2019 16:52)  T(F): 97.7 (21 Feb 2019 10:38), Max: 98.5 (20 Feb 2019 16:52)  HR: 70 (21 Feb 2019 10:38) (51 - 72)  BP: 94/56 (21 Feb 2019 10:38) (94/56 - 135/69)  BP(mean): --  RR: 17 (21 Feb 2019 10:38) (16 - 17)  SpO2: 96% (21 Feb 2019 10:38) (94% - 98%)    GENERAL: Comfortable, NAD   HEAD:  Atraumatic, Normocephalic  EYES: EOMI, PERRL, conjunctiva and sclera clear  HEENT: Moist mucous membranes  NECK: Supple, No JVD  NERVOUS SYSTEM:  Alert & Oriented X2/3 - forgetful, Motor Strength 5/5 B/L upper and lower extremities; DTRs 2+ intact and symmetric  CHEST/LUNG: Crackles bilaterally to lower lobes; No wheezing   HEART: S1 S2 normal, no murmur  ABDOMEN: Soft, Nontender and nondistended; Bowel sounds present in all 4 quadrants   GENITOURINARY: Voiding, nonpalpable and nontender  EXTREMITIES:  2+ Peripheral Pulses, No clubbing, cyanosis, or edema  MUSCULOSKELETAL: No muscle tenderness, Muscle tone normal, No joint tenderness, no Joint swelling, Joint range of motion-normal  SKIN: No rash, no lesion  PSYCH: Mood stable    meds/labs: Reviewed    Assessment and Plan: 77 y/o F with PMH significant for A-fib on coumadin, CAD s/p stents, TIA BIBA from [facility] found to have Left lower lobe infiltrate with parapneumonic effusion     HCAP (healthcare-associated pneumonia) with metabolic encephalopathy:   - Left lower lobe infiltrate with parapneumonic effusion   - Mentation resolved to baseline   - BC/UC (-), RVP (-)  - S/P cefepime and azithromycin per ID recs, change  to po ceftin    Atrial fibrillation:   -cont. Digoxin 125mcg po daily  -cont. Atenolol 25mg po daily  -cont. Coumadin     HTN  -cont. Enalapril 2.5mg po daily    Anxiety disorder  - Cont. Duloxetine 20mg po daily    Code Status  - Full Code    DVT Prophylaxis   - On Coumadin    Attending Statement: 40 minutes spent on total encounter and discharge planning.

## 2019-02-18 NOTE — DISCHARGE NOTE ADULT - CONDITIONS AT DISCHARGE
PT REQUIRES FOLLOW UP APPOINTMENT WITH DR. KELTON THOMAS ADVISED [facility] WHO ADVISED CUSTOMARY THAT PT'S POST D/C ARE SEEN WITHIN ONE WEEK.  WILLIAM ADVISED PT IS ON A [facility] LIST SCORED 12 THE ABOVE IF A REQUIREMENT.

## 2019-02-18 NOTE — DISCHARGE NOTE ADULT - CARE PLAN
Principal Discharge DX:	HCAP (healthcare-associated pneumonia)  Goal:	antibiotic therapy.  Assessment and plan of treatment:	Take antibiotics as prescribed.  follow up outpatient pcp.

## 2019-02-18 NOTE — PROGRESS NOTE ADULT - ASSESSMENT
77 y/o F PMHx significant for A-fib on coumadin, CAD s/p stents, Anxiety, HTN, RA, and left Renal CA s/p partial right nephrectomy, TIA, BIBA from [facility] for further evaluation of nausea, headache, and subjective fevers over the last several "days." Labs => WBC 25.32, INR 2.25BUN/Cr 27/1.08, RVP (-). CT Chest => Left lower lobe infiltrate with parapneumonic effusion. CT ABD/Pelvis => Incompletely characterized postoperative right kidney due to noncontrast technique and cannot exclude presence of recurrent tumor on this exam. In the ED the patient received Ceftriaxone 1g IVP x 1, Ciprofloxacin 400mg IVPB x 1, started on IV cefepime.    1. LLL PNA/probable parapneumonic effusion/HCAP/PCN allergy  - improving  - pcn allergy but tolerating cephalosporins without rash/side effect  - on cefepime renally dose adjusted 2gm daily #2  - on azithromycin for atypical coverage #2/3  - continue with abx coverage   - monitor temps  - urine cx/blood cx no growth f/u sputum cx/legionella  - on dc plan for po ceftin to complete 7 day course, total 3 days azithro  - tolerating abx well so far; no side effects noted  - reason for abx use and side effects reviewed with patient  - supportive care  - fu cbc    2. other issues - care per medicine

## 2019-02-18 NOTE — DISCHARGE NOTE ADULT - MEDICATION SUMMARY - MEDICATIONS TO TAKE
I will START or STAY ON the medications listed below when I get home from the hospital:    enalapril 2.5 mg oral tablet  -- 1 tab(s) by mouth once a day (at bedtime)  -- Indication: For resume home med    digoxin 125 mcg (0.125 mg) oral tablet  -- 1 tab(s) by mouth once a day (at bedtime)  -- Indication: For resume home med    warfarin 2.5 mg oral tablet  -- 1 tab(s) by mouth 4 times a week, tues, thurs, sat, sun  -- Indication: For resume home med    warfarin 5 mg oral tablet  -- 1 tab(s) by mouth 3 times a week, mon, wed, fri  -- Indication: For resume home med    DULoxetine 20 mg oral delayed release capsule  -- 1 cap(s) by mouth once a day  -- Indication: For resume home med    atenolol 25 mg oral tablet  -- 1 tab(s) by mouth once a day (at bedtime)  -- Indication: For resume home med    cefuroxime 500 mg oral tablet  -- 1 tab(s) by mouth 2 times a day   -- Finish all this medication unless otherwise directed by prescriber.  Medication should be taken with plenty of water.  Take with food or milk.    -- Indication: For Pna    docusate potassium 100 mg oral capsule  -- 2  by mouth once a day  -- Indication: For resume home med    magnesium oxide 200 mg oral tablet  -- 1  by mouth once a day  -- Indication: For resume home med    Omega-3 oral capsule  -- 1  by mouth once a day  -- Indication: For resume home med    pantoprazole 40 mg oral delayed release tablet  -- 1 tab(s) by mouth once a day (before a meal)  -- Indication: For resume home med    oxybutynin 5 mg/24 hours oral tablet, extended release  -- 1 tab(s) by mouth once a day  -- Indication: For resume home med    Multiple Vitamins oral tablet  -- 1 tab(s) by mouth once a day  -- Indication: For resume home med    Vitamin D3 1000 intl units oral tablet  -- 1 tab(s) by mouth once a day  -- Indication: For resume home med    ascorbic acid 500 mg oral tablet  -- 1 tab(s) by mouth once a day  -- Indication: For resume home med

## 2019-02-18 NOTE — DISCHARGE NOTE ADULT - DISCHARGE TO
[facility] [place] [facility] [place] [facility]

## 2019-02-18 NOTE — DISCHARGE NOTE ADULT - CARE PROVIDER_API CALL
Alen Nieto (NP; RN)  NP in Family Health  [address] [place] [address]  Phone: [phone]  Fax: [phone]  Follow Up Time:

## 2019-02-18 NOTE — PROGRESS NOTE ADULT - SUBJECTIVE AND OBJECTIVE BOX
CC: "HA, Nausea and subjective fevers"     HPI:  79 y/o F with PMH of A-fib on coumadin, CAD s/p stents, Anxiety, HTN, RA, and left Renal CA s/p partial right nephrectomy, TIA, BIBA from [facility] for further evaluation of nausea, headache, and subjective fevers over the last several "days." In ED patient found to have WBC 25.32, INR 2.25BUN/Cr 27/1.08, RVP (-),  CT Chest with Left lower lobe infiltrate with parapneumonic effusion. Patient received Ceftriaxone 1g IVP x 1, Ciprofloxacin 400mg IVPB x 1, Morphine 2mg IVP x 1, and NS x 1L.     Interval HPI : C/o of weakness with ambulation. States, "I feel much better"     Review of Systems:  CONSTITUTIONAL: C/o of weakness; denies fevers or chills  EYES/ENT: Denies visual changes, nasal congestion, vertigo or throat pain   NECK: Denies pain or stiffness  RESPIRATORY: C/o of nonproductive cough; Denies wheezing, hemoptysis or shortness of breath at rest or exertion.   CARDIOVASCULAR: Denies chest pain, palpitations or lower extremity edema   GASTROINTESTINAL: Denies abdominal or epigastric pain, nausea, vomiting, diarrhea or constipation.   GENITOURINARY: Denies dysuria, frequency or hematuria  NEUROLOGICAL: Denies numbness or weakness  SKIN: Denies itching, burning, rashes, or lesions   All other review of systems is negative unless indicated above    PHYSICAL EXAM:  Vital Signs Last 24 Hrs  T(C): 37.1 (2019 10:39), Max: 37.1 (2019 22:36)  T(F): 98.7 (2019 10:39), Max: 98.7 (2019 22:36)  HR: 53 (2019 10:39) (53 - 66)  BP: 129/68 (2019 10:39) (129/68 - 155/73)  BP(mean): --  RR: 18 (2019 10:39) (16 - 18)  SpO2: 99% (2019 10:39) (97% - 100%)    GENERAL: Comfortable, NAD   HEAD:  Atraumatic, Normocephalic  EYES: EOMI, PERRL, conjunctiva and sclera clear  HEENT: Moist mucous membranes  NECK: Supple, No JVD  NERVOUS SYSTEM:  Alert & Oriented X2/3 - forgetful, Motor Strength 5/5 B/L upper and lower extremities; DTRs 2+ intact and symmetric  CHEST/LUNG: Crackles bilaterally to lower lobes; No wheezing   HEART: S1 S2 normal, no murmur  ABDOMEN: Soft, Nontender and nondistended; Bowel sounds present in all 4 quadrants   GENITOURINARY: Voiding, nonpalpable and nontender  EXTREMITIES:  2+ Peripheral Pulses, No clubbing, cyanosis, or edema  MUSCULOSKELETAL: No muscle tenderness, Muscle tone normal, No joint tenderness, no Joint swelling, Joint range of motion-normal  SKIN: No rash, no lesion  PSYCH: Mood stable      LABS:                        11.8   8.62  )-----------( 191      ( 2019 06:46 )             36.3         139  |  106  |  21  ----------------------------<  121<H>  3.5   |  25  |  1.03    Ca    8.5      2019 06:46  Mg     1.9         TPro  6.3  /  Alb  2.7<L>  /  TBili  0.5  /  DBili  x   /  AST  24  /  ALT  19  /  AlkPhos  69  -    PT/INR - ( 2019 07:21 )   PT: 19.2 sec;   INR: 1.70 ratio         PTT - ( 2019 06:46 )  PTT:33.8 sec  Urinalysis Basic - ( 2019 18:08 )    Color: Yellow / Appearance: Clear / S.015 / pH: x  Gluc: x / Ketone: Negative  / Bili: Negative / Urobili: Negative mg/dL   Blood: x / Protein: 30 mg/dL / Nitrite: Negative   Leuk Esterase: Trace / RBC: 3-5 /HPF / WBC 11-25   Sq Epi: x / Non Sq Epi: Occasional / Bacteria: Occasional        CAPILLARY BLOOD GLUCOSE    MEDICATIONS  (STANDING):  ATENolol  Tablet 25 milliGRAM(s) Oral at bedtime  azithromycin   Tablet 500 milliGRAM(s) Oral daily  cefepime   IVPB 2000 milliGRAM(s) IV Intermittent every 24 hours  digoxin     Tablet 0.125 milliGRAM(s) Oral at bedtime  DULoxetine 20 milliGRAM(s) Oral daily  enalapril 2.5 milliGRAM(s) Oral at bedtime  oxybutynin 5 milliGRAM(s) Oral daily  pantoprazole    Tablet 40 milliGRAM(s) Oral before breakfast  warfarin 2.5 milliGRAM(s) Oral daily    MEDICATIONS  (PRN):  acetaminophen   Tablet .. 650 milliGRAM(s) Oral every 6 hours PRN Temp greater or equal to 38C (100.4F), Mild Pain (1 - 3)  docusate sodium 100 milliGRAM(s) Oral three times a day PRN Constipation  ondansetron Injectable 4 milliGRAM(s) IV Push every 6 hours PRN Nausea  senna 2 Tablet(s) Oral at bedtime PRN Constipation

## 2019-02-18 NOTE — PROGRESS NOTE ADULT - SUBJECTIVE AND OBJECTIVE BOX
Date of service: 19 @ 12:19    pt seen and examined  feels better  no resp distress  less cough    ROS: no fever or chills; denies dizziness, no HA, no abdominal pain, no diarrhea or constipation; no dysuria, no urinary frequency, no legs pain, no rashes    MEDICATIONS  (STANDING):  ATENolol  Tablet 25 milliGRAM(s) Oral at bedtime  azithromycin   Tablet 500 milliGRAM(s) Oral daily  cefepime   IVPB 2000 milliGRAM(s) IV Intermittent every 24 hours  digoxin     Tablet 0.125 milliGRAM(s) Oral at bedtime  DULoxetine 20 milliGRAM(s) Oral daily  enalapril 2.5 milliGRAM(s) Oral at bedtime  oxybutynin 5 milliGRAM(s) Oral daily  pantoprazole    Tablet 40 milliGRAM(s) Oral before breakfast  warfarin 2.5 milliGRAM(s) Oral daily      Vital Signs Last 24 Hrs  T(C): 37.1 (2019 10:39), Max: 37.1 (2019 22:36)  T(F): 98.7 (2019 10:39), Max: 98.7 (2019 22:36)  HR: 53 (2019 10:39) (53 - 66)  BP: 129/68 (2019 10:39) (129/68 - 155/73)  BP(mean): --  RR: 18 (2019 10:39) (16 - 18)  SpO2: 99% (2019 10:39) (97% - 100%)      PE:  Constitutional: frail looking  HEENT: NC/AT, EOMI, PERRLA, conjunctivae clear; ears and nose atraumatic; pharynx benign  Neck: supple; thyroid not palpable  Back: no tenderness  Respiratory: decreased breath sounds  Cardiovascular: S1S2 regular, no murmurs  Abdomen: soft, not tender, not distended, positive BS; liver and spleen WNL  Genitourinary: no suprapubic tenderness  Lymphatic: no LN palpable  Musculoskeletal: no muscle tenderness, no joint swelling or tenderness  Extremities: no pedal edema  Neurological/ Psychiatric:  moving all extremities  Skin: no rashes; no palpable lesions    Labs: all available labs reviewed                                   11.8   8.62  )-----------( 191      ( 2019 06:46 )             36.3         139  |  106  |  21  ----------------------------<  121<H>  3.5   |  25  |  1.03    Ca    8.5      2019 06:46  Mg     1.9         TPro  6.3  /  Alb  2.7<L>  /  TBili  0.5  /  DBili  x   /  AST  24  /  ALT  19  /  AlkPhos  69           Urinalysis Basic - ( 2019 18:08 )    Color: Yellow / Appearance: Clear / S.015 / pH: x  Gluc: x / Ketone: Negative  / Bili: Negative / Urobili: Negative mg/dL   Blood: x / Protein: 30 mg/dL / Nitrite: Negative   Leuk Esterase: Trace / RBC: 3-5 /HPF / WBC 11-25   Sq Epi: x / Non Sq Epi: Occasional / Bacteria: Occasional    Culture - Urine (19 @ 18:08)    Specimen Source: .Urine Catheterized    Culture Results:   <10,000 CFU/ml  Normal Urogenital hubert present    Culture - Blood (19 @ 16:44)    Specimen Source: .Blood None    Culture Results:   No growth to date.            Radiology: all available radiological tests reviewed    EXAM:  CT ABDOMEN AND PELVIS                        EXAM:  CT CHEST                            PROCEDURE DATE:  2019          INTERPRETATION:  CLINICAL INDICATION: Leukocytosis. Headache and   tachycardia. Streak of renal cell carcinoma.    COMPARISON:        PROCEDURE: CT of the chest abdomen and pelvis was performed without the   administration of intravenous contrast. Coronal and sagittal reformatted   images were submitted.    FINDINGS:    Solid organ evaluation is suboptimal inherent to noncontrast CT   technique.     CT CHEST:     Small left basilar infiltrate with left pleural effusion is evident.    The thoracic aorta and main pulmonary artery are normal in caliber. The   heart is normal size. Coronary artery and aortic arteriosclerotic   calcifications are present.    There is no pericardial effusion. There is no pneumothorax or   pneumomediastinum.    There is no axillary, mediastinal, or hilar lymphadenopathy.     CT ABDOMEN PELVIS:     LIVER/GALLBLADDER: Subcentimeter hypodense structure in the medial   segment of the left hepatic lobe) due to a 64), too small to   characterize. Unremarkable gallbladder.    PANCREAS: No pancreatic ductal dilatation, peripancreatic fluid   collection, or focal pancreatic mass.    SPLEEN: Normal size.    KIDNEYS: Surgical clips associated with the lower right kidney and   adjacent retroperitoneum. No hydronephrosis, perinephric stranding, or   fluid collection.    ADRENAL GLANDS: Unremarkable.    BOWEL: No bowel obstruction or free intraperitoneal air.     URINARY BLADDER: Mildly distended.    PELVIC ORGANS: Unremarkable.    BONES/SOFT TISSUES: Advanced multilevel spondylosis.    AORTA: Atheromatous calcification.    IMPRESSION:      CT CHEST: Left lower lobe infiltrate with parapneumonic effusion.   Follow-up to resolution.    CT ABDOMEN PELVIS: Incompletely characterized postoperative right kidney   due to noncontrast technique and cannot exclude presence of recurrent   tumor on this exam.        Advanced directives addressed: full resuscitation

## 2019-02-18 NOTE — PROGRESS NOTE ADULT - ASSESSMENT
77 y/o F with PMH significant for A-fib on coumadin, CAD s/p stents, TIA BIBA from [facility] found to have Left lower lobe infiltrate with parapneumonic effusion     HCAP (healthcare-associated pneumonia) with metabolic encephalopathy:   - Afebrile   - Left lower lobe infiltrate with parapneumonic effusion   - Mentation resolved to baseline   - BC/UC (-), RVP (-)  - Continue cefepime and azithromycin   - Continue supportive care    Atrial fibrillation:   -cont. Digoxin 125mcg po daily  -cont. Atenolol 25mg po daily  -cont. Coumadin     HTN  -cont. Enalapril 2.5mg po daily    Anxiety disorder  - Cont. Duloxetine 20mg po daily    Code Status  - Full Code    DVT Prophylaxis   - On Coumadin 77 y/o F with PMH significant for A-fib on coumadin, CAD s/p stents, TIA BIBA from [facility] found to have Left lower lobe infiltrate with parapneumonic effusion     HCAP (healthcare-associated pneumonia) with metabolic encephalopathy:   - Afebrile   - Leucocytosis resolved 25.32 --> 8.62   - Left lower lobe infiltrate with parapneumonic effusion   - Mentation resolved to baseline   - BC/UC (-), RVP (-)  - Continue cefepime and azithromycin   - Continue supportive care    Atrial fibrillation:   -cont. Digoxin 125mcg po daily  -cont. Atenolol 25mg po daily  -cont. Coumadin     HTN  -cont. Enalapril 2.5mg po daily    Anxiety disorder  - Cont. Duloxetine 20mg po daily    Code Status  - Full Code    DVT Prophylaxis   - On Coumadin 79 y/o F with PMH significant for A-fib on coumadin, CAD s/p stents, TIA BIBA from [facility] found to have Left lower lobe infiltrate with parapneumonic effusion     HCAP (healthcare-associated pneumonia) with metabolic encephalopathy:   - Afebrile   - Leucocytosis resolved 25.32 --> 8.62   - Left lower lobe infiltrate with parapneumonic effusion   - Mentation resolved to baseline   - BC/UC (-), RVP (-)  - Continue cefepime and azithromycin per ID recs  - Continue supportive care    Atrial fibrillation:   -cont. Digoxin 125mcg po daily  -cont. Atenolol 25mg po daily  -cont. Coumadin     HTN  -cont. Enalapril 2.5mg po daily    Anxiety disorder  - Cont. Duloxetine 20mg po daily    Code Status  - Full Code    DVT Prophylaxis   - On Coumadin     Pt seen and examined with NP bruna Bradley. I personally had a face to face encounter with the patient, examined the patient myself and reviewed the plan of care with the patient and said NP bruna Bradley. I agree with the assessment and plan of  NP bruna Bradley as stated and discussed.  Continue IV antibiotics per ID recs and discharge tomorrow.

## 2019-02-19 LAB
INR BLD: 1.64 RATIO — HIGH (ref 0.88–1.16)
PROTHROM AB SERPL-ACNC: 18.5 SEC — HIGH (ref 10–12.9)

## 2019-02-19 RX ORDER — WARFARIN SODIUM 2.5 MG/1
5 TABLET ORAL ONCE
Qty: 0 | Refills: 0 | Status: COMPLETED | OUTPATIENT
Start: 2019-02-19 | End: 2019-02-19

## 2019-02-19 RX ADMIN — AZITHROMYCIN 500 MILLIGRAM(S): 500 TABLET, FILM COATED ORAL at 11:01

## 2019-02-19 RX ADMIN — Medication 5 MILLIGRAM(S): at 11:02

## 2019-02-19 RX ADMIN — WARFARIN SODIUM 5 MILLIGRAM(S): 2.5 TABLET ORAL at 21:37

## 2019-02-19 RX ADMIN — Medication 2.5 MILLIGRAM(S): at 21:36

## 2019-02-19 RX ADMIN — PANTOPRAZOLE SODIUM 40 MILLIGRAM(S): 20 TABLET, DELAYED RELEASE ORAL at 06:53

## 2019-02-19 RX ADMIN — Medication 650 MILLIGRAM(S): at 16:47

## 2019-02-19 RX ADMIN — CEFEPIME 100 MILLIGRAM(S): 1 INJECTION, POWDER, FOR SOLUTION INTRAMUSCULAR; INTRAVENOUS at 16:49

## 2019-02-19 RX ADMIN — Medication 0.12 MILLIGRAM(S): at 21:36

## 2019-02-19 RX ADMIN — DULOXETINE HYDROCHLORIDE 20 MILLIGRAM(S): 30 CAPSULE, DELAYED RELEASE ORAL at 11:01

## 2019-02-19 RX ADMIN — ATENOLOL 25 MILLIGRAM(S): 25 TABLET ORAL at 21:36

## 2019-02-19 NOTE — PROGRESS NOTE ADULT - ASSESSMENT
77 y/o F with PMH significant for A-fib on coumadin, CAD s/p stents, TIA BIBA from [facility] found to have Left lower lobe infiltrate with parapneumonic effusion     HCAP (healthcare-associated pneumonia) with metabolic encephalopathy:   - Afebrile   - Leucocytosis resolved   - Left lower lobe infiltrate with parapneumonic effusion   - Mentation resolved to baseline   - BC/UC (-), RVP (-)  - Continue cefepime and azithromycin per ID recs  - Continue supportive care    Atrial fibrillation:   -cont. Digoxin 125mcg po daily  -cont. Atenolol 25mg po daily  -INR subtherapeutic  -Increase Coumadin     HTN  -cont. Enalapril 2.5mg po daily    Anxiety disorder  - Cont. Duloxetine 20mg po daily    Code Status  - Full Code    DVT Prophylaxis   - On Coumadin

## 2019-02-19 NOTE — PROGRESS NOTE ADULT - SUBJECTIVE AND OBJECTIVE BOX
CC: "HA, Nausea and subjective fevers"     HPI:  79 y/o F with PMH of A-fib on coumadin, CAD s/p stents, Anxiety, HTN, RA, and left Renal CA s/p partial right nephrectomy, TIA, BIBA from [facility] for further evaluation of nausea, headache, and subjective fevers over the last several "days." In ED patient found to have WBC 25.32, INR 2.25BUN/Cr 27/1.08, RVP (-),  CT Chest with Left lower lobe infiltrate with parapneumonic effusion. Patient received Ceftriaxone 1g IVP x 1, Ciprofloxacin 400mg IVPB x 1, Morphine 2mg IVP x 1, and NS x 1L.     Interval HPI 2/18: C/o of weakness with ambulation. States, "I feel much better"     Review of Systems:  CONSTITUTIONAL: C/o of weakness; denies fevers or chills  EYES/ENT: Denies visual changes, nasal congestion, vertigo or throat pain   NECK: Denies pain or stiffness  RESPIRATORY: C/o of nonproductive cough; Denies wheezing, hemoptysis or shortness of breath at rest or exertion.   CARDIOVASCULAR: Denies chest pain, palpitations or lower extremity edema   GASTROINTESTINAL: Denies abdominal or epigastric pain, nausea, vomiting, diarrhea or constipation.   GENITOURINARY: Denies dysuria, frequency or hematuria  NEUROLOGICAL: Denies numbness or weakness  SKIN: Denies itching, burning, rashes, or lesions   All other review of systems is negative unless indicated above      PHYSICAL EXAM:    Vital Signs Last 24 Hrs  T(C): 36.6 (19 Feb 2019 10:52), Max: 36.9 (18 Feb 2019 22:15)  T(F): 97.8 (19 Feb 2019 10:52), Max: 98.5 (18 Feb 2019 22:15)  HR: 61 (19 Feb 2019 10:52) (50 - 66)  BP: 140/86 (19 Feb 2019 10:52) (132/67 - 153/72)  BP(mean): --  RR: 18 (19 Feb 2019 10:52) (18 - 18)  SpO2: 98% (19 Feb 2019 10:52) (97% - 100%)        GENERAL: Comfortable, NAD   HEAD:  Atraumatic, Normocephalic  EYES: EOMI, PERRL, conjunctiva and sclera clear  HEENT: Moist mucous membranes  NECK: Supple, No JVD  NERVOUS SYSTEM:  Alert & Oriented X2/3 - forgetful, Motor Strength 5/5 B/L upper and lower extremities; DTRs 2+ intact and symmetric  CHEST/LUNG: Crackles bilaterally to lower lobes; No wheezing   HEART: S1 S2 normal, no murmur  ABDOMEN: Soft, Nontender and nondistended; Bowel sounds present in all 4 quadrants   GENITOURINARY: Voiding, nonpalpable and nontender  EXTREMITIES:  2+ Peripheral Pulses, No clubbing, cyanosis, or edema  MUSCULOSKELETAL: No muscle tenderness, Muscle tone normal, No joint tenderness, no Joint swelling, Joint range of motion-normal  SKIN: No rash, no lesion  PSYCH: Mood stable      LABS:        PT/INR - ( 19 Feb 2019 07:35 )   PT: 18.5 sec;   INR: 1.64 ratio             MEDICATIONS:    ATENolol  Tablet 25 milliGRAM(s) Oral at bedtime  azithromycin   Tablet 500 milliGRAM(s) Oral daily  cefepime   IVPB 2000 milliGRAM(s) IV Intermittent every 24 hours  digoxin     Tablet 0.125 milliGRAM(s) Oral at bedtime  DULoxetine 20 milliGRAM(s) Oral daily  enalapril 2.5 milliGRAM(s) Oral at bedtime  oxybutynin 5 milliGRAM(s) Oral daily  pantoprazole    Tablet 40 milliGRAM(s) Oral before breakfast  warfarin 2.5 milliGRAM(s) Oral daily    MEDICATIONS  (PRN):  acetaminophen   Tablet .. 650 milliGRAM(s) Oral every 6 hours PRN Temp greater or equal to 38C (100.4F), Mild Pain (1 - 3)  docusate sodium 100 milliGRAM(s) Oral three times a day PRN Constipation  ondansetron Injectable 4 milliGRAM(s) IV Push every 6 hours PRN Nausea  senna 2 Tablet(s) Oral at bedtime PRN Constipation

## 2019-02-19 NOTE — CDI QUERY NOTE - NSCDIOTHERTXTBX_GEN_ALL_CORE_HH
78 year old female noted with HCAP PNA and metabolic encephalopathy.  Cefepime 2000mg IV Q24hrs ordered.    Please specify type of Pneumonia known or suspected/probable/likely:  A) Probable Gram-negative mal pneumonia  B) Likely MRSA pneumonia  C) Other, please specify  D) Not clinically significant

## 2019-02-20 LAB
ANION GAP SERPL CALC-SCNC: 5 MMOL/L — SIGNIFICANT CHANGE UP (ref 5–17)
BUN SERPL-MCNC: 21 MG/DL — SIGNIFICANT CHANGE UP (ref 7–23)
CALCIUM SERPL-MCNC: 9.1 MG/DL — SIGNIFICANT CHANGE UP (ref 8.5–10.1)
CHLORIDE SERPL-SCNC: 106 MMOL/L — SIGNIFICANT CHANGE UP (ref 96–108)
CO2 SERPL-SCNC: 27 MMOL/L — SIGNIFICANT CHANGE UP (ref 22–31)
CREAT SERPL-MCNC: 1.05 MG/DL — SIGNIFICANT CHANGE UP (ref 0.5–1.3)
GLUCOSE SERPL-MCNC: 102 MG/DL — HIGH (ref 70–99)
HCT VFR BLD CALC: 40.2 % — SIGNIFICANT CHANGE UP (ref 34.5–45)
HGB BLD-MCNC: 13.3 G/DL — SIGNIFICANT CHANGE UP (ref 11.5–15.5)
INR BLD: 1.59 RATIO — HIGH (ref 0.88–1.16)
MCHC RBC-ENTMCNC: 30.9 PG — SIGNIFICANT CHANGE UP (ref 27–34)
MCHC RBC-ENTMCNC: 33.1 GM/DL — SIGNIFICANT CHANGE UP (ref 32–36)
MCV RBC AUTO: 93.5 FL — SIGNIFICANT CHANGE UP (ref 80–100)
NRBC # BLD: 0 /100 WBCS — SIGNIFICANT CHANGE UP (ref 0–0)
PLATELET # BLD AUTO: 226 K/UL — SIGNIFICANT CHANGE UP (ref 150–400)
POTASSIUM SERPL-MCNC: 4.1 MMOL/L — SIGNIFICANT CHANGE UP (ref 3.5–5.3)
POTASSIUM SERPL-SCNC: 4.1 MMOL/L — SIGNIFICANT CHANGE UP (ref 3.5–5.3)
PROTHROM AB SERPL-ACNC: 17.9 SEC — HIGH (ref 10–12.9)
RBC # BLD: 4.3 M/UL — SIGNIFICANT CHANGE UP (ref 3.8–5.2)
RBC # FLD: 12.4 % — SIGNIFICANT CHANGE UP (ref 10.3–14.5)
SODIUM SERPL-SCNC: 138 MMOL/L — SIGNIFICANT CHANGE UP (ref 135–145)
WBC # BLD: 5.99 K/UL — SIGNIFICANT CHANGE UP (ref 3.8–10.5)
WBC # FLD AUTO: 5.99 K/UL — SIGNIFICANT CHANGE UP (ref 3.8–10.5)

## 2019-02-20 RX ORDER — ENOXAPARIN SODIUM 100 MG/ML
40 INJECTION SUBCUTANEOUS EVERY 24 HOURS
Qty: 0 | Refills: 0 | Status: DISCONTINUED | OUTPATIENT
Start: 2019-02-20 | End: 2019-02-21

## 2019-02-20 RX ORDER — CEFUROXIME AXETIL 250 MG
500 TABLET ORAL EVERY 12 HOURS
Qty: 0 | Refills: 0 | Status: DISCONTINUED | OUTPATIENT
Start: 2019-02-20 | End: 2019-02-21

## 2019-02-20 RX ORDER — WARFARIN SODIUM 2.5 MG/1
5 TABLET ORAL ONCE
Qty: 0 | Refills: 0 | Status: COMPLETED | OUTPATIENT
Start: 2019-02-20 | End: 2019-02-20

## 2019-02-20 RX ADMIN — Medication 5 MILLIGRAM(S): at 12:01

## 2019-02-20 RX ADMIN — WARFARIN SODIUM 5 MILLIGRAM(S): 2.5 TABLET ORAL at 22:34

## 2019-02-20 RX ADMIN — PANTOPRAZOLE SODIUM 40 MILLIGRAM(S): 20 TABLET, DELAYED RELEASE ORAL at 06:33

## 2019-02-20 RX ADMIN — DULOXETINE HYDROCHLORIDE 20 MILLIGRAM(S): 30 CAPSULE, DELAYED RELEASE ORAL at 12:01

## 2019-02-20 RX ADMIN — Medication 0.12 MILLIGRAM(S): at 22:34

## 2019-02-20 RX ADMIN — Medication 2.5 MILLIGRAM(S): at 22:34

## 2019-02-20 RX ADMIN — Medication 500 MILLIGRAM(S): at 17:32

## 2019-02-20 RX ADMIN — ENOXAPARIN SODIUM 40 MILLIGRAM(S): 100 INJECTION SUBCUTANEOUS at 15:03

## 2019-02-20 RX ADMIN — Medication 650 MILLIGRAM(S): at 22:34

## 2019-02-20 NOTE — PROGRESS NOTE ADULT - SUBJECTIVE AND OBJECTIVE BOX
CC: "HA, Nausea and subjective fevers"     HPI:  77 y/o F with PMH of A-fib on coumadin, CAD s/p stents, Anxiety, HTN, RA, and left Renal CA s/p partial right nephrectomy, TIA, BIBA from [facility] for further evaluation of nausea, headache, and subjective fevers over the last several "days." In ED patient found to have WBC 25.32, INR 2.25BUN/Cr 27/1.08, RVP (-),  CT Chest with Left lower lobe infiltrate with parapneumonic effusion. Patient received Ceftriaxone 1g IVP x 1, Ciprofloxacin 400mg IVPB x 1, Morphine 2mg IVP x 1, and NS x 1L.     Interval HPI 2/18: C/o of weakness with ambulation. States, "I feel much better"   02/20/19: Patient seen and examined. Feels tired.     Review of Systems:  CONSTITUTIONAL: C/o of weakness; denies fevers or chills  EYES/ENT: Denies visual changes, nasal congestion, vertigo or throat pain   NECK: Denies pain or stiffness  RESPIRATORY: C/o of nonproductive cough; Denies wheezing, hemoptysis or shortness of breath at rest or exertion.   CARDIOVASCULAR: Denies chest pain, palpitations or lower extremity edema   GASTROINTESTINAL: Denies abdominal or epigastric pain, nausea, vomiting, diarrhea or constipation.   GENITOURINARY: Denies dysuria, frequency or hematuria  NEUROLOGICAL: Denies numbness or weakness  SKIN: Denies itching, burning, rashes, or lesions   All other review of systems is negative unless indicated above      PHYSICAL EXAM:    Vital Signs Last 24 Hrs  T(C): 36.8 (20 Feb 2019 10:50), Max: 36.9 (19 Feb 2019 17:46)  T(F): 98.2 (20 Feb 2019 10:50), Max: 98.4 (19 Feb 2019 17:46)  HR: 52 (20 Feb 2019 10:50) (52 - 56)  BP: 144/84 (20 Feb 2019 10:50) (138/69 - 153/91)  BP(mean): --  RR: 16 (20 Feb 2019 10:50) (16 - 18)  SpO2: 96% (20 Feb 2019 10:50) (96% - 97%)      GENERAL: Comfortable, NAD   HEAD:  Atraumatic, Normocephalic  EYES: EOMI, PERRL, conjunctiva and sclera clear  HEENT: Moist mucous membranes  NECK: Supple, No JVD  NERVOUS SYSTEM:  Alert & Oriented X2/3 - forgetful, Motor Strength 5/5 B/L upper and lower extremities; DTRs 2+ intact and symmetric  CHEST/LUNG: Crackles bilaterally to lower lobes; No wheezing   HEART: S1 S2 normal, no murmur  ABDOMEN: Soft, Nontender and nondistended; Bowel sounds present in all 4 quadrants   GENITOURINARY: Voiding, nonpalpable and nontender  EXTREMITIES:  2+ Peripheral Pulses, No clubbing, cyanosis, or edema  MUSCULOSKELETAL: No muscle tenderness, Muscle tone normal, No joint tenderness, no Joint swelling, Joint range of motion-normal  SKIN: No rash, no lesion  PSYCH: Mood stable      LABS:                            13.3   5.99  )-----------( 226      ( 20 Feb 2019 07:18 )             40.2     20 Feb 2019 07:18    138    |  106    |  21     ----------------------------<  102    4.1     |  27     |  1.05     Ca    9.1        20 Feb 2019 07:18        PT/INR - ( 20 Feb 2019 07:18 )   PT: 17.9 sec;   INR: 1.59 ratio               MEDICATIONS:      MEDICATIONS  (STANDING):  ATENolol  Tablet 25 milliGRAM(s) Oral at bedtime  cefuroxime   Tablet 500 milliGRAM(s) Oral every 12 hours  digoxin     Tablet 0.125 milliGRAM(s) Oral at bedtime  DULoxetine 20 milliGRAM(s) Oral daily  enalapril 2.5 milliGRAM(s) Oral at bedtime  oxybutynin 5 milliGRAM(s) Oral daily  pantoprazole    Tablet 40 milliGRAM(s) Oral before breakfast    MEDICATIONS  (PRN):  acetaminophen   Tablet .. 650 milliGRAM(s) Oral every 6 hours PRN Temp greater or equal to 38C (100.4F), Mild Pain (1 - 3)  docusate sodium 100 milliGRAM(s) Oral three times a day PRN Constipation  ondansetron Injectable 4 milliGRAM(s) IV Push every 6 hours PRN Nausea  senna 2 Tablet(s) Oral at bedtime PRN Constipation

## 2019-02-20 NOTE — PROGRESS NOTE ADULT - SUBJECTIVE AND OBJECTIVE BOX
Date of service: 19 @ 09:58    pt seen and examined  feels better  no resp distress  less cough    ROS: no fever or chills; denies dizziness, no HA, no abdominal pain, no diarrhea or constipation; no dysuria, no urinary frequency, no legs pain, no rashes    MEDICATIONS  (STANDING):  ATENolol  Tablet 25 milliGRAM(s) Oral at bedtime  cefepime   IVPB 2000 milliGRAM(s) IV Intermittent every 24 hours  digoxin     Tablet 0.125 milliGRAM(s) Oral at bedtime  DULoxetine 20 milliGRAM(s) Oral daily  enalapril 2.5 milliGRAM(s) Oral at bedtime  oxybutynin 5 milliGRAM(s) Oral daily  pantoprazole    Tablet 40 milliGRAM(s) Oral before breakfast      Vital Signs Last 24 Hrs  T(C): 36.6 (2019 05:21), Max: 36.9 (2019 17:46)  T(F): 97.8 (2019 05:21), Max: 98.4 (2019 17:46)  HR: 54 (2019 05:21) (54 - 61)  BP: 138/69 (2019 05:21) (138/69 - 153/91)  BP(mean): --  RR: 18 (2019 05:21) (18 - 18)  SpO2: 96% (2019 05:21) (96% - 98%)      PE:  Constitutional: frail looking  HEENT: NC/AT, EOMI, PERRLA, conjunctivae clear; ears and nose atraumatic; pharynx benign  Neck: supple; thyroid not palpable  Back: no tenderness  Respiratory: decreased breath sounds  Cardiovascular: S1S2 regular, no murmurs  Abdomen: soft, not tender, not distended, positive BS; liver and spleen WNL  Genitourinary: no suprapubic tenderness  Lymphatic: no LN palpable  Musculoskeletal: no muscle tenderness, no joint swelling or tenderness  Extremities: no pedal edema  Neurological/ Psychiatric:  moving all extremities  Skin: no rashes; no palpable lesions    Labs: all available labs reviewed                                              13.3   5.99  )-----------( 226      ( 2019 07:18 )             40.2         138  |  106  |  21  ----------------------------<  102<H>  4.1   |  27  |  1.05    Ca    9.1      2019 07:18         Urinalysis Basic - ( 2019 18:08 )    Color: Yellow / Appearance: Clear / S.015 / pH: x  Gluc: x / Ketone: Negative  / Bili: Negative / Urobili: Negative mg/dL   Blood: x / Protein: 30 mg/dL / Nitrite: Negative   Leuk Esterase: Trace / RBC: 3-5 /HPF / WBC 11-25   Sq Epi: x / Non Sq Epi: Occasional / Bacteria: Occasional    Culture - Urine (19 @ 18:08)    Specimen Source: .Urine Catheterized    Culture Results:   <10,000 CFU/ml  Normal Urogenital hubert present    Culture - Blood (19 @ 16:44)    Specimen Source: .Blood None    Culture Results:   No growth to date.            Radiology: all available radiological tests reviewed    EXAM:  CT ABDOMEN AND PELVIS                        EXAM:  CT CHEST                            PROCEDURE DATE:  2019          INTERPRETATION:  CLINICAL INDICATION: Leukocytosis. Headache and   tachycardia. Streak of renal cell carcinoma.    COMPARISON:        PROCEDURE: CT of the chest abdomen and pelvis was performed without the   administration of intravenous contrast. Coronal and sagittal reformatted   images were submitted.    FINDINGS:    Solid organ evaluation is suboptimal inherent to noncontrast CT   technique.     CT CHEST:     Small left basilar infiltrate with left pleural effusion is evident.    The thoracic aorta and main pulmonary artery are normal in caliber. The   heart is normal size. Coronary artery and aortic arteriosclerotic   calcifications are present.    There is no pericardial effusion. There is no pneumothorax or   pneumomediastinum.    There is no axillary, mediastinal, or hilar lymphadenopathy.     CT ABDOMEN PELVIS:     LIVER/GALLBLADDER: Subcentimeter hypodense structure in the medial   segment of the left hepatic lobe) due to a 64), too small to   characterize. Unremarkable gallbladder.    PANCREAS: No pancreatic ductal dilatation, peripancreatic fluid   collection, or focal pancreatic mass.    SPLEEN: Normal size.    KIDNEYS: Surgical clips associated with the lower right kidney and   adjacent retroperitoneum. No hydronephrosis, perinephric stranding, or   fluid collection.    ADRENAL GLANDS: Unremarkable.    BOWEL: No bowel obstruction or free intraperitoneal air.     URINARY BLADDER: Mildly distended.    PELVIC ORGANS: Unremarkable.    BONES/SOFT TISSUES: Advanced multilevel spondylosis.    AORTA: Atheromatous calcification.    IMPRESSION:      CT CHEST: Left lower lobe infiltrate with parapneumonic effusion.   Follow-up to resolution.    CT ABDOMEN PELVIS: Incompletely characterized postoperative right kidney   due to noncontrast technique and cannot exclude presence of recurrent   tumor on this exam.        Advanced directives addressed: full resuscitation

## 2019-02-20 NOTE — PROGRESS NOTE ADULT - REASON FOR ADMISSION
Headache, Subjective fevers

## 2019-02-20 NOTE — PROGRESS NOTE ADULT - ASSESSMENT
79 y/o F PMHx significant for A-fib on coumadin, CAD s/p stents, Anxiety, HTN, RA, and left Renal CA s/p partial right nephrectomy, TIA, BIBA from [facility] for further evaluation of nausea, headache, and subjective fevers over the last several "days." Labs => WBC 25.32, INR 2.25BUN/Cr 27/1.08, RVP (-). CT Chest => Left lower lobe infiltrate with parapneumonic effusion. CT ABD/Pelvis => Incompletely characterized postoperative right kidney due to noncontrast technique and cannot exclude presence of recurrent tumor on this exam. In the ED the patient received Ceftriaxone 1g IVP x 1, Ciprofloxacin 400mg IVPB x 1, started on IV cefepime.    1. LLL PNA/probable parapneumonic effusion/HCAP/PCN allergy  - improving  - pcn allergy but tolerating cephalosporins without rash/side effect  - on cefepime renally dose adjusted 2gm daily #4  - completed 3 days of azithromycin   - continue with abx coverage   - monitor temps  - urine cx/blood cx no growth f/u sputum cx/legionella  - on dc plan for po ceftin to complete 7 day course  - tolerating abx well so far; no side effects noted  - reason for abx use and side effects reviewed with patient  - supportive care  - fu cbc    2. other issues - care per medicine

## 2019-02-20 NOTE — PROGRESS NOTE ADULT - ASSESSMENT
79 y/o F with PMH significant for A-fib on coumadin, CAD s/p stents, TIA BIBA from [facility] found to have Left lower lobe infiltrate with parapneumonic effusion     HCAP (healthcare-associated pneumonia) with metabolic encephalopathy:   - Afebrile   - Leucocytosis resolved   - Left lower lobe infiltrate with parapneumonic effusion   - Mentation resolved to baseline   - BC/UC (-), RVP (-)  - Continue cefepime, completed 3 day zithromax  - Continue supportive care  -Dr Lozano follow up appreciated    Atrial fibrillation:   -cont. Digoxin 125mcg po daily  -cont. Atenolol 25mg po daily  -INR subtherapeutic  -Increased Coumadin to 5 mg last night    HTN  -cont. Enalapril 2.5mg po daily    Anxiety disorder  - Cont. Duloxetine 20mg po daily    Code Status  - Full Code    DVT Prophylaxis   - On Coumadin 79 y/o F with PMH significant for A-fib on coumadin, CAD s/p stents, TIA BIBA from [facility] found to have Left lower lobe infiltrate with parapneumonic effusion     HCAP (healthcare-associated pneumonia) with metabolic encephalopathy:   probable gram negative rods pneumonia  - Afebrile   - Leucocytosis resolved   - Left lower lobe infiltrate with parapneumonic effusion   - Mentation resolved to baseline   - BC/UC (-), RVP (-)  - Continue cefepime, completed 3 day zithromax  - Continue supportive care  -Dr Loazno follow up appreciated    Atrial fibrillation:   -cont. Digoxin 125mcg po daily  -cont. Atenolol 25mg po daily  -INR subtherapeutic  -Increased Coumadin to 5 mg last night    HTN  -cont. Enalapril 2.5mg po daily    Anxiety disorder  - Cont. Duloxetine 20mg po daily    Code Status  - Full Code    DVT Prophylaxis   - On Coumadin

## 2019-02-21 VITALS
TEMPERATURE: 98 F | SYSTOLIC BLOOD PRESSURE: 94 MMHG | OXYGEN SATURATION: 96 % | RESPIRATION RATE: 17 BRPM | DIASTOLIC BLOOD PRESSURE: 56 MMHG | HEART RATE: 70 BPM

## 2019-02-21 LAB
INR BLD: 1.74 RATIO — HIGH (ref 0.88–1.16)
PROTHROM AB SERPL-ACNC: 19.7 SEC — HIGH (ref 10–12.9)

## 2019-02-21 RX ORDER — CEFUROXIME AXETIL 250 MG
1 TABLET ORAL
Qty: 6 | Refills: 0
Start: 2019-02-21 | End: 2019-02-23

## 2019-02-21 RX ADMIN — Medication 500 MILLIGRAM(S): at 04:47

## 2019-02-21 RX ADMIN — PANTOPRAZOLE SODIUM 40 MILLIGRAM(S): 20 TABLET, DELAYED RELEASE ORAL at 04:47

## 2019-02-21 RX ADMIN — Medication 5 MILLIGRAM(S): at 11:11

## 2019-02-21 RX ADMIN — ENOXAPARIN SODIUM 40 MILLIGRAM(S): 100 INJECTION SUBCUTANEOUS at 11:09

## 2019-02-21 RX ADMIN — DULOXETINE HYDROCHLORIDE 20 MILLIGRAM(S): 30 CAPSULE, DELAYED RELEASE ORAL at 11:11

## 2019-02-24 ENCOUNTER — EMERGENCY (EMERGENCY)
Facility: HOSPITAL | Age: 79
LOS: 0 days | Discharge: HOME | End: 2019-02-25
Attending: EMERGENCY MEDICINE | Admitting: EMERGENCY MEDICINE
Payer: MEDICARE

## 2019-02-24 VITALS
OXYGEN SATURATION: 99 % | SYSTOLIC BLOOD PRESSURE: 119 MMHG | WEIGHT: 126.1 LBS | HEART RATE: 56 BPM | TEMPERATURE: 98 F | HEIGHT: 65 IN | DIASTOLIC BLOOD PRESSURE: 69 MMHG | RESPIRATION RATE: 16 BRPM

## 2019-02-24 DIAGNOSIS — R26.89 OTHER ABNORMALITIES OF GAIT AND MOBILITY: ICD-10-CM

## 2019-02-24 DIAGNOSIS — Z98.62 PERIPHERAL VASCULAR ANGIOPLASTY STATUS: Chronic | ICD-10-CM

## 2019-02-24 DIAGNOSIS — Z98.890 OTHER SPECIFIED POSTPROCEDURAL STATES: Chronic | ICD-10-CM

## 2019-02-24 DIAGNOSIS — I25.10 ATHEROSCLEROTIC HEART DISEASE OF NATIVE CORONARY ARTERY WITHOUT ANGINA PECTORIS: ICD-10-CM

## 2019-02-24 DIAGNOSIS — I10 ESSENTIAL (PRIMARY) HYPERTENSION: ICD-10-CM

## 2019-02-24 DIAGNOSIS — Z16.21 RESISTANCE TO VANCOMYCIN: ICD-10-CM

## 2019-02-24 DIAGNOSIS — Z98.49 CATARACT EXTRACTION STATUS, UNSPECIFIED EYE: Chronic | ICD-10-CM

## 2019-02-24 DIAGNOSIS — N30.01 ACUTE CYSTITIS WITH HEMATURIA: ICD-10-CM

## 2019-02-24 DIAGNOSIS — Z90.89 ACQUIRED ABSENCE OF OTHER ORGANS: Chronic | ICD-10-CM

## 2019-02-24 DIAGNOSIS — Z91.81 HISTORY OF FALLING: ICD-10-CM

## 2019-02-24 DIAGNOSIS — M06.9 RHEUMATOID ARTHRITIS, UNSPECIFIED: ICD-10-CM

## 2019-02-24 DIAGNOSIS — I51.81 TAKOTSUBO SYNDROME: ICD-10-CM

## 2019-02-24 DIAGNOSIS — Z90.5 ACQUIRED ABSENCE OF KIDNEY: Chronic | ICD-10-CM

## 2019-02-24 DIAGNOSIS — Z85.53 PERSONAL HISTORY OF MALIGNANT NEOPLASM OF RENAL PELVIS: ICD-10-CM

## 2019-02-24 DIAGNOSIS — Z79.899 OTHER LONG TERM (CURRENT) DRUG THERAPY: ICD-10-CM

## 2019-02-24 DIAGNOSIS — I48.91 UNSPECIFIED ATRIAL FIBRILLATION: ICD-10-CM

## 2019-02-24 DIAGNOSIS — Z98.89 OTHER SPECIFIED POSTPROCEDURAL STATES: Chronic | ICD-10-CM

## 2019-02-24 DIAGNOSIS — B95.2 ENTEROCOCCUS AS THE CAUSE OF DISEASES CLASSIFIED ELSEWHERE: ICD-10-CM

## 2019-02-24 DIAGNOSIS — R55 SYNCOPE AND COLLAPSE: ICD-10-CM

## 2019-02-24 DIAGNOSIS — F41.8 OTHER SPECIFIED ANXIETY DISORDERS: ICD-10-CM

## 2019-02-24 DIAGNOSIS — Z95.5 PRESENCE OF CORONARY ANGIOPLASTY IMPLANT AND GRAFT: ICD-10-CM

## 2019-02-24 DIAGNOSIS — R53.1 WEAKNESS: ICD-10-CM

## 2019-02-24 DIAGNOSIS — Z79.01 LONG TERM (CURRENT) USE OF ANTICOAGULANTS: ICD-10-CM

## 2019-02-24 LAB
ALBUMIN SERPL ELPH-MCNC: 3.4 G/DL — SIGNIFICANT CHANGE UP (ref 3.3–5)
ALP SERPL-CCNC: 84 U/L — SIGNIFICANT CHANGE UP (ref 40–120)
ALT FLD-CCNC: 48 U/L — SIGNIFICANT CHANGE UP (ref 12–78)
ANION GAP SERPL CALC-SCNC: 6 MMOL/L — SIGNIFICANT CHANGE UP (ref 5–17)
APPEARANCE UR: ABNORMAL
APTT BLD: 32.3 SEC — SIGNIFICANT CHANGE UP (ref 27.5–36.3)
AST SERPL-CCNC: 43 U/L — HIGH (ref 15–37)
BACTERIA # UR AUTO: ABNORMAL
BASOPHILS # BLD AUTO: 0.03 K/UL — SIGNIFICANT CHANGE UP (ref 0–0.2)
BASOPHILS NFR BLD AUTO: 0.3 % — SIGNIFICANT CHANGE UP (ref 0–2)
BILIRUB SERPL-MCNC: 0.5 MG/DL — SIGNIFICANT CHANGE UP (ref 0.2–1.2)
BILIRUB UR-MCNC: NEGATIVE — SIGNIFICANT CHANGE UP
BUN SERPL-MCNC: 29 MG/DL — HIGH (ref 7–23)
CALCIUM SERPL-MCNC: 9.3 MG/DL — SIGNIFICANT CHANGE UP (ref 8.5–10.1)
CHLORIDE SERPL-SCNC: 103 MMOL/L — SIGNIFICANT CHANGE UP (ref 96–108)
CO2 SERPL-SCNC: 27 MMOL/L — SIGNIFICANT CHANGE UP (ref 22–31)
COLOR SPEC: YELLOW — SIGNIFICANT CHANGE UP
COMMENT - URINE: SIGNIFICANT CHANGE UP
CREAT SERPL-MCNC: 1.26 MG/DL — SIGNIFICANT CHANGE UP (ref 0.5–1.3)
DIFF PNL FLD: ABNORMAL
EOSINOPHIL # BLD AUTO: 0.1 K/UL — SIGNIFICANT CHANGE UP (ref 0–0.5)
EOSINOPHIL NFR BLD AUTO: 1.1 % — SIGNIFICANT CHANGE UP (ref 0–6)
EPI CELLS # UR: ABNORMAL
GLUCOSE SERPL-MCNC: 96 MG/DL — SIGNIFICANT CHANGE UP (ref 70–99)
GLUCOSE UR QL: NEGATIVE MG/DL — SIGNIFICANT CHANGE UP
HCT VFR BLD CALC: 41.9 % — SIGNIFICANT CHANGE UP (ref 34.5–45)
HGB BLD-MCNC: 13.6 G/DL — SIGNIFICANT CHANGE UP (ref 11.5–15.5)
IMM GRANULOCYTES NFR BLD AUTO: 0.4 % — SIGNIFICANT CHANGE UP (ref 0–1.5)
INR BLD: 1.93 RATIO — HIGH (ref 0.88–1.16)
KETONES UR-MCNC: NEGATIVE — SIGNIFICANT CHANGE UP
LEUKOCYTE ESTERASE UR-ACNC: ABNORMAL
LYMPHOCYTES # BLD AUTO: 1.43 K/UL — SIGNIFICANT CHANGE UP (ref 1–3.3)
LYMPHOCYTES # BLD AUTO: 15.4 % — SIGNIFICANT CHANGE UP (ref 13–44)
MCHC RBC-ENTMCNC: 30.7 PG — SIGNIFICANT CHANGE UP (ref 27–34)
MCHC RBC-ENTMCNC: 32.5 GM/DL — SIGNIFICANT CHANGE UP (ref 32–36)
MCV RBC AUTO: 94.6 FL — SIGNIFICANT CHANGE UP (ref 80–100)
MONOCYTES # BLD AUTO: 0.55 K/UL — SIGNIFICANT CHANGE UP (ref 0–0.9)
MONOCYTES NFR BLD AUTO: 5.9 % — SIGNIFICANT CHANGE UP (ref 2–14)
NEUTROPHILS # BLD AUTO: 7.13 K/UL — SIGNIFICANT CHANGE UP (ref 1.8–7.4)
NEUTROPHILS NFR BLD AUTO: 76.9 % — SIGNIFICANT CHANGE UP (ref 43–77)
NITRITE UR-MCNC: NEGATIVE — SIGNIFICANT CHANGE UP
NRBC # BLD: 0 /100 WBCS — SIGNIFICANT CHANGE UP (ref 0–0)
PH UR: 6.5 — SIGNIFICANT CHANGE UP (ref 5–8)
PLATELET # BLD AUTO: 252 K/UL — SIGNIFICANT CHANGE UP (ref 150–400)
POTASSIUM SERPL-MCNC: 4.1 MMOL/L — SIGNIFICANT CHANGE UP (ref 3.5–5.3)
POTASSIUM SERPL-SCNC: 4.1 MMOL/L — SIGNIFICANT CHANGE UP (ref 3.5–5.3)
PROT SERPL-MCNC: 7.7 GM/DL — SIGNIFICANT CHANGE UP (ref 6–8.3)
PROT UR-MCNC: 15 MG/DL
PROTHROM AB SERPL-ACNC: 21.9 SEC — HIGH (ref 10–12.9)
RBC # BLD: 4.43 M/UL — SIGNIFICANT CHANGE UP (ref 3.8–5.2)
RBC # FLD: 12.6 % — SIGNIFICANT CHANGE UP (ref 10.3–14.5)
RBC CASTS # UR COMP ASSIST: SIGNIFICANT CHANGE UP /HPF (ref 0–4)
SODIUM SERPL-SCNC: 136 MMOL/L — SIGNIFICANT CHANGE UP (ref 135–145)
SP GR SPEC: 1.01 — SIGNIFICANT CHANGE UP (ref 1.01–1.02)
TROPONIN I SERPL-MCNC: <0.015 NG/ML — SIGNIFICANT CHANGE UP (ref 0.01–0.04)
UROBILINOGEN FLD QL: NEGATIVE MG/DL — SIGNIFICANT CHANGE UP
WBC # BLD: 9.28 K/UL — SIGNIFICANT CHANGE UP (ref 3.8–10.5)
WBC # FLD AUTO: 9.28 K/UL — SIGNIFICANT CHANGE UP (ref 3.8–10.5)
WBC UR QL: SIGNIFICANT CHANGE UP

## 2019-02-24 PROCEDURE — 71045 X-RAY EXAM CHEST 1 VIEW: CPT | Mod: 26

## 2019-02-24 PROCEDURE — 70450 CT HEAD/BRAIN W/O DYE: CPT | Mod: 26

## 2019-02-24 PROCEDURE — 99285 EMERGENCY DEPT VISIT HI MDM: CPT

## 2019-02-24 PROCEDURE — 93010 ELECTROCARDIOGRAM REPORT: CPT

## 2019-02-24 RX ORDER — SODIUM CHLORIDE 9 MG/ML
1000 INJECTION INTRAMUSCULAR; INTRAVENOUS; SUBCUTANEOUS
Qty: 0 | Refills: 0 | Status: DISCONTINUED | OUTPATIENT
Start: 2019-02-24 | End: 2019-02-25

## 2019-02-24 RX ORDER — CEPHALEXIN 500 MG
500 CAPSULE ORAL EVERY 12 HOURS
Qty: 0 | Refills: 0 | Status: DISCONTINUED | OUTPATIENT
Start: 2019-02-24 | End: 2019-02-25

## 2019-02-24 RX ADMIN — SODIUM CHLORIDE 1000 MILLILITER(S): 9 INJECTION INTRAMUSCULAR; INTRAVENOUS; SUBCUTANEOUS at 12:04

## 2019-02-24 RX ADMIN — SODIUM CHLORIDE 125 MILLILITER(S): 9 INJECTION INTRAMUSCULAR; INTRAVENOUS; SUBCUTANEOUS at 12:04

## 2019-02-24 RX ADMIN — Medication 500 MILLIGRAM(S): at 18:14

## 2019-02-24 NOTE — ED CLERICAL - NS ED CLERK NOTE PRE-ARRIVAL INFORMATION; ADDITIONAL PRE-ARRIVAL INFORMATION
This patient is enrolled in the readmission program and has active care navigation. This patient can be followed up by the care navigation team within 24 hours. To arrange close follow-up or to obtain additional clinical information about this patient, please call the contact number above. Please call the hospitalist as needed to collaborate on further medical management ([phone])

## 2019-02-24 NOTE — ED PROVIDER NOTE - CARE PLAN
Principal Discharge DX:	Weakness Principal Discharge DX:	Weakness  Secondary Diagnosis:	Acute cystitis without hematuria  Secondary Diagnosis:	Gait abnormality

## 2019-02-24 NOTE — ED ADULT TRIAGE NOTE - CHIEF COMPLAINT QUOTE
Pt BIBEMS from [facility] for syncope and collapse last night, family requested pt eval in ED for generalized weakness today

## 2019-02-24 NOTE — ED PROVIDER NOTE - PROGRESS NOTE DETAILS
Family at bedside -- state she did not have syncope, just has been very weak since discharge.  After previous admissions has required short term inpatient rehab.  May require now provided w/u unremarkable in ED. Family at bedside -- state she did not have syncope, just has been very weak since discharge.  After previous admissions has required short term inpatient rehab.  May require now as well provided w/u unremarkable in ED. Patient comfortable, w/u negative.  Seen by s/w -- for inpatient rehabilitation placement in am [facility] PT evaluated in the ED today for syncope which she denies during my interview. States she has generalized weakness like her knees are giving out when she tries to ambulate.  On my exam PT able to stand but has difficulty walking secondary to generalized weakness.  NIH 0.  No focal neurological sign.  Likely deconditioning.  Possible UTI will treat with keflex.  No other concerning findings.  Pt evaluated by WILLIAM will receive placement tomorrow. CC:  Pt cleared for D/C back to A/L by PT: ambulation w/ walker assistance.  Daughter at bedside, agrees to take pt back to A/L.  Sending escript for UTI.

## 2019-02-24 NOTE — ED ADULT NURSE REASSESSMENT NOTE - COMFORT CARE
side rails up/assisted to bedpan/repositioned/No further assistance needed at this time./warm blanket provided

## 2019-02-24 NOTE — ED PROVIDER NOTE - OBJECTIVE STATEMENT
79 y/o female with PMHx of Takotsubo syndrome, CAD s/p stent, Afib, depression, anxiety, HTN, RA, osteoporosis, renal CA, s/p partial nephrectomy presents to the ED BIBEMS from [facility] s/p mechanical fall (?syncope) last night and generalized weakness today. Pt reports she lost her balance and fell last night. +ecchymosis to back and bilateral LEs. Pt reports recently discharged from [facility] on 2/21/19 with dx of PNA. On Coumadin. Hx limited, pt is a poor historian. 77 y/o female with PMHx of Takotsubo syndrome, CAD s/p stent, Afib, depression, anxiety, HTN, RA, osteoporosis, renal CA, s/p partial nephrectomy presents to the ED BIBEMS from [facility] s/p mechanical fall last night and generalized weakness today. Pt reports she lost her balance and fell last night. +ecchymosis to back and bilateral LEs. Pt reports recently discharged from [facility] on 2/21/19 with dx of PNA. On Coumadin. Hx limited, pt is a poor historian.

## 2019-02-24 NOTE — ED PROVIDER NOTE - CLINICAL SUMMARY MEDICAL DECISION MAKING FREE TEXT BOX
79 y/o female with PMHx of Takotsubo syndrome, CAD s/p stent, Afib, depression, anxiety, HTN, RA, osteoporosis, renal CA, s/p partial nephrectomy presents to the ED BIBEMS from [facility] s/p mechanical fall (?syncope) last night and generalized weakness today. Recent dx of PNA. Hx limited, pt is a poor historian. Plan: CT head, XR, labs, EKG, likely admit.

## 2019-02-25 VITALS
RESPIRATION RATE: 18 BRPM | SYSTOLIC BLOOD PRESSURE: 137 MMHG | HEART RATE: 64 BPM | OXYGEN SATURATION: 98 % | TEMPERATURE: 97 F | DIASTOLIC BLOOD PRESSURE: 70 MMHG

## 2019-02-25 RX ORDER — METHENAMINE MANDELATE 1 G
1 TABLET ORAL
Qty: 14 | Refills: 0
Start: 2019-02-25 | End: 2019-03-03

## 2019-02-25 RX ORDER — CEPHALEXIN 500 MG
1 CAPSULE ORAL
Qty: 21 | Refills: 0 | OUTPATIENT
Start: 2019-02-25 | End: 2019-03-03

## 2019-02-25 RX ADMIN — Medication 500 MILLIGRAM(S): at 05:36

## 2019-02-25 NOTE — PHYSICAL THERAPY INITIAL EVALUATION ADULT - PLANNED THERAPY INTERVENTIONS, PT EVAL
strengthening/transfer training/bed mobility training/gait training/Eval, amb, transfers x 10'. transfer training/strengthening/Eval, amb, transfers x 10'. Pt left on ER stretcher with bilateral rails up and locked./bed mobility training/gait training

## 2019-02-25 NOTE — PHYSICAL THERAPY INITIAL EVALUATION ADULT - GENERAL OBSERVATIONS, REHAB EVAL
Pt found coming out of bathroom with nursing assistant. Pt c/o chronic achiness bilateral LE's unable to give number.

## 2019-02-25 NOTE — ED ADULT NURSE REASSESSMENT NOTE - NS ED NURSE REASSESS COMMENT FT1
pt assisted on and off bedpan, suly care provided. pt updated on plan of care, awaiting rehab placement in the morning.

## 2019-02-25 NOTE — PHYSICAL THERAPY INITIAL EVALUATION ADULT - PERTINENT HX OF CURRENT PROBLEM, REHAB EVAL
PT admitted to  secondary to syncope and collapse. CT head: neg for acute changes. Pt admitted to  secondary to syncope and collapse. CT head: neg for acute changes.

## 2019-02-26 DIAGNOSIS — I48.2 CHRONIC ATRIAL FIBRILLATION: ICD-10-CM

## 2019-02-26 DIAGNOSIS — R50.9 FEVER, UNSPECIFIED: ICD-10-CM

## 2019-02-26 DIAGNOSIS — J15.6 PNEUMONIA DUE TO OTHER GRAM-NEGATIVE BACTERIA: ICD-10-CM

## 2019-02-26 DIAGNOSIS — Z79.899 OTHER LONG TERM (CURRENT) DRUG THERAPY: ICD-10-CM

## 2019-02-26 DIAGNOSIS — Z95.5 PRESENCE OF CORONARY ANGIOPLASTY IMPLANT AND GRAFT: ICD-10-CM

## 2019-02-26 DIAGNOSIS — G93.41 METABOLIC ENCEPHALOPATHY: ICD-10-CM

## 2019-02-26 DIAGNOSIS — Z79.01 LONG TERM (CURRENT) USE OF ANTICOAGULANTS: ICD-10-CM

## 2019-02-26 DIAGNOSIS — I25.10 ATHEROSCLEROTIC HEART DISEASE OF NATIVE CORONARY ARTERY WITHOUT ANGINA PECTORIS: ICD-10-CM

## 2019-02-26 DIAGNOSIS — I10 ESSENTIAL (PRIMARY) HYPERTENSION: ICD-10-CM

## 2019-02-26 DIAGNOSIS — Z85.528 PERSONAL HISTORY OF OTHER MALIGNANT NEOPLASM OF KIDNEY: ICD-10-CM

## 2019-02-26 DIAGNOSIS — Z88.0 ALLERGY STATUS TO PENICILLIN: ICD-10-CM

## 2019-02-26 DIAGNOSIS — F41.9 ANXIETY DISORDER, UNSPECIFIED: ICD-10-CM

## 2019-02-26 DIAGNOSIS — Z87.891 PERSONAL HISTORY OF NICOTINE DEPENDENCE: ICD-10-CM

## 2019-02-26 DIAGNOSIS — Z86.73 PERSONAL HISTORY OF TRANSIENT ISCHEMIC ATTACK (TIA), AND CEREBRAL INFARCTION WITHOUT RESIDUAL DEFICITS: ICD-10-CM

## 2019-02-26 DIAGNOSIS — M06.9 RHEUMATOID ARTHRITIS, UNSPECIFIED: ICD-10-CM

## 2019-02-26 LAB
-  AMPICILLIN: SIGNIFICANT CHANGE UP
-  CIPROFLOXACIN: SIGNIFICANT CHANGE UP
-  LEVOFLOXACIN: SIGNIFICANT CHANGE UP
-  LINEZOLID: SIGNIFICANT CHANGE UP
-  NITROFURANTOIN: SIGNIFICANT CHANGE UP
-  TETRACYCLINE: SIGNIFICANT CHANGE UP
-  VANCOMYCIN: SIGNIFICANT CHANGE UP
CULTURE RESULTS: SIGNIFICANT CHANGE UP
METHOD TYPE: SIGNIFICANT CHANGE UP
ORGANISM # SPEC MICROSCOPIC CNT: SIGNIFICANT CHANGE UP
ORGANISM # SPEC MICROSCOPIC CNT: SIGNIFICANT CHANGE UP
SPECIMEN SOURCE: SIGNIFICANT CHANGE UP

## 2019-02-26 NOTE — ED POST DISCHARGE NOTE - DETAILS
left message for RNs at [facility] to call back ED for results  Rosanna Ho PA-C Contacted daughter Madiha and reported results of +UC. Instructed to have mother (patient) to STOP Macrobid and start Doxycycline. Sandee NP

## 2019-03-19 RX ORDER — PANTOPRAZOLE SODIUM 40 MG/1
40 TABLET, DELAYED RELEASE ORAL DAILY
Qty: 30 | Refills: 0 | Status: ACTIVE | COMMUNITY
Start: 2019-03-19

## 2019-03-29 ENCOUNTER — TRANSCRIPTION ENCOUNTER (OUTPATIENT)
Age: 79
End: 2019-03-29

## 2019-06-07 NOTE — PATIENT PROFILE ADULT. - MEDICATIONS BROUGHT TO HOSPITAL, PROFILE
What Is The Reason For Today's Visit?: History of Non-Melanoma Skin Cancer How Many Skin Cancers Have You Had?: more than one When Was Your Last Cancer Diagnosed?: 2018 no

## 2019-10-07 ENCOUNTER — INPATIENT (INPATIENT)
Facility: HOSPITAL | Age: 79
LOS: 2 days | Discharge: HOME HEALTH | DRG: 312 | End: 2019-10-10
Attending: HOSPITALIST | Admitting: INTERNAL MEDICINE
Payer: MEDICARE

## 2019-10-07 VITALS
HEIGHT: 65 IN | WEIGHT: 126.1 LBS | TEMPERATURE: 98 F | SYSTOLIC BLOOD PRESSURE: 192 MMHG | OXYGEN SATURATION: 100 % | RESPIRATION RATE: 20 BRPM | HEART RATE: 77 BPM | DIASTOLIC BLOOD PRESSURE: 97 MMHG

## 2019-10-07 DIAGNOSIS — Z98.49 CATARACT EXTRACTION STATUS, UNSPECIFIED EYE: Chronic | ICD-10-CM

## 2019-10-07 DIAGNOSIS — Z90.5 ACQUIRED ABSENCE OF KIDNEY: Chronic | ICD-10-CM

## 2019-10-07 DIAGNOSIS — Z98.890 OTHER SPECIFIED POSTPROCEDURAL STATES: Chronic | ICD-10-CM

## 2019-10-07 DIAGNOSIS — Z90.89 ACQUIRED ABSENCE OF OTHER ORGANS: Chronic | ICD-10-CM

## 2019-10-07 DIAGNOSIS — Z98.89 OTHER SPECIFIED POSTPROCEDURAL STATES: Chronic | ICD-10-CM

## 2019-10-07 DIAGNOSIS — Z98.62 PERIPHERAL VASCULAR ANGIOPLASTY STATUS: Chronic | ICD-10-CM

## 2019-10-07 LAB
CK SERPL-CCNC: 156 U/L — SIGNIFICANT CHANGE UP (ref 26–192)
HCT VFR BLD CALC: 46.5 % — HIGH (ref 34.5–45)
HGB BLD-MCNC: 15.2 G/DL — SIGNIFICANT CHANGE UP (ref 11.5–15.5)
MCHC RBC-ENTMCNC: 29.9 PG — SIGNIFICANT CHANGE UP (ref 27–34)
MCHC RBC-ENTMCNC: 32.7 GM/DL — SIGNIFICANT CHANGE UP (ref 32–36)
MCV RBC AUTO: 91.5 FL — SIGNIFICANT CHANGE UP (ref 80–100)
PLATELET # BLD AUTO: 234 K/UL — SIGNIFICANT CHANGE UP (ref 150–400)
RBC # BLD: 5.08 M/UL — SIGNIFICANT CHANGE UP (ref 3.8–5.2)
RBC # FLD: 13.4 % — SIGNIFICANT CHANGE UP (ref 10.3–14.5)
WBC # BLD: 9.2 K/UL — SIGNIFICANT CHANGE UP (ref 3.8–10.5)
WBC # FLD AUTO: 9.2 K/UL — SIGNIFICANT CHANGE UP (ref 3.8–10.5)

## 2019-10-07 PROCEDURE — 80053 COMPREHEN METABOLIC PANEL: CPT

## 2019-10-07 PROCEDURE — 84443 ASSAY THYROID STIM HORMONE: CPT

## 2019-10-07 PROCEDURE — 84100 ASSAY OF PHOSPHORUS: CPT

## 2019-10-07 PROCEDURE — 82962 GLUCOSE BLOOD TEST: CPT

## 2019-10-07 PROCEDURE — 97116 GAIT TRAINING THERAPY: CPT | Mod: GP

## 2019-10-07 PROCEDURE — 93306 TTE W/DOPPLER COMPLETE: CPT

## 2019-10-07 PROCEDURE — 84484 ASSAY OF TROPONIN QUANT: CPT

## 2019-10-07 PROCEDURE — 80162 ASSAY OF DIGOXIN TOTAL: CPT

## 2019-10-07 PROCEDURE — 93005 ELECTROCARDIOGRAM TRACING: CPT

## 2019-10-07 PROCEDURE — 85730 THROMBOPLASTIN TIME PARTIAL: CPT

## 2019-10-07 PROCEDURE — 83735 ASSAY OF MAGNESIUM: CPT

## 2019-10-07 PROCEDURE — 97162 PT EVAL MOD COMPLEX 30 MIN: CPT | Mod: GP

## 2019-10-07 PROCEDURE — 87086 URINE CULTURE/COLONY COUNT: CPT

## 2019-10-07 PROCEDURE — 85610 PROTHROMBIN TIME: CPT

## 2019-10-07 PROCEDURE — 81001 URINALYSIS AUTO W/SCOPE: CPT

## 2019-10-07 PROCEDURE — 36415 COLL VENOUS BLD VENIPUNCTURE: CPT

## 2019-10-07 PROCEDURE — 84439 ASSAY OF FREE THYROXINE: CPT

## 2019-10-07 PROCEDURE — 85027 COMPLETE CBC AUTOMATED: CPT

## 2019-10-07 PROCEDURE — 97530 THERAPEUTIC ACTIVITIES: CPT | Mod: GP

## 2019-10-07 PROCEDURE — 87186 SC STD MICRODIL/AGAR DIL: CPT

## 2019-10-07 RX ORDER — WARFARIN SODIUM 2.5 MG/1
1 TABLET ORAL
Qty: 0 | Refills: 0 | DISCHARGE

## 2019-10-07 RX ORDER — PANTOPRAZOLE SODIUM 20 MG/1
40 TABLET, DELAYED RELEASE ORAL
Refills: 0 | Status: DISCONTINUED | OUTPATIENT
Start: 2019-10-07 | End: 2019-10-10

## 2019-10-07 RX ORDER — OMEGA-3 ACID ETHYL ESTERS 1 G
1 CAPSULE ORAL
Qty: 0 | Refills: 0 | DISCHARGE

## 2019-10-07 RX ORDER — ACETAMINOPHEN 500 MG
500 TABLET ORAL DAILY
Refills: 0 | Status: DISCONTINUED | OUTPATIENT
Start: 2019-10-07 | End: 2019-10-10

## 2019-10-07 RX ORDER — DIGOXIN 250 MCG
0.12 TABLET ORAL DAILY
Refills: 0 | Status: DISCONTINUED | OUTPATIENT
Start: 2019-10-07 | End: 2019-10-08

## 2019-10-07 RX ORDER — DOCUSATE SODIUM 100 MG
2 CAPSULE ORAL
Qty: 0 | Refills: 0 | DISCHARGE

## 2019-10-07 RX ORDER — ATENOLOL 25 MG/1
25 TABLET ORAL AT BEDTIME
Refills: 0 | Status: DISCONTINUED | OUTPATIENT
Start: 2019-10-07 | End: 2019-10-09

## 2019-10-07 RX ORDER — SODIUM CHLORIDE 9 MG/ML
1000 INJECTION INTRAMUSCULAR; INTRAVENOUS; SUBCUTANEOUS
Refills: 0 | Status: DISCONTINUED | OUTPATIENT
Start: 2019-10-07 | End: 2019-10-09

## 2019-10-07 RX ORDER — ASPIRIN/CALCIUM CARB/MAGNESIUM 324 MG
81 TABLET ORAL ONCE
Refills: 0 | Status: COMPLETED | OUTPATIENT
Start: 2019-10-07 | End: 2019-10-07

## 2019-10-07 RX ORDER — WARFARIN SODIUM 2.5 MG/1
5 TABLET ORAL DAILY
Refills: 0 | Status: DISCONTINUED | OUTPATIENT
Start: 2019-10-07 | End: 2019-10-09

## 2019-10-07 RX ORDER — DULOXETINE HYDROCHLORIDE 30 MG/1
1 CAPSULE, DELAYED RELEASE ORAL
Qty: 0 | Refills: 0 | DISCHARGE

## 2019-10-07 RX ORDER — CHOLECALCIFEROL (VITAMIN D3) 125 MCG
1 CAPSULE ORAL
Qty: 0 | Refills: 0 | DISCHARGE

## 2019-10-07 RX ORDER — ASCORBIC ACID 60 MG
1 TABLET,CHEWABLE ORAL
Qty: 0 | Refills: 0 | DISCHARGE

## 2019-10-07 RX ORDER — SERTRALINE 25 MG/1
50 TABLET, FILM COATED ORAL DAILY
Refills: 0 | Status: DISCONTINUED | OUTPATIENT
Start: 2019-10-07 | End: 2019-10-10

## 2019-10-07 RX ORDER — ACETAMINOPHEN 500 MG
650 TABLET ORAL ONCE
Refills: 0 | Status: COMPLETED | OUTPATIENT
Start: 2019-10-07 | End: 2019-10-07

## 2019-10-07 RX ORDER — MAGNESIUM OXIDE 400 MG ORAL TABLET 241.3 MG
1 TABLET ORAL
Qty: 0 | Refills: 0 | DISCHARGE

## 2019-10-07 RX ORDER — KETOROLAC TROMETHAMINE 30 MG/ML
10 SYRINGE (ML) INJECTION EVERY 8 HOURS
Refills: 0 | Status: DISCONTINUED | OUTPATIENT
Start: 2019-10-07 | End: 2019-10-09

## 2019-10-07 RX ORDER — OXYBUTYNIN CHLORIDE 5 MG
1 TABLET ORAL
Qty: 0 | Refills: 0 | DISCHARGE

## 2019-10-07 RX ADMIN — Medication 2.5 MILLIGRAM(S): at 22:31

## 2019-10-07 RX ADMIN — Medication 500 MILLIGRAM(S): at 23:00

## 2019-10-07 RX ADMIN — WARFARIN SODIUM 5 MILLIGRAM(S): 2.5 TABLET ORAL at 22:21

## 2019-10-07 RX ADMIN — Medication 650 MILLIGRAM(S): at 18:56

## 2019-10-07 RX ADMIN — SODIUM CHLORIDE 100 MILLILITER(S): 9 INJECTION INTRAMUSCULAR; INTRAVENOUS; SUBCUTANEOUS at 22:31

## 2019-10-07 RX ADMIN — Medication 81 MILLIGRAM(S): at 17:56

## 2019-10-07 RX ADMIN — ATENOLOL 25 MILLIGRAM(S): 25 TABLET ORAL at 22:21

## 2019-10-07 RX ADMIN — Medication 500 MILLIGRAM(S): at 22:16

## 2019-10-07 RX ADMIN — Medication 650 MILLIGRAM(S): at 17:56

## 2019-10-07 NOTE — H&P ADULT - NSHPPHYSICALEXAM_GEN_ALL_CORE
Vital Signs Last 24 Hrs  T(C): 36.4 (07 Oct 2019 21:21), Max: 36.8 (07 Oct 2019 19:02)  T(F): 97.6 (07 Oct 2019 21:21), Max: 98.2 (07 Oct 2019 19:02)  HR: 67 (07 Oct 2019 21:21) (67 - 77)  BP: 160/84 (07 Oct 2019 21:21) (153/84 - 192/97)  BP(mean): --  RR: 19 (07 Oct 2019 21:21) (19 - 20)  SpO2: 95% (07 Oct 2019 21:21) (95% - 100%)

## 2019-10-07 NOTE — ED ADULT TRIAGE NOTE - CHIEF COMPLAINT QUOTE
Patient brought in by EMS for unwitnessed fall. Patient complaining of pain to back of head, nose pain, and nausea. patient on coumadin. trauma alert called in triage,

## 2019-10-07 NOTE — H&P ADULT - NSICDXFAMILYHX_GEN_ALL_CORE_FT
FAMILY HISTORY:  Father  Still living? No  Family history of MI (myocardial infarction), Age at diagnosis: Age Unknown    Mother  Still living? No  Family history of other heart disease, Age at diagnosis: Age Unknown

## 2019-10-07 NOTE — H&P ADULT - NSICDXPASTSURGICALHX_GEN_ALL_CORE_FT
PAST SURGICAL HISTORY:  H/O breast biopsy right?    H/O partial nephrectomy right 2006    History of cataract surgery, unspecified laterality b/l 2016    History of percutaneous coronary intervention stent RCA    S/P angioplasty 1 stent 1998    S/P colonoscopy     S/P tonsillectomy 1948

## 2019-10-07 NOTE — ED PROVIDER NOTE - ENMT, MLM
Airway patent, Nasal mucosa clear. Mouth with normal mucosa. Throat has no vesicles, no oropharyngeal exudates and uvula is midline. +TTP posterior scalp

## 2019-10-07 NOTE — ED ADULT NURSE NOTE - NSIMPLEMENTINTERV_GEN_ALL_ED
Implemented All Fall with Harm Risk Interventions:  [place] to call system. Call bell, personal items and telephone within reach. Instruct patient to call for assistance. Room bathroom lighting operational. Non-slip footwear when patient is off stretcher. Physically safe environment: no spills, clutter or unnecessary equipment. Stretcher in lowest position, wheels locked, appropriate side rails in place. Provide visual cue, wrist band, yellow gown, etc. Monitor gait and stability. Monitor for mental status changes and reorient to person, place, and time. Review medications for side effects contributing to fall risk. Reinforce activity limits and safety measures with patient and family. Provide visual clues: red socks.

## 2019-10-07 NOTE — H&P ADULT - ASSESSMENT
78 year old female pt with unwitnessed fall/likely syncopal episode    -Syncope  DDX: Vasovagal, Orthostatic, arrhthymias, mechanical(cardiac),   -monitor on telemetry  -check orthostatics  -IVF hydration  -get morning TTE  -Cardiology consult(Chauncey)  -fall precaution    #Subtherapeutic INR  -on coumadin for afib  -give 5 mg of coumadin today  -follow up am INR    #CAD  - on ACE and BB    #Afib  -currently rate controlled on Atenolol and digoxin  -on coumadin for AC but lNR is subtherapeutic.  -f/u morning INR    #HTN  -on Enalapril    #Depression  -on zoloft    #RA  -pain control with tylenol    #Advanced Directives  -Full code    #DVT ppx  -on coumadin

## 2019-10-07 NOTE — ED PROVIDER NOTE - OBJECTIVE STATEMENT
79 y/o female with PMHx of Takotsubo syndrome, CAD s/p stent, Afib on coumadin, depression, anxiety, HTN, RA, osteoporosis, right renal CA, s/p partial nephrectomy 2006 presents to the ED BIBEMS s/p unwitnessed fall 40 minutes PTA. Pt doesn't remember what occurred today; states she only remembers waking up in the ambulance. As per daughter, pt fell and hit her head. Daughter states she heard someone screaming and it was pt on the floor crying for help. Pt was not able to get up. Now has a lump on back of her head. Pt has h/o frequent fall and balance issues. PMD: Dr. Flo Samson. Cardiologist: Dr. Devaughn Ellis. Pt is a poor historian, history obtained by daughter at bedside. 77 y/o female with PMHx of Takotsubo syndrome, CAD s/p stent, Afib on coumadin, depression, anxiety, HTN, RA, osteoporosis, right renal CA, s/p partial nephrectomy 2006 presents to the ED BIBEMS s/p unwitnessed fall 40 minutes PTA. Pt doesn't remember what occurred today; states she only remembers waking up in the ambulance. As per daughter, pt fell and hit her head. Daughter states she heard someone screaming and it was pt on the floor crying for help. Pt was not able to get up. Now has a lump on back of her head. Pt has h/o frequent fall and balance issues. Pt on Coumadin. Trauma alert initiated upon arrival to ED. PMD: Dr. Flo Samson. Cardiologist: Dr. Devaughn Ellis. Pt is a poor historian, history obtained by daughter at bedside.

## 2019-10-07 NOTE — ED PROVIDER NOTE - EYES, MLM
Clear bilaterally, pupils equal, round and reactive to light. Clear bilaterally, pupils equal, round and reactive to light. EOMI. Visual fields intact x 4 quadrants.

## 2019-10-07 NOTE — ED PROVIDER NOTE - NS_ ATTENDINGSCRIBEDETAILS _ED_A_ED_FT
I Rob Sommer MD saw and examined the patient. Scribe documented for me and under my supervision. I have modified the scribe's documentation where necessary to reflect my history, physical exam and other relevant documentations pertinent to the care of the patient.

## 2019-10-07 NOTE — H&P ADULT - NSICDXPASTMEDICALHX_GEN_ALL_CORE_FT
PAST MEDICAL HISTORY:  Anxiety disorder     Atrial fibrillation     CAD (coronary artery disease)     Depressive disorder     HTN (hypertension)     Knee pain, left     OP (osteoporosis)     RA (rheumatoid arthritis)     Renal cancer, right     Stented coronary artery     Takotsubo syndrome     Urge incontinence of urine

## 2019-10-07 NOTE — H&P ADULT - HISTORY OF PRESENT ILLNESS
78 year old female patient with pertinent history of afib presented to the ED after an unwitnessed fall. Patient does not remember what she did before or after falling. Remembers being in the ambulance and the ER. Daughter at bedside states she was visiting her mother in the facility when she heard a loud cry for help; patient was later found on the floor. Denies any preceding dizziness, chest pain, palpitation, chest pain. Patient currently endorsing a headache. Patient ambulates at baseline with a walker and often time needs assistance with ambulation.

## 2019-10-07 NOTE — ED PROVIDER NOTE - CLINICAL SUMMARY MEDICAL DECISION MAKING FREE TEXT BOX
Due to pt possibly having syncope, pt is unable to recall fall, now c/o head and neck pain on coumadin. Will PAN scan w/o contrast due to pt having only 1 functioning kidney. Will start with non-con scan. Pt will be admitted to hospital. Due to pt having had a syncopal episode, pt is unable to recall fall, now c/o head and neck pain on coumadin. Will PAN scan w/o contrast due to pt having only 1 functioning kidney. Will start with non-con scan. Pt will be admitted to hospital to work out cardiogenic syncope and since patient has high risk factors.

## 2019-10-07 NOTE — ED PROVIDER NOTE - MUSCULOSKELETAL, MLM
Spine appears normal, range of motion is not limited, +TTP neck, TTP upper and lower back Spine appears normal, range of motion is not limited, +TTP neck, TTP upper and lower back. no meningismus. 5/5 strength on flexion and extension of all limbs.

## 2019-10-07 NOTE — ED PROVIDER NOTE - CARE PLAN
Principal Discharge DX:	Syncope, unspecified syncope type  Secondary Diagnosis:	Closed head injury, initial encounter

## 2019-10-08 DIAGNOSIS — S09.90XA UNSPECIFIED INJURY OF HEAD, INITIAL ENCOUNTER: ICD-10-CM

## 2019-10-08 LAB
ADD ON TEST-SPECIMEN IN LAB: SIGNIFICANT CHANGE UP
ADD ON TEST-SPECIMEN IN LAB: SIGNIFICANT CHANGE UP
ALBUMIN SERPL ELPH-MCNC: 3 G/DL — LOW (ref 3.3–5)
ALP SERPL-CCNC: 78 U/L — SIGNIFICANT CHANGE UP (ref 40–120)
ALT FLD-CCNC: 28 U/L — SIGNIFICANT CHANGE UP (ref 12–78)
ANION GAP SERPL CALC-SCNC: 5 MMOL/L — SIGNIFICANT CHANGE UP (ref 5–17)
APPEARANCE UR: ABNORMAL
APTT BLD: 28.2 SEC — SIGNIFICANT CHANGE UP (ref 27.5–36.3)
AST SERPL-CCNC: 21 U/L — SIGNIFICANT CHANGE UP (ref 15–37)
BILIRUB SERPL-MCNC: 0.7 MG/DL — SIGNIFICANT CHANGE UP (ref 0.2–1.2)
BILIRUB UR-MCNC: NEGATIVE — SIGNIFICANT CHANGE UP
BUN SERPL-MCNC: 20 MG/DL — SIGNIFICANT CHANGE UP (ref 7–23)
CALCIUM SERPL-MCNC: 8.8 MG/DL — SIGNIFICANT CHANGE UP (ref 8.5–10.1)
CHLORIDE SERPL-SCNC: 106 MMOL/L — SIGNIFICANT CHANGE UP (ref 96–108)
CO2 SERPL-SCNC: 26 MMOL/L — SIGNIFICANT CHANGE UP (ref 22–31)
COLOR SPEC: YELLOW — SIGNIFICANT CHANGE UP
CREAT SERPL-MCNC: 1.07 MG/DL — SIGNIFICANT CHANGE UP (ref 0.5–1.3)
DIFF PNL FLD: ABNORMAL
GLUCOSE SERPL-MCNC: 88 MG/DL — SIGNIFICANT CHANGE UP (ref 70–99)
GLUCOSE UR QL: NEGATIVE MG/DL — SIGNIFICANT CHANGE UP
HCT VFR BLD CALC: 41.1 % — SIGNIFICANT CHANGE UP (ref 34.5–45)
HGB BLD-MCNC: 13.1 G/DL — SIGNIFICANT CHANGE UP (ref 11.5–15.5)
INR BLD: 1.24 RATIO — HIGH (ref 0.88–1.16)
KETONES UR-MCNC: NEGATIVE — SIGNIFICANT CHANGE UP
LEUKOCYTE ESTERASE UR-ACNC: ABNORMAL
MAGNESIUM SERPL-MCNC: 2 MG/DL — SIGNIFICANT CHANGE UP (ref 1.6–2.6)
MCHC RBC-ENTMCNC: 29.7 PG — SIGNIFICANT CHANGE UP (ref 27–34)
MCHC RBC-ENTMCNC: 31.9 GM/DL — LOW (ref 32–36)
MCV RBC AUTO: 93.2 FL — SIGNIFICANT CHANGE UP (ref 80–100)
NITRITE UR-MCNC: POSITIVE
PH UR: 5 — SIGNIFICANT CHANGE UP (ref 5–8)
PHOSPHATE SERPL-MCNC: 2.8 MG/DL — SIGNIFICANT CHANGE UP (ref 2.5–4.5)
PLATELET # BLD AUTO: 186 K/UL — SIGNIFICANT CHANGE UP (ref 150–400)
POTASSIUM SERPL-MCNC: 3.9 MMOL/L — SIGNIFICANT CHANGE UP (ref 3.5–5.3)
POTASSIUM SERPL-SCNC: 3.9 MMOL/L — SIGNIFICANT CHANGE UP (ref 3.5–5.3)
PROT SERPL-MCNC: 6.6 GM/DL — SIGNIFICANT CHANGE UP (ref 6–8.3)
PROT UR-MCNC: 30 MG/DL
PROTHROM AB SERPL-ACNC: 13.8 SEC — HIGH (ref 10–12.9)
RBC # BLD: 4.41 M/UL — SIGNIFICANT CHANGE UP (ref 3.8–5.2)
RBC # FLD: 13.2 % — SIGNIFICANT CHANGE UP (ref 10.3–14.5)
SODIUM SERPL-SCNC: 137 MMOL/L — SIGNIFICANT CHANGE UP (ref 135–145)
SP GR SPEC: 1.01 — SIGNIFICANT CHANGE UP (ref 1.01–1.02)
T4 FREE SERPL-MCNC: 1.06 NG/DL — SIGNIFICANT CHANGE UP (ref 0.76–1.46)
TROPONIN I SERPL-MCNC: <0.015 NG/ML — SIGNIFICANT CHANGE UP (ref 0.01–0.04)
TSH SERPL-MCNC: 0.63 UU/ML — SIGNIFICANT CHANGE UP (ref 0.34–4.82)
UROBILINOGEN FLD QL: NEGATIVE MG/DL — SIGNIFICANT CHANGE UP
WBC # BLD: 9.28 K/UL — SIGNIFICANT CHANGE UP (ref 3.8–10.5)
WBC # FLD AUTO: 9.28 K/UL — SIGNIFICANT CHANGE UP (ref 3.8–10.5)

## 2019-10-08 PROCEDURE — 93306 TTE W/DOPPLER COMPLETE: CPT | Mod: 26

## 2019-10-08 RX ADMIN — SERTRALINE 50 MILLIGRAM(S): 25 TABLET, FILM COATED ORAL at 13:05

## 2019-10-08 RX ADMIN — WARFARIN SODIUM 5 MILLIGRAM(S): 2.5 TABLET ORAL at 21:30

## 2019-10-08 RX ADMIN — Medication 2.5 MILLIGRAM(S): at 21:30

## 2019-10-08 RX ADMIN — Medication 100 MILLIGRAM(S): at 20:25

## 2019-10-08 RX ADMIN — Medication 500 MILLIGRAM(S): at 13:04

## 2019-10-08 RX ADMIN — Medication 10 MILLIGRAM(S): at 06:53

## 2019-10-08 RX ADMIN — ATENOLOL 25 MILLIGRAM(S): 25 TABLET ORAL at 21:30

## 2019-10-08 RX ADMIN — PANTOPRAZOLE SODIUM 40 MILLIGRAM(S): 20 TABLET, DELAYED RELEASE ORAL at 06:43

## 2019-10-08 NOTE — PHYSICAL THERAPY INITIAL EVALUATION ADULT - BALANCE DISTURBANCE, IDENTIFIED IMPAIRMENT CONTRIBUTE, REHAB EVAL
Airway patent, Nasal mucosa clear. Mouth with normal mucosa. Throat has no vesicles, no oropharyngeal exudates and uvula is midline. AGE

## 2019-10-08 NOTE — PROGRESS NOTE ADULT - SUBJECTIVE AND OBJECTIVE BOX
cc: syncope  hpi: 78y female w/ pmh afib on coumadin p/w syncopal episode at [facility].  Denies preceding dizziness, cp, palp and doesn't recall falling.  Next thing she remembers is being in ambulance on way to Ed.  Currently feels well,  no complaints. States she doesn't hydrate enough.     ros- as per hpi , other 10 point ros negative     Vital Signs Last 24 Hrs  T(C): 36.2 (08 Oct 2019 05:47), Max: 36.9 (07 Oct 2019 22:15)  T(F): 97.2 (08 Oct 2019 05:47), Max: 98.5 (07 Oct 2019 22:15)  HR: 50 (08 Oct 2019 05:47) (50 - 77)  BP: 136/68 (08 Oct 2019 05:47) (136/68 - 192/97)  BP(mean): --  RR: 16 (08 Oct 2019 05:47) (16 - 20)  SpO2: 99% (08 Oct 2019 05:47) (95% - 100%)    physical          LABS: All Labs Reviewed:                        13.1   9.28  )-----------( 186      ( 08 Oct 2019 07:08 )             41.1     10-08    137  |  106  |  20  ----------------------------<  88  3.9   |  26  |  1.07    Ca    8.8      08 Oct 2019 07:08  Phos  2.8     10-08  Mg     2.0     10-08    TPro  6.6  /  Alb  3.0<L>  /  TBili  0.7  /  DBili  x   /  AST  21  /  ALT  28  /  AlkPhos  78  10-08    PT/INR - ( 08 Oct 2019 07:08 )   PT: 13.8 sec;   INR: 1.24 ratio         PTT - ( 08 Oct 2019 07:08 )  PTT:28.2 sec  CARDIAC MARKERS ( 08 Oct 2019 07:08 )  <0.015 ng/mL / x     / x     / x     / x      CARDIAC MARKERS ( 07 Oct 2019 17:48 )  <0.015 ng/mL / x     / 156 U/L / x     / x          < from: CT Abdomen and Pelvis No Cont (10.07.19 @ 17:00) >  IMPRESSION:     No evidence of acute injury.    Postsurgical changes of the right kidney, unchanged.    < end of copied text >    < from: CT Head No Cont (10.07.19 @ 16:59) >    Impression:    Age related involutional and microvascular ischemic changes, without   evidence of intracranial hemorrhage, mass effect or midline shift.    < end of copied text >    MEDICATIONS  (STANDING):  acetaminophen   Tablet .. 500 milliGRAM(s) Oral daily  ATENolol  Tablet 25 milliGRAM(s) Oral at bedtime  digoxin     Tablet 0.125 milliGRAM(s) Oral daily  enalapril 2.5 milliGRAM(s) Oral at bedtime  pantoprazole    Tablet 40 milliGRAM(s) Oral before breakfast  sertraline 50 milliGRAM(s) Oral daily  sodium chloride 0.9%. 1000 milliLiter(s) (100 mL/Hr) IV Continuous <Continuous>  warfarin 5 milliGRAM(s) Oral daily    MEDICATIONS  (PRN):  ketorolac   Injectable 10 milliGRAM(s) IV Push every 8 hours PRN Moderate Pain (4 - 6) cc: syncope  hpi: 78y female w/ pmh afib on coumadin p/w syncopal episode at [facility].  Denies preceding dizziness, cp, palp and doesn't recall falling.  Next thing she remembers is being in ambulance on way to Ed.  Currently feels well,  no complaints.  States she doesn't hydrate enough.     ros- as per hpi , other 10 point ros negative     Vital Signs Last 24 Hrs  T(C): 36.2 (08 Oct 2019 05:47), Max: 36.9 (07 Oct 2019 22:15)  T(F): 97.2 (08 Oct 2019 05:47), Max: 98.5 (07 Oct 2019 22:15)  HR: 50 (08 Oct 2019 05:47) (50 - 77)  BP: 136/68 (08 Oct 2019 05:47) (136/68 - 192/97)  BP(mean): --  RR: 16 (08 Oct 2019 05:47) (16 - 20)  SpO2: 99% (08 Oct 2019 05:47) (95% - 100%)    physical          LABS: All Labs Reviewed:                        13.1   9.28  )-----------( 186      ( 08 Oct 2019 07:08 )             41.1     10-08    137  |  106  |  20  ----------------------------<  88  3.9   |  26  |  1.07    Ca    8.8      08 Oct 2019 07:08  Phos  2.8     10-08  Mg     2.0     10-08    TPro  6.6  /  Alb  3.0<L>  /  TBili  0.7  /  DBili  x   /  AST  21  /  ALT  28  /  AlkPhos  78  10-08    PT/INR - ( 08 Oct 2019 07:08 )   PT: 13.8 sec;   INR: 1.24 ratio         PTT - ( 08 Oct 2019 07:08 )  PTT:28.2 sec  CARDIAC MARKERS ( 08 Oct 2019 07:08 )  <0.015 ng/mL / x     / x     / x     / x      CARDIAC MARKERS ( 07 Oct 2019 17:48 )  <0.015 ng/mL / x     / 156 U/L / x     / x          < from: CT Abdomen and Pelvis No Cont (10.07.19 @ 17:00) >  IMPRESSION:     No evidence of acute injury.    Postsurgical changes of the right kidney, unchanged.    < end of copied text >    < from: CT Head No Cont (10.07.19 @ 16:59) >    Impression:    Age related involutional and microvascular ischemic changes, without   evidence of intracranial hemorrhage, mass effect or midline shift.    < end of copied text >    MEDICATIONS  (STANDING):  acetaminophen   Tablet .. 500 milliGRAM(s) Oral daily  ATENolol  Tablet 25 milliGRAM(s) Oral at bedtime  digoxin     Tablet 0.125 milliGRAM(s) Oral daily  enalapril 2.5 milliGRAM(s) Oral at bedtime  pantoprazole    Tablet 40 milliGRAM(s) Oral before breakfast  sertraline 50 milliGRAM(s) Oral daily  sodium chloride 0.9%. 1000 milliLiter(s) (100 mL/Hr) IV Continuous <Continuous>  warfarin 5 milliGRAM(s) Oral daily    MEDICATIONS  (PRN):  ketorolac   Injectable 10 milliGRAM(s) IV Push every 8 hours PRN Moderate Pain (4 - 6)

## 2019-10-08 NOTE — CHART NOTE - NSCHARTNOTEFT_GEN_A_CORE
patient c/o urinary retention and dysuria this morning and sent for UA which is positive.  Will order urine culture and then antibiotics. Prior urine culture 2/24 reviewed- VRE, sensitive to doxy, zyvox.  ID consult.

## 2019-10-08 NOTE — PHYSICAL THERAPY INITIAL EVALUATION ADULT - PERTINENT HX OF CURRENT PROBLEM, REHAB EVAL
78 year old female patient with pertinent history of afib presented to the ED after an unwitnessed fall. Patient does not remember what she did before or after falling. Remembers being in the ambulance and the ER. Daughter at bedside states she was visiting her mother in the facility when she heard a loud cry for help; patient was later found on the floor.

## 2019-10-08 NOTE — PROGRESS NOTE ADULT - ASSESSMENT
Assessment and Plan:   78y female w/     1. syncope  - hx afib- monitor on tele;  ekg sinus w/ pvcs  - trops neg  - orthostatics neg  - ivf  - echo  - Cardio consult   - PT eval     2. afib  - continue atenolol, dig  - inr goal 2-3 on coumadin    3. urinary retention, dysuria  - check UA    4. dvt px    full code Assessment and Plan:   78y female w/     1. syncope  - hx afib- monitor on tele;  ekg sinus chapin w/ pvcs   - trops neg  - orthostatics neg  - ivf  - echo pending  - Cardio consult appreciated  - PT eval     2. afib  - continue atenolol, dig  - inr goal 2-3 on coumadin    3. urinary retention, dysuria  - check UA    4. dvt px    full code

## 2019-10-08 NOTE — PROVIDER CONTACT NOTE (OTHER) - SITUATION
Spoke to Rikcie Garcia's office they are aware of the admission.  they said she has not been in the office since 2017

## 2019-10-08 NOTE — CONSULT NOTE ADULT - SUBJECTIVE AND OBJECTIVE BOX
Patient is a 78y old  Female who presents with a chief complaint of Syncope (08 Oct 2019 09:35)    ________________________________  VENTURA DAVEY is a 78y year old Female who follows with Dr. Ellis with a past medical history of coronary artery disease status post remote PCI, axillary atrial fibrillation maintained on anticoagulation with warfarin, history of stress-induced cardiomyopathy, with improvement in ejection fraction to 53%, moderate to severe mitral valve regurgitation, history of renal cell carcinoma status post surgery, prior tobacco abuse, hypertension, and osteoarthritis.    The patient presents after a syncopal event that occurred at her assisted living facility. She states that she does not recall the events preceding her syncope.  She remembered she was entering the ambulance. Structure. She denies any history of chest pain, palpitations, shortness of breath or dizziness preceding this event. She admits to a headache, and back of her head.  When she arrived, her INR was subtherapeutic. No ICH on CT      PREVIOUS CARDIAC WORKUP:    Echocardiogram: 8/4/2019  --LV ejection fraction (approximately 53 %) is normal.  --Grade II left ventricular diastolic dysfunction with elevated left atrial pressure.  --Aortic arch is normal in size; its diameter is 3.2 cm.  --There is mild to moderate aortic regurgitation. 2 AR jets.  --There is bileaflet mitral valve prolapse. Mild. There is moderate to severe mitral   regurgitation. 2 MR jets noted precluding PISA of VCW methods.  --There is moderate to severe tricuspid regurgitation.  --The right atrial pressure is normal (0 - 5 mm Hg). There is minimal pulmonary hypertension.  --There is no pericardial effusion.  --No prior study available for comparison.    Stress Test: NONE  Cardiac Catheterization: NOT AVAIL  ________________________________  Review of systems: A 10 point review of system has been performed, and is negative except for what has been mentioned in the above history of present illness.     PAST MEDICAL & SURGICAL HISTORY:  AS ABOVE  Takotsubo syndrome  Stented coronary artery  CAD (coronary artery disease)  Knee pain, left   Urge incontinence of urine  Depressive disorder  Anxiety disorder  HTN (hypertension)  RA (rheumatoid arthritis)  OP (osteoporosis)  Renal cancer, right  S/P colonoscopy  H/O breast biopsy: right?  S/P tonsillectomy: 1948  History of cataract surgery, unspecified laterality: b/l 2016  H/O partial nephrectomy: right 2006  S/P angioplasty: 1 stent 1998  History of percutaneous coronary intervention: stent RCA    FAMILY HISTORY:  Family history of other heart disease (Mother)  Family history of MI (myocardial infarction) (Father)     SOCIAL HISTORY: History of remote tobacco abuse. Quit in 1980. Resides at [facility] living.     Home Medications:  atenolol 25 mg oral tablet: 1 tab(s) orally once a day (at bedtime) (07 Oct 2019 18:29)  clindamycin 300 mg oral capsule: 1 cap(s) orally 3 times a day  ***Course Completed*** (07 Oct 2019 18:29)  Cranberry oral capsule: 1 cap(s) orally once a day (07 Oct 2019 18:29)  digoxin 125 mcg (0.125 mg) oral tablet: 1 tab(s) orally once a day (at bedtime) (07 Oct 2019 18:29)  enalapril 2.5 mg oral tablet: 1 tab(s) orally once a day (at bedtime) (07 Oct 2019 18:29)  pantoprazole 40 mg oral delayed release tablet: 1 tab(s) orally once a day (before a meal) (07 Oct 2019 18:29)  sertraline 50 mg oral tablet: 1 tab(s) orally once a day (07 Oct 2019 18:29)  Tylenol Extra Strength 500 mg oral tablet: 1 tab(s) orally once a day, As Needed - for mild pain (07 Oct 2019 18:29)  warfarin 2.5 mg oral tablet: 1 tab(s) orally once a week on Monday (07 Oct 2019 18:29)  warfarin 5 mg oral tablet: 1 tab(s) orally 6 times a week on Sun, Tues, Wed, Thurs, Fri and Sat (07 Oct 2019 18:29)    MEDICATIONS  (STANDING):  acetaminophen   Tablet .. 500 milliGRAM(s) Oral daily  ATENolol  Tablet 25 milliGRAM(s) Oral at bedtime  digoxin     Tablet 0.125 milliGRAM(s) Oral daily  enalapril 2.5 milliGRAM(s) Oral at bedtime  pantoprazole    Tablet 40 milliGRAM(s) Oral before breakfast  sertraline 50 milliGRAM(s) Oral daily  sodium chloride 0.9%. 1000 milliLiter(s) (100 mL/Hr) IV Continuous <Continuous>  warfarin 5 milliGRAM(s) Oral daily    MEDICATIONS  (PRN):  ketorolac   Injectable 10 milliGRAM(s) IV Push every 8 hours PRN Moderate Pain (4 - 6)    Vital Signs Last 24 Hrs  T(C): 36.2 (08 Oct 2019 05:47), Max: 36.9 (07 Oct 2019 22:15)  T(F): 97.2 (08 Oct 2019 05:47), Max: 98.5 (07 Oct 2019 22:15)  HR: 50 (08 Oct 2019 05:47) (50 - 77)  BP: 136/68 (08 Oct 2019 05:47) (136/68 - 192/97)  BP(mean): --  RR: 16 (08 Oct 2019 05:47) (16 - 20)  SpO2: 99% (08 Oct 2019 05:47) (95% - 100%)  I&O's Summary    07 Oct 2019 07:01  -  08 Oct 2019 07:00  --------------------------------------------------------  IN: 0 mL / OUT: 500 mL / NET: -500 mL      ________________________________  PHYSICAL EXAM:  GENERAL APPEARANCE:  No acute distress  HEAD: normocephalic, atraumatic  NECK: supple, no jugular venous distention, no carotid bruit    HEART: regular rate and rhythm, S1, S2 normal, 2/6 systolic murmur    CHEST:  No anterior chest wall tenderness    LUNGS:  Clear to auscultation, without any wheezing, rhonchi or rales    ABDOMEN soft, nontender, nondistended, with positive bowel sounds appreciated  EXTREMITIES: no clubbing, cyanosis, or edema.   NEURO:  Alert and oriented x3  PSYC:  Normal affect  SKIN:  Dry   ________________________________  TELEMETRY: Sinus rhythm with sinus bradycardia and PACs    ECG: Sinus rhythm at 63 bpm. Nonspecific changes. Normal axis.  Repeat EKG showed sinus bradycardia at 49 bpm with nonspecific changes. U wave present.  LABS:                        13.1   9.28  )-----------( 186      ( 08 Oct 2019 07:08 )             41.1             10-08    137  |  106  |  20  ----------------------------<  88  3.9   |  26  |  1.07    Ca    8.8      08 Oct 2019 07:08  Phos  2.8     10-08  Mg     2.0     10-08    TPro  6.6  /  Alb  3.0<L>  /  TBili  0.7  /  DBili  x   /  AST  21  /  ALT  28  /  AlkPhos  78  10-08      LIVER FUNCTIONS - ( 08 Oct 2019 07:08 )  Alb: 3.0 g/dL / Pro: 6.6 gm/dL / ALK PHOS: 78 U/L / ALT: 28 U/L / AST: 21 U/L / GGT: x         PT/INR - ( 08 Oct 2019 07:08 )   PT: 13.8 sec;   INR: 1.24 ratio         PTT - ( 08 Oct 2019 07:08 )  PTT:28.2 sec    10-08 @ 07:08  Trop-I  <0.015  CK      --  CK-MB   --    10-07 @ 17:48  Trop-I  <0.015  CK      156  CK-MB   --      PT/INR - ( 08 Oct 2019 07:08 )   PT: 13.8 sec;   INR: 1.24 ratio         PTT - ( 08 Oct 2019 07:08 )  PTT:28.2 sec      ________________________________    RADIOLOGY & ADDITIONAL STUDIES: Age related involutional and microvascular ischemic changes, without   evidence of intracranial hemorrhage, mass effect or midline shift.    ________________________________    ASSESSMENT:  Rule out syncope  History of stress-induced cardio myopathy, with improvement in ejection fraction to 53%  CAD status post remote PCI over 10 years ago with Dr. Ellis  Paroxysmal atrial fibrillation on anticoagulation with warfarin  Hypertension  Hyperlipidemia  History of osteoarthritis    PLAN:  This patient presents after a syncopal event, and has ruled out for ACS.  Etiology of syncope not entirely clear. Possibly due to conversion pause versus bradycardia versus orthostasis    Check orthostatics  Heart rate has been on the low side. Digoxin not indicated and paroxysmal atrial fibrillation if heart rate is stable. Will discontinue.  Continue with atenolol  Continue with warfarin for anticoagulation  Continue monitor on telemetry  No anginal symptoms, troponins negative. No need for ischemic evaluation. Echocardiogram pending.    __________________________________________________________________________  Thank you for allowing me to participate in the care of your patient. Please contact me should any questions arise.    IHSAN Turcios DO, [facility]  [phone]

## 2019-10-09 LAB
INR BLD: 1.45 RATIO — HIGH (ref 0.88–1.16)
PROTHROM AB SERPL-ACNC: 16.3 SEC — HIGH (ref 10–12.9)

## 2019-10-09 PROCEDURE — 93010 ELECTROCARDIOGRAM REPORT: CPT

## 2019-10-09 RX ORDER — HYDRALAZINE HCL 50 MG
5 TABLET ORAL ONCE
Refills: 0 | Status: DISCONTINUED | OUTPATIENT
Start: 2019-10-09 | End: 2019-10-09

## 2019-10-09 RX ORDER — ACETAMINOPHEN 500 MG
650 TABLET ORAL ONCE
Refills: 0 | Status: COMPLETED | OUTPATIENT
Start: 2019-10-09 | End: 2019-10-09

## 2019-10-09 RX ORDER — WARFARIN SODIUM 2.5 MG/1
7.5 TABLET ORAL ONCE
Refills: 0 | Status: COMPLETED | OUTPATIENT
Start: 2019-10-09 | End: 2019-10-09

## 2019-10-09 RX ORDER — CEFTRIAXONE 500 MG/1
1000 INJECTION, POWDER, FOR SOLUTION INTRAMUSCULAR; INTRAVENOUS EVERY 24 HOURS
Refills: 0 | Status: DISCONTINUED | OUTPATIENT
Start: 2019-10-09 | End: 2019-10-10

## 2019-10-09 RX ORDER — ONDANSETRON 8 MG/1
4 TABLET, FILM COATED ORAL EVERY 6 HOURS
Refills: 0 | Status: DISCONTINUED | OUTPATIENT
Start: 2019-10-09 | End: 2019-10-10

## 2019-10-09 RX ORDER — CARVEDILOL PHOSPHATE 80 MG/1
12.5 CAPSULE, EXTENDED RELEASE ORAL EVERY 12 HOURS
Refills: 0 | Status: DISCONTINUED | OUTPATIENT
Start: 2019-10-09 | End: 2019-10-09

## 2019-10-09 RX ORDER — CARVEDILOL PHOSPHATE 80 MG/1
6.25 CAPSULE, EXTENDED RELEASE ORAL EVERY 12 HOURS
Refills: 0 | Status: DISCONTINUED | OUTPATIENT
Start: 2019-10-09 | End: 2019-10-10

## 2019-10-09 RX ADMIN — Medication 500 MILLIGRAM(S): at 10:32

## 2019-10-09 RX ADMIN — WARFARIN SODIUM 7.5 MILLIGRAM(S): 2.5 TABLET ORAL at 21:26

## 2019-10-09 RX ADMIN — Medication 650 MILLIGRAM(S): at 22:30

## 2019-10-09 RX ADMIN — CARVEDILOL PHOSPHATE 6.25 MILLIGRAM(S): 80 CAPSULE, EXTENDED RELEASE ORAL at 17:45

## 2019-10-09 RX ADMIN — Medication 500 MILLIGRAM(S): at 11:00

## 2019-10-09 RX ADMIN — Medication 650 MILLIGRAM(S): at 21:27

## 2019-10-09 RX ADMIN — PANTOPRAZOLE SODIUM 40 MILLIGRAM(S): 20 TABLET, DELAYED RELEASE ORAL at 05:16

## 2019-10-09 RX ADMIN — Medication 5 MILLIGRAM(S): at 21:26

## 2019-10-09 RX ADMIN — CEFTRIAXONE 1000 MILLIGRAM(S): 500 INJECTION, POWDER, FOR SOLUTION INTRAMUSCULAR; INTRAVENOUS at 12:06

## 2019-10-09 RX ADMIN — Medication 100 MILLIGRAM(S): at 05:16

## 2019-10-09 RX ADMIN — SERTRALINE 50 MILLIGRAM(S): 25 TABLET, FILM COATED ORAL at 10:32

## 2019-10-09 NOTE — CHART NOTE - NSCHARTNOTEFT_GEN_A_CORE
Received call from RN re: pt with elevated BP a/w new nausea, vomiting, headache    CHART REVIEW:  HPI:  78 year old female patient with pertinent history of afib presented to the ED after an unwitnessed fall. Patient does not remember what she did before or after falling. Remembers being in the ambulance and the ER. Daughter at bedside states she was visiting her mother in the facility when she heard a loud cry for help; patient was later found on the floor. Denies any preceding dizziness, chest pain, palpitation, chest pain. Patient currently endorsing a headache. Patient ambulates at baseline with a walker and often time needs assistance with ambulation. (07 Oct 2019 21:36)      Pt seen and examined at bedside. Reports that 1 hour prior she began to experience 4/10 diffuse headache and nausea with 2 episodes of NBNB emesis. She has not experienced nausea     Vitals: HR 65, /85    Physical exam  Constitutional: NAD, awake and alert, well-developed  HEENT: EOMI, Normal Hearing, MMM  Neck: Trachea midline  Respiratory: Crackles over the left middle and lower lung fields. Lungs otherwise clear to auscultatoin  Cardiovascular: RRR, no r/g/m  Gastrointestinal: Soft, nontender, nondistended  Extremities: No peripheral edema  Neurological: Alert, no focal deficits  Musculoskeletal: Moving all four limbs spontaneously    Assessment  78F with atrial fibrillation now with elevated blood pressure, headache, and new nausea.    #Hypertensive emergency  -Hydralazine 5mg stat  -Repeat blood pressure in 15 minutes  -Will not give symptomatic treatment for headache or nausea so that end organ damage can be tracked  -Discussed case w/RN who is aware and will contact MD w/further concerns should they arise

## 2019-10-09 NOTE — PROGRESS NOTE ADULT - SUBJECTIVE AND OBJECTIVE BOX
Initial Consult HPI    VENTURA DAVEY is a 78y year old Female who follows with Dr. Ellis with a past medical history of coronary artery disease status post remote PCI, axillary atrial fibrillation maintained on anticoagulation with warfarin, history of stress-induced cardiomyopathy, with improvement in ejection fraction to 53%, moderate to severe mitral valve regurgitation, history of renal cell carcinoma status post surgery, prior tobacco abuse, hypertension, and osteoarthritis.    The patient presents after a syncopal.   When she arrived, her INR was subtherapeutic. No ICH on CT    The patient was seen and examined. N/V this AM. BP elevated.  No chest pain.  No shortness of breath.  No palpitations.    PREVIOUS CARDIAC WORKUP:    Echocardiogram: 2019  --LV ejection fraction (approximately 53 %) is normal.  --Grade II left ventricular diastolic dysfunction with elevated left atrial pressure.  --Aortic arch is normal in size; its diameter is 3.2 cm.  --There is mild to moderate aortic regurgitation. 2 AR jets.  --There is bileaflet mitral valve prolapse. Mild. There is moderate to severe mitral   regurgitation. 2 MR jets noted precluding PISA of VCW methods.  --There is moderate to severe tricuspid regurgitation.  --The right atrial pressure is normal (0 - 5 mm Hg). There is minimal pulmonary hypertension.  --There is no pericardial effusion.  --No prior study available for comparison.    Stress Test: NONE  Cardiac Catheterization: NOT AVAIL  ________________________________  Review of systems: A 10 point review of system has been performed, and is negative except for what has been mentioned in the above history of present illness.     PAST MEDICAL & SURGICAL HISTORY:  AS ABOVE  Takotsubo syndrome  Stented coronary artery  CAD (coronary artery disease)  Knee pain, left   Urge incontinence of urine  Depressive disorder  Anxiety disorder  HTN (hypertension)  RA (rheumatoid arthritis)  OP (osteoporosis)  Renal cancer, right  S/P colonoscopy  H/O breast biopsy: right?  S/P tonsillectomy: 1948  History of cataract surgery, unspecified laterality: b/l   H/O partial nephrectomy: right   S/P angioplasty: 1 stent   History of percutaneous coronary intervention: stent RCA    Home Medications:  atenolol 25 mg oral tablet: 1 tab(s) orally once a day (at bedtime) (07 Oct 2019 18:29)  clindamycin 300 mg oral capsule: 1 cap(s) orally 3 times a day  ***Course Completed*** (07 Oct 2019 18:29)  Cranberry oral capsule: 1 cap(s) orally once a day (07 Oct 2019 18:29)  digoxin 125 mcg (0.125 mg) oral tablet: 1 tab(s) orally once a day (at bedtime) (07 Oct 2019 18:29)  enalapril 2.5 mg oral tablet: 1 tab(s) orally once a day (at bedtime) (07 Oct 2019 18:29)  pantoprazole 40 mg oral delayed release tablet: 1 tab(s) orally once a day (before a meal) (07 Oct 2019 18:29)  sertraline 50 mg oral tablet: 1 tab(s) orally once a day (07 Oct 2019 18:29)  Tylenol Extra Strength 500 mg oral tablet: 1 tab(s) orally once a day, As Needed - for mild pain (07 Oct 2019 18:29)  warfarin 2.5 mg oral tablet: 1 tab(s) orally once a week on Monday (07 Oct 2019 18:29)  warfarin 5 mg oral tablet: 1 tab(s) orally 6 times a week on Sun, Tues, Wed, Thurs, Fri and Sat (07 Oct 2019 18:29)    MEDICATIONS  (STANDING):  acetaminophen   Tablet .. 500 milliGRAM(s) Oral daily  carvedilol 6.25 milliGRAM(s) Oral every 12 hours  doxycycline hyclate Capsule 100 milliGRAM(s) Oral every 12 hours  enalapril 5 milliGRAM(s) Oral daily  pantoprazole    Tablet 40 milliGRAM(s) Oral before breakfast  sertraline 50 milliGRAM(s) Oral daily  warfarin 5 milliGRAM(s) Oral daily    MEDICATIONS  (PRN):  ketorolac   Injectable 10 milliGRAM(s) IV Push every 8 hours PRN Moderate Pain (4 - 6)  ondansetron Injectable 4 milliGRAM(s) IV Push every 6 hours PRN Nausea and/or Vomiting      Vital Signs Last 24 Hrs  T(C): 36.7 (09 Oct 2019 06:48), Max: 36.7 (08 Oct 2019 17:00)  T(F): 98 (09 Oct 2019 06:48), Max: 98.1 (08 Oct 2019 17:00)  HR: 65 (09 Oct 2019 06:48) (54 - 65)  BP: 148/71 (09 Oct 2019 08:11) (127/59 - 185/86)  BP(mean): --  RR: 18 (09 Oct 2019 06:48) (16 - 18)  SpO2: 97% (09 Oct 2019 06:48) (97% - 99%)  I&O's Summary    08 Oct 2019 07:01  -  09 Oct 2019 07:00  --------------------------------------------------------  IN: 0 mL / OUT: 1 mL / NET: -1 mL      ________________________________  PHYSICAL EXAM:  GENERAL APPEARANCE:  No acute distress  HEAD: normocephalic, atraumatic  NECK: supple, no jugular venous distention, no carotid bruit    HEART: regular rate and rhythm, S1, S2 normal, 2/6 systolic murmur    CHEST:  No anterior chest wall tenderness    LUNGS:  Clear to auscultation, without any wheezing, rhonchi or rales    ABDOMEN soft, nontender, nondistended, with positive bowel sounds appreciated  EXTREMITIES: no clubbing, cyanosis, or edema.   NEURO:  Alert and oriented x3  PSYC:  Normal affect  SKIN:  Dry   ________________________________  TELEMETRY: Sinus rhythm with sinus bradycardia and PACs    ECG: Sinus rhythm at 63 bpm. Nonspecific changes. Normal axis.  Repeat EKG showed sinus bradycardia at 49 bpm with nonspecific changes. U wave present.  LABS:                                          13.1   9.28  )-----------( 186      ( 08 Oct 2019 07:08 )             41.1          10-08    137  |  106  |  20  ----------------------------<  88  3.9   |  26  |  1.07    Ca    8.8      08 Oct 2019 07:08  Phos  2.8     10-08  Mg     2.0     10-08    TPro  6.6  /  Alb  3.0<L>  /  TBili  0.7  /  DBili  x   /  AST  21  /  ALT  28  /  AlkPhos  78  10    CARDIAC MARKERS ( 08 Oct 2019 07:08 )  <0.015 ng/mL / x     / x     / x     / x      CARDIAC MARKERS ( 07 Oct 2019 17:48 )  <0.015 ng/mL / x     / 156 U/L / x     / x          PT/INR - ( 09 Oct 2019 07:57 )   PT: 16.3 sec;   INR: 1.45 ratio         PTT - ( 08 Oct 2019 07:08 )  PTT:28.2 sec  Urinalysis Basic - ( 08 Oct 2019 15:56 )    Color: Yellow / Appearance: very cloudy / S.015 / pH: x  Gluc: x / Ketone: Negative  / Bili: Negative / Urobili: Negative mg/dL   Blood: x / Protein: 30 mg/dL / Nitrite: Positive   Leuk Esterase: Moderate / RBC: 11-25 /HPF / WBC >50   Sq Epi: x / Non Sq Epi: Occasional / Bacteria: Many    ________________________________    RADIOLOGY & ADDITIONAL STUDIES: Age related involutional and microvascular ischemic changes, without   evidence of intracranial hemorrhage, mass effect or midline shift.    ________________________________    ASSESSMENT:  Rule out syncope  History of stress-induced cardio myopathy, with improvement in ejection fraction to 53%  CAD status post remote PCI over 10 years ago with Dr. Ellis  Paroxysmal atrial fibrillation on anticoagulation with warfarin  Hypertension  Hyperlipidemia  History of osteoarthritis    PLAN:  Etiology of syncope not entirely clear. Possibly due to conversion pause versus bradycardia. Orthostatics neg  Echo reviewed - mild to mod MR but sub optimal echo windows.  No evidence of AFib but HR on low side with elevated BP - switch atenolol to coreg  No anginal symptoms, troponins negative. No need for ischemic evaluation.  Cont warfain  - pt to FU with Dr Ellis.  __________________________________________________________________________  Thank you for allowing me to participate in the care of your patient. Please contact me should any questions arise.    IHSAN Turcios DO, [facility]  [phone]

## 2019-10-09 NOTE — CONSULT NOTE ADULT - ASSESSMENT
78 year old female patient with pertinent history of afib presented to the ED after an unwitnessed fall. Patient does not remember what she did before or after falling. Remembers being in the ambulance and the ER. Daughter at bedside states she was visiting her mother in the facility when she heard a loud cry for help; patient was later found on the floor. Denies any preceding dizziness, chest pain, palpitation, chest pain. Patient currently endorsing a headache. Patient ambulates at baseline with a walker and often time needs assistance with ambulation. Here noted with episodes of urinary retention with dysuria UA sent positive was given doxy for UTI had n/v, dc this am.     1. dysuria. urinary retention. UTI. PCN allergy  - dc doxy, f/u urine cx  - start rocephin 1gm daily - has tolerated in past  - continue with abx coverage  - on dc plan for po ceftin complete 3-5 day course  - monitor temps  - tolerating abx well so far; no side effects noted  - reason for abx use and side effects reviewed with patient  - supportive care  - fu cbc    2. other issues - care per medicine

## 2019-10-09 NOTE — CONSULT NOTE ADULT - SUBJECTIVE AND OBJECTIVE BOX
Patient is a 78y old  Female who presents with a chief complaint of Syncope (09 Oct 2019 08:47)    HPI:  78 year old female patient with pertinent history of afib presented to the ED after an unwitnessed fall. Patient does not remember what she did before or after falling. Remembers being in the ambulance and the ER. Daughter at bedside states she was visiting her mother in the facility when she heard a loud cry for help; patient was later found on the floor. Denies any preceding dizziness, chest pain, palpitation, chest pain. Patient currently endorsing a headache. Patient ambulates at baseline with a walker and often time needs assistance with ambulation. Here noted with episodes of urinary retention with dysuria UA sent positive was given doxy for UTI had n/v, dc this am.     PMH: as above  PSH: as above  Meds: per reconciliation sheet, noted below  MEDICATIONS  (STANDING):  acetaminophen   Tablet .. 500 milliGRAM(s) Oral daily  carvedilol 6.25 milliGRAM(s) Oral every 12 hours  cefTRIAXone Injectable. 1000 milliGRAM(s) IV Push every 24 hours  enalapril 5 milliGRAM(s) Oral daily  pantoprazole    Tablet 40 milliGRAM(s) Oral before breakfast  sertraline 50 milliGRAM(s) Oral daily  warfarin 5 milliGRAM(s) Oral daily    Allergies    penicillin (Hives)    Intolerances    statins (Muscle Pain)    Social: no smoking, no alcohol, no illegal drugs; no recent travel, no exposure to TB  FAMILY HISTORY:  Family history of other heart disease  Family history of MI (myocardial infarction)     no history of premature cardiovascular disease in first degree relatives    ROS: the patient denies fever, no chills, no HA, no dizziness, no sore throat, no blurry vision, no CP, no palpitations, no SOB, no cough, no abdominal pain, no diarrhea, no N/V, no dysuria, no leg pain, no claudication, no rash, no joint aches, no rectal pain or bleeding, no night sweats  All other systems reviewed and are negative    Vital Signs Last 24 Hrs  T(C): 36.7 (09 Oct 2019 11:01), Max: 36.7 (08 Oct 2019 17:00)  T(F): 98.1 (09 Oct 2019 11:01), Max: 98.1 (08 Oct 2019 17:00)  HR: 60 (09 Oct 2019 11:01) (54 - 65)  BP: 146/72 (09 Oct 2019 11:01) (130/68 - 185/86)  BP(mean): --  RR: 18 (09 Oct 2019 11:01) (18 - 18)  SpO2: 98% (09 Oct 2019 11:01) (97% - 99%)  Daily     Daily     PE:  Constitutional: frail looking  HEENT: NC/AT, EOMI, PERRLA, conjunctivae clear; ears and nose atraumatic; pharynx benign  Neck: supple; thyroid not palpable  Back: no tenderness  Respiratory: respiratory effort normal; clear to auscultation  Cardiovascular: S1S2 regular, no murmurs  Abdomen: soft, not tender, not distended, positive BS  Genitourinary: no suprapubic tenderness  Lymphatic: no LN palpable  Musculoskeletal: no muscle tenderness, no joint swelling or tenderness  Extremities: no pedal edema  Neurological/ Psychiatric:  moving all extremities  Skin: no rashes; no palpable lesions    Labs: all available labs reviewed                        13.1   9.28  )-----------( 186      ( 08 Oct 2019 07:08 )             41.1     10-08    137  |  106  |  20  ----------------------------<  88  3.9   |  26  |  1.07    Ca    8.8      08 Oct 2019 07:08  Phos  2.8     10-08  Mg     2.0     10-08    TPro  6.6  /  Alb  3.0<L>  /  TBili  0.7  /  DBili  x   /  AST  21  /  ALT  28  /  AlkPhos  78  10-08     LIVER FUNCTIONS - ( 08 Oct 2019 07:08 )  Alb: 3.0 g/dL / Pro: 6.6 gm/dL / ALK PHOS: 78 U/L / ALT: 28 U/L / AST: 21 U/L / GGT: x           Urinalysis Basic - ( 08 Oct 2019 15:56 )    Color: Yellow / Appearance: very cloudy / S.015 / pH: x  Gluc: x / Ketone: Negative  / Bili: Negative / Urobili: Negative mg/dL   Blood: x / Protein: 30 mg/dL / Nitrite: Positive   Leuk Esterase: Moderate / RBC: 11-25 /HPF / WBC >50   Sq Epi: x / Non Sq Epi: Occasional / Bacteria: Many        Radiology: all available radiological tests reviewed    EXAM:  CT ABDOMEN AND PELVIS                          EXAM:  CT CHEST                            PROCEDURE DATE:  10/07/2019          INTERPRETATION:  CLINICAL INFORMATION: Fall, lower back pain, history of   Takotsubo syndrome, right renal cell carcinoma      COMPARISON: 2019    PROCEDURE:   CT of the Chest, Abdomen and Pelvis was performed without intravenous   contrast.   Intravenous contrast: None.  Oral contrast: None.  Sagittal and coronal reformats were performed.    FINDINGS:    CHEST:     LUNGS AND LARGE AIRWAYS: Patent central airways. No pulmonary nodules. No   pneumothorax  PLEURA: No pleural effusion.  VESSELS: Within normal limits.  HEART: Cardiomegaly. No pericardial effusion. Coronary artery   calcifications  MEDIASTINUM AND JC: No lymphadenopathy.  CHEST WALL AND LOWER NECK: Within normal limits.    ABDOMEN AND PELVIS:    LIVER: Within normal limits.  BILE DUCTS: Normal caliber.  GALLBLADDER: Within normal limits.  SPLEEN: Within normal limits.  PANCREAS: Within normal limits.  ADRENALS: Within normal limits.  KIDNEYS/URETERS: Postsurgical changes again seen of the right kidney,   unchanged from prior study. Left kidney appears normal.    BLADDER: Within normal limits.  REPRODUCTIVE ORGANS: Uterus and adnexa within normal limits.    BOWEL: No bowel obstruction. Appendix not clearly identified  PERITONEUM: No ascites.  VESSELS: Within normal limits.  RETROPERITONEUM/LYMPH NODES: No lymphadenopathy.    ABDOMINAL WALL: Within normal limits.  BONES: No evidence of fracture    IMPRESSION:     No evidence of acute injury.        Advanced directives addressed: full resuscitation

## 2019-10-09 NOTE — PROGRESS NOTE ADULT - SUBJECTIVE AND OBJECTIVE BOX
CC/reason for follow up: *    S: *    ROS: no chest pain, no dyspnea    T(C): 36.7 (10-09-19 @ 11:01), Max: 36.7 (10-08-19 @ 17:00)  HR: 60 (10-09-19 @ 11:01) (54 - 65)  BP: 146/72 (10-09-19 @ 11:01) (130/68 - 185/86)  RR: 18 (10-09-19 @ 11:01) (18 - 18)  SpO2: 98% (10-09-19 @ 11:01) (97% - 99%)    Gen - Pleasant, cooperative, in no acute distress  HEENT- PERRL, moist mucus membranes, OP clear  CV - RRR, No m/r/g, +pulses, * edema  Lungs - Good effort, Clear to auscultation bilaterally  Abdomen - Soft, nontender, nondistended, +BS, No rebound/rigidity/guarding  Ext - No cyanosis/clubbing.   Skin - No rashes, No jaundice  Psych- Alert & oriented x 3  Neuro- *    MEDICATIONS  (STANDING):  acetaminophen   Tablet .. 500 milliGRAM(s) Oral daily  carvedilol 6.25 milliGRAM(s) Oral every 12 hours  cefTRIAXone Injectable. 1000 milliGRAM(s) IV Push every 24 hours  enalapril 5 milliGRAM(s) Oral daily  pantoprazole    Tablet 40 milliGRAM(s) Oral before breakfast  sertraline 50 milliGRAM(s) Oral daily  warfarin 5 milliGRAM(s) Oral daily    MEDICATIONS  (PRN):  ondansetron Injectable 4 milliGRAM(s) IV Push every 6 hours PRN Nausea and/or Vomiting      Diagnostic studies: personally reviewed  LABS: All Labs Reviewed:                        13.1   9.28  )-----------( 186      ( 08 Oct 2019 07:08 )             41.1     10-08    137  |  106  |  20  ----------------------------<  88  3.9   |  26  |  1.07    Ca    8.8      08 Oct 2019 07:08  Phos  2.8     10-08  Mg     2.0     10-08    TPro  6.6  /  Alb  3.0<L>  /  TBili  0.7  /  DBili  x   /  AST  21  /  ALT  28  /  AlkPhos  78  10-08    PT/INR - ( 09 Oct 2019 07:57 )   PT: 16.3 sec;   INR: 1.45 ratio         PTT - ( 08 Oct 2019 07:08 )  PTT:28.2 sec  CARDIAC MARKERS ( 08 Oct 2019 07:08 )  <0.015 ng/mL / x     / x     / x     / x      CARDIAC MARKERS ( 07 Oct 2019 17:48 )  <0.015 ng/mL / x     / 156 U/L / x     / x            Blood Culture:   RADIOLOGY/EKG:          Assessment and Plan:  78y female w/ pmh afib on coumadin p/w syncopal episode at [facility]    1. syncope  - hx afib- monitor on tele;  ekg sinus chapin w/ pvcs   - trops neg  - orthostatics neg  - ivf  - echo pending  - Cardio consult appreciated  - PT eval     2. afib  - continue atenolol, dig  - inr goal 2-3 on coumadin    3. UTI, urinary retention, dysuria  - ID consult appreciated. doxy changed to rocephin  - urine culture pending    4. dvt px    full code     Code status: *  Dispo: *  ELOS: *    All questions/concerns were discussed with patient/family by bedside.    Case d/w *    Total time spent: *min. More than 50% of time was spent in counseling, discussion of medications, and coordination of care CC/reason for follow up: syncope    S: BP was elevated this morning. had nausea. received meds and now feels better.     ROS: no chest pain, no dyspnea    T(C): 36.7 (10-09-19 @ 11:01), Max: 36.7 (10-08-19 @ 17:00)  HR: 60 (10-09-19 @ 11:01) (54 - 65)  BP: 146/72 (10-09-19 @ 11:01) (130/68 - 185/86)  RR: 18 (10-09-19 @ 11:01) (18 - 18)  SpO2: 98% (10-09-19 @ 11:01) (97% - 99%)    Gen - Pleasant, cooperative, in no acute distress  HEENT- PERRL, moist mucus membranes, OP clear  CV - IRIR, No m/r/g, +pulses, no edema  Lungs - Good effort, Clear to auscultation bilaterally  Abdomen - Soft, nontender, nondistended, +BS, No rebound/rigidity/guarding  Ext - No cyanosis/clubbing.   Skin - No rashes, No jaundice  Psych- Alert & oriented x 3  Neuro- fluent speech, no facial droop, EOMI. moves all ext    MEDICATIONS  (STANDING):  acetaminophen   Tablet .. 500 milliGRAM(s) Oral daily  carvedilol 6.25 milliGRAM(s) Oral every 12 hours  cefTRIAXone Injectable. 1000 milliGRAM(s) IV Push every 24 hours  enalapril 5 milliGRAM(s) Oral daily  pantoprazole    Tablet 40 milliGRAM(s) Oral before breakfast  sertraline 50 milliGRAM(s) Oral daily  warfarin 5 milliGRAM(s) Oral daily    MEDICATIONS  (PRN):  ondansetron Injectable 4 milliGRAM(s) IV Push every 6 hours PRN Nausea and/or Vomiting      Diagnostic studies: personally reviewed  LABS: All Labs Reviewed:                        13.1   9.28  )-----------( 186      ( 08 Oct 2019 07:08 )             41.1     10-08    137  |  106  |  20  ----------------------------<  88  3.9   |  26  |  1.07    Ca    8.8      08 Oct 2019 07:08  Phos  2.8     10-08  Mg     2.0     10-08    TPro  6.6  /  Alb  3.0<L>  /  TBili  0.7  /  DBili  x   /  AST  21  /  ALT  28  /  AlkPhos  78  10-08    PT/INR - ( 09 Oct 2019 07:57 )   PT: 16.3 sec;   INR: 1.45 ratio         PTT - ( 08 Oct 2019 07:08 )  PTT:28.2 sec  CARDIAC MARKERS ( 08 Oct 2019 07:08 )  <0.015 ng/mL / x     / x     / x     / x      CARDIAC MARKERS ( 07 Oct 2019 17:48 )  <0.015 ng/mL / x     / 156 U/L / x     / x            Blood Culture:   RADIOLOGY/EKG:    < from: Xray Chest 1 View-PORTABLE IMMEDIATE (10.07.19 @ 16:37) >  IMPRESSION: No acute disease.    < end of copied text >        Assessment and Plan:  78y female w/ pmh afib on coumadin p/w syncopal episode at [facility]    1. syncope  - possible conversion pause vs bradycardia per cardiology  - hx afib- monitor on tele;  ekg sinus chapin w/ pvcs   - trops neg  - orthostatics neg  - ivf  - echo normal LV/RV fxn. EF 60%. has mild MR, mod TR. pulmHTN   - Cardio consult appreciated  - PT eval     2. afib  - continue atenolol, dig  - inr goal 2-3. INR subtherapeutic. give coumadin 7.5 mg po tonight. repeat inr in am    3. UTI, urinary retention, dysuria  - ID consult appreciated. doxy changed to rocephin  - prior h/o vre  - urine culture pending    4. dvt px    full code     Code status: full  Dispo: inpt  ELOS: 1-2 more days    All questions/concerns were discussed with patient/family by bedside.    Case d/w staff, RN, social work/ during rounds    Total time spent: 40min. More than 50% of time was spent in counseling, discussion of medications, and coordination of care

## 2019-10-09 NOTE — PROVIDER CONTACT NOTE (OTHER) - ACTION/TREATMENT ORDERED:
MD Lai made aware. Will come to assess pt. No new orders at this time. Will cont to monitor. MD Lai made aware. Will come to assess pt. Awaiting further orders at this time. Will cont to monitor.

## 2019-10-10 ENCOUNTER — TRANSCRIPTION ENCOUNTER (OUTPATIENT)
Age: 79
End: 2019-10-10

## 2019-10-10 VITALS
DIASTOLIC BLOOD PRESSURE: 76 MMHG | TEMPERATURE: 98 F | SYSTOLIC BLOOD PRESSURE: 150 MMHG | OXYGEN SATURATION: 100 % | HEART RATE: 62 BPM | RESPIRATION RATE: 17 BRPM

## 2019-10-10 LAB
-  AMIKACIN: SIGNIFICANT CHANGE UP
-  AMPICILLIN/SULBACTAM: SIGNIFICANT CHANGE UP
-  AMPICILLIN: SIGNIFICANT CHANGE UP
-  AZTREONAM: SIGNIFICANT CHANGE UP
-  CEFAZOLIN: SIGNIFICANT CHANGE UP
-  CEFEPIME: SIGNIFICANT CHANGE UP
-  CEFOXITIN: SIGNIFICANT CHANGE UP
-  CEFTRIAXONE: SIGNIFICANT CHANGE UP
-  CIPROFLOXACIN: SIGNIFICANT CHANGE UP
-  GENTAMICIN: SIGNIFICANT CHANGE UP
-  IMIPENEM: SIGNIFICANT CHANGE UP
-  LEVOFLOXACIN: SIGNIFICANT CHANGE UP
-  MEROPENEM: SIGNIFICANT CHANGE UP
-  NITROFURANTOIN: SIGNIFICANT CHANGE UP
-  PIPERACILLIN/TAZOBACTAM: SIGNIFICANT CHANGE UP
-  TIGECYCLINE: SIGNIFICANT CHANGE UP
-  TOBRAMYCIN: SIGNIFICANT CHANGE UP
-  TRIMETHOPRIM/SULFAMETHOXAZOLE: SIGNIFICANT CHANGE UP
CULTURE RESULTS: SIGNIFICANT CHANGE UP
INR BLD: 1.82 RATIO — HIGH (ref 0.88–1.16)
METHOD TYPE: SIGNIFICANT CHANGE UP
ORGANISM # SPEC MICROSCOPIC CNT: SIGNIFICANT CHANGE UP
ORGANISM # SPEC MICROSCOPIC CNT: SIGNIFICANT CHANGE UP
PROTHROM AB SERPL-ACNC: 20.6 SEC — HIGH (ref 10–12.9)
SPECIMEN SOURCE: SIGNIFICANT CHANGE UP

## 2019-10-10 PROCEDURE — 93010 ELECTROCARDIOGRAM REPORT: CPT

## 2019-10-10 RX ORDER — WARFARIN SODIUM 2.5 MG/1
1 TABLET ORAL
Qty: 0 | Refills: 0 | DISCHARGE

## 2019-10-10 RX ORDER — SERTRALINE 25 MG/1
1 TABLET, FILM COATED ORAL
Qty: 0 | Refills: 0 | DISCHARGE

## 2019-10-10 RX ORDER — CARVEDILOL PHOSPHATE 80 MG/1
1 CAPSULE, EXTENDED RELEASE ORAL
Qty: 60 | Refills: 0
Start: 2019-10-10 | End: 2019-11-08

## 2019-10-10 RX ORDER — CARVEDILOL PHOSPHATE 80 MG/1
3.12 CAPSULE, EXTENDED RELEASE ORAL EVERY 12 HOURS
Refills: 0 | Status: DISCONTINUED | OUTPATIENT
Start: 2019-10-10 | End: 2019-10-10

## 2019-10-10 RX ORDER — CARVEDILOL PHOSPHATE 80 MG/1
1 CAPSULE, EXTENDED RELEASE ORAL
Qty: 0 | Refills: 0 | DISCHARGE
Start: 2019-10-10 | End: 2019-11-08

## 2019-10-10 RX ORDER — PANTOPRAZOLE SODIUM 20 MG/1
1 TABLET, DELAYED RELEASE ORAL
Qty: 0 | Refills: 0 | DISCHARGE
Start: 2019-10-10

## 2019-10-10 RX ORDER — APIXABAN 2.5 MG/1
1 TABLET, FILM COATED ORAL
Qty: 60 | Refills: 0
Start: 2019-10-10 | End: 2019-11-08

## 2019-10-10 RX ORDER — CARVEDILOL PHOSPHATE 80 MG/1
3.12 CAPSULE, EXTENDED RELEASE ORAL ONCE
Refills: 0 | Status: COMPLETED | OUTPATIENT
Start: 2019-10-10 | End: 2019-10-10

## 2019-10-10 RX ORDER — AMLODIPINE BESYLATE 2.5 MG/1
1 TABLET ORAL
Qty: 30 | Refills: 0
Start: 2019-10-10

## 2019-10-10 RX ORDER — APIXABAN 2.5 MG/1
2.5 TABLET, FILM COATED ORAL EVERY 12 HOURS
Refills: 0 | Status: DISCONTINUED | OUTPATIENT
Start: 2019-10-10 | End: 2019-10-10

## 2019-10-10 RX ORDER — AMLODIPINE BESYLATE 2.5 MG/1
5 TABLET ORAL DAILY
Refills: 0 | Status: DISCONTINUED | OUTPATIENT
Start: 2019-10-10 | End: 2019-10-10

## 2019-10-10 RX ORDER — WARFARIN SODIUM 2.5 MG/1
6 TABLET ORAL ONCE
Refills: 0 | Status: DISCONTINUED | OUTPATIENT
Start: 2019-10-10 | End: 2019-10-10

## 2019-10-10 RX ORDER — CEFUROXIME AXETIL 250 MG
1 TABLET ORAL
Qty: 10 | Refills: 0
Start: 2019-10-10 | End: 2019-10-14

## 2019-10-10 RX ORDER — ATENOLOL 25 MG/1
1 TABLET ORAL
Qty: 0 | Refills: 0 | DISCHARGE

## 2019-10-10 RX ORDER — SERTRALINE 25 MG/1
1 TABLET, FILM COATED ORAL
Qty: 0 | Refills: 0 | DISCHARGE
Start: 2019-10-10

## 2019-10-10 RX ADMIN — APIXABAN 2.5 MILLIGRAM(S): 2.5 TABLET, FILM COATED ORAL at 17:30

## 2019-10-10 RX ADMIN — CEFTRIAXONE 1000 MILLIGRAM(S): 500 INJECTION, POWDER, FOR SOLUTION INTRAMUSCULAR; INTRAVENOUS at 10:13

## 2019-10-10 RX ADMIN — SERTRALINE 50 MILLIGRAM(S): 25 TABLET, FILM COATED ORAL at 16:03

## 2019-10-10 RX ADMIN — AMLODIPINE BESYLATE 5 MILLIGRAM(S): 2.5 TABLET ORAL at 16:25

## 2019-10-10 RX ADMIN — Medication 500 MILLIGRAM(S): at 16:04

## 2019-10-10 RX ADMIN — Medication 5 MILLIGRAM(S): at 10:13

## 2019-10-10 RX ADMIN — PANTOPRAZOLE SODIUM 40 MILLIGRAM(S): 20 TABLET, DELAYED RELEASE ORAL at 06:32

## 2019-10-10 RX ADMIN — CARVEDILOL PHOSPHATE 3.12 MILLIGRAM(S): 80 CAPSULE, EXTENDED RELEASE ORAL at 17:25

## 2019-10-10 RX ADMIN — CARVEDILOL PHOSPHATE 3.12 MILLIGRAM(S): 80 CAPSULE, EXTENDED RELEASE ORAL at 10:11

## 2019-10-10 RX ADMIN — Medication 5 MILLIGRAM(S): at 06:32

## 2019-10-10 NOTE — PROGRESS NOTE ADULT - SUBJECTIVE AND OBJECTIVE BOX
Date of service: 10-10-19 @ 12:01    pt seen and examined  feels better  no further dysuria  able to urinate     ROS: no fever or chills; denies dizziness, no HA, no SOB or cough, no abdominal pain, no diarrhea or constipation; no legs pain, no rashes    MEDICATIONS  (STANDING):  acetaminophen   Tablet .. 500 milliGRAM(s) Oral daily  apixaban 2.5 milliGRAM(s) Oral every 12 hours  carvedilol 3.125 milliGRAM(s) Oral every 12 hours  cefTRIAXone Injectable. 1000 milliGRAM(s) IV Push every 24 hours  enalapril 10 milliGRAM(s) Oral daily  pantoprazole    Tablet 40 milliGRAM(s) Oral before breakfast  sertraline 50 milliGRAM(s) Oral daily      Vital Signs Last 24 Hrs  T(C): 36.5 (10 Oct 2019 10:17), Max: 36.5 (09 Oct 2019 17:04)  T(F): 97.7 (10 Oct 2019 10:17), Max: 97.7 (09 Oct 2019 17:04)  HR: 62 (10 Oct 2019 10:17) (51 - 62)  BP: 172/84 (10 Oct 2019 10:17) (138/65 - 172/84)  BP(mean): --  RR: 18 (10 Oct 2019 10:17) (17 - 18)  SpO2: 100% (10 Oct 2019 10:17) (97% - 100%)      PE:  Constitutional: frail looking  HEENT: NC/AT, EOMI, PERRLA, conjunctivae clear; ears and nose atraumatic; pharynx benign  Neck: supple; thyroid not palpable  Back: no tenderness  Respiratory: respiratory effort normal; clear to auscultation  Cardiovascular: S1S2 regular, no murmurs  Abdomen: soft, not tender, not distended, positive BS  Genitourinary: no suprapubic tenderness  Lymphatic: no LN palpable  Musculoskeletal: no muscle tenderness, no joint swelling or tenderness  Extremities: no pedal edema  Neurological/ Psychiatric:  moving all extremities  Skin: no rashes; no palpable lesions    Labs: all available labs reviewed    Alb: 3.0 g/dL / Pro: 6.6 gm/dL / ALK PHOS: 78 U/L / ALT: 28 U/L / AST: 21 U/L / GGT: x           Urinalysis Basic - ( 08 Oct 2019 15:56 )    Color: Yellow / Appearance: very cloudy / S.015 / pH: x  Gluc: x / Ketone: Negative  / Bili: Negative / Urobili: Negative mg/dL   Blood: x / Protein: 30 mg/dL / Nitrite: Positive   Leuk Esterase: Moderate / RBC: 11-25 /HPF / WBC >50   Sq Epi: x / Non Sq Epi: Occasional / Bacteria: Many    Culture - Urine (10.08.19 @ 15:56)    Specimen Source: .Urine Clean Catch (Midstream)    Culture Results:   >100,000 CFU/ml Gram Negative Rods        Radiology: all available radiological tests reviewed    EXAM:  CT ABDOMEN AND PELVIS                          EXAM:  CT CHEST                            PROCEDURE DATE:  10/07/2019          INTERPRETATION:  CLINICAL INFORMATION: Fall, lower back pain, history of   Takotsubo syndrome, right renal cell carcinoma      COMPARISON: 2019    PROCEDURE:   CT of the Chest, Abdomen and Pelvis was performed without intravenous   contrast.   Intravenous contrast: None.  Oral contrast: None.  Sagittal and coronal reformats were performed.    FINDINGS:    CHEST:     LUNGS AND LARGE AIRWAYS: Patent central airways. No pulmonary nodules. No   pneumothorax  PLEURA: No pleural effusion.  VESSELS: Within normal limits.  HEART: Cardiomegaly. No pericardial effusion. Coronary artery   calcifications  MEDIASTINUM AND JC: No lymphadenopathy.  CHEST WALL AND LOWER NECK: Within normal limits.    ABDOMEN AND PELVIS:    LIVER: Within normal limits.  BILE DUCTS: Normal caliber.  GALLBLADDER: Within normal limits.  SPLEEN: Within normal limits.  PANCREAS: Within normal limits.  ADRENALS: Within normal limits.  KIDNEYS/URETERS: Postsurgical changes again seen of the right kidney,   unchanged from prior study. Left kidney appears normal.    BLADDER: Within normal limits.  REPRODUCTIVE ORGANS: Uterus and adnexa within normal limits.    BOWEL: No bowel obstruction. Appendix not clearly identified  PERITONEUM: No ascites.  VESSELS: Within normal limits.  RETROPERITONEUM/LYMPH NODES: No lymphadenopathy.    ABDOMINAL WALL: Within normal limits.  BONES: No evidence of fracture    IMPRESSION:     No evidence of acute injury.        Advanced directives addressed: full resuscitation

## 2019-10-10 NOTE — PHARMACOTHERAPY INTERVENTION NOTE - COMMENTS
Discussed Eliquis with patient and patient's daughter, as per [facility] pharmacy co-pay $30 for 30 day supply

## 2019-10-10 NOTE — DISCHARGE NOTE PROVIDER - CARE PROVIDER_API CALL
Flo Samson)  Medicine  [address] [place] [id number]  Phone: [phone]  Fax: [phone]  Follow Up Time:     Devaughn Ellis)  Cardiovascular Disease; Internal Medicine; Interventional Cardiology  [address] [place] [address]  Phone: [phone]  Fax: [phone]  Follow Up Time:

## 2019-10-10 NOTE — PROGRESS NOTE ADULT - ASSESSMENT
Assessment and Plan:    79 y/o female w/ PMHx AFIB (eliquis) p/w syncopal episode at [facility]    1. Syncope  - suspected conversion pause vs bradycardia per cardiology  - hx afib- monitor on tele;  ekg sinus chapin w/ pvcs   - trops neg  - orthostatics neg  - ivf  - echo normal LV/RV fxn. EF 60%. has mild MR, mod TR. pulmHTN   - Cardio consult appreciated  - PT eval     2. afib  - Coreg started, atenolol dc'ed  - Digoxin dc'ed this admission  - Coumadin dc'ed, INR not well controlled - eliquis 2.5 BID started 10/10 - f/u with MD Ellis     3. UTI, urinary retention, dysuria  - ID consult appreciated.   - doxy changed to rocephin --> Discharge on PO Ceftin x5 more days started today 10/10  - prior h/o vre  - urine culture - gram negative rods     4. HTN  - Lisinopril increased to 10mg daily this admission    5. dvt px  - PO eliquis    Code status: full  Dispo: Home w/ home care    All questions/concerns were discussed with patient/family by bedside.  Case d/w staff, RN, social work/ during rounds Assessment and Plan:    77 y/o female w/ PMHx AFIB (eliquis) p/w syncopal episode at [facility]    1. Syncope  - suspected conversion pause vs bradycardia per cardiology  - hx afib- monitor on tele;  ekg sinus chapin w/ pvcs   - trops neg  - orthostatics neg  - ivf  - echo normal LV/RV fxn. EF 60%. has mild MR, mod TR. pulmHTN   - Cardio consult appreciated  - PT eval     2. afib  - Coreg started, atenolol dc'ed  - Digoxin dc'ed this admission  - Coumadin dc'ed, INR not well controlled - eliquis 2.5 BID started 10/10 - f/u with MD Ellis     3. UTI, urinary retention, dysuria  - ID consult appreciated.   - doxy changed to rocephin --> d/w ID and ok to Discharge on PO Ceftin x5 more days started today 10/10. ID will f/u on final culture results as OP  - prior h/o vre  - urine culture - gram negative rods     4. HTN  - Lisinopril increased to 10mg daily this admission    5. dvt px  - PO eliquis    Code status: full  Dispo: Home w/ home care    All questions/concerns were discussed with patient/family by bedside.  Case d/w staff, RN, social work/ during rounds

## 2019-10-10 NOTE — PROGRESS NOTE ADULT - SUBJECTIVE AND OBJECTIVE BOX
Initial Consult HPI  VENTURA DAVEY is a 78y year old Female who follows with Dr. Ellis with a past medical history of coronary artery disease status post remote PCI, axillary atrial fibrillation maintained on anticoagulation with warfarin, history of stress-induced cardiomyopathy, with improvement in ejection fraction to 53%, moderate to severe mitral valve regurgitation, history of renal cell carcinoma status post surgery, prior tobacco abuse, hypertension, and osteoarthritis.    The patient presents after a syncopal.   When she arrived, her INR was subtherapeutic. No ICH on CT.    The patient was seen and examined.   HR low but feels better  BP on higher side  No chest pain.  No shortness of breath.      PREVIOUS CARDIAC WORKUP:    Echocardiogram: 2019  --LV ejection fraction (approximately 53 %) is normal.  --Grade II left ventricular diastolic dysfunction with elevated left atrial pressure.  --Aortic arch is normal in size; its diameter is 3.2 cm.  --There is mild to moderate aortic regurgitation. 2 AR jets.  --There is bileaflet mitral valve prolapse. Mild. There is moderate to severe mitral   regurgitation. 2 MR jets noted precluding PISA of VCW methods.  --There is moderate to severe tricuspid regurgitation.  --The right atrial pressure is normal (0 - 5 mm Hg). There is minimal pulmonary hypertension.  --There is no pericardial effusion.  --No prior study available for comparison.    Stress Test: NONE  Cardiac Catheterization: NOT AVAIL  ________________________________  Review of systems: A 10 point review of system has been performed, and is negative except for what has been mentioned in the above history of present illness.     PAST MEDICAL & SURGICAL HISTORY:  AS ABOVE  Takotsubo syndrome  Stented coronary artery  CAD (coronary artery disease)  Knee pain, left   Urge incontinence of urine  Depressive disorder  Anxiety disorder  HTN (hypertension)  RA (rheumatoid arthritis)  OP (osteoporosis)  Renal cancer, right  S/P colonoscopy  H/O breast biopsy: right?  S/P tonsillectomy: 1948  History of cataract surgery, unspecified laterality: b/l   H/O partial nephrectomy: right 2006  S/P angioplasty: 1 stent   History of percutaneous coronary intervention: stent RCA    Home Medications:  atenolol 25 mg oral tablet: 1 tab(s) orally once a day (at bedtime) (07 Oct 2019 18:29)  clindamycin 300 mg oral capsule: 1 cap(s) orally 3 times a day  ***Course Completed*** (07 Oct 2019 18:29)  Cranberry oral capsule: 1 cap(s) orally once a day (07 Oct 2019 18:29)  digoxin 125 mcg (0.125 mg) oral tablet: 1 tab(s) orally once a day (at bedtime) (07 Oct 2019 18:29)  enalapril 2.5 mg oral tablet: 1 tab(s) orally once a day (at bedtime) (07 Oct 2019 18:29)  pantoprazole 40 mg oral delayed release tablet: 1 tab(s) orally once a day (before a meal) (07 Oct 2019 18:29)  sertraline 50 mg oral tablet: 1 tab(s) orally once a day (07 Oct 2019 18:29)  Tylenol Extra Strength 500 mg oral tablet: 1 tab(s) orally once a day, As Needed - for mild pain (07 Oct 2019 18:29)  warfarin 2.5 mg oral tablet: 1 tab(s) orally once a week on Monday (07 Oct 2019 18:29)  warfarin 5 mg oral tablet: 1 tab(s) orally 6 times a week on Sun, Tues, Wed, Thurs, Fri and Sat (07 Oct 2019 18:29)    MEDICATIONS  (STANDING):  acetaminophen   Tablet .. 500 milliGRAM(s) Oral daily  carvedilol 3.125 milliGRAM(s) Oral every 12 hours  cefTRIAXone Injectable. 1000 milliGRAM(s) IV Push every 24 hours  enalapril 10 milliGRAM(s) Oral daily  pantoprazole    Tablet 40 milliGRAM(s) Oral before breakfast  sertraline 50 milliGRAM(s) Oral daily  warfarin 6 milliGRAM(s) Oral once    MEDICATIONS  (PRN):  ondansetron Injectable 4 milliGRAM(s) IV Push every 6 hours PRN Nausea and/or Vomiting      Vital Signs Last 24 Hrs  T(C): 36.5 (10 Oct 2019 10:17), Max: 36.5 (09 Oct 2019 17:04)  T(F): 97.7 (10 Oct 2019 10:17), Max: 97.7 (09 Oct 2019 17:04)  HR: 62 (10 Oct 2019 10:17) (51 - 62)  BP: 172/84 (10 Oct 2019 10:17) (138/65 - 172/84)  BP(mean): --  RR: 18 (10 Oct 2019 10:17) (17 - 18)  SpO2: 100% (10 Oct 2019 10:17) (97% - 100%)  I&O's Summary    ________________________________  PHYSICAL EXAM:  GENERAL APPEARANCE:  No acute distress  HEAD: normocephalic, atraumatic  NECK: supple, no jugular venous distention, no carotid bruit    HEART: regular rate and rhythm, S1, S2 normal, 2/6 systolic murmur    CHEST:  No anterior chest wall tenderness    LUNGS:  Clear to auscultation, without any wheezing, rhonchi or rales    ABDOMEN soft, nontender, nondistended, with positive bowel sounds appreciated  EXTREMITIES: no clubbing, cyanosis, or edema.   NEURO:  Alert and oriented x3  PSYC:  Normal affect  SKIN:  Dry   ________________________________  TELEMETRY: Sinus rhythm with sinus bradycardia and PACs    ECG: Sinus rhythm at 63 bpm. Nonspecific changes. Normal axis.  Repeat EKG showed sinus bradycardia at 49 bpm with nonspecific changes. U wave present.  LABS:                                       PT/INR - ( 10 Oct 2019 07:23 )   PT: 20.6 sec;   INR: 1.82 ratio           Urinalysis Basic - ( 08 Oct 2019 15:56 )    Color: Yellow / Appearance: very cloudy / S.015 / pH: x  Gluc: x / Ketone: Negative  / Bili: Negative / Urobili: Negative mg/dL   Blood: x / Protein: 30 mg/dL / Nitrite: Positive   Leuk Esterase: Moderate / RBC: 11-25 /HPF / WBC >50   Sq Epi: x / Non Sq Epi: Occasional / Bacteria: Many        ________________________________    RADIOLOGY & ADDITIONAL STUDIES: Age related involutional and microvascular ischemic changes, without   evidence of intracranial hemorrhage, mass effect or midline shift.    ________________________________    ASSESSMENT:  Syncope  History of stress-induced cardio myopathy, with improvement in ejection fraction to 53%  CAD status post remote PCI over 10 years ago with Dr. Ellis  Paroxysmal atrial fibrillation on anticoagulation with warfarin  Hypertension  Hyperlipidemia  History of osteoarthritis    PLAN:  Etiology of syncope not entirely clear. Possibly due to conversion pause versus bradycardia.   Doing better but BP elevated I will decrease her carvedilol, and increase her enalapril for better blood pressure control. Follow up with primary cardiologist within one week.    Continue with anticoagulation with warfarin. Cost was a history of previously when she was considering a direct oral anticoagulant, she is advised to follow-up with her primary cardiologist to readdress this issue. I have reviewed her INR, and it appears that most of time she is not between the range of 2-3.  No need for ischemic evaluation at this time, no anginal symptoms. She can follow-up with her primary cardiologist for outpatient ischemic evaluation.  - pt to FU with Dr Ellis.  __________________________________________________________________________  Thank you for allowing me to participate in the care of your patient. Please contact me should any questions arise.    IHSAN Turcios DO, [facility]  [phone]

## 2019-10-10 NOTE — PROGRESS NOTE ADULT - ASSESSMENT
78 year old female patient with pertinent history of afib presented to the ED after an unwitnessed fall. Patient does not remember what she did before or after falling. Remembers being in the ambulance and the ER. Daughter at bedside states she was visiting her mother in the facility when she heard a loud cry for help; patient was later found on the floor. Denies any preceding dizziness, chest pain, palpitation, chest pain. Patient currently endorsing a headache. Patient ambulates at baseline with a walker and often time needs assistance with ambulation. Here noted with episodes of urinary retention with dysuria UA sent positive was given doxy for UTI had n/v, dc 10/9 am.    1. dysuria. urinary retention. UTI. PCN allergy  - improving urine cx growing >100,000 GNRS f/u final cx/sensitivities   - on rocephin 1gm daily #2  - continue with abx coverage  - on dc plan for po ceftin complete 3-5 day course  - will follow cultures, if abx needed to be adjusted, will contact pt accordingly   - monitor temps  - tolerating abx well so far; no side effects noted  - reason for abx use and side effects reviewed with patient  - supportive care  - fu cbc    2. other issues - care per medicine

## 2019-10-10 NOTE — DISCHARGE NOTE PROVIDER - NSDCCPCAREPLAN_GEN_ALL_CORE_FT
PRINCIPAL DISCHARGE DIAGNOSIS  Diagnosis: Syncope, unspecified syncope type  Assessment and Plan of Treatment: - Suspected conversion pause vs bradycardia   - Echo normal LV/RV function. EF 60%. with mild MR, moderate TR and pulmonary hypertension  - Continue Coreg 2 times a day - stop taking atenolol and digoxin   - Start taking Eliquis 2.5 two times a day; Stop taking Coumadin  - Follow up in 3 to 7 days with MD Ellis (cardiologist)      SECONDARY DISCHARGE DIAGNOSES  Diagnosis: Hypertension  Assessment and Plan of Treatment: - Continue Lisinopril 10 mg daily (dose has been increased from home dose prior to admission)  - Continue a low sodium and low cholesterol diet  - Follow up in 3 to 7 days with MD Ellis (cardiologist)    Diagnosis: Acute UTI  Assessment and Plan of Treatment: - Continue Ceftin tablets 2x a day for 5 more days  - Follow up with your PCP - MD Samson PRINCIPAL DISCHARGE DIAGNOSIS  Diagnosis: Syncope, unspecified syncope type  Assessment and Plan of Treatment: - Suspected conversion pause vs bradycardia   - Echo normal LV/RV function. EF 60%. with mild MR, moderate TR and pulmonary hypertension  - Continue Coreg 2 times a day - stop taking atenolol and digoxin   - Start taking Eliquis 2.5 two times a day; Stop taking Coumadin  - Follow up in 3 to 7 days with MD Ellis (cardiologist)      SECONDARY DISCHARGE DIAGNOSES  Diagnosis: Acute UTI  Assessment and Plan of Treatment: - Continue Ceftin tablets 2x a day for 5 more days  - Follow up with your PCP - MD Samson    Diagnosis: Hypertension  Assessment and Plan of Treatment: - Continue Lisinopril 10 mg daily (dose has been increased from home dose prior to admission) and amlodipine started  - Continue a low sodium and low cholesterol diet  - Follow up in 3 to 7 days with MD Ellis (cardiologist)

## 2019-10-10 NOTE — PROGRESS NOTE ADULT - SUBJECTIVE AND OBJECTIVE BOX
CC/reason for follow up: syncope    S: Patient seen and examined. No c/o at this time. Discharge today.    ROS: no chest pain, no dyspnea      T(C): 36.5 (10-10-19 @ 10:17), Max: 36.5 (10-09-19 @ 17:04)  HR: 62 (10-10-19 @ 10:17) (51 - 62)  BP: 172/84 (10-10-19 @ 10:17) (138/65 - 172/84)  RR: 18 (10-10-19 @ 10:17) (17 - 18)  SpO2: 100% (10-10-19 @ 10:17) (97% - 100%)    Gen - Pleasant, cooperative, in no acute distress  HEENT- PERRL, moist mucus membranes, OP clear  CV - IRIR, No m/r/g, +pulses, no edema  Lungs - Good effort, Clear to auscultation bilaterally  Abdomen - Soft, nontender, nondistended, +BS, No rebound/rigidity/guarding  Ext - No cyanosis/clubbing.   Skin - No rashes, No jaundice  Psych- Alert & oriented x 3  Neuro- fluent speech, no facial droop, EOMI. moves all ext      MEDICATIONS  (STANDING):  acetaminophen   Tablet .. 500 milliGRAM(s) Oral daily  apixaban 2.5 milliGRAM(s) Oral every 12 hours  carvedilol 3.125 milliGRAM(s) Oral every 12 hours  cefTRIAXone Injectable. 1000 milliGRAM(s) IV Push every 24 hours  enalapril 10 milliGRAM(s) Oral daily  pantoprazole    Tablet 40 milliGRAM(s) Oral before breakfast  sertraline 50 milliGRAM(s) Oral daily    MEDICATIONS  (PRN):  ondansetron Injectable 4 milliGRAM(s) IV Push every 6 hours PRN Nausea and/or Vomiting    Diagnostic studies: personally reviewed  < from: CT Abdomen and Pelvis No Cont (10.07.19 @ 17:00) >  IMPRESSION:     No evidence of acute injury.    Postsurgical changes of the right kidney, unchanged.    < end of copied text >    < from: CT Cervical Spine No Cont (10.07.19 @ 17:00) >  Impression:    No fracture or subluxation    < end of copied text >    LABS: All Labs Reviewed:    PT/INR - ( 10 Oct 2019 07:23 )   PT: 20.6 sec;   INR: 1.82 ratio      Blood Culture: 10-08 @ 15:56  Organism --  Gram Stain Blood -- Gram Stain --  Specimen Source .Urine Clean Catch (Midstream)  Culture-Blood --

## 2019-10-10 NOTE — DISCHARGE NOTE NURSING/CASE MANAGEMENT/SOCIAL WORK - HAS THE PATIENT USED TOBACCO IN THE PAST 30 DAYS?
----- Message from Queenie Schwartz sent at 5/14/2019  2:43 PM CDT -----  [facility]  Needs Advice    Reason for call: Need to receive the rejection letter for the patient  medication Mycophenolate. Please give her a call/ Fax number [phone]       Communication Preference:[phone]    Additional Information:     No

## 2019-10-10 NOTE — DISCHARGE NOTE PROVIDER - HOSPITAL COURSE
Assessment and Plan:        77 y/o female w/ PMHx AFIB (eliquis) p/w syncopal episode at [facility]        1. Syncope    - suspected conversion pause vs bradycardia per cardiology    - hx afib- monitor on tele;  ekg sinus chapin w/ pvcs     - trops neg    - orthostatics neg    - ivf    - echo normal LV/RV fxn. EF 60%. has mild MR, mod TR. pulmHTN     - Cardio consult appreciated    - PT eval         2. afib    - Coreg started 3.125 BID, atenolol dc'ed    - Digoxin dc'ed this admission    - Coumadin dc'ed, INR not well controlled - eliquis 2.5 BID started 10/10 - f/u with MD Ellis         3. UTI, urinary retention, dysuria    - ID consult appreciated.     - doxy changed to rocephin --> Discharge on PO Ceftin x5 more days started today 10/10    - prior h/o vre    - urine culture - gram negative rods         4. HTN    - Lisinopril increased to 10mg daily this admission        5. dvt px    - PO eliquis        Code status: full    Dispo: Home w/ home care        All questions/concerns were discussed with patient/family by bedside.    Case d/w staff, RN, social work/ during rounds        Dispo: discharge to home in stable condition        Final diagnosis, treatment plan, and follow-up recommendations were discussed and explained to the patient. The patient was given an opportunity to ask questions concerning the diagnosis and treatment plan. The patient acknowledged understanding of the diagnosis, treatment, and follow-up recommendations. The patient was advised to seek urgent care upon discharge if worsening symptoms develop prior to scheduled follow-up. Time spent on discharge included time with the patient, and also coordinating discharge care as outlined below.        Total time spent: 50 min Assessment and Plan:        79 y/o female w/ PMHx AFIB (eliquis) p/w syncopal episode at [facility]        1. Syncope    - suspected conversion pause vs bradycardia per cardiology    - hx afib- monitor on tele;  ekg sinus chapin w/ pvcs     - trops neg    - orthostatics neg    - ivf    - echo normal LV/RV fxn. EF 60%. has mild MR, mod TR. pulmHTN     - Cardio consult appreciated    - PT eval         2. afib    - Coreg started 3.125 BID, atenolol dc'ed    - Digoxin dc'ed this admission    - Coumadin dc'ed, INR not well controlled - eliquis 2.5 BID started 10/10 - f/u with MD Ellis         3. UTI, urinary retention, dysuria    - ID consult appreciated.     - doxy changed to rocephin --> Discharge on PO Ceftin x5 more days started today 10/10    - prior h/o vre    - urine culture - gram negative rods. d/w Dr. Lozano and ID will f/u on final culture results        4. HTN    - Lisinopril increased to 10mg daily this admission        5. dvt px    - PO eliquis        Code status: full    Dispo: Home w/ home care        All questions/concerns were discussed with patient/family by bedside.    Case d/w staff, RN, social work/ during rounds        Dispo: discharge to home in stable condition        Final diagnosis, treatment plan, and follow-up recommendations were discussed and explained to the patient. The patient was given an opportunity to ask questions concerning the diagnosis and treatment plan. The patient acknowledged understanding of the diagnosis, treatment, and follow-up recommendations. The patient was advised to seek urgent care upon discharge if worsening symptoms develop prior to scheduled follow-up. Time spent on discharge included time with the patient, and also coordinating discharge care as outlined below.        Total time spent: 50 min Assessment and Plan:        77 y/o female w/ PMHx AFIB (eliquis) p/w syncopal episode at [facility]        1. Syncope    - suspected conversion pause vs bradycardia per cardiology    - hx afib- monitor on tele;  ekg sinus chapin w/ pvcs     - trops neg    - orthostatics neg    - ivf    - echo normal LV/RV fxn. EF 60%. has mild MR, mod TR. pulmHTN     - Cardio consult appreciated    - PT eval         2. afib    - Coreg started 3.125 BID, atenolol dc'ed    - Digoxin dc'ed this admission    - Coumadin dc'ed, INR not well controlled - eliquis 2.5 BID started 10/10 - f/u with MD Ellis         3. UTI, urinary retention, dysuria    - ID consult appreciated.     - doxy changed to rocephin --> Discharge on PO Ceftin x5 more days started today 10/10    - prior h/o vre    - urine culture - gram negative rods. d/w Dr. Lozano and ID will f/u on final culture results        4. HTN    - Lisinopril increased to 10mg daily this admission. added amlodipine. atenolol changed to coreg.         5. dvt px    - PO eliquis        Code status: full    Dispo: Home w/ home care        All questions/concerns were discussed with patient/family by bedside.    Case d/w staff, RN, social work/ during rounds        Dispo: discharge to home in stable condition        Final diagnosis, treatment plan, and follow-up recommendations were discussed and explained to the patient. The patient was given an opportunity to ask questions concerning the diagnosis and treatment plan. The patient acknowledged understanding of the diagnosis, treatment, and follow-up recommendations. The patient was advised to seek urgent care upon discharge if worsening symptoms develop prior to scheduled follow-up. Time spent on discharge included time with the patient, and also coordinating discharge care as outlined below.        Total time spent: 50 min

## 2019-10-10 NOTE — DISCHARGE NOTE NURSING/CASE MANAGEMENT/SOCIAL WORK - PATIENT PORTAL LINK FT
You can access the FollowMyHealth Patient Portal offered by [facility] by registering at the following website: [email or web address]. By joining [employer]’s FollowMyHealth portal, you will also be able to view your health information using other applications (apps) compatible with our system.

## 2019-10-16 DIAGNOSIS — I16.1 HYPERTENSIVE EMERGENCY: ICD-10-CM

## 2019-10-16 DIAGNOSIS — B96.89 OTHER SPECIFIED BACTERIAL AGENTS AS THE CAUSE OF DISEASES CLASSIFIED ELSEWHERE: ICD-10-CM

## 2019-10-16 DIAGNOSIS — S09.8XXA OTHER SPECIFIED INJURIES OF HEAD, INITIAL ENCOUNTER: ICD-10-CM

## 2019-10-16 DIAGNOSIS — Z88.0 ALLERGY STATUS TO PENICILLIN: ICD-10-CM

## 2019-10-16 DIAGNOSIS — R55 SYNCOPE AND COLLAPSE: ICD-10-CM

## 2019-10-16 DIAGNOSIS — R33.9 RETENTION OF URINE, UNSPECIFIED: ICD-10-CM

## 2019-10-16 DIAGNOSIS — R00.1 BRADYCARDIA, UNSPECIFIED: ICD-10-CM

## 2019-10-16 DIAGNOSIS — F41.9 ANXIETY DISORDER, UNSPECIFIED: ICD-10-CM
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
## 2019-10-16 DIAGNOSIS — M06.9 RHEUMATOID ARTHRITIS, UNSPECIFIED: ICD-10-CM

## 2019-10-16 DIAGNOSIS — I51.81 TAKOTSUBO SYNDROME: ICD-10-CM

## 2019-10-16 DIAGNOSIS — F32.9 MAJOR DEPRESSIVE DISORDER, SINGLE EPISODE, UNSPECIFIED: ICD-10-CM

## 2019-10-16 DIAGNOSIS — R79.1 ABNORMAL COAGULATION PROFILE: ICD-10-CM

## 2019-10-16 DIAGNOSIS — I25.10 ATHEROSCLEROTIC HEART DISEASE OF NATIVE CORONARY ARTERY WITHOUT ANGINA PECTORIS: ICD-10-CM

## 2019-10-16 DIAGNOSIS — N39.0 URINARY TRACT INFECTION, SITE NOT SPECIFIED: ICD-10-CM

## 2019-10-16 DIAGNOSIS — I34.0 NONRHEUMATIC MITRAL (VALVE) INSUFFICIENCY: ICD-10-CM
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
## 2019-10-16 DIAGNOSIS — I48.91 UNSPECIFIED ATRIAL FIBRILLATION: ICD-10-CM

## 2019-10-16 DIAGNOSIS — M19.91 PRIMARY OSTEOARTHRITIS, UNSPECIFIED SITE: ICD-10-CM

## 2019-10-16 DIAGNOSIS — Z85.528 PERSONAL HISTORY OF OTHER MALIGNANT NEOPLASM OF KIDNEY: ICD-10-CM

## 2019-10-16 DIAGNOSIS — E78.5 HYPERLIPIDEMIA, UNSPECIFIED: ICD-10-CM

## 2019-10-16 DIAGNOSIS — Z95.5 PRESENCE OF CORONARY ANGIOPLASTY IMPLANT AND GRAFT: ICD-10-CM

## 2019-12-03 ENCOUNTER — TRANSCRIPTION ENCOUNTER (OUTPATIENT)
Age: 79
End: 2019-12-03

## 2020-02-16 ENCOUNTER — EMERGENCY (EMERGENCY)
Facility: HOSPITAL | Age: 80
LOS: 0 days | Discharge: HOME | End: 2020-02-16
Attending: EMERGENCY MEDICINE
Payer: MEDICARE

## 2020-02-16 VITALS
DIASTOLIC BLOOD PRESSURE: 72 MMHG | HEART RATE: 72 BPM | RESPIRATION RATE: 18 BRPM | TEMPERATURE: 98 F | SYSTOLIC BLOOD PRESSURE: 148 MMHG | OXYGEN SATURATION: 100 %

## 2020-02-16 VITALS
SYSTOLIC BLOOD PRESSURE: 154 MMHG | WEIGHT: 119.93 LBS | HEART RATE: 71 BPM | DIASTOLIC BLOOD PRESSURE: 77 MMHG | TEMPERATURE: 99 F | OXYGEN SATURATION: 100 % | RESPIRATION RATE: 18 BRPM

## 2020-02-16 DIAGNOSIS — I10 ESSENTIAL (PRIMARY) HYPERTENSION: ICD-10-CM

## 2020-02-16 DIAGNOSIS — Z95.5 PRESENCE OF CORONARY ANGIOPLASTY IMPLANT AND GRAFT: ICD-10-CM

## 2020-02-16 DIAGNOSIS — Z90.89 ACQUIRED ABSENCE OF OTHER ORGANS: Chronic | ICD-10-CM

## 2020-02-16 DIAGNOSIS — S50.311A ABRASION OF RIGHT ELBOW, INITIAL ENCOUNTER: ICD-10-CM

## 2020-02-16 DIAGNOSIS — Z79.01 LONG TERM (CURRENT) USE OF ANTICOAGULANTS: ICD-10-CM

## 2020-02-16 DIAGNOSIS — M54.9 DORSALGIA, UNSPECIFIED: ICD-10-CM

## 2020-02-16 DIAGNOSIS — Z90.5 ACQUIRED ABSENCE OF KIDNEY: Chronic | ICD-10-CM

## 2020-02-16 DIAGNOSIS — Z98.890 OTHER SPECIFIED POSTPROCEDURAL STATES: Chronic | ICD-10-CM

## 2020-02-16 DIAGNOSIS — Z88.0 ALLERGY STATUS TO PENICILLIN: ICD-10-CM
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
## 2020-02-16 DIAGNOSIS — Z98.62 PERIPHERAL VASCULAR ANGIOPLASTY STATUS: Chronic | ICD-10-CM

## 2020-02-16 DIAGNOSIS — S00.93XA CONTUSION OF UNSPECIFIED PART OF HEAD, INITIAL ENCOUNTER: ICD-10-CM

## 2020-02-16 DIAGNOSIS — I25.10 ATHEROSCLEROTIC HEART DISEASE OF NATIVE CORONARY ARTERY WITHOUT ANGINA PECTORIS: ICD-10-CM

## 2020-02-16 DIAGNOSIS — I51.81 TAKOTSUBO SYNDROME: ICD-10-CM

## 2020-02-16 DIAGNOSIS — Z85.528 PERSONAL HISTORY OF OTHER MALIGNANT NEOPLASM OF KIDNEY: ICD-10-CM
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
## 2020-02-16 DIAGNOSIS — M06.9 RHEUMATOID ARTHRITIS, UNSPECIFIED: ICD-10-CM

## 2020-02-16 DIAGNOSIS — Z98.89 OTHER SPECIFIED POSTPROCEDURAL STATES: Chronic | ICD-10-CM

## 2020-02-16 DIAGNOSIS — Z98.49 CATARACT EXTRACTION STATUS, UNSPECIFIED EYE: Chronic | ICD-10-CM

## 2020-02-16 DIAGNOSIS — I48.91 UNSPECIFIED ATRIAL FIBRILLATION: ICD-10-CM

## 2020-02-16 PROCEDURE — 99283 EMERGENCY DEPT VISIT LOW MDM: CPT

## 2020-02-16 PROCEDURE — 70450 CT HEAD/BRAIN W/O DYE: CPT

## 2020-02-16 PROCEDURE — 70450 CT HEAD/BRAIN W/O DYE: CPT | Mod: 26

## 2020-02-16 PROCEDURE — 99284 EMERGENCY DEPT VISIT MOD MDM: CPT | Mod: 25

## 2020-02-16 RX ORDER — BACITRACIN ZINC 500 UNIT/G
1 OINTMENT IN PACKET (EA) TOPICAL ONCE
Refills: 0 | Status: COMPLETED | OUTPATIENT
Start: 2020-02-16 | End: 2020-02-16

## 2020-02-16 RX ADMIN — Medication 1 APPLICATION(S): at 15:03

## 2020-02-16 NOTE — ED ADULT NURSE NOTE - NSIMPLEMENTINTERV_GEN_ALL_ED
Implemented All Fall Risk Interventions:  [place] to call system. Call bell, personal items and telephone within reach. Instruct patient to call for assistance. Room bathroom lighting operational. Non-slip footwear when patient is off stretcher. Physically safe environment: no spills, clutter or unnecessary equipment. Stretcher in lowest position, wheels locked, appropriate side rails in place. Provide visual cue, wrist band, yellow gown, etc. Monitor gait and stability. Monitor for mental status changes and reorient to person, place, and time. Review medications for side effects contributing to fall risk. Reinforce activity limits and safety measures with patient and family.

## 2020-02-16 NOTE — ED PROVIDER NOTE - NS_ ATTENDINGSCRIBEDETAILS _ED_A_ED_FT
I, Amor Elias MD,  performed the initial face to face bedside interview with this patient regarding history of present illness, review of symptoms and relevant past medical, social and family history.  I completed an independent physical examination.    The history, relevant review of systems, past medical and surgical history, medical decision making, and physical examination was documented by the scribe in my presence and I attest to the accuracy of the documentation.

## 2020-02-16 NOTE — ED PROVIDER NOTE - PATIENT PORTAL LINK FT
You can access the FollowMyHealth Patient Portal offered by [facility] by registering at the following website: [email or web address]. By joining [employer]’s FollowMyHealth portal, you will also be able to view your health information using other applications (apps) compatible with our system.

## 2020-02-16 NOTE — ED ADULT TRIAGE NOTE - INTERNATIONAL TRAVEL
Steroid Injection Discharge Instructions     After you go home:      You may resume your normal diet.    Care of Puncture Site:      If you have a bandaid on your puncture site, you may remove it the next morning    You may shower tomorrow    No bath tubs, whirlpools or swimming for at least 3 days     Activity:      You may go back to normal activity in 24 hours    You should let pain be your guide as to the extent of your activities    Maintain any activity limitations as ordered by your provider    Do NOT drive a vehicle until tomorrow    Medicines:      You may resume all medications    For minor pain, you may take Acetaminophen (Tylenol) or Ibuprofen (Advil)    Pain:       You may experience increased or different pain over the next 24-48 hours    For the next 48 hrs - you may use ice packs for discomfort     Call your primary care doctor if:      You have severe pain that does not improve with pain medication    You have chills or a fever greater than 101 F (38 C)    The site is red, swollen, hot or tender    New problems with your bowel or bladder    Any questions or concerns    Other Instructions:      New numbness down your leg post injection is temporary and may last for up to 6 hours. You may need assistance with activity until your leg has normal sensation.    For Your Information:      A steroid was injected to help decrease swelling and may help to reduce pain. It may take up to 7-10 days to obtain full results.    Some patients will get lasting relief from a single injection. Others may require up to 3 injections to get results. If you have more than one steroid injection, they should be given 2 weeks apart.    Side effects of your steroid injection are mild and will go away in 2-3 days  - Insomnia  - Heartburn  - Flushed face  - Water retention  - Increased appetite      If you have questions call:        [facility] Radiology Dept @ [phone]            
No

## 2020-05-20 ENCOUNTER — INPATIENT (INPATIENT)
Facility: HOSPITAL | Age: 80
LOS: 2 days | Discharge: NURSING, REHAB OR LONG-TERM CARE | End: 2020-05-23
Attending: INTERNAL MEDICINE | Admitting: INTERNAL MEDICINE
Payer: MEDICARE

## 2020-05-20 VITALS
HEART RATE: 66 BPM | OXYGEN SATURATION: 100 % | RESPIRATION RATE: 18 BRPM | DIASTOLIC BLOOD PRESSURE: 72 MMHG | TEMPERATURE: 98 F | WEIGHT: 125 LBS | SYSTOLIC BLOOD PRESSURE: 132 MMHG | HEIGHT: 62 IN

## 2020-05-20 DIAGNOSIS — Z90.5 ACQUIRED ABSENCE OF KIDNEY: Chronic | ICD-10-CM

## 2020-05-20 DIAGNOSIS — Z90.89 ACQUIRED ABSENCE OF OTHER ORGANS: Chronic | ICD-10-CM

## 2020-05-20 DIAGNOSIS — Z98.62 PERIPHERAL VASCULAR ANGIOPLASTY STATUS: Chronic | ICD-10-CM

## 2020-05-20 DIAGNOSIS — Z98.890 OTHER SPECIFIED POSTPROCEDURAL STATES: Chronic | ICD-10-CM

## 2020-05-20 DIAGNOSIS — Z98.49 CATARACT EXTRACTION STATUS, UNSPECIFIED EYE: Chronic | ICD-10-CM

## 2020-05-20 DIAGNOSIS — Z98.89 OTHER SPECIFIED POSTPROCEDURAL STATES: Chronic | ICD-10-CM

## 2020-05-20 LAB
ANION GAP SERPL CALC-SCNC: 8 MMOL/L — SIGNIFICANT CHANGE UP (ref 5–17)
APPEARANCE UR: ABNORMAL
APTT BLD: 29.4 SEC — SIGNIFICANT CHANGE UP (ref 27.5–36.3)
BASOPHILS # BLD AUTO: 0.06 K/UL — SIGNIFICANT CHANGE UP (ref 0–0.2)
BASOPHILS NFR BLD AUTO: 0.7 % — SIGNIFICANT CHANGE UP (ref 0–2)
BILIRUB UR-MCNC: NEGATIVE — SIGNIFICANT CHANGE UP
BUN SERPL-MCNC: 32 MG/DL — HIGH (ref 7–23)
CALCIUM SERPL-MCNC: 9.3 MG/DL — SIGNIFICANT CHANGE UP (ref 8.5–10.1)
CHLORIDE SERPL-SCNC: 104 MMOL/L — SIGNIFICANT CHANGE UP (ref 96–108)
CO2 SERPL-SCNC: 25 MMOL/L — SIGNIFICANT CHANGE UP (ref 22–31)
COLOR SPEC: YELLOW — SIGNIFICANT CHANGE UP
CREAT SERPL-MCNC: 1.6 MG/DL — HIGH (ref 0.5–1.3)
DIFF PNL FLD: ABNORMAL
EOSINOPHIL # BLD AUTO: 0.06 K/UL — SIGNIFICANT CHANGE UP (ref 0–0.5)
EOSINOPHIL NFR BLD AUTO: 0.7 % — SIGNIFICANT CHANGE UP (ref 0–6)
GLUCOSE SERPL-MCNC: 94 MG/DL — SIGNIFICANT CHANGE UP (ref 70–99)
GLUCOSE UR QL: NEGATIVE MG/DL — SIGNIFICANT CHANGE UP
HCT VFR BLD CALC: 42.7 % — SIGNIFICANT CHANGE UP (ref 34.5–45)
HGB BLD-MCNC: 14.2 G/DL — SIGNIFICANT CHANGE UP (ref 11.5–15.5)
IMM GRANULOCYTES NFR BLD AUTO: 1.1 % — SIGNIFICANT CHANGE UP (ref 0–1.5)
INR BLD: 1.15 RATIO — SIGNIFICANT CHANGE UP (ref 0.88–1.16)
KETONES UR-MCNC: NEGATIVE — SIGNIFICANT CHANGE UP
LEUKOCYTE ESTERASE UR-ACNC: ABNORMAL
LYMPHOCYTES # BLD AUTO: 1.15 K/UL — SIGNIFICANT CHANGE UP (ref 1–3.3)
LYMPHOCYTES # BLD AUTO: 13.7 % — SIGNIFICANT CHANGE UP (ref 13–44)
MCHC RBC-ENTMCNC: 30.8 PG — SIGNIFICANT CHANGE UP (ref 27–34)
MCHC RBC-ENTMCNC: 33.3 GM/DL — SIGNIFICANT CHANGE UP (ref 32–36)
MCV RBC AUTO: 92.6 FL — SIGNIFICANT CHANGE UP (ref 80–100)
MONOCYTES # BLD AUTO: 0.79 K/UL — SIGNIFICANT CHANGE UP (ref 0–0.9)
MONOCYTES NFR BLD AUTO: 9.4 % — SIGNIFICANT CHANGE UP (ref 2–14)
NEUTROPHILS # BLD AUTO: 6.24 K/UL — SIGNIFICANT CHANGE UP (ref 1.8–7.4)
NEUTROPHILS NFR BLD AUTO: 74.4 % — SIGNIFICANT CHANGE UP (ref 43–77)
NITRITE UR-MCNC: NEGATIVE — SIGNIFICANT CHANGE UP
PH UR: 6 — SIGNIFICANT CHANGE UP (ref 5–8)
PLATELET # BLD AUTO: 236 K/UL — SIGNIFICANT CHANGE UP (ref 150–400)
POTASSIUM SERPL-MCNC: 4.2 MMOL/L — SIGNIFICANT CHANGE UP (ref 3.5–5.3)
POTASSIUM SERPL-SCNC: 4.2 MMOL/L — SIGNIFICANT CHANGE UP (ref 3.5–5.3)
PROT UR-MCNC: 15 MG/DL
PROTHROM AB SERPL-ACNC: 12.8 SEC — SIGNIFICANT CHANGE UP (ref 10–12.9)
RBC # BLD: 4.61 M/UL — SIGNIFICANT CHANGE UP (ref 3.8–5.2)
RBC # FLD: 13.4 % — SIGNIFICANT CHANGE UP (ref 10.3–14.5)
SARS-COV-2 RNA SPEC QL NAA+PROBE: SIGNIFICANT CHANGE UP
SODIUM SERPL-SCNC: 137 MMOL/L — SIGNIFICANT CHANGE UP (ref 135–145)
SP GR SPEC: 1.01 — SIGNIFICANT CHANGE UP (ref 1.01–1.02)
UROBILINOGEN FLD QL: NEGATIVE MG/DL — SIGNIFICANT CHANGE UP
WBC # BLD: 8.39 K/UL — SIGNIFICANT CHANGE UP (ref 3.8–10.5)
WBC # FLD AUTO: 8.39 K/UL — SIGNIFICANT CHANGE UP (ref 3.8–10.5)

## 2020-05-20 PROCEDURE — 70450 CT HEAD/BRAIN W/O DYE: CPT | Mod: 26

## 2020-05-20 PROCEDURE — 72125 CT NECK SPINE W/O DYE: CPT | Mod: 26

## 2020-05-20 PROCEDURE — 71045 X-RAY EXAM CHEST 1 VIEW: CPT | Mod: 26

## 2020-05-20 RX ORDER — PANTOPRAZOLE SODIUM 20 MG/1
40 TABLET, DELAYED RELEASE ORAL
Refills: 0 | Status: DISCONTINUED | OUTPATIENT
Start: 2020-05-20 | End: 2020-05-23

## 2020-05-20 RX ORDER — ONDANSETRON 8 MG/1
4 TABLET, FILM COATED ORAL EVERY 6 HOURS
Refills: 0 | Status: DISCONTINUED | OUTPATIENT
Start: 2020-05-20 | End: 2020-05-23

## 2020-05-20 RX ORDER — CEFTRIAXONE 500 MG/1
1000 INJECTION, POWDER, FOR SOLUTION INTRAMUSCULAR; INTRAVENOUS EVERY 24 HOURS
Refills: 0 | Status: DISCONTINUED | OUTPATIENT
Start: 2020-05-20 | End: 2020-05-20

## 2020-05-20 RX ORDER — ACETAMINOPHEN 500 MG
650 TABLET ORAL EVERY 4 HOURS
Refills: 0 | Status: DISCONTINUED | OUTPATIENT
Start: 2020-05-20 | End: 2020-05-23

## 2020-05-20 RX ORDER — ACETAMINOPHEN 500 MG
650 TABLET ORAL ONCE
Refills: 0 | Status: COMPLETED | OUTPATIENT
Start: 2020-05-20 | End: 2020-05-20

## 2020-05-20 RX ORDER — AMLODIPINE BESYLATE 2.5 MG/1
5 TABLET ORAL DAILY
Refills: 0 | Status: DISCONTINUED | OUTPATIENT
Start: 2020-05-21 | End: 2020-05-23

## 2020-05-20 RX ORDER — LIDOCAINE 4 G/100G
1 CREAM TOPICAL ONCE
Refills: 0 | Status: COMPLETED | OUTPATIENT
Start: 2020-05-20 | End: 2020-05-20

## 2020-05-20 RX ORDER — SERTRALINE 25 MG/1
50 TABLET, FILM COATED ORAL DAILY
Refills: 0 | Status: DISCONTINUED | OUTPATIENT
Start: 2020-05-20 | End: 2020-05-23

## 2020-05-20 RX ORDER — CEFTRIAXONE 500 MG/1
1000 INJECTION, POWDER, FOR SOLUTION INTRAMUSCULAR; INTRAVENOUS EVERY 24 HOURS
Refills: 0 | Status: DISCONTINUED | OUTPATIENT
Start: 2020-05-20 | End: 2020-05-23

## 2020-05-20 RX ORDER — SODIUM CHLORIDE 9 MG/ML
1000 INJECTION INTRAMUSCULAR; INTRAVENOUS; SUBCUTANEOUS ONCE
Refills: 0 | Status: COMPLETED | OUTPATIENT
Start: 2020-05-20 | End: 2020-05-20

## 2020-05-20 RX ORDER — APIXABAN 2.5 MG/1
2.5 TABLET, FILM COATED ORAL EVERY 12 HOURS
Refills: 0 | Status: DISCONTINUED | OUTPATIENT
Start: 2020-05-20 | End: 2020-05-23

## 2020-05-20 RX ORDER — SODIUM CHLORIDE 9 MG/ML
3 INJECTION INTRAMUSCULAR; INTRAVENOUS; SUBCUTANEOUS ONCE
Refills: 0 | Status: COMPLETED | OUTPATIENT
Start: 2020-05-20 | End: 2020-05-20

## 2020-05-20 RX ORDER — SODIUM CHLORIDE 9 MG/ML
1000 INJECTION INTRAMUSCULAR; INTRAVENOUS; SUBCUTANEOUS
Refills: 0 | Status: DISCONTINUED | OUTPATIENT
Start: 2020-05-20 | End: 2020-05-23

## 2020-05-20 RX ORDER — CARVEDILOL PHOSPHATE 80 MG/1
3.12 CAPSULE, EXTENDED RELEASE ORAL EVERY 12 HOURS
Refills: 0 | Status: DISCONTINUED | OUTPATIENT
Start: 2020-05-20 | End: 2020-05-23

## 2020-05-20 RX ADMIN — Medication 650 MILLIGRAM(S): at 11:26

## 2020-05-20 RX ADMIN — SODIUM CHLORIDE 2000 MILLILITER(S): 9 INJECTION INTRAMUSCULAR; INTRAVENOUS; SUBCUTANEOUS at 12:03

## 2020-05-20 RX ADMIN — APIXABAN 2.5 MILLIGRAM(S): 2.5 TABLET, FILM COATED ORAL at 20:36

## 2020-05-20 RX ADMIN — CARVEDILOL PHOSPHATE 3.12 MILLIGRAM(S): 80 CAPSULE, EXTENDED RELEASE ORAL at 20:36

## 2020-05-20 RX ADMIN — LIDOCAINE 1 PATCH: 4 CREAM TOPICAL at 14:43

## 2020-05-20 RX ADMIN — SODIUM CHLORIDE 83 MILLILITER(S): 9 INJECTION INTRAMUSCULAR; INTRAVENOUS; SUBCUTANEOUS at 22:21

## 2020-05-20 RX ADMIN — CEFTRIAXONE 1000 MILLIGRAM(S): 500 INJECTION, POWDER, FOR SOLUTION INTRAMUSCULAR; INTRAVENOUS at 18:28

## 2020-05-20 RX ADMIN — SODIUM CHLORIDE 3 MILLILITER(S): 9 INJECTION INTRAMUSCULAR; INTRAVENOUS; SUBCUTANEOUS at 14:44

## 2020-05-20 NOTE — ED ADULT NURSE NOTE - CHIEF COMPLAINT QUOTE
Pt. to the ED BIB[facility] from [facility] C/O Unwitnessed fall from standing height. Unknown LOC - GSC 14- On Blood Thinners- Bump in back of head noted - TA to ED Called @ [id number]

## 2020-05-20 NOTE — ED PROVIDER NOTE - OBJECTIVE STATEMENT
78 y/o female with a PMHx of CAD, afib, HTN, anxiety, RA, OA, and renal CA presents to ED s/p fall. Pt was walking, lost her balance, and then fell. Pt his the left side of her head on the ground. Pt helped up and brought to ED. Pt denies LOC. Pt reports that her legs are weak and she has fallen in past. Pt denies CP and SOB.

## 2020-05-20 NOTE — H&P ADULT - NSHPREVIEWOFSYSTEMS_GEN_ALL_CORE
no n/v/d/c. no fever or chills. no chest pain, dyspnea, palpitations, focal weakness, lightheadedness or dizziness preceding the fall and no symptoms now.

## 2020-05-20 NOTE — ED ADULT NURSE NOTE - NSIMPLEMENTINTERV_GEN_ALL_ED
Implemented All Fall with Harm Risk Interventions:  [place] to call system. Call bell, personal items and telephone within reach. Instruct patient to call for assistance. Room bathroom lighting operational. Non-slip footwear when patient is off stretcher. Physically safe environment: no spills, clutter or unnecessary equipment. Stretcher in lowest position, wheels locked, appropriate side rails in place. Provide visual cue, wrist band, yellow gown, etc. Monitor gait and stability. Monitor for mental status changes and reorient to person, place, and time. Review medications for side effects contributing to fall risk. Reinforce activity limits and safety measures with patient and family. Provide visual clues: red socks.

## 2020-05-20 NOTE — ED PROVIDER NOTE - PROGRESS NOTE DETAILS
CT head with Posterior scalp hematoma, no other acute findings, CXR with NAD, labs with mildly elevated Cr, will hydrate with IVF and admit pt to obs for pending COVID swab before returning to nursing facility  Rosanna Ho PA-C CT head with Posterior scalp hematoma, no other acute findings, CXR with NAD, labs with mildly elevated Cr, will hydrate with IVF and admit pt to tele obs before returning to nursing facility  Rosanna Ho PA-C CT head with Posterior scalp hematoma, no other acute findings, CXR with NAD, labs with mildly elevated Cr, will hydrate with IVF and place in tele obs before returning to nursing facility  Rosanna Ho PA-C

## 2020-05-20 NOTE — H&P ADULT - ASSESSMENT
A/P: 77 y/o female w/ PMHx AFIB (eliquis), CAD s/p PCI 1998, HTN, RA, R renal CA s/p partial nephrectomy 2006, Takotsubo syndrome, prior syncope 10/19, UTI 10/19, presents from [facility] after sustaining a fall and c/o neck pain    1.	MK  -creat 1.6 today, (baseline 1.07) which is 1.5x greater than baseline  2.	Suspected UTI  3.	s/p fall, unwitnessed, unknown if syncope  4.	scalp hematoma  5.	PAF on eliquis  6.	HTN    ·	admit to tele, monitor for arrhythmia  ·	check orthostatic vital signs  ·	PT eval  ·	IV fluids  ·	start ceftriaxone. await urine culture  ·	resume other home meds  ·	neuro checks  ·	CT findings as above  ·	repeat labs in AM  ·	avoid nephrotoxic agents  ·	full code - confirmed w/ patient and daughter  ·	dvt proph: scd's, eliquis  ·	d/w pt and daughter. all questions answered

## 2020-05-20 NOTE — ED PROVIDER NOTE - ATTENDING CONTRIBUTION TO CARE
I, Tessie Garcia DO,  performed the initial face to face bedside interview with this patient regarding history of present illness, review of symptoms and relevant past medical, social and family history.  I completed an independent physical examination.  I was the initial provider who evaluated this patient. I have signed out the follow up of any pending tests (i.e. labs, radiological studies) to the ACP.  I have communicated the patient’s plan of care and disposition with the ACP.  The history, relevant review of systems, past medical and surgical history, medical decision making, and physical examination was documented by the scribe in my presence and I attest to the accuracy of the documentation.

## 2020-05-20 NOTE — ED ADULT TRIAGE NOTE - CHIEF COMPLAINT QUOTE
Pt. to the ED BIBA from [facility] C/O Unwitnessed fall from standing height. Unknown LOC - GSC 14- On Blood Thinners- Bump in back of head noted - TA to ED Called @ [id number]

## 2020-05-20 NOTE — H&P ADULT - NSICDXFAMILYHX_GEN_ALL_CORE_FT
FAMILY HISTORY:  Family history of MI (myocardial infarction)  Family history of other heart disease

## 2020-05-20 NOTE — ED PROVIDER NOTE - CARE PLAN
Principal Discharge DX:	Fall, initial encounter  Secondary Diagnosis:	Injury of head, initial encounter  Secondary Diagnosis:	Scalp hematoma, initial encounter Principal Discharge DX:	Injury of head, initial encounter  Secondary Diagnosis:	Scalp hematoma, initial encounter  Secondary Diagnosis:	Fall, initial encounter

## 2020-05-20 NOTE — H&P ADULT - NSHPPHYSICALEXAM_GEN_ALL_CORE
T(C): 36.9 (05-20-20 @ 15:31), Max: 36.9 (05-20-20 @ 15:31)  HR: 72 (05-20-20 @ 15:31) (66 - 76)  BP: 115/63 (05-20-20 @ 15:31) (114/77 - 132/72)  RR: 18 (05-20-20 @ 15:31) (18 - 18)  SpO2: 100% (05-20-20 @ 15:31) (98% - 100%)    General:  Alert, cooperative, no distress, appears stated age.  Head:  Normocephalic, without obvious abnormality, atraumatic.  Eyes:  Conjunctivae/corneas clear. PERRL, EOMs intact.   Nose: Nares normal.  Mucosa normal. No drainage  Throat: Lips, mucosa, and tongue normal. Teeth and gums normal.  Neck: Supple, symmetrical, trachea midline  Back:   Symmetric, no curvature. ROM normal. No CVA tenderness.  Lungs:   Good effort. Clear to auscultation bilaterally.  Chest wall:  No tenderness or deformity.  Heart:  Regular rate and rhythm, S1, S2 normal, no murmur, click, rub or gallop.  Abdomen:   Soft, non-tender. Non-distended. No R/R/G. Bowel sounds normal. No masses,  No HSM.  Psych: Alert and oriented to person, place, month, doesn't know the year. Appropriate mood/ affect.  Extremities: Extremities normal, atraumatic, no cyanosis or edema.  Pulses: 2+ and symmetric all extremities.  Skin: Skin color, texture, turgor normal. No rashes or lesions  Neurologic: CNIII-XII intact. Normal strength throughout. fluent speech

## 2020-05-20 NOTE — H&P ADULT - HISTORY OF PRESENT ILLNESS
79 y/o female w/ PMHx AFIB (eliquis), CAD s/p PCI 1998, HTN, RA, R renal CA s/p partial nephrectomy 2006, Takotsubo syndrome, prior syncope 10/19, UTI 10/19, presents from [facility] after sustaining a fall and c/o neck pain. history is obtained from the patient, daughter (on the phone) and the chart. she was found by the staff at [facility] after hearing patient screaming for help. pt reports that she was walking and fell down. denies any presyncopal symptoms. no chest pain, dyspnea, palpitations, focal weakness, lightheadedness or dizziness preceding the fall. denies any focal weakness, numbness or tingling. she denies any LOC but fall was unwitnessed. she struck her head on c/o "lump" on the back of her head.   in ER, CT head showed scalp hematoma. no other acute findings.

## 2020-05-20 NOTE — ED ADULT NURSE NOTE - OBJECTIVE STATEMENT
Patient presents with EMS unwitnessed fall at [facility]. Patient fell from standing height hitting her head. Denies LOC, patient on Eliquis. Patient was downgraded to Neuro alert at 10:20 by Dr Garcia. Patient baseline mental status GCS 15. C Collar palced at 10:25

## 2020-05-20 NOTE — ED ADULT TRIAGE NOTE - PAIN RATING/NUMBER SCALE (0-10): REST
The patient has been examined and the H&P has been reviewed:    I concur with the findings and no changes have occurred since H&P was written.    Anesthesia/Surgery risks, benefits and alternative options discussed and understood by patient/family.          Active Hospital Problems    Diagnosis  POA    Closed disp fx of olecranon process w/intraartic exten of right ulna [S52.031A]  Yes      Resolved Hospital Problems    Diagnosis Date Resolved POA   No resolved problems to display.     
0

## 2020-05-20 NOTE — H&P ADULT - NSHPLABSRESULTS_GEN_ALL_CORE
LABS: All Labs Reviewed:                        14.2   8.39  )-----------( 236      ( 20 May 2020 10:51 )             42.7     05-20    137  |  104  |  32<H>  ----------------------------<  94  4.2   |  25  |  1.60<H>    Ca    9.3      20 May 2020 10:51      PT/INR - ( 20 May 2020 10:51 )   PT: 12.8 sec;   INR: 1.15 ratio         PTT - ( 20 May 2020 10:51 )  PTT:29.4 sec        Blood Culture:   RADIOLOGY/EKG:    < from: CT Cervical Spine No Cont (05.20.20 @ 10:47) >    IMPRESSION:   No vertebral fracture is recognized.  Multilevel degenerative changes of the cervical spine are present.    < end of copied text >    < from: CT Head No Cont (05.20.20 @ 10:47) >    IMPRESSION:       No evidence of acute intracranial abnormality.  No evidence of hemorrhage.      < end of copied text >

## 2020-05-21 LAB
ANION GAP SERPL CALC-SCNC: 7 MMOL/L — SIGNIFICANT CHANGE UP (ref 5–17)
BASOPHILS # BLD AUTO: 0.04 K/UL — SIGNIFICANT CHANGE UP (ref 0–0.2)
BASOPHILS NFR BLD AUTO: 0.6 % — SIGNIFICANT CHANGE UP (ref 0–2)
BUN SERPL-MCNC: 25 MG/DL — HIGH (ref 7–23)
CALCIUM SERPL-MCNC: 8.6 MG/DL — SIGNIFICANT CHANGE UP (ref 8.5–10.1)
CHLORIDE SERPL-SCNC: 108 MMOL/L — SIGNIFICANT CHANGE UP (ref 96–108)
CO2 SERPL-SCNC: 26 MMOL/L — SIGNIFICANT CHANGE UP (ref 22–31)
CREAT SERPL-MCNC: 1.11 MG/DL — SIGNIFICANT CHANGE UP (ref 0.5–1.3)
EOSINOPHIL # BLD AUTO: 0.12 K/UL — SIGNIFICANT CHANGE UP (ref 0–0.5)
EOSINOPHIL NFR BLD AUTO: 1.7 % — SIGNIFICANT CHANGE UP (ref 0–6)
GLUCOSE SERPL-MCNC: 100 MG/DL — HIGH (ref 70–99)
HCT VFR BLD CALC: 38.1 % — SIGNIFICANT CHANGE UP (ref 34.5–45)
HGB BLD-MCNC: 12.3 G/DL — SIGNIFICANT CHANGE UP (ref 11.5–15.5)
IMM GRANULOCYTES NFR BLD AUTO: 0.3 % — SIGNIFICANT CHANGE UP (ref 0–1.5)
LYMPHOCYTES # BLD AUTO: 1.37 K/UL — SIGNIFICANT CHANGE UP (ref 1–3.3)
LYMPHOCYTES # BLD AUTO: 19.9 % — SIGNIFICANT CHANGE UP (ref 13–44)
MCHC RBC-ENTMCNC: 30.1 PG — SIGNIFICANT CHANGE UP (ref 27–34)
MCHC RBC-ENTMCNC: 32.3 GM/DL — SIGNIFICANT CHANGE UP (ref 32–36)
MCV RBC AUTO: 93.2 FL — SIGNIFICANT CHANGE UP (ref 80–100)
MONOCYTES # BLD AUTO: 0.72 K/UL — SIGNIFICANT CHANGE UP (ref 0–0.9)
MONOCYTES NFR BLD AUTO: 10.4 % — SIGNIFICANT CHANGE UP (ref 2–14)
NEUTROPHILS # BLD AUTO: 4.63 K/UL — SIGNIFICANT CHANGE UP (ref 1.8–7.4)
NEUTROPHILS NFR BLD AUTO: 67.1 % — SIGNIFICANT CHANGE UP (ref 43–77)
PLATELET # BLD AUTO: 198 K/UL — SIGNIFICANT CHANGE UP (ref 150–400)
POTASSIUM SERPL-MCNC: 3.9 MMOL/L — SIGNIFICANT CHANGE UP (ref 3.5–5.3)
POTASSIUM SERPL-SCNC: 3.9 MMOL/L — SIGNIFICANT CHANGE UP (ref 3.5–5.3)
RBC # BLD: 4.09 M/UL — SIGNIFICANT CHANGE UP (ref 3.8–5.2)
RBC # FLD: 13.3 % — SIGNIFICANT CHANGE UP (ref 10.3–14.5)
SODIUM SERPL-SCNC: 141 MMOL/L — SIGNIFICANT CHANGE UP (ref 135–145)
WBC # BLD: 6.9 K/UL — SIGNIFICANT CHANGE UP (ref 3.8–10.5)
WBC # FLD AUTO: 6.9 K/UL — SIGNIFICANT CHANGE UP (ref 3.8–10.5)

## 2020-05-21 PROCEDURE — 70547 MR ANGIOGRAPHY NECK W/O DYE: CPT | Mod: 26

## 2020-05-21 PROCEDURE — 70551 MRI BRAIN STEM W/O DYE: CPT | Mod: 26

## 2020-05-21 PROCEDURE — 70544 MR ANGIOGRAPHY HEAD W/O DYE: CPT | Mod: 26,59

## 2020-05-21 RX ORDER — MECLIZINE HCL 12.5 MG
12.5 TABLET ORAL THREE TIMES A DAY
Refills: 0 | Status: DISCONTINUED | OUTPATIENT
Start: 2020-05-21 | End: 2020-05-22

## 2020-05-21 RX ORDER — CYCLOBENZAPRINE HYDROCHLORIDE 10 MG/1
5 TABLET, FILM COATED ORAL THREE TIMES A DAY
Refills: 0 | Status: DISCONTINUED | OUTPATIENT
Start: 2020-05-21 | End: 2020-05-23

## 2020-05-21 RX ADMIN — APIXABAN 2.5 MILLIGRAM(S): 2.5 TABLET, FILM COATED ORAL at 06:32

## 2020-05-21 RX ADMIN — AMLODIPINE BESYLATE 5 MILLIGRAM(S): 2.5 TABLET ORAL at 06:32

## 2020-05-21 RX ADMIN — Medication 12.5 MILLIGRAM(S): at 21:14

## 2020-05-21 RX ADMIN — Medication 650 MILLIGRAM(S): at 08:54

## 2020-05-21 RX ADMIN — LIDOCAINE 1 PATCH: 4 CREAM TOPICAL at 02:00

## 2020-05-21 RX ADMIN — SERTRALINE 50 MILLIGRAM(S): 25 TABLET, FILM COATED ORAL at 08:57

## 2020-05-21 RX ADMIN — CEFTRIAXONE 1000 MILLIGRAM(S): 500 INJECTION, POWDER, FOR SOLUTION INTRAMUSCULAR; INTRAVENOUS at 18:41

## 2020-05-21 RX ADMIN — CARVEDILOL PHOSPHATE 3.12 MILLIGRAM(S): 80 CAPSULE, EXTENDED RELEASE ORAL at 18:41

## 2020-05-21 RX ADMIN — ONDANSETRON 4 MILLIGRAM(S): 8 TABLET, FILM COATED ORAL at 10:30

## 2020-05-21 RX ADMIN — Medication 12.5 MILLIGRAM(S): at 15:07

## 2020-05-21 RX ADMIN — CARVEDILOL PHOSPHATE 3.12 MILLIGRAM(S): 80 CAPSULE, EXTENDED RELEASE ORAL at 06:32

## 2020-05-21 RX ADMIN — PANTOPRAZOLE SODIUM 40 MILLIGRAM(S): 20 TABLET, DELAYED RELEASE ORAL at 06:32

## 2020-05-21 RX ADMIN — APIXABAN 2.5 MILLIGRAM(S): 2.5 TABLET, FILM COATED ORAL at 18:41

## 2020-05-21 RX ADMIN — Medication 650 MILLIGRAM(S): at 21:14

## 2020-05-21 NOTE — PROGRESS NOTE ADULT - SUBJECTIVE AND OBJECTIVE BOX
CC/reason for follow up: fall    S: c/o dizziness/vertigo. +dysuria    ROS: no chest pain, no dyspnea    T(C): 36.9 (05-21-20 @ 16:31), Max: 37 (05-20-20 @ 20:18)  HR: 75 (05-21-20 @ 16:31) (67 - 82)  BP: 140/75 (05-21-20 @ 16:31) (126/58 - 140/75)  RR: 18 (05-21-20 @ 16:31) (16 - 18)  SpO2: 94% (05-21-20 @ 16:31) (94% - 100%)    Gen - Pleasant, cooperative, in no acute distress  HEENT- PERRL, moist mucus membranes, OP clear  CV - RRR, No m/r/g, +pulses, no edema  Lungs - Good effort, Clear to auscultation bilaterally  Abdomen - Soft, nontender, nondistended, +BS, No rebound/rigidity/guarding  Ext - No cyanosis/clubbing.   Skin - No rashes, No jaundice  Psych- Alert & oriented x 2-3  Neuro- fluent speech, no facial droop, EOMI. moves all ext    MEDICATIONS  (STANDING):  amLODIPine   Tablet 5 milliGRAM(s) Oral daily  apixaban 2.5 milliGRAM(s) Oral every 12 hours  carvedilol 3.125 milliGRAM(s) Oral every 12 hours  cefTRIAXone Injectable. 1000 milliGRAM(s) IV Push every 24 hours  meclizine 12.5 milliGRAM(s) Oral three times a day  pantoprazole    Tablet 40 milliGRAM(s) Oral before breakfast  sertraline 50 milliGRAM(s) Oral daily  sodium chloride 0.9%. 1000 milliLiter(s) (83 mL/Hr) IV Continuous <Continuous>    MEDICATIONS  (PRN):  acetaminophen   Tablet .. 650 milliGRAM(s) Oral every 4 hours PRN Mild Pain (1 - 3)  cyclobenzaprine 5 milliGRAM(s) Oral three times a day PRN Muscle Spasm  ondansetron Injectable 4 milliGRAM(s) IV Push every 6 hours PRN Nausea      Diagnostic studies: personally reviewed  LABS: All Labs Reviewed:                        12.3   6.90  )-----------( 198      ( 21 May 2020 08:28 )             38.1     05-21    141  |  108  |  25<H>  ----------------------------<  100<H>  3.9   |  26  |  1.11    Ca    8.6      21 May 2020 08:28      PT/INR - ( 20 May 2020 10:51 )   PT: 12.8 sec;   INR: 1.15 ratio         PTT - ( 20 May 2020 10:51 )  PTT:29.4 sec        Blood Culture:   RADIOLOGY/EKG:      < from: CT Head No Cont (05.20.20 @ 10:47) >  IMPRESSION:       No evidence of acute intracranial abnormality.  No evidence of hemorrhage.        < end of copied text >

## 2020-05-21 NOTE — PHYSICAL THERAPY INITIAL EVALUATION ADULT - GENERAL OBSERVATIONS, REHAB EVAL
Pt found supine in bed with tele monitor and bed alarm activated. RN notified therapist that pt just put back to bed secondary to dizziness and nausea/vomiting.

## 2020-05-21 NOTE — PHYSICAL THERAPY INITIAL EVALUATION ADULT - PERTINENT HX OF CURRENT PROBLEM, REHAB EVAL
Pt admitted to  secondary to fall. Pt with head strike and c/o c-spine pain following fall. CT head: neg, CT c-spine: neg, with degenerative changes. COVID 19- neg.

## 2020-05-21 NOTE — PHYSICAL THERAPY INITIAL EVALUATION ADULT - ACTIVE RANGE OF MOTION EXAMINATION, REHAB EVAL
Right UE Active ROM was WFL (within functional limits)/Right LE Active ROM was WFL (within functional limits)/Left LE Active ROM was WFL (within functional limits)/bilateral DF neutral./Left UE Active ROM was WFL (within functional limits)

## 2020-05-21 NOTE — CONSULT NOTE ADULT - SUBJECTIVE AND OBJECTIVE BOX
Patient is a 79y old  Female who presents with a chief complaint of fall (20 May 2020 16:03)    ________________________________  MAngelina DAVEY is a 79y year old Female with a past medical history of  AFIB (eliquis), CAD s/p PCI , HTN, RA, R renal CA s/p partial nephrectomy , Takotsubo syndrome, prior syncope 10/19, UTI 10/19, presents from [facility] after sustaining a fall and c/o neck pain. history is obtained from the patient, daughter (on the phone) and the chart. she was found by the staff at [facility] after hearing patient screaming for help. pt reports that she was walking and fell down. denies any presyncopal symptoms. no chest pain, dyspnea, palpitations, focal weakness, lightheadedness or dizziness preceding the fall. denies any focal weakness, numbness or tingling. she denies any LOC but fall was unwitnessed. she struck her head on c/o "lump" on the back of her head.   in ER, CT head showed scalp hematoma. no other acute findings.    Admitted for syncope 10/19      PREVIOUS CARDIAC WORKUP:    Echocardiogram:       ________________________________  Review of systems: A 10 point review of system has been performed, and is negative except for what has been mentioned in the above history of present illness.     PAST MEDICAL & SURGICAL HISTORY:  Takotsubo syndrome  Stented coronary artery  CAD (coronary artery disease)  Knee pain, left  Atrial fibrillation  Urge incontinence of urine  Depressive disorder  Anxiety disorder  HTN (hypertension)  RA (rheumatoid arthritis)  OP (osteoporosis)  Renal cancer, right  S/P colonoscopy  H/O breast biopsy: right?  S/P tonsillectomy: 1948  History of cataract surgery, unspecified laterality: b/l 2016  H/O partial nephrectomy: right   S/P angioplasty: 1 stent   History of percutaneous coronary intervention: stent RCA    FAMILY HISTORY:  Family history of other heart disease  Family history of MI (myocardial infarction)     SOCIAL HISTORY: The patient denies any history of tobacco abuse, alcohol abuse or illicit drug use.     Home Medications:  Cranberry oral capsule: 1 cap(s) orally once a day (20 May 2020 14:50)  pantoprazole 40 mg oral delayed release tablet: 1 tab(s) orally once a day (before a meal) (20 May 2020 14:50)  sertraline 50 mg oral tablet: 1 tab(s) orally once a day (20 May 2020 14:50)  Tylenol Extra Strength 500 mg oral tablet: 1 tab(s) orally once a day, As Needed - for mild pain (20 May 2020 14:50)    MEDICATIONS  (STANDING):  amLODIPine   Tablet 5 milliGRAM(s) Oral daily  apixaban 2.5 milliGRAM(s) Oral every 12 hours  carvedilol 3.125 milliGRAM(s) Oral every 12 hours  cefTRIAXone Injectable. 1000 milliGRAM(s) IV Push every 24 hours  pantoprazole    Tablet 40 milliGRAM(s) Oral before breakfast  sertraline 50 milliGRAM(s) Oral daily  sodium chloride 0.9%. 1000 milliLiter(s) (83 mL/Hr) IV Continuous <Continuous>    MEDICATIONS  (PRN):  acetaminophen   Tablet .. 650 milliGRAM(s) Oral every 4 hours PRN Mild Pain (1 - 3)  cyclobenzaprine 5 milliGRAM(s) Oral three times a day PRN Muscle Spasm  ondansetron Injectable 4 milliGRAM(s) IV Push every 6 hours PRN Nausea    Vital Signs Last 24 Hrs  T(C): 36.5 (21 May 2020 05:12), Max: 37 (20 May 2020 20:18)  T(F): 97.7 (21 May 2020 05:12), Max: 98.6 (20 May 2020 20:18)  HR: 68 (21 May 2020 05:12) (66 - 77)  BP: 132/66 (21 May 2020 05:12) (114/77 - 133/77)  BP(mean): 79 (20 May 2020 15:31) (79 - 79)  RR: 16 (21 May 2020 05:12) (16 - 18)  SpO2: 100% (21 May 2020 05:12) (96% - 100%)  I&O's Summary    ________________________________  GENERAL APPEARANCE:  No acute distress  HEAD: normocephalic, atraumatic  NECK: supple, no jugular venous distention, no carotid bruit    HEART: Regular rate and rhythm, S1, S2 normal, 2/6 regurgitant murmur    CHEST:  No anterior chest wall tenderness    LUNGS:  Clear to auscultation, without any wheezing, rhonchi or rales    ABDOMEN: soft, nontender, nondistended, with positive bowel sounds appreciated  EXTREMITIES: no clubbing, cyanosis, or edema.   NEURO: Alert and oriented x3  PSYC:  Normal affect  SKIN:  Dry  ________________________________   TELEMETRY:    ECG:    LABS:                        12.3   6.90  )-----------( 198      ( 21 May 2020 08:28 )             38.1             05-21    141  |  108  |  25<H>  ----------------------------<  100<H>  3.9   |  26  |  1.11    Ca    8.6      21 May 2020 08:28        PT/INR - ( 20 May 2020 10:51 )   PT: 12.8 sec;   INR: 1.15 ratio         PTT - ( 20 May 2020 10:51 )  PTT:29.4 sec  Urinalysis Basic - ( 20 May 2020 15:14 )    Color: Yellow / Appearance: very cloudy / S.015 / pH: x  Gluc: x / Ketone: Negative  / Bili: Negative / Urobili: Negative mg/dL   Blood: x / Protein: 15 mg/dL / Nitrite: Negative   Leuk Esterase: Moderate / RBC: 3-5 /HPF / WBC >50   Sq Epi: x / Non Sq Epi: Many / Bacteria: Occasional        PT/INR - ( 20 May 2020 10:51 )   PT: 12.8 sec;   INR: 1.15 ratio         PTT - ( 20 May 2020 10:51 )  PTT:29.4 sec  Urinalysis Basic - ( 20 May 2020 15:14 )    Color: Yellow / Appearance: very cloudy / S.015 / pH: x  Gluc: x / Ketone: Negative  / Bili: Negative / Urobili: Negative mg/dL   Blood: x / Protein: 15 mg/dL / Nitrite: Negative   Leuk Esterase: Moderate / RBC: 3-5 /HPF / WBC >50   Sq Epi: x / Non Sq Epi: Many / Bacteria: Occasional        ________________________________    RADIOLOGY & ADDITIONAL STUDIES:  ________________________________    ASSESSMENT:      PLAN:      __________________________________________________________________________  Thank you for allowing me to participate in the care of your patient. Please contact me should any questions arise.    IHSAN Turcios DO, [facility]  [phone] Patient is a 79y old  Female who presents with a chief complaint of fall (20 May 2020 16:03)    ________________________________  VENTURA DAVEY is a 79y year old Female with a past medical history of moderate aortic regurgitation,   coronary artery disease status post remote PCI, Parox atrial fibrillation maintained on anticoagulation with Eliquis, history of stress-induced cardiomyopathy, with improvement in LVEF to >50% , moderate to severe mitral valve regurgitation, history of renal cell carcinoma status post surgery, prior tobacco abuse, hypertension, and osteoarthritis.    Admitted for syncope 10/19  She resides at [facility], and was found by the staff on the floor calling for help. She reports that she was walking and fell down.  She does not recall what happened.     She denies any recent history of chest pain, palpitations, lightheadedness or dizziness but was unsteady and lightheaded when she attempted to ambulate to the bathroom.  Fortunately she did not fall.  On telemetry she has been in sinus rhythm, without any significant tachycardia or bradycardia arrhythmias.  CT head showed no ICH with scalp hematoma.  Orthostatic vital signs negative.    She was admitted previously for syncope.     Follows with Dr. Ellis at [facility].        PREVIOUS CARDIAC WORKUP:    Echocardiogram:    --LV ejection fraction (approximately 53 %) is normal.  --Grade II left ventricular diastolic dysfunction with elevated left atrial pressure.  --Aortic arch is normal in size; its diameter is 3.2 cm.  --There is mild to moderate aortic regurgitation. 2 AR jets.  --There is bileaflet mitral valve prolapse. Mild. There is moderate to severe mitral   regurgitation. 2 MR jets noted precluding PISA of VCW methods.  --There is moderate to severe tricuspid regurgitation.  --The right atrial pressure is normal (0 - 5 mm Hg). There is minimal pulmonary hypertension.  --There is no pericardial effusion.       ________________________________  Review of systems: A 10 point review of system has been performed, and is negative except for what has been mentioned in the above history of present illness.     PAST MEDICAL & SURGICAL HISTORY:  Takotsubo syndrome  Stented coronary artery  CAD (coronary artery disease)  Knee pain, left  Atrial fibrillation  Urge incontinence of urine  Depressive disorder  Anxiety disorder  HTN (hypertension)  RA (rheumatoid arthritis)  OP (osteoporosis)  Renal cancer, right  S/P colonoscopy  H/O breast biopsy: right?  S/P tonsillectomy: 1948  History of cataract surgery, unspecified laterality: b/l 2016  H/O partial nephrectomy: right 2006  S/P angioplasty: 1 stent 1998  History of percutaneous coronary intervention: stent RCA    FAMILY HISTORY:  Family history of other heart disease  Family history of MI (myocardial infarction)     SOCIAL HISTORY: The patient denies any history of tobacco abuse, alcohol abuse or illicit drug use.     Home Medications:  Cranberry oral capsule: 1 cap(s) orally once a day (20 May 2020 14:50)  pantoprazole 40 mg oral delayed release tablet: 1 tab(s) orally once a day (before a meal) (20 May 2020 14:50)  sertraline 50 mg oral tablet: 1 tab(s) orally once a day (20 May 2020 14:50)  Tylenol Extra Strength 500 mg oral tablet: 1 tab(s) orally once a day, As Needed - for mild pain (20 May 2020 14:50)    MEDICATIONS  (STANDING):  amLODIPine   Tablet 5 milliGRAM(s) Oral daily  apixaban 2.5 milliGRAM(s) Oral every 12 hours  carvedilol 3.125 milliGRAM(s) Oral every 12 hours  cefTRIAXone Injectable. 1000 milliGRAM(s) IV Push every 24 hours  pantoprazole    Tablet 40 milliGRAM(s) Oral before breakfast  sertraline 50 milliGRAM(s) Oral daily  sodium chloride 0.9%. 1000 milliLiter(s) (83 mL/Hr) IV Continuous <Continuous>    MEDICATIONS  (PRN):  acetaminophen   Tablet .. 650 milliGRAM(s) Oral every 4 hours PRN Mild Pain (1 - 3)  cyclobenzaprine 5 milliGRAM(s) Oral three times a day PRN Muscle Spasm  ondansetron Injectable 4 milliGRAM(s) IV Push every 6 hours PRN Nausea    Vital Signs Last 24 Hrs  T(C): 36.5 (21 May 2020 05:12), Max: 37 (20 May 2020 20:18)  T(F): 97.7 (21 May 2020 05:12), Max: 98.6 (20 May 2020 20:18)  HR: 68 (21 May 2020 05:12) (66 - 77)  BP: 132/66 (21 May 2020 05:12) (114/77 - 133/77)  BP(mean): 79 (20 May 2020 15:31) (79 - 79)  RR: 16 (21 May 2020 05:12) (16 - 18)  SpO2: 100% (21 May 2020 05:12) (96% - 100%)  I&O's Summary    ________________________________  GENERAL APPEARANCE:  No acute distress  HEAD: normocephalic, atraumatic  NECK: supple, no jugular venous distention, no carotid bruit    HEART: Regular rate and rhythm, S1, S2 normal, 2/6 regurgitant murmur    CHEST:  No anterior chest wall tenderness    LUNGS:  Clear to auscultation, without any wheezing, rhonchi or rales    ABDOMEN: soft, nontender, nondistended, with positive bowel sounds appreciated  EXTREMITIES: no clubbing, cyanosis, or edema.   NEURO: Alert and oriented x3  PSYC:  Normal affect  SKIN:  Dry  ________________________________   TELEMETRY: Sinus Rhythm     ECG: Sinus Rhythm     LABS:                        12.3   6.90  )-----------( 198      ( 21 May 2020 08:28 )             38.1             05-21    141  |  108  |  25<H>  ----------------------------<  100<H>  3.9   |  26  |  1.11    Ca    8.6      21 May 2020 08:28        PT/INR - ( 20 May 2020 10:51 )   PT: 12.8 sec;   INR: 1.15 ratio         PTT - ( 20 May 2020 10:51 )  PTT:29.4 sec  Urinalysis Basic - ( 20 May 2020 15:14 )    Color: Yellow / Appearance: very cloudy / S.015 / pH: x  Gluc: x / Ketone: Negative  / Bili: Negative / Urobili: Negative mg/dL   Blood: x / Protein: 15 mg/dL / Nitrite: Negative   Leuk Esterase: Moderate / RBC: 3-5 /HPF / WBC >50   Sq Epi: x / Non Sq Epi: Many / Bacteria: Occasional        PT/INR - ( 20 May 2020 10:51 )   PT: 12.8 sec;   INR: 1.15 ratio         PTT - ( 20 May 2020 10:51 )  PTT:29.4 sec  Urinalysis Basic - ( 20 May 2020 15:14 )    Color: Yellow / Appearance: very cloudy / S.015 / pH: x  Gluc: x / Ketone: Negative  / Bili: Negative / Urobili: Negative mg/dL   Blood: x / Protein: 15 mg/dL / Nitrite: Negative   Leuk Esterase: Moderate / RBC: 3-5 /HPF / WBC >50   Sq Epi: x / Non Sq Epi: Many / Bacteria: Occasional        ________________________________    RADIOLOGY & ADDITIONAL STUDIES: No intracranial hemorrhage or posterior scalp hematoma.  ________________________________    ASSESSMENT:  Paroxysmal atrial fibrillation on anticoagulation with Eliquis  CAD status post PCI  Moderate to severe mitral valve regurgitation  Moderate to severe tricuspid valve regurgitation with mild pulmonary hypertension  Systemic hypertension  Hyperlipidemia  Prior history of syncope    PLAN:    In summary, this is a 79-year-old female with a past medical history of CAD status post prior PCI paroxysmal atrial fibrillation on full dose anticoagulation with moderate to severe mitral valve regurgitation, systemic hypertension, hyperlipidemia and prior history of syncope.    No significant arrhythmias noted on telemetry.  Likely etiology of fall, is secondary to steady gait.  Recommend PT evaluation.  Consider MRI to rule out CVA.  Orthostatic vital signs noted.  Continue current antihypertensive therapy.  Continue carvedilol.  Would benefit from outpatient event monitor.  Attempted to reach family, no answer.  Discussed with primary attending.      __________________________________________________________________________  Thank you for allowing me to participate in the care of your patient. Please contact me should any questions arise.    IHSAN Turcios DO, [facility]  [phone]

## 2020-05-21 NOTE — PHYSICAL THERAPY INITIAL EVALUATION ADULT - LEVEL OF INDEPENDENCE: SUPINE/SIT, REHAB EVAL
Did not attempt OOB again secondary to pt just put back to bed secondary dizziness and nausea/vomiting Will attempt to increase mobility later if possible, if not on 5/22/2020.

## 2020-05-22 ENCOUNTER — TRANSCRIPTION ENCOUNTER (OUTPATIENT)
Age: 80
End: 2020-05-22

## 2020-05-22 LAB
-  AMIKACIN: SIGNIFICANT CHANGE UP
-  AMPICILLIN/SULBACTAM: SIGNIFICANT CHANGE UP
-  AMPICILLIN: SIGNIFICANT CHANGE UP
-  AZTREONAM: SIGNIFICANT CHANGE UP
-  CEFAZOLIN: SIGNIFICANT CHANGE UP
-  CEFEPIME: SIGNIFICANT CHANGE UP
-  CEFOXITIN: SIGNIFICANT CHANGE UP
-  CEFTRIAXONE: SIGNIFICANT CHANGE UP
-  CIPROFLOXACIN: SIGNIFICANT CHANGE UP
-  GENTAMICIN: SIGNIFICANT CHANGE UP
-  IMIPENEM: SIGNIFICANT CHANGE UP
-  LEVOFLOXACIN: SIGNIFICANT CHANGE UP
-  MEROPENEM: SIGNIFICANT CHANGE UP
-  NITROFURANTOIN: SIGNIFICANT CHANGE UP
-  PIPERACILLIN/TAZOBACTAM: SIGNIFICANT CHANGE UP
-  TIGECYCLINE: SIGNIFICANT CHANGE UP
-  TOBRAMYCIN: SIGNIFICANT CHANGE UP
-  TRIMETHOPRIM/SULFAMETHOXAZOLE: SIGNIFICANT CHANGE UP
ALBUMIN SERPL ELPH-MCNC: 3.1 G/DL — LOW (ref 3.3–5)
ALP SERPL-CCNC: 90 U/L — SIGNIFICANT CHANGE UP (ref 40–120)
ALT FLD-CCNC: 22 U/L — SIGNIFICANT CHANGE UP (ref 12–78)
ANION GAP SERPL CALC-SCNC: 7 MMOL/L — SIGNIFICANT CHANGE UP (ref 5–17)
AST SERPL-CCNC: 19 U/L — SIGNIFICANT CHANGE UP (ref 15–37)
BILIRUB SERPL-MCNC: 0.5 MG/DL — SIGNIFICANT CHANGE UP (ref 0.2–1.2)
BUN SERPL-MCNC: 13 MG/DL — SIGNIFICANT CHANGE UP (ref 7–23)
CALCIUM SERPL-MCNC: 9.3 MG/DL — SIGNIFICANT CHANGE UP (ref 8.5–10.1)
CHLORIDE SERPL-SCNC: 111 MMOL/L — HIGH (ref 96–108)
CO2 SERPL-SCNC: 25 MMOL/L — SIGNIFICANT CHANGE UP (ref 22–31)
CREAT SERPL-MCNC: 0.93 MG/DL — SIGNIFICANT CHANGE UP (ref 0.5–1.3)
CULTURE RESULTS: SIGNIFICANT CHANGE UP
GLUCOSE SERPL-MCNC: 99 MG/DL — SIGNIFICANT CHANGE UP (ref 70–99)
MAGNESIUM SERPL-MCNC: 2.1 MG/DL — SIGNIFICANT CHANGE UP (ref 1.6–2.6)
METHOD TYPE: SIGNIFICANT CHANGE UP
ORGANISM # SPEC MICROSCOPIC CNT: SIGNIFICANT CHANGE UP
ORGANISM # SPEC MICROSCOPIC CNT: SIGNIFICANT CHANGE UP
POTASSIUM SERPL-MCNC: 4.1 MMOL/L — SIGNIFICANT CHANGE UP (ref 3.5–5.3)
POTASSIUM SERPL-SCNC: 4.1 MMOL/L — SIGNIFICANT CHANGE UP (ref 3.5–5.3)
PROT SERPL-MCNC: 6.6 GM/DL — SIGNIFICANT CHANGE UP (ref 6–8.3)
SODIUM SERPL-SCNC: 143 MMOL/L — SIGNIFICANT CHANGE UP (ref 135–145)
SPECIMEN SOURCE: SIGNIFICANT CHANGE UP
VIT B12 SERPL-MCNC: 575 PG/ML — SIGNIFICANT CHANGE UP (ref 232–1245)

## 2020-05-22 PROCEDURE — 99232 SBSQ HOSP IP/OBS MODERATE 35: CPT

## 2020-05-22 RX ORDER — MECLIZINE HCL 12.5 MG
25 TABLET ORAL THREE TIMES A DAY
Refills: 0 | Status: DISCONTINUED | OUTPATIENT
Start: 2020-05-22 | End: 2020-05-23

## 2020-05-22 RX ORDER — MECLIZINE HCL 12.5 MG
1 TABLET ORAL
Qty: 0 | Refills: 0 | DISCHARGE
Start: 2020-05-22

## 2020-05-22 RX ORDER — ASPIRIN/CALCIUM CARB/MAGNESIUM 324 MG
81 TABLET ORAL DAILY
Refills: 0 | Status: DISCONTINUED | OUTPATIENT
Start: 2020-05-22 | End: 2020-05-23

## 2020-05-22 RX ORDER — CYCLOBENZAPRINE HYDROCHLORIDE 10 MG/1
1 TABLET, FILM COATED ORAL
Qty: 0 | Refills: 0 | DISCHARGE
Start: 2020-05-22

## 2020-05-22 RX ADMIN — CARVEDILOL PHOSPHATE 3.12 MILLIGRAM(S): 80 CAPSULE, EXTENDED RELEASE ORAL at 17:50

## 2020-05-22 RX ADMIN — Medication 650 MILLIGRAM(S): at 16:09

## 2020-05-22 RX ADMIN — Medication 25 MILLIGRAM(S): at 16:08

## 2020-05-22 RX ADMIN — Medication 81 MILLIGRAM(S): at 16:08

## 2020-05-22 RX ADMIN — Medication 12.5 MILLIGRAM(S): at 05:20

## 2020-05-22 RX ADMIN — CEFTRIAXONE 1000 MILLIGRAM(S): 500 INJECTION, POWDER, FOR SOLUTION INTRAMUSCULAR; INTRAVENOUS at 17:50

## 2020-05-22 RX ADMIN — APIXABAN 2.5 MILLIGRAM(S): 2.5 TABLET, FILM COATED ORAL at 17:50

## 2020-05-22 RX ADMIN — PANTOPRAZOLE SODIUM 40 MILLIGRAM(S): 20 TABLET, DELAYED RELEASE ORAL at 05:20

## 2020-05-22 RX ADMIN — Medication 25 MILLIGRAM(S): at 21:40

## 2020-05-22 RX ADMIN — AMLODIPINE BESYLATE 5 MILLIGRAM(S): 2.5 TABLET ORAL at 05:20

## 2020-05-22 RX ADMIN — SERTRALINE 50 MILLIGRAM(S): 25 TABLET, FILM COATED ORAL at 16:08

## 2020-05-22 RX ADMIN — CARVEDILOL PHOSPHATE 3.12 MILLIGRAM(S): 80 CAPSULE, EXTENDED RELEASE ORAL at 05:20

## 2020-05-22 RX ADMIN — APIXABAN 2.5 MILLIGRAM(S): 2.5 TABLET, FILM COATED ORAL at 05:20

## 2020-05-22 NOTE — DISCHARGE NOTE PROVIDER - PROVIDER TOKENS
PROVIDER:[TOKEN:[[id number]]],PROVIDER:[TOKEN:[[id number]]],PROVIDER:[TOKEN:[[id number]]]

## 2020-05-22 NOTE — PROGRESS NOTE ADULT - SUBJECTIVE AND OBJECTIVE BOX
The patient was seen and examined. No acute events overnight.  No chest pain.  No shortness of breath.  No palpitations.  Cont to have dizziness    ___________________________  VENTURA DAVEY is a 79y year old Female with a past medical history of moderate aortic regurgitation,   coronary artery disease status post remote PCI, Parox atrial fibrillation maintained on anticoagulation with Eliquis, history of stress-induced cardiomyopathy, with improvement in LVEF to >50% , moderate to severe mitral valve regurgitation, history of renal cell carcinoma status post surgery, prior tobacco abuse, hypertension, and osteoarthritis.    Admitted for syncope 10/19  She resides at [facility], and was found by the staff on the floor calling for help. She reports that she was walking and fell down.  She does not recall what happened.     She denies any recent history of chest pain, palpitations, lightheadedness or dizziness but was unsteady and lightheaded when she attempted to ambulate to the bathroom.  Fortunately she did not fall.  On telemetry she has been in sinus rhythm, without any significant tachycardia or bradycardia arrhythmias.  CT head showed no ICH with scalp hematoma.  Orthostatic vital signs negative.    She was admitted previously for syncope.  Follows with Dr. Ellis at [facility].     PREVIOUS CARDIAC WORKUP:    Echocardiogram:   --LV ejection fraction (approximately 53 %) is normal.  --Grade II left ventricular diastolic dysfunction with elevated left atrial pressure.  --Aortic arch is normal in size; its diameter is 3.2 cm.  --There is mild to moderate aortic regurgitation. 2 AR jets.  --There is bileaflet mitral valve prolapse. Mild. There is moderate to severe mitral   regurgitation. 2 MR jets noted precluding PISA of VCW methods.  --There is moderate to severe tricuspid regurgitation.  --The right atrial pressure is normal (0 - 5 mm Hg). There is minimal pulmonary hypertension.  --There is no pericardial effusion.       ________________________________  Review of systems: A 10 point review of system has been performed, and is negative except for what has been mentioned in the above history of present illness.     PAST MEDICAL & SURGICAL HISTORY:  Takotsubo syndrome  Stented coronary artery  CAD (coronary artery disease)  Knee pain, left  Atrial fibrillation  Urge incontinence of urine  Depressive disorder  Anxiety disorder  HTN (hypertension)  RA (rheumatoid arthritis)  OP (osteoporosis)  Renal cancer, right  S/P colonoscopy  H/O breast biopsy: right?  S/P tonsillectomy: 1948  History of cataract surgery, unspecified laterality: b/l 2016  H/O partial nephrectomy: right 2006  S/P angioplasty: 1 stent   History of percutaneous coronary intervention: stent RCA    Home Medications:  Cranberry oral capsule: 1 cap(s) orally once a day (20 May 2020 14:50)  pantoprazole 40 mg oral delayed release tablet: 1 tab(s) orally once a day (before a meal) (20 May 2020 14:50)  sertraline 50 mg oral tablet: 1 tab(s) orally once a day (20 May 2020 14:50)  Tylenol Extra Strength 500 mg oral tablet: 1 tab(s) orally once a day, As Needed - for mild pain (20 May 2020 14:50)      MEDICATIONS  (STANDING):  amLODIPine   Tablet 5 milliGRAM(s) Oral daily  apixaban 2.5 milliGRAM(s) Oral every 12 hours  carvedilol 3.125 milliGRAM(s) Oral every 12 hours  cefTRIAXone Injectable. 1000 milliGRAM(s) IV Push every 24 hours  meclizine 25 milliGRAM(s) Oral three times a day  pantoprazole    Tablet 40 milliGRAM(s) Oral before breakfast  sertraline 50 milliGRAM(s) Oral daily  sodium chloride 0.9%. 1000 milliLiter(s) (83 mL/Hr) IV Continuous <Continuous>    MEDICATIONS  (PRN):  acetaminophen   Tablet .. 650 milliGRAM(s) Oral every 4 hours PRN Mild Pain (1 - 3)  cyclobenzaprine 5 milliGRAM(s) Oral three times a day PRN Muscle Spasm  ondansetron Injectable 4 milliGRAM(s) IV Push every 6 hours PRN Nausea      Vital Signs Last 24 Hrs  T(C): 36.3 (22 May 2020 05:15), Max: 36.9 (21 May 2020 16:31)  T(F): 97.4 (22 May 2020 05:15), Max: 98.4 (21 May 2020 16:31)  HR: 64 (22 May 2020 05:15) (64 - 75)  BP: 141/87 (22 May 2020 05:15) (140/75 - 141/87)  BP(mean): --  RR: 18 (22 May 2020 05:15) (18 - 18)  SpO2: 96% (22 May 2020 05:15) (94% - 96%)  I&O's Summary    ________________________________  GENERAL APPEARANCE:  No acute distress  HEAD: normocephalic, atraumatic  NECK: supple, no jugular venous distention, no carotid bruit    HEART: Regular rate and rhythm, S1, S2 normal, 2/6 regurgitant murmur    CHEST:  No anterior chest wall tenderness    LUNGS:  Clear to auscultation, without any wheezing, rhonchi or rales    ABDOMEN: soft, nontender, nondistended, with positive bowel sounds appreciated  EXTREMITIES: no clubbing, cyanosis, or edema.   NEURO: Alert and oriented x3  PSYC:  Normal affect  SKIN:  Dry  ________________________________   TELEMETRY: Sinus Rhythm  with Parox atrial fibrillation     ECG: Sinus Rhythm     LABS:                          12.3   6.90  )-----------( 198      ( 21 May 2020 08:28 )             38.1          05-22    143  |  111<H>  |  13  ----------------------------<  99  4.1   |  25  |  0.93    Ca    9.3      22 May 2020 08:23  Mg     2.1     -    TPro  6.6  /  Alb  3.1<L>  /  TBili  0.5  /  DBili  x   /  AST  19  /  ALT  22  /  AlkPhos  90  05-22      Urinalysis Basic - ( 20 May 2020 15:14 )    Color: Yellow / Appearance: very cloudy / S.015 / pH: x  Gluc: x / Ketone: Negative  / Bili: Negative / Urobili: Negative mg/dL   Blood: x / Protein: 15 mg/dL / Nitrite: Negative   Leuk Esterase: Moderate / RBC: 3-5 /HPF / WBC >50   Sq Epi: x / Non Sq Epi: Many / Bacteria: Occasional        ________________________________    RADIOLOGY & ADDITIONAL STUDIES: No intracranial hemorrhage or posterior scalp hematoma.  ________________________________    ASSESSMENT:  Paroxysmal atrial fibrillation on anticoagulation with Eliquis  CAD status post PCI  Moderate to severe mitral valve regurgitation  Moderate to severe tricuspid valve regurgitation with mild pulmonary hypertension  Systemic hypertension  Hyperlipidemia  Prior history of syncope    PLAN:    In summary, this is a 79-year-old female with a past medical history of CAD status post prior PCI paroxysmal atrial fibrillation on full dose anticoagulation with moderate to severe mitral valve regurgitation, systemic hypertension, hyperlipidemia and prior history of syncope.    MRI brain noted.  Sinus rhythm with short bouts of paroxysmal atrial fibrillation on telemetry.  No change in therapy.  Continue anticoagulation.  Outpatient event monitor.  Statin therapy  Outpatient antihypertensives  Stable for discharge and follow-up with Dr. Ellis    __________________________________________________________________________  Thank you for allowing me to participate in the care of your patient. Please contact me should any questions arise.    IHSAN Turcios DO, [facility]  [phone]

## 2020-05-22 NOTE — PROGRESS NOTE ADULT - ATTENDING COMMENTS
I have personally seen and examined patient today.   I discussed the case with APN and I agree with findings and plan as detailed per note above, which I have amended where appropriate.

## 2020-05-22 NOTE — DISCHARGE NOTE PROVIDER - NSDCCPCAREPLAN_GEN_ALL_CORE_FT
PRINCIPAL DISCHARGE DIAGNOSIS  Diagnosis: Fall  Assessment and Plan of Treatment: You were admitted due to unwitnessed fall which was likely due to vertigo   - Your CT and MRI did not show any acute stroke  - Continue meclizine three times a day for vertigo  - Continue tylenol and flexeril for pain and muscle spasms  - Follow up with vestibular rehab  - Follow up with an ENT (Dr. Klein) for further management      SECONDARY DISCHARGE DIAGNOSES  Diagnosis: Acute UTI  Assessment and Plan of Treatment: - Stay well hydrated  - Continue oral antibiotics until 5/26 - evening    Diagnosis: PAF (paroxysmal atrial fibrillation)  Assessment and Plan of Treatment: You have history of atrial fibrillation which is a cardiac arrythmia.   - Continue Coreg  - Continue Eliquis for stroke prevention  - Follow up with your cardiologist (Dr. Trucios) for an outpatient event monitor    Diagnosis: Scalp hematoma, initial encounter  Assessment and Plan of Treatment: - Continue to monitor

## 2020-05-22 NOTE — DISCHARGE NOTE PROVIDER - HOSPITAL COURSE
CHIEF COMPLAINT/DIAGNOSIS: Fall, dizziness, vertigo    HPI: 79 y/o female w/ PMHx AFIB (eliquis), CAD s/p PCI 1998, HTN, RA, R renal CA s/p partial nephrectomy 2006, Takotsubo syndrome, prior syncope 10/19, UTI 10/19, presents from [facility] after sustaining a fall and c/o neck pain. history is obtained from the patient, daughter (on the phone) and the chart. she was found by the staff at [facility] after hearing patient screaming for help. pt reports that she was walking and fell down. denies any presyncopal symptoms. no chest pain, dyspnea, palpitations, focal weakness, lightheadedness or dizziness preceding the fall. denies any focal weakness, numbness or tingling. she denies any LOC but fall was unwitnessed. she struck her head on c/o "lump" on the back of her head.     in ER, CT head showed scalp hematoma. no other acute findings.         SUBJECTIVE: +dizziness, vertigo, denies cp or palp, sob or cough        REVIEW OF SYSTEMS:    All other review of systems is negative unless indicated above        Gen - Pleasant, cooperative, in no acute distress    HEENT- PERRL, moist mucus membranes, OP clear    CV - RRR, No m/r/g, +pulses, no edema    Lungs - Good effort, Clear to auscultation bilaterally    Abdomen - Soft, nontender, nondistended, +BS, No rebound/rigidity/guarding    Ext - No cyanosis/clubbing.     Skin - No rashes, No jaundice    Psych- Alert & oriented x 2-3    Neuro- fluent speech, no facial droop, EOMI. moves all ext        LABS: All Labs Reviewed    Urine Culture -- sensitivities pending*    RADIOLOGY:    < from: CT Head No Cont (05.20.20 @ 10:47) >    IMPRESSION:       No evidence of acute intracranial abnormality.  No evidence of hemorrhage.          MRI brain: No acute infarct. Generalized cerebral volume loss and chronic microvascular ischemic changes as above.    MRA brain: No hemodynamically significant stenosis.    MRA neck:  No hemodynamically significant stenosis.        MEDICATIONS: ALL REVIEWED         A/P:     79 y/o female w/ PMHx AFIB (eliquis), CAD s/p PCI 1998, HTN, RA, R renal CA s/p partial nephrectomy 2006, Takotsubo syndrome, prior syncope 10/19, UTI 10/19, presents from [facility] after sustaining a fall and c/o neck pain        s/p unwitnessed fall due to vertigo due to BPPV, no CVA     MK  -creat 1.6 on admission, (baseline 1.07) which is 1.5x greater than baseline ~ RESOLVED     GNR UTI     scalp hematoma - cbc stable     PAF on eliquis    HTN        check MRI to r/o CVA -- normal no acute infarct. chronic changes noted.     BPPV - meclizine increased to 25mg TID. f/u with vestibular rehab, EBT      tele, monitor for arrhythmia -- 4 beats APCs overnight -- NSR 60-70s     check orthostatic vital signs -- negative     PT eval  -- final eval pending     IV fluids - creat improving -- PO fluid encouraged     started ceftriaxone. await urine culture sens. -- old culture in 2019 pansensitive, ecoli. will d/c on PO ceftin      resume other home meds    neuro checks - NORMAL     CT findings as above     avoid nephrotoxic agents -- PO fluid encouraged     consult cardiology -- Continue carvedilol.  Would benefit from outpatient event monitor. f/u outpatient     full code - confirmed w/ patient and daughter    dvt proph: scd's, eliquis - for PAF        d/w pt and daughter. all questions answered CHIEF COMPLAINT/DIAGNOSIS: Fall, dizziness, vertigo    HPI: 77 y/o female w/ PMHx AFIB (eliquis), CAD s/p PCI 1998, HTN, RA, R renal CA s/p partial nephrectomy 2006, Takotsubo syndrome, prior syncope 10/19, UTI 10/19, presents from [facility] after sustaining a fall and c/o neck pain. history is obtained from the patient, daughter (on the phone) and the chart. she was found by the staff at [facility] after hearing patient screaming for help. pt reports that she was walking and fell down. denies any presyncopal symptoms. no chest pain, dyspnea, palpitations, focal weakness, lightheadedness or dizziness preceding the fall. denies any focal weakness, numbness or tingling. she denies any LOC but fall was unwitnessed. she struck her head on c/o "lump" on the back of her head.     in ER, CT head showed scalp hematoma. no other acute findings.         SUBJECTIVE: +dizziness, vertigo, denies cp or palp, sob or cough        REVIEW OF SYSTEMS:    All other review of systems is negative unless indicated above        Gen - Pleasant, cooperative, in no acute distress    HEENT- PERRL, moist mucus membranes, OP clear    CV - RRR, No m/r/g, +pulses, no edema    Lungs - Good effort, Clear to auscultation bilaterally    Abdomen - Soft, nontender, nondistended, +BS, No rebound/rigidity/guarding    Ext - No cyanosis/clubbing.     Skin - No rashes, No jaundice    Psych- Alert & oriented x 2-3    Neuro- fluent speech, no facial droop, EOMI. moves all ext        LABS: All Labs Reviewed    Urine Culture -- sensitivities pending*    RADIOLOGY:    < from: CT Head No Cont (05.20.20 @ 10:47) >    IMPRESSION:       No evidence of acute intracranial abnormality.  No evidence of hemorrhage.          MRI brain: No acute infarct. Generalized cerebral volume loss and chronic microvascular ischemic changes as above.    MRA brain: No hemodynamically significant stenosis.    MRA neck:  No hemodynamically significant stenosis.        MEDICATIONS: ALL REVIEWED         A/P:     77 y/o female w/ PMHx AFIB (eliquis), CAD s/p PCI 1998, HTN, RA, R renal CA s/p partial nephrectomy 2006, Takotsubo syndrome, prior syncope 10/19, UTI 10/19, presents from [facility] after sustaining a fall and c/o neck pain        s/p unwitnessed fall due to vertigo due to BPPV, no CVA     MK  -creat 1.6 on admission, (baseline 1.07) which is 1.5x greater than baseline ~ RESOLVED     GNR UTI     scalp hematoma - cbc stable     PAF on eliquis    HTN        check MRI to r/o CVA -- normal no acute infarct. chronic changes noted. d/w cardio start asa 79    BPPV - meclizine increased to 25mg TID. f/u with vestibular rehab, EBT      tele, monitor for arrhythmia -- 4 beats APCs overnight -- NSR 60-70s     check orthostatic vital signs -- negative     PT eval  -- final eval pending     IV fluids - creat improving -- PO fluid encouraged     started ceftriaxone. await urine culture sens. -- old culture in 2019 pansensitive, ecoli. will d/c on PO ceftin      resume other home meds    neuro checks - NORMAL     CT findings as above     avoid nephrotoxic agents -- PO fluid encouraged     consult cardiology -- Continue carvedilol.  Would benefit from outpatient event monitor. f/u outpatient     full code - confirmed w/ patient and daughter    dvt proph: scd's, eliquis - for PAF        d/w pt and daughter. all questions answered A/P: 79 y/o female w/ PMHx AFIB (eliquis), CAD s/p PCI 1998, HTN, RA, R renal CA s/p partial nephrectomy 2006, Takotsubo syndrome, prior syncope 10/19, UTI 10/19, presents from [facility] after sustaining a fall and c/o neck         MRI brain: No acute infarct. Generalized cerebral volume loss and chronic microvascular ischemic changes as above.    MRA brain: No hemodynamically significant stenosis.    MRA neck:  No hemodynamically significant stenosis.        1.s/p unwitnessed fall, likely mechanical. doubt syncope. monitored on tele. evaluated by cardiology. MRI shows no stroke. orthostatic vital signs negative. PT eval done. [facility] recommended.     2.vertigo/BPPV, no CVA on stroke. needs vestibular rehab. on meclizine.     3.MK  -creat 1.6 on admission, (baseline 1.07) which is 1.5x greater than baseline. IV fluids given. creat has normalized. MK resolved.     4.Klebsiella UTI - on IV ceftriaxone. will switch to oral antibx on discharge    5.scalp hematoma - cbc stable     6.PAF on eliquis    7.HTN - Cont meds        d/w pt and daughter        Dispo: discharge to [facility] in stable condition        Final diagnosis, treatment plan, and follow-up recommendations were discussed and explained to the patient. The patient was given an opportunity to ask questions concerning the diagnosis and treatment plan. The patient acknowledged understanding of the diagnosis, treatment, and follow-up recommendations. The patient was advised to seek urgent care upon discharge if worsening symptoms develop prior to scheduled follow-up. Time spent on discharge included time with the patient, and also coordinating discharge care as outlined below.        Total time spent: 50 min

## 2020-05-22 NOTE — DISCHARGE NOTE PROVIDER - CARE PROVIDER_API CALL
SAADIA ESCOBEDO J  [facility]  [address], [address]  [place] [address]  Phone: [phone]  Fax: [phone]  Follow Up Time:     Rehan Turcios (DO; HORTENC[place])  Cardiology  [address] [place] [address]  Phone: [phone]  Fax: [phone]  Follow Up Time:     Devaughn Klein  OTOLARYNGOLOGY  [address] [place] [address]  Phone: [phone]  Fax: [phone]  Follow Up Time:

## 2020-05-22 NOTE — DISCHARGE NOTE PROVIDER - NSDCMRMEDTOKEN_GEN_ALL_CORE_FT
amLODIPine 5 mg oral tablet: 1 tab(s) orally once a day  Aspir 81 oral delayed release tablet: 1 tab(s) orally once a day *new*  Ceftin 250 mg oral tablet: 1 tab(s) orally every 12 hours *Last Dose 26th evening*  Coreg 3.125 mg oral tablet: 1 tab(s) orally every 12 hours  Cranberry oral capsule: 1 cap(s) orally once a day  cyclobenzaprine 5 mg oral tablet: 1 tab(s) orally 3 times a day, As needed, Muscle Spasm  Eliquis 2.5 mg oral tablet: 1 tab(s) orally 2 times a day     Indication: Afib  meclizine 25 mg oral tablet: 1 tab(s) orally 3 times a day  pantoprazole 40 mg oral delayed release tablet: 1 tab(s) orally once a day (before a meal)  sertraline 50 mg oral tablet: 1 tab(s) orally once a day  Tylenol Extra Strength 500 mg oral tablet: 1 tab(s) orally once a day, As Needed - for mild pain amLODIPine 5 mg oral tablet: 1 tab(s) orally once a day  Aspir 81 oral delayed release tablet: 1 tab(s) orally once a day *new*  Ceftin 250 mg oral tablet: 1 tab(s) orally every 12 hours *Last Dose 26th evening*  Coreg 3.125 mg oral tablet: 1 tab(s) orally every 12 hours  Cranberry oral capsule: 1 cap(s) orally once a day  Eliquis 2.5 mg oral tablet: 1 tab(s) orally 2 times a day     Indication: Afib  meclizine 25 mg oral tablet: 1 tab(s) orally 3 times a day  pantoprazole 40 mg oral delayed release tablet: 1 tab(s) orally once a day (before a meal)  sertraline 50 mg oral tablet: 1 tab(s) orally once a day  Tylenol Extra Strength 500 mg oral tablet: 1 tab(s) orally once a day, As Needed - for mild pain

## 2020-05-23 ENCOUNTER — TRANSCRIPTION ENCOUNTER (OUTPATIENT)
Age: 80
End: 2020-05-23

## 2020-05-23 VITALS
SYSTOLIC BLOOD PRESSURE: 152 MMHG | RESPIRATION RATE: 20 BRPM | TEMPERATURE: 98 F | HEART RATE: 118 BPM | DIASTOLIC BLOOD PRESSURE: 82 MMHG | OXYGEN SATURATION: 96 %

## 2020-05-23 DIAGNOSIS — Z91.81 HISTORY OF FALLING: ICD-10-CM

## 2020-05-23 PROCEDURE — 99239 HOSP IP/OBS DSCHRG MGMT >30: CPT

## 2020-05-23 RX ADMIN — Medication 25 MILLIGRAM(S): at 06:03

## 2020-05-23 RX ADMIN — Medication 25 MILLIGRAM(S): at 15:09

## 2020-05-23 RX ADMIN — SERTRALINE 50 MILLIGRAM(S): 25 TABLET, FILM COATED ORAL at 11:00

## 2020-05-23 RX ADMIN — PANTOPRAZOLE SODIUM 40 MILLIGRAM(S): 20 TABLET, DELAYED RELEASE ORAL at 06:03

## 2020-05-23 RX ADMIN — CARVEDILOL PHOSPHATE 3.12 MILLIGRAM(S): 80 CAPSULE, EXTENDED RELEASE ORAL at 06:03

## 2020-05-23 RX ADMIN — Medication 81 MILLIGRAM(S): at 11:00

## 2020-05-23 RX ADMIN — APIXABAN 2.5 MILLIGRAM(S): 2.5 TABLET, FILM COATED ORAL at 06:03

## 2020-05-23 RX ADMIN — AMLODIPINE BESYLATE 5 MILLIGRAM(S): 2.5 TABLET ORAL at 06:03

## 2020-05-23 NOTE — PROGRESS NOTE ADULT - NSHPATTENDINGPLANDISCUSS_GEN_ALL_CORE
patient, RN,  and pt's daughter
patient, team and d/w daughter. d/w Dr. Turcios
patient, team and pt's daughter

## 2020-05-23 NOTE — DISCHARGE NOTE NURSING/CASE MANAGEMENT/SOCIAL WORK - PATIENT PORTAL LINK FT
You can access the FollowMyHealth Patient Portal offered by [facility] by registering at the following website: [email or web address]. By joining [employer]’s FollowMyHealth portal, you will also be able to view your health information using other applications (apps) compatible with our system.

## 2020-05-23 NOTE — PROGRESS NOTE ADULT - SUBJECTIVE AND OBJECTIVE BOX
CHIEF COMPLAINT/DIAGNOSIS: Fall, dizziness, vertigo    HPI: 77 y/o female w/ PMHx AFIB (eliquis), CAD s/p PCI 1998, HTN, RA, R renal CA s/p partial nephrectomy 2006, Takotsubo syndrome, prior syncope 10/19, UTI 10/19, presents from [facility] after sustaining a fall and c/o neck pain. history is obtained from the patient, daughter (on the phone) and the chart. she was found by the staff at [facility] after hearing patient screaming for help. pt reports that she was walking and fell down. denies any presyncopal symptoms. no chest pain, dyspnea, palpitations, focal weakness, lightheadedness or dizziness preceding the fall. denies any focal weakness, numbness or tingling. she denies any LOC but fall was unwitnessed. she struck her head on c/o "lump" on the back of her head.   in ER, CT head showed scalp hematoma. no other acute findings.     SUBJECTIVE: intermittent dizziness when moving head. dysuria improving. has urinary frequency. no chest pain, no dyspnea    REVIEW OF SYSTEMS:  All other review of systems is negative unless indicated above    T(C): 36.6 (05-23-20 @ 08:54), Max: 36.7 (05-22-20 @ 16:59)  HR: 118 (05-23-20 @ 08:54) (63 - 118)  BP: 152/82 (05-23-20 @ 08:54) (131/67 - 152/82)  RR: 20 (05-23-20 @ 08:54) (18 - 20)  SpO2: 96% (05-23-20 @ 08:54) (94% - 99%)    Gen - Pleasant, cooperative, in no acute distress  HEENT- PERRL, moist mucus membranes, OP clear  CV - RRR, No m/r/g, +pulses, no edema  Lungs - Good effort, Clear to auscultation bilaterally  Abdomen - Soft, nontender, nondistended, +BS, No rebound/rigidity/guarding  Ext - No cyanosis/clubbing.   Skin - No rashes, No jaundice  Psych- Alert & oriented x 2-3  Neuro- fluent speech, no facial droop, EOMI. moves all ext        LABS: All Labs Reviewed:                        12.3   6.90  )-----------( 198      ( 21 May 2020 08:28 )             38.1     05-22    143  |  111<H>  |  13  ----------------------------<  99  4.1   |  25  |  0.93    Ca    9.3      22 May 2020 08:23  Mg     2.1     05-22    TPro  6.6  /  Alb  3.1<L>  /  TBili  0.5  /  DBili  x   /  AST  19  /  ALT  22  /  AlkPhos  90  05-22        Blood Culture: 05-20 @ 15:14  Organism --  Gram Stain Blood -- Gram Stain --  Specimen Source .Urine Clean Catch (Midstream)  Urine Culture -- sensitivities pending*      RADIOLOGY:  < from: CT Head No Cont (05.20.20 @ 10:47) >  IMPRESSION:     No evidence of acute intracranial abnormality.  No evidence of hemorrhage.      MRI brain: No acute infarct. Generalized cerebral volume loss and chronic microvascular ischemic changes as above.  MRA brain: No hemodynamically significant stenosis.  MRA neck:  No hemodynamically significant stenosis.      MEDICATIONS  (STANDING):  amLODIPine   Tablet 5 milliGRAM(s) Oral daily  apixaban 2.5 milliGRAM(s) Oral every 12 hours  carvedilol 3.125 milliGRAM(s) Oral every 12 hours  cefTRIAXone Injectable. 1000 milliGRAM(s) IV Push every 24 hours  meclizine 25 milliGRAM(s) Oral three times a day  pantoprazole    Tablet 40 milliGRAM(s) Oral before breakfast  sertraline 50 milliGRAM(s) Oral daily  sodium chloride 0.9%. 1000 milliLiter(s) (83 mL/Hr) IV Continuous <Continuous>    MEDICATIONS  (PRN):  acetaminophen   Tablet .. 650 milliGRAM(s) Oral every 4 hours PRN Mild Pain (1 - 3)  cyclobenzaprine 5 milliGRAM(s) Oral three times a day PRN Muscle Spasm  ondansetron Injectable 4 milliGRAM(s) IV Push every 6 hours PRN Nausea          A/P:     77 y/o female w/ PMHx AFIB (eliquis), CAD s/p PCI 1998, HTN, RA, R renal CA s/p partial nephrectomy 2006, Takotsubo syndrome, prior syncope 10/19, UTI 10/19, presents from [facility] after sustaining a fall and c/o neck pain    1.	s/p unwitnessed fall, likely mechanical. doubt syncope. monitored on tele. evaluated by cardiology. MRI shows no stroke. orthostatic vital signs negative. PT eval done. [facility] recommended.   2.	vertigo/BPPV, no CVA on stroke. needs vestibular rehab. on meclizine.   3.	MK  -creat 1.6 on admission, (baseline 1.07) which is 1.5x greater than baseline. IV fluids given. creat has normalized. MK resolved.   4.	Klebsiella UTI - on IV ceftriaxone. will switch to oral antibx on discharge  5.	scalp hematoma - cbc stable   6.	PAF on eliquis  7.	HTN    ·	full code - confirmed w/ patient and daughter  ·	dvt proph: scd's, Eliquis - for PAF  ·	d/w pt and daughter. all questions answered    Dispo: transfer to rehab

## 2020-05-27 DIAGNOSIS — I10 ESSENTIAL (PRIMARY) HYPERTENSION: ICD-10-CM
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
## 2020-05-27 DIAGNOSIS — R42 DIZZINESS AND GIDDINESS: ICD-10-CM

## 2020-05-27 DIAGNOSIS — F41.9 ANXIETY DISORDER, UNSPECIFIED: ICD-10-CM

## 2020-05-27 DIAGNOSIS — S00.03XA CONTUSION OF SCALP, INITIAL ENCOUNTER: ICD-10-CM

## 2020-05-27 DIAGNOSIS — N17.9 ACUTE KIDNEY FAILURE, UNSPECIFIED: ICD-10-CM

## 2020-05-27 DIAGNOSIS — I25.10 ATHEROSCLEROTIC HEART DISEASE OF NATIVE CORONARY ARTERY WITHOUT ANGINA PECTORIS: ICD-10-CM

## 2020-05-27 DIAGNOSIS — I51.81 TAKOTSUBO SYNDROME: ICD-10-CM

## 2020-05-27 DIAGNOSIS — N39.0 URINARY TRACT INFECTION, SITE NOT SPECIFIED: ICD-10-CM

## 2020-05-27 DIAGNOSIS — Z88.0 ALLERGY STATUS TO PENICILLIN: ICD-10-CM

## 2020-05-27 DIAGNOSIS — H81.10 BENIGN PAROXYSMAL VERTIGO, UNSPECIFIED EAR: ICD-10-CM

## 2020-05-27 DIAGNOSIS — Z79.01 LONG TERM (CURRENT) USE OF ANTICOAGULANTS: ICD-10-CM

## 2020-05-27 DIAGNOSIS — I48.0 PAROXYSMAL ATRIAL FIBRILLATION: ICD-10-CM

## 2020-05-29 NOTE — ED PROVIDER NOTE - OBJECTIVE STATEMENT
Called pt to see if she could come in this morning at 10:30 am instead of 9 am for Dr. Lina Wood pt did not answer did leave a vm.
79 YOF PMHx of Takotsubo syndrome, A fib, CAD s/p coronary stent, HTN, RA, OP, anxiety disorder, depressive disorder, osteoporosis, rheumatoid arthritis, renal CA (right), PSHx of s/p partial nephrectomy (right - 2006) presents to ED BIBEMS from [facility] s/p fall PTA. Pt says she fell while in the living room of [facility]. Pt denies passing out, does not recall falling but suspects trip and fall. Struck head on wooden table during fall. Takes blood thinner but cannot recall name of med. Currently pt complaining of head pain and mild acute on chronic back pain, presents with abrasion to right elbow. No other acute complaints. Tetanus UTD.

## 2020-06-03 NOTE — ED ADULT NURSE NOTE - ALCOHOL PRE SCREEN (AUDIT - C)
Asking what about cholesterol medication does she need a refill?  If not then keep taking recommendation if knows will not miss doses at HS and we need to work with the psr also please to have the upcoming visit a telephonic visit for sure   Statement Selected

## 2020-12-21 NOTE — DISCHARGE NOTE ADULT - NURSING SECTION COMPLETE
Progress Note - Infectious Disease   Fran Benavides 66 y o  male MRN: [id number]  Unit/Bed#: E5 -01 Encounter: [id number]      Impression/Plan:  1  MSSA bacteremia-with only 1 of 2 sets positive  While this could represent a contaminant, the patient had the same organism isolated in the foot wound which has been worsening, therefore I suspect this is more of a transient bacteremia  Consideration for the possibility of endocarditis although I doubt  Fortunately the patient is not systemically ill, is hemodynamically stable  He seems to be tolerating the antibiotics without difficulty   -antibiotics as below  -recheck blood cultures x2 sets to confirm clearance of bacteremia  -check transthoracic echocardiogram  -additional workup as needed     2  Left foot osteomyelitis-involving primarily the 5th metatarsal but also the phalanx  Infection appears to be polymicrobial although I suspect that MSSA is the primary invasive organism  Fortunately the infection seems to be fairly well localized  Vascular status felt to be acceptable for surgical intervention  -continue cefepime but increase the dose to 2 g IV q 12 hours with improved renal function  -continue Flagyl  -patient to the OR today for 5th ray amputation  -likely simplify antibiotics back to cefazolin 12/22/2020  -recheck CBC with diff and BMP  -close podiatry follow-up  -serial exams     3  Chronic kidney disease-advanced  Renal function is modestly improved  Possibly a pre renal issue   -dose adjusted antibiotics as above  -volume management  -recheck BMP     4  Chronic dysuria-with positive urine culture for BCG as expected post treatment for bladder cancer  The patient is now status post biopsy with mild chronic inflammation but AFB smears negative for invasive disease  AFB Culture from the biopsies negative thus far    -follow-up AFB cultures  -recheck AFB cultures  -follow-up with Urology and in the Infectious Disease office    Discussed the above management plan with the primary service    Antibiotics:  Cefepime 2  Flagyl 4  Antibiotics 4  Subjective:  Patient has no fever, chills, sweats; no nausea, vomiting, diarrhea; no cough, shortness of breath; no pain  No new symptoms  Objective:  Vitals:  Temp:  [98 °F (36 7 °C)-99 °F (37 2 °C)] 98 °F (36 7 °C)  HR:  [65-75] 75  Resp:  [18-19] 18  BP: (113-148)/(60-85) 148/85  SpO2:  [97 %-99 %] 97 %  Temp (24hrs), Av 4 °F (36 9 °C), Min:98 °F (36 7 °C), Max:99 °F (37 2 °C)  Current: Temperature: 98 °F (36 7 °C)    Physical Exam:   General Appearance:  Alert, interactive, nontoxic, no acute distress  Throat: Oropharynx moist without lesions  Lungs:   Clear to auscultation bilaterally; no wheezes, rhonchi or rales; respirations unlabored   Heart:  RRR; no murmur, rub or gallop   Abdomen:   Soft, non-tender, non-distended, positive bowel sounds  Extremities: No clubbing, cyanosis  Skin: No new rashes or lesions  No draining wounds noted  Labs, Imaging, & Other studies:   All pertinent labs and imaging studies were personally reviewed  Results from last 7 days   Lab Units 20  0641 20  0607 20  0520   WBC Thousand/uL 11 99* 10 83* 9 79   HEMOGLOBIN g/dL 10 1* 9 2* 10 6*   PLATELETS Thousands/uL 143* 127* 149     Results from last 7 days   Lab Units 20  0641 20  0607 20  0520 20  1705   SODIUM mmol/L 136 136 137 140   POTASSIUM mmol/L 4 5 4 4 4 6 5 0   CHLORIDE mmol/L 104 105 105 106   CO2 mmol/L 23 24 23 27   BUN mg/dL 54* 52* 49* 50*   CREATININE mg/dL 2 67* 2 86* 2 42* 2 47*   EGFR ml/min/1 73sq m 25 23 29 28   CALCIUM mg/dL 8 7 8 3 8 7 8 9   AST U/L  --   --   --  22   ALT U/L  --   --   --  61   ALK PHOS U/L  --   --   --  115     Results from last 7 days   Lab Units 20  1857 20  1706 20  1653   BLOOD CULTURE   --  No Growth at 48 hrs   Staphylococcus aureus*   GRAM STAIN RESULT  Rare Polys*  3+ Gram positive cocci in clusters*  --  Gram positive cocci in clusters*   WOUND CULTURE  2+ Growth of Enterobacter aerogenes*  4+ Growth of Staphylococcus aureus*  --   --              Results from last 7 days   Lab Units 12/19/20  0520   FERRITIN ng/mL 52 Patient/Caregiver provided printed discharge information.

## 2021-01-06 ENCOUNTER — EMERGENCY (EMERGENCY)
Facility: HOSPITAL | Age: 81
LOS: 0 days | Discharge: HOME | End: 2021-01-06
Attending: EMERGENCY MEDICINE
Payer: MEDICARE

## 2021-01-06 VITALS
HEART RATE: 62 BPM | TEMPERATURE: 98 F | OXYGEN SATURATION: 100 % | DIASTOLIC BLOOD PRESSURE: 67 MMHG | SYSTOLIC BLOOD PRESSURE: 131 MMHG | RESPIRATION RATE: 18 BRPM

## 2021-01-06 VITALS
RESPIRATION RATE: 18 BRPM | TEMPERATURE: 99 F | OXYGEN SATURATION: 96 % | WEIGHT: 126.1 LBS | HEIGHT: 59 IN | HEART RATE: 58 BPM | DIASTOLIC BLOOD PRESSURE: 76 MMHG | SYSTOLIC BLOOD PRESSURE: 135 MMHG

## 2021-01-06 DIAGNOSIS — Z98.49 CATARACT EXTRACTION STATUS, UNSPECIFIED EYE: Chronic | ICD-10-CM

## 2021-01-06 DIAGNOSIS — I35.0 NONRHEUMATIC AORTIC (VALVE) STENOSIS: ICD-10-CM

## 2021-01-06 DIAGNOSIS — Z90.49 ACQUIRED ABSENCE OF OTHER SPECIFIED PARTS OF DIGESTIVE TRACT: ICD-10-CM

## 2021-01-06 DIAGNOSIS — Z98.890 OTHER SPECIFIED POSTPROCEDURAL STATES: Chronic | ICD-10-CM

## 2021-01-06 DIAGNOSIS — F03.90 UNSPECIFIED DEMENTIA WITHOUT BEHAVIORAL DISTURBANCE: ICD-10-CM

## 2021-01-06 DIAGNOSIS — I50.9 HEART FAILURE, UNSPECIFIED: ICD-10-CM

## 2021-01-06 DIAGNOSIS — Z11.2 ENCOUNTER FOR SCREENING FOR OTHER BACTERIAL DISEASES: ICD-10-CM

## 2021-01-06 DIAGNOSIS — Z90.89 ACQUIRED ABSENCE OF OTHER ORGANS: ICD-10-CM

## 2021-01-06 DIAGNOSIS — Z90.5 ACQUIRED ABSENCE OF KIDNEY: Chronic | ICD-10-CM

## 2021-01-06 DIAGNOSIS — Z90.89 ACQUIRED ABSENCE OF OTHER ORGANS: Chronic | ICD-10-CM

## 2021-01-06 DIAGNOSIS — Z98.89 OTHER SPECIFIED POSTPROCEDURAL STATES: Chronic | ICD-10-CM

## 2021-01-06 DIAGNOSIS — Z20.822 CONTACT WITH AND (SUSPECTED) EXPOSURE TO COVID-19: ICD-10-CM

## 2021-01-06 DIAGNOSIS — R05 COUGH: ICD-10-CM

## 2021-01-06 DIAGNOSIS — E78.5 HYPERLIPIDEMIA, UNSPECIFIED: ICD-10-CM

## 2021-01-06 DIAGNOSIS — I11.0 HYPERTENSIVE HEART DISEASE WITH HEART FAILURE: ICD-10-CM

## 2021-01-06 DIAGNOSIS — Z98.62 PERIPHERAL VASCULAR ANGIOPLASTY STATUS: Chronic | ICD-10-CM

## 2021-01-06 DIAGNOSIS — Z88.8 ALLERGY STATUS TO OTHER DRUGS, MEDICAMENTS AND BIOLOGICAL SUBSTANCES: ICD-10-CM

## 2021-01-06 DIAGNOSIS — E78.00 PURE HYPERCHOLESTEROLEMIA, UNSPECIFIED: ICD-10-CM

## 2021-01-06 DIAGNOSIS — Z95.810 PRESENCE OF AUTOMATIC (IMPLANTABLE) CARDIAC DEFIBRILLATOR: ICD-10-CM

## 2021-01-06 DIAGNOSIS — R07.0 PAIN IN THROAT: ICD-10-CM

## 2021-01-06 DIAGNOSIS — Z87.891 PERSONAL HISTORY OF NICOTINE DEPENDENCE: ICD-10-CM

## 2021-01-06 DIAGNOSIS — K21.9 GASTRO-ESOPHAGEAL REFLUX DISEASE WITHOUT ESOPHAGITIS: ICD-10-CM

## 2021-01-06 DIAGNOSIS — J02.9 ACUTE PHARYNGITIS, UNSPECIFIED: ICD-10-CM

## 2021-01-06 LAB
ALBUMIN SERPL ELPH-MCNC: 3.5 G/DL — SIGNIFICANT CHANGE UP (ref 3.3–5)
ALP SERPL-CCNC: 97 U/L — SIGNIFICANT CHANGE UP (ref 40–120)
ALT FLD-CCNC: 27 U/L — SIGNIFICANT CHANGE UP (ref 12–78)
ANION GAP SERPL CALC-SCNC: 6 MMOL/L — SIGNIFICANT CHANGE UP (ref 5–17)
APTT BLD: 33.7 SEC — SIGNIFICANT CHANGE UP (ref 27.5–35.5)
AST SERPL-CCNC: 20 U/L — SIGNIFICANT CHANGE UP (ref 15–37)
BASOPHILS # BLD AUTO: 0.05 K/UL — SIGNIFICANT CHANGE UP (ref 0–0.2)
BASOPHILS NFR BLD AUTO: 0.7 % — SIGNIFICANT CHANGE UP (ref 0–2)
BILIRUB SERPL-MCNC: 0.4 MG/DL — SIGNIFICANT CHANGE UP (ref 0.2–1.2)
BUN SERPL-MCNC: 26 MG/DL — HIGH (ref 7–23)
CALCIUM SERPL-MCNC: 9.4 MG/DL — SIGNIFICANT CHANGE UP (ref 8.5–10.1)
CHLORIDE SERPL-SCNC: 104 MMOL/L — SIGNIFICANT CHANGE UP (ref 96–108)
CO2 SERPL-SCNC: 28 MMOL/L — SIGNIFICANT CHANGE UP (ref 22–31)
CREAT SERPL-MCNC: 1.28 MG/DL — SIGNIFICANT CHANGE UP (ref 0.5–1.3)
EOSINOPHIL # BLD AUTO: 0.29 K/UL — SIGNIFICANT CHANGE UP (ref 0–0.5)
EOSINOPHIL NFR BLD AUTO: 3.8 % — SIGNIFICANT CHANGE UP (ref 0–6)
GLUCOSE SERPL-MCNC: 101 MG/DL — HIGH (ref 70–99)
HCT VFR BLD CALC: 39.6 % — SIGNIFICANT CHANGE UP (ref 34.5–45)
HGB BLD-MCNC: 12.8 G/DL — SIGNIFICANT CHANGE UP (ref 11.5–15.5)
IMM GRANULOCYTES NFR BLD AUTO: 0.3 % — SIGNIFICANT CHANGE UP (ref 0–1.5)
INR BLD: 1.24 RATIO — HIGH (ref 0.88–1.16)
LYMPHOCYTES # BLD AUTO: 2.25 K/UL — SIGNIFICANT CHANGE UP (ref 1–3.3)
LYMPHOCYTES # BLD AUTO: 29.5 % — SIGNIFICANT CHANGE UP (ref 13–44)
MCHC RBC-ENTMCNC: 30.8 PG — SIGNIFICANT CHANGE UP (ref 27–34)
MCHC RBC-ENTMCNC: 32.3 GM/DL — SIGNIFICANT CHANGE UP (ref 32–36)
MCV RBC AUTO: 95.2 FL — SIGNIFICANT CHANGE UP (ref 80–100)
MONOCYTES # BLD AUTO: 0.93 K/UL — HIGH (ref 0–0.9)
MONOCYTES NFR BLD AUTO: 12.2 % — SIGNIFICANT CHANGE UP (ref 2–14)
NEUTROPHILS # BLD AUTO: 4.09 K/UL — SIGNIFICANT CHANGE UP (ref 1.8–7.4)
NEUTROPHILS NFR BLD AUTO: 53.5 % — SIGNIFICANT CHANGE UP (ref 43–77)
NT-PROBNP SERPL-SCNC: 739 PG/ML — HIGH (ref 0–450)
PLATELET # BLD AUTO: 215 K/UL — SIGNIFICANT CHANGE UP (ref 150–400)
POTASSIUM SERPL-MCNC: 3.8 MMOL/L — SIGNIFICANT CHANGE UP (ref 3.5–5.3)
POTASSIUM SERPL-SCNC: 3.8 MMOL/L — SIGNIFICANT CHANGE UP (ref 3.5–5.3)
PROT SERPL-MCNC: 7.8 GM/DL — SIGNIFICANT CHANGE UP (ref 6–8.3)
PROTHROM AB SERPL-ACNC: 14.4 SEC — HIGH (ref 10.6–13.6)
RAPID RVP RESULT: SIGNIFICANT CHANGE UP
RBC # BLD: 4.16 M/UL — SIGNIFICANT CHANGE UP (ref 3.8–5.2)
RBC # FLD: 13.9 % — SIGNIFICANT CHANGE UP (ref 10.3–14.5)
S PYO AG SPEC QL IA: NEGATIVE — SIGNIFICANT CHANGE UP
SARS-COV-2 RNA SPEC QL NAA+PROBE: SIGNIFICANT CHANGE UP
SODIUM SERPL-SCNC: 138 MMOL/L — SIGNIFICANT CHANGE UP (ref 135–145)
WBC # BLD: 7.63 K/UL — SIGNIFICANT CHANGE UP (ref 3.8–10.5)
WBC # FLD AUTO: 7.63 K/UL — SIGNIFICANT CHANGE UP (ref 3.8–10.5)

## 2021-01-06 PROCEDURE — 36415 COLL VENOUS BLD VENIPUNCTURE: CPT

## 2021-01-06 PROCEDURE — 99284 EMERGENCY DEPT VISIT MOD MDM: CPT | Mod: 25

## 2021-01-06 PROCEDURE — 87040 BLOOD CULTURE FOR BACTERIA: CPT | Mod: XU

## 2021-01-06 PROCEDURE — 0225U NFCT DS DNA&RNA 21 SARSCOV2: CPT

## 2021-01-06 PROCEDURE — 99283 EMERGENCY DEPT VISIT LOW MDM: CPT

## 2021-01-06 PROCEDURE — 80053 COMPREHEN METABOLIC PANEL: CPT

## 2021-01-06 PROCEDURE — 85730 THROMBOPLASTIN TIME PARTIAL: CPT

## 2021-01-06 PROCEDURE — 85610 PROTHROMBIN TIME: CPT

## 2021-01-06 PROCEDURE — 85025 COMPLETE CBC W/AUTO DIFF WBC: CPT

## 2021-01-06 PROCEDURE — 71045 X-RAY EXAM CHEST 1 VIEW: CPT | Mod: 26

## 2021-01-06 PROCEDURE — 71045 X-RAY EXAM CHEST 1 VIEW: CPT

## 2021-01-06 PROCEDURE — 87081 CULTURE SCREEN ONLY: CPT

## 2021-01-06 PROCEDURE — 87880 STREP A ASSAY W/OPTIC: CPT

## 2021-01-06 PROCEDURE — 83880 ASSAY OF NATRIURETIC PEPTIDE: CPT

## 2021-01-06 RX ORDER — SODIUM CHLORIDE 9 MG/ML
3 INJECTION INTRAMUSCULAR; INTRAVENOUS; SUBCUTANEOUS ONCE
Refills: 0 | Status: COMPLETED | OUTPATIENT
Start: 2021-01-06 | End: 2021-01-06

## 2021-01-06 NOTE — ED PROVIDER NOTE - NSFOLLOWUPINSTRUCTIONS_ED_ALL_ED_FT
COVID test by PCR NEGATIVE.  Rapid Strep test negative, throat culture sent out, results pending.  Sore throat resolved prior to ED arrival.  CXR no focal infiltrate.    Continue regular medications as per routine.  Follow up with your own doctor.      ED evaluation and management discussed with the patient and family (if available) in detail.  Close PMD follow up encouraged.  Strict ED return instructions discussed in detail and patient given the opportunity to ask any questions about their discharge diagnosis and instructions. Patient verbalized understanding.        Pharyngitis    WHAT YOU NEED TO KNOW:    Pharyngitis, or sore throat, is inflammation of the tissues and structures in your pharynx (throat). Pharyngitis is most often caused by bacteria. It may also be caused by a cold or flu virus. Other causes include smoking, allergies, or acid reflux.     DISCHARGE INSTRUCTIONS:    Call 911 for any of the following:   •You have trouble breathing or swallowing because your throat is swollen or sore.          Return to the emergency department if:   •You are drooling because it hurts too much to swallow.      •Your fever is higher than 102°F (39°C) or lasts longer than 3 days.      •You are confused.      •You taste blood in your throat.      Contact your healthcare provider if:   •Your throat pain gets worse.      •You have a painful lump in your throat that does not go away after 5 days.      •Your symptoms do not improve after 5 days.      •You have questions or concerns about your condition or care.      Medicines: Viral pharyngitis will go away on its own without treatment. Your sore throat should start to feel better in 3 to 5 days for both viral and bacterial infections. You may need any of the following:   •Antibiotics treat a bacterial infection.      •NSAIDs, such as ibuprofen, help decrease swelling, pain, and fever. NSAIDs can cause stomach bleeding or kidney problems in certain people. If you take blood thinner medicine, always ask your healthcare provider if NSAIDs are safe for you. Always read the medicine label and follow directions.      •Acetaminophen decreases pain and fever. It is available without a doctor's order. Ask how much to take and how often to take it. Follow directions. Acetaminophen can cause liver damage if not taken correctly.      •Take your medicine as directed. Contact your healthcare provider if you think your medicine is not helping or if you have side effects. Tell him or her if you are allergic to any medicine. Keep a list of the medicines, vitamins, and herbs you take. Include the amounts, and when and why you take them. Bring the list or the pill bottles to follow-up visits. Carry your medicine list with you in case of an emergency.      Manage your symptoms:   •Gargle salt water. Mix ¼ teaspoon salt in an 8 ounce glass of warm water and gargle. This may help decrease swelling in your throat.      •Drink liquids as directed. You may need to drink more liquids than usual. Liquids may help soothe your throat and prevent dehydration. Ask how much liquid to drink each day and which liquids are best for you.      •Use a cool-steam humidifier to help moisten the air in your room and calm your cough.      •Soothe your throat with cough drops, ice, soft foods, or popsicles.      Prevent the spread of pharyngitis: Cover your mouth and nose when you cough or sneeze. Do not share food or drinks. Wash your hands often. Use soap and water. If soap and water are unavailable, use an alcohol based hand .     Follow up with your healthcare provider as directed: Write down your questions so you remember to ask them during your visits.

## 2021-01-06 NOTE — ED ADULT NURSE NOTE - NSFALLRSKPASTHIST_ED_ALL_ED
,DirectAddress_Unknown ,DirectAddress_Unknown,[email or web address] ,DirectAddress_Unknown,[email or web address] no

## 2021-01-06 NOTE — ED PROVIDER NOTE - PATIENT PORTAL LINK FT
You can access the FollowMyHealth Patient Portal offered by [facility] by registering at the following website: [email or web address]. By joining [employer]’s FollowMyHealth portal, you will also be able to view your health information using other applications (apps) compatible with our system.

## 2021-01-06 NOTE — ED ADULT NURSE NOTE - OBJECTIVE STATEMENT
Patient had a sore throat last night.  Patient reports that it has resolved.  Color pink, skin warm and dry.  Patient denies cough at this time, no fever, so shortness of breath.  Pulse ox on room air is %.  Sent for COVID Swab.

## 2021-01-06 NOTE — ED PROVIDER NOTE - ENMT, MLM
Mouth with slightly dry mucous membranes. Oropharynx clear, No focal erythema or swelling/exudate posterior oropharynx. Voice normal, tolerating own secretions well.

## 2021-01-06 NOTE — ED ADULT NURSE REASSESSMENT NOTE - COMFORT CARE
Patient incontinent or urine, linens changed, skin care given.  Purafit placed on patient at this time and it is draining./meal provided/plan of care explained/po fluids offered/repositioned/side rails up/warm blanket provided

## 2021-01-06 NOTE — ED ADULT NURSE NOTE - SUICIDE SCREENING QUESTION 1
Testosterone shot given verbal consent given by Patient for injection(s) administered. Patient tolerated without incident. See MAR for documentation.    
No

## 2021-01-06 NOTE — ED PROVIDER NOTE - CLINICAL SUMMARY MEDICAL DECISION MAKING FREE TEXT BOX
82 y/o F with PMHx of dementia, syncope and falling, non ischemic cardiomyopathy s/p ICD placement, aortic stenosis, GERD, DM2, HTN, HLD, CHF, s/p cholecystectomy, and s/p appendectomy presents to the ED BIBEMS from assisted living c/o 1 day of sore throat, ?cough. No other apparent flu or COVID symptoms, no known COVID exposure. Pt sent for COVID testing. Bibasilar crackles noted in lungs. Pt in NAD, VSS. PLAN: Labs including COVID PCR test, Rapid Strep/through CNS. Labs including blood cultures. CXR, observe, reassess.

## 2021-01-06 NOTE — ED ADULT NURSE REASSESSMENT NOTE - NS ED NURSE REASSESS COMMENT FT1
Pt resting comfortably. NAD.
transport back to facility being set up. Pt in NAD.
VS stable as charted.  Patient with no complaints.  Patient was able to call and speak with her daughter at this time.  COVID results pending.  Color pink, skin warm and dry no additional requests at this time.

## 2021-01-06 NOTE — ED ADULT NURSE NOTE - CAS DISCH TRANSFER METHOD
[facility] Cardiology Consult                Date of  Admission: 11/26/2018  3:15 PM     Primary Care Physician: Dr. Veronica Hwang   Primary Cardiologist: DR. Cleopatra Wade   Referring Physician: Hospitalist   Consulting Physician: Dr. Cleopatra Wade     CC/Reason for consult:acute and chronic CHF      Etelvina Kahn is a 74 y.o. male with prior h/o chronic systolic HF, seizures, COPD, TAZ, CKD, TIA,  CAD/MI at age 26, and HTN. Last echo 11/2/18 showed EF 35%- 40%. Recent [facility] jan 2018 showed mild CAD  Patient presented to ED at [facility] with c/o increasing SOB, productive cough, leg edema and orthopnea x 3-4 days. In Ed, labs showed  then repeat today 1335, Cr 1.85 and EKG with A> fib (new onset). Cardiology was consulted for a/c CHF. No real diuresis recorded. Still remains with SOB at rest today.          Diagnosis    CKD (chronic kidney disease) stage 3, GFR 30-59 ml/min ([facility])    Acute on chronic systolic heart failure (HCC)    Coronary atherosclerosis of native coronary vessel    Arthritis    Hypertension    COPD, moderate ([facility])    High triglycerides    Gastroesophageal reflux disease without esophagitis    Mixed hyperlipidemia    Essential hypertension    CHF (congestive heart failure) ([facility])    TAZ (obstructive sleep apnea)    Atherosclerosis of native coronary artery of native heart with stable angina pectoris ([facility])    Personal history of tobacco use    Erysipelas    Preseptal cellulitis    Acute respiratory failure with hypoxemia ([facility])    Controlled type 2 diabetes mellitus without complication, without long-term current use of insulin (HCC)    Type 2 diabetes with nephropathy (HCC)    Chronic systolic heart failure (HCC)    Shortness of breath    Stage 2 chronic kidney disease    Chronic obstructive pulmonary disease (HCC)    Seizure (HCC)    Prolonged Q-T interval on ECG    Elevated troponin    Pulmonary hypertension (HCC)    Stroke (cerebrum) ([facility])    Pleural effusion, right    Transient cerebral ischemia    Seizure disorder (HCC)    Acute on chronic systolic (congestive) heart failure (HCC)    COPD exacerbation (HCC)       Past Medical History:   Diagnosis Date    Acute CHF (congestive heart failure) (HCC) 4/25/2015    Acute combined systolic and diastolic congestive heart failure ([address] 4/28/2015    Chronic obstructive pulmonary disease (HCC)     De Quervain's tenosynovitis, right 4/28/2015    Dyspnea on exertion 6/28/2015    Elevated serum creatinine 4/25/2015    Elevated troponin 6/28/2015    History of coronary artery disease     MI at 27 yo    History of right knee surgery     cartilage removal    History of shingles     Malignant hypertension 4/25/2015    MI (myocardial infarction) [facility])       Past Surgical History:   Procedure Laterality Date    HX HEART CATHETERIZATION  6/29/2015    no intervention    HX KNEE ARTHROSCOPY Right     removal of cartilage     Allergies   Allergen Reactions    Pcn [Penicillins] Other (comments)     \"makes my heart stop\"      Family History   Problem Relation Age of Onset    Hypertension Mother     No Known Problems Father         Current Facility-Administered Medications   Medication Dose Route Frequency    albuterol-ipratropium (DUO-NEB) 2.5 MG-0.5 MG/3 ML  3 mL Nebulization Q6H RT    allopurinol (ZYLOPRIM) tablet 100 mg  100 mg Oral DAILY    aspirin chewable tablet 81 mg  81 mg Oral DAILY    atorvastatin (LIPITOR) tablet 20 mg  20 mg Oral QHS    carvedilol (COREG) tablet 3.125 mg  3.125 mg Oral BID WITH MEALS    pantoprazole (PROTONIX) tablet 40 mg  40 mg Oral ACB    sodium chloride (NS) flush 5-10 mL  5-10 mL IntraVENous Q8H    sodium chloride (NS) flush 5-10 mL  5-10 mL IntraVENous PRN    acetaminophen (TYLENOL) tablet 650 mg  650 mg Oral Q4H PRN    ondansetron (ZOFRAN) injection 4 mg  4 mg IntraVENous Q4H PRN    heparin (porcine) injection 5,000 Units  5,000 Units SubCUTAneous Q8H    levETIRAcetam (KEPPRA) tablet 500 mg  500 mg Oral BID    furosemide (LASIX) injection 40 mg  40 mg IntraVENous DAILY    albuterol (PROVENTIL VENTOLIN) nebulizer solution 2.5 mg  2.5 mg Nebulization Q4H PRN    budesonide (PULMICORT) 500 mcg/2 ml nebulizer suspension  500 mcg Nebulization BID RT    azithromycin (ZITHROMAX) tablet 500 mg  500 mg Oral DAILY    guaiFENesin ER (MUCINEX) tablet 600 mg  600 mg Oral Q12H       Review of Systems   Constitution: Negative for diaphoresis, weakness and malaise/fatigue. HENT: Negative for congestion. Cardiovascular: Positive for dyspnea on exertion and leg swelling. Negative for chest pain, claudication, cyanosis, irregular heartbeat, near-syncope, orthopnea, palpitations, paroxysmal nocturnal dyspnea and syncope. Respiratory: Positive for cough and shortness of breath. Negative for wheezing. Endocrine: Negative for cold intolerance and heat intolerance. Hematologic/Lymphatic: Does not bruise/bleed easily. Skin: Negative for nail changes. Neurological: Negative for dizziness and headaches. Physical Exam  Vitals:    11/27/18 0152 11/27/18 0301 11/27/18 0719 11/27/18 0743   BP:  124/81  112/67   Pulse:  83  92   Resp:  20  18   Temp:  98 °F (36.7 °C)  98.1 °F (36.7 °C)   SpO2: 97% 99% 99% 100%   Weight:           Physical Exam:  General: Well Developed, Well Nourished, No Acute Distress  HEENT: pupils equal and round, no abnormalities noted  Neck: supple, no JVD, no carotid bruits  Heart: S1S2 irregular irregular with RRR   Lungs: Crackles in bases  Abd: soft, nontender, nondistended, with good bowel sounds  Ext: warm, 1+ edema, calves supple/nontender, pulses 2+ bilaterally  Skin: warm and dry  Neurologic: Alert and oriented X 3    Cardiographics    Telemetry: A. Fib with controlled rate  ECG: A. Fib with controlled rate  Echocardiogram: 11/2/18 showed -  Left ventricle: Systolic function was moderately reduced. Ejection fraction was estimated in the range of 35 % to 40 %.  There was moderate diffuse hypokinesis. There was severe concentric hypertrophy (speckled pattern;  consideration for inflitrative cardiomyopathy). There is grade III diastolic dysfunction. -  Right ventricle: Systolic pressure was mildly increased. Estimated peak pressure was in the range of 40-45 mmHg. -  Left atrium: The atrium was moderately dilated. -  Atrial septum: Agitated saline contrast injection (bubble study) was performed. There was no right-to-left shunt, at rest or induced by the Valsalva maneuver. -  Right atrium: The atrium was moderately dilated. -  Tricuspid valve: There was moderate to severe regurgitation. Labs:   Recent Labs     11/27/18  0657 11/26/18  1537    140   K 3.9 4.1   BUN 31* 26*   CREA 1.95* 1.85*   * 79   WBC 7.6 6.4   HGB 14.2 14.9   HCT 44.6 47.0    170        Assessment/Plan:     Assessment:      Principal Problem:    Acute on chronic systolic (congestive) heart failure (HCC) (11/26/2018)- no real diuresis; will increase lasix today and continue daily labs. Will continue coreg but continue to hold ACE with CKD and borderline B/Ps in past. Further recommendations pending clinical course. Active Problems:    Hypertension (9/29/2017)- will follow      COPD, moderate ([address] (9/29/2017)      Overview: Complete PFTs: 7/20/15:      Spirometry is consistent with a moderate obstructive/restrictive defect. .       The residual volume is increased relative to other lung volumes suggesting       air-trapping. The diffusion capacity corrected for alveolar volume was       normal suggesting no loss of alveolo-capillary units. Mixed hyperlipidemia (9/28/2016)- continue statin       Type 2 diabetes with nephropathy ([address] (10/8/2018)      Stage 2 chronic kidney disease (10/17/2018)      Seizure disorder ([address] (11/14/2018)      COPD exacerbation ([address] (11/26/2018)      Atrial fibrillation ([address] (11/27/2018)- new onset; likely triggered by acute illness.  Will start eliquis with high CHADSVASC2 score. NPO after midnight for possible PILO/DCCV in am.     Atrial Fibrillation CHADSVASC2 Score Stroke Risk:   74 y.o. > 70        +2    male Male     +0   CHF HX: Yes    +1   HTN HX: Yes    +1   Stroke/TIA/Thromboembolism Yes   +2   Vascular Disease HX: No    + 0   Diabetes Mellitus Yes    +1   CHADSVASC 2 Score 7      Annual Stroke Risk 11.2% - moderate- high         Prior TIA- on asa and statin. Thank you very much for this referral. We appreciate the opportunity to participate in this patient's care. We will follow along with above stated plan. Betty Guo NP  Consulting MD: Dr. Jerome Arguello     Patient was seen and examined. He has a non-ischemic CM with moderate systolic dysfunction. He presented with new AF and volume overload. He also has CKD with baseline creatinine of 1.8-2. He has an elevated stroke risk and needs anticoagulation along with IV lasix diuresis. He would benefit from PILO cardioversion and addition of antiarrhythmic therapy. Exam shows decreased BS, irregular S1S2, mild leg edema. Agree with above note and plan. Continue IV lasix, add po Eliquis. Monitor renal function. Plan PILO cardioversion in am - Dr. Karlos Spurling will see him tomorrow. Start po Amiodarone if QTC is acceptable. It was 545 ms yesterday. Recheck EKG today. Stop Zithromax (QT prolongation - discussed with hospitalist). NPO after MN.      Severa Paras, MD Ambulance

## 2021-01-06 NOTE — ED PROVIDER NOTE - OBJECTIVE STATEMENT
82 y/o F with PMHx of dementia, syncope and falling, non ischemic cardiomyopathy s/p ICD placement, aortic stenosis, GERD, DM2, HTN, HLD, CHF, s/p cholecystectomy, and s/p appendectomy presents to the ED BIBEMS from [facility] c/o sore throat since last evening, R/o COVID. Pt reports mild sore throat, gradual onset, persistent through this AM, though pt states no sore throat at present. AL reports +cough, which pt states is more a matter of clearing through than specific cough. Denies fevers chills, CP, SOB, cough, diffuse myalgias, diarrhea, abd pain, urinary complaints. No known COVID exposure.. No fever. Former smoker. Allergic to calcium channel blockers. PCP: Dr. Thai Alvarez. Complete history not obtainable 2/2 baseline dementia.

## 2021-01-06 NOTE — ED PROVIDER NOTE - PROGRESS NOTE DETAILS
Dr. Lema:  P.O. 96% at R/A.  Pt in good clinical comfort. Dr. Lema:  COVID test & rapid Strep negative.  CXR SHANDA, pt in good clinical comfort, no active c/o's, + stable for D/c back to [facility].

## 2021-01-06 NOTE — ED PROVIDER NOTE - PMH
Aortic stenosis    CHF (congestive heart failure)    Diabetes mellitus type II    GERD (gastroesophageal reflux disease)    HTN (hypertension)    Hypercholesteremia    ICD  implanted in 2008 for non ischemic cardiomyopathy

## 2021-01-08 LAB
CULTURE RESULTS: SIGNIFICANT CHANGE UP
SPECIMEN SOURCE: SIGNIFICANT CHANGE UP

## 2021-05-05 NOTE — H&P ADULT - PROBLEM/PLAN-1
Immunosuppresion Meds: Tacrolimus  Patient Notified: 05/05/21  Patient Notified Time: 1546  Tacrolimus Result Date: 05/05/21  Tacrolimus Test Result: 8.7  Current Dose: 1/1.5  Current Range Goal: 4-6  New Dose:  1/1  Comments: Plan to repeat in one month.       DISPLAY PLAN FREE TEXT

## 2021-06-08 NOTE — H&P ADULT - NS NEC GEN PE MLT EXAM PC
Protecting Yourself From the Sun    · Apply an over-the-counter broad spectrum water resistant sunscreen with an SPF of at least 30 to exposed areas of the skin. Dont forget the ears and lips! Remember to reapply sunscreen about every 2 hours and after swimming or sweating. · Wear sun protective clothing. Swim shirts (aka. rash guards) are a great idea and negates the need to reapply sunscreen in those areas. · Seek the shade whenever possible especially between the hours of 10 am and 4 pm when the suns rays are the strongest.     · Avoid tanning beds       Cryosurgery (Freezing) Wound Care Instructions    AFTER THE PROCEDURE:    You will notice swelling and redness around the site. This is normal.    You may experience a sharp or sore feeling for the next several days. For this discomfort, you may take acetaminophen (Tylenol©).  A blister may develop at the treated area, sometimes as soon as by the end of the day. After several days, the blister will subside and a scab will form.  If the area is bumped or traumatized during the first few days following freezing, you may develop bleeding into the blister, forming a blood blister. This is nothing to be alarmed about.  If the blister is tense, uncomfortable, or much larger than the site that was frozen, you may pop the blister along its edge with a sterile needle (boiled, heated under a flame, or cleaned with alcohol) to allow the fluid to drain out. If the blister does not bother you, no treatment is needed.  Do NOT peel off the top of the blister roof. It will act as a dressing on top of your wound. WOUND CARE:    You may shower or bathe as usual, but avoid scrubbing the areas that have been frozen.  Cleanse the site twice a day with mild soapy water, and then apply a thin film of white petrolatum (Vaseline©).  You do not need to cover the area, but can if you prefer.     Do NOT allow the site to become dry or crusted, or attempt to dry it out with rubbing alcohol or hydrogen peroxide.  Continue this regimen until the area is pink and healed. Depending on the size and location of your cryosurgery site, healing may take 2 to 4 weeks.  The area may continue to be pink for several weeks, and over the next few months may become darker or lighter than the surrounding skin. This may be a permanent change. detailed exam

## 2021-07-04 NOTE — ED PROVIDER NOTE - ENMT, MLM
Patient is weight-bearing as tolerated to the left heel in a surgical shoe, per podiatry. Airway patent, Nasal mucosa clear. Mouth with normal mucosa. Throat has no vesicles, no oropharyngeal exudates and uvula is midline.

## 2021-08-07 ENCOUNTER — INPATIENT (INPATIENT)
Facility: HOSPITAL | Age: 81
LOS: 3 days | Discharge: NURSING, REHAB OR LONG-TERM CARE | DRG: 92 | End: 2021-08-11
Attending: FAMILY MEDICINE | Admitting: STUDENT IN AN ORGANIZED HEALTH CARE EDUCATION/TRAINING PROGRAM
Payer: MEDICARE

## 2021-08-07 VITALS
TEMPERATURE: 98 F | HEART RATE: 92 BPM | WEIGHT: 119.93 LBS | SYSTOLIC BLOOD PRESSURE: 130 MMHG | HEIGHT: 59 IN | RESPIRATION RATE: 17 BRPM | DIASTOLIC BLOOD PRESSURE: 89 MMHG | OXYGEN SATURATION: 100 %

## 2021-08-07 DIAGNOSIS — Z90.89 ACQUIRED ABSENCE OF OTHER ORGANS: Chronic | ICD-10-CM

## 2021-08-07 DIAGNOSIS — Z90.5 ACQUIRED ABSENCE OF KIDNEY: Chronic | ICD-10-CM

## 2021-08-07 DIAGNOSIS — Z98.62 PERIPHERAL VASCULAR ANGIOPLASTY STATUS: Chronic | ICD-10-CM

## 2021-08-07 DIAGNOSIS — Z98.89 OTHER SPECIFIED POSTPROCEDURAL STATES: Chronic | ICD-10-CM

## 2021-08-07 DIAGNOSIS — Z98.890 OTHER SPECIFIED POSTPROCEDURAL STATES: Chronic | ICD-10-CM

## 2021-08-07 DIAGNOSIS — Z98.49 CATARACT EXTRACTION STATUS, UNSPECIFIED EYE: Chronic | ICD-10-CM

## 2021-08-07 DIAGNOSIS — S09.90XA UNSPECIFIED INJURY OF HEAD, INITIAL ENCOUNTER: ICD-10-CM

## 2021-08-07 LAB
ALBUMIN SERPL ELPH-MCNC: 3.6 G/DL — SIGNIFICANT CHANGE UP (ref 3.3–5)
ALP SERPL-CCNC: 81 U/L — SIGNIFICANT CHANGE UP (ref 40–120)
ALT FLD-CCNC: 26 U/L — SIGNIFICANT CHANGE UP (ref 12–78)
ANION GAP SERPL CALC-SCNC: 5 MMOL/L — SIGNIFICANT CHANGE UP (ref 5–17)
APPEARANCE UR: ABNORMAL
APTT BLD: 32.6 SEC — SIGNIFICANT CHANGE UP (ref 27.5–35.5)
AST SERPL-CCNC: 24 U/L — SIGNIFICANT CHANGE UP (ref 15–37)
BASOPHILS # BLD AUTO: 0.04 K/UL — SIGNIFICANT CHANGE UP (ref 0–0.2)
BASOPHILS NFR BLD AUTO: 0.5 % — SIGNIFICANT CHANGE UP (ref 0–2)
BILIRUB SERPL-MCNC: 0.5 MG/DL — SIGNIFICANT CHANGE UP (ref 0.2–1.2)
BILIRUB UR-MCNC: NEGATIVE — SIGNIFICANT CHANGE UP
BUN SERPL-MCNC: 29 MG/DL — HIGH (ref 7–23)
CALCIUM SERPL-MCNC: 9.3 MG/DL — SIGNIFICANT CHANGE UP (ref 8.5–10.1)
CHLORIDE SERPL-SCNC: 109 MMOL/L — HIGH (ref 96–108)
CO2 SERPL-SCNC: 25 MMOL/L — SIGNIFICANT CHANGE UP (ref 22–31)
COLOR SPEC: YELLOW — SIGNIFICANT CHANGE UP
CREAT SERPL-MCNC: 1.18 MG/DL — SIGNIFICANT CHANGE UP (ref 0.5–1.3)
DIFF PNL FLD: ABNORMAL
EOSINOPHIL # BLD AUTO: 0.04 K/UL — SIGNIFICANT CHANGE UP (ref 0–0.5)
EOSINOPHIL NFR BLD AUTO: 0.5 % — SIGNIFICANT CHANGE UP (ref 0–6)
GLUCOSE SERPL-MCNC: 101 MG/DL — HIGH (ref 70–99)
GLUCOSE UR QL: NEGATIVE MG/DL — SIGNIFICANT CHANGE UP
HCT VFR BLD CALC: 41.5 % — SIGNIFICANT CHANGE UP (ref 34.5–45)
HGB BLD-MCNC: 13.6 G/DL — SIGNIFICANT CHANGE UP (ref 11.5–15.5)
IMM GRANULOCYTES NFR BLD AUTO: 0.2 % — SIGNIFICANT CHANGE UP (ref 0–1.5)
INR BLD: 1.16 RATIO — SIGNIFICANT CHANGE UP (ref 0.88–1.16)
KETONES UR-MCNC: NEGATIVE — SIGNIFICANT CHANGE UP
LEUKOCYTE ESTERASE UR-ACNC: ABNORMAL
LYMPHOCYTES # BLD AUTO: 1.78 K/UL — SIGNIFICANT CHANGE UP (ref 1–3.3)
LYMPHOCYTES # BLD AUTO: 21.6 % — SIGNIFICANT CHANGE UP (ref 13–44)
MCHC RBC-ENTMCNC: 31 PG — SIGNIFICANT CHANGE UP (ref 27–34)
MCHC RBC-ENTMCNC: 32.8 GM/DL — SIGNIFICANT CHANGE UP (ref 32–36)
MCV RBC AUTO: 94.5 FL — SIGNIFICANT CHANGE UP (ref 80–100)
MONOCYTES # BLD AUTO: 0.94 K/UL — HIGH (ref 0–0.9)
MONOCYTES NFR BLD AUTO: 11.4 % — SIGNIFICANT CHANGE UP (ref 2–14)
NEUTROPHILS # BLD AUTO: 5.42 K/UL — SIGNIFICANT CHANGE UP (ref 1.8–7.4)
NEUTROPHILS NFR BLD AUTO: 65.8 % — SIGNIFICANT CHANGE UP (ref 43–77)
NITRITE UR-MCNC: NEGATIVE — SIGNIFICANT CHANGE UP
PH UR: 6.5 — SIGNIFICANT CHANGE UP (ref 5–8)
PLATELET # BLD AUTO: 222 K/UL — SIGNIFICANT CHANGE UP (ref 150–400)
POTASSIUM SERPL-MCNC: 4.3 MMOL/L — SIGNIFICANT CHANGE UP (ref 3.5–5.3)
POTASSIUM SERPL-SCNC: 4.3 MMOL/L — SIGNIFICANT CHANGE UP (ref 3.5–5.3)
PROT SERPL-MCNC: 7.4 GM/DL — SIGNIFICANT CHANGE UP (ref 6–8.3)
PROT UR-MCNC: 30 MG/DL
PROTHROM AB SERPL-ACNC: 13.4 SEC — SIGNIFICANT CHANGE UP (ref 10.6–13.6)
RAPID RVP RESULT: SIGNIFICANT CHANGE UP
RBC # BLD: 4.39 M/UL — SIGNIFICANT CHANGE UP (ref 3.8–5.2)
RBC # FLD: 13.2 % — SIGNIFICANT CHANGE UP (ref 10.3–14.5)
SARS-COV-2 RNA SPEC QL NAA+PROBE: SIGNIFICANT CHANGE UP
SODIUM SERPL-SCNC: 139 MMOL/L — SIGNIFICANT CHANGE UP (ref 135–145)
SP GR SPEC: 1.01 — SIGNIFICANT CHANGE UP (ref 1.01–1.02)
UROBILINOGEN FLD QL: NEGATIVE MG/DL — SIGNIFICANT CHANGE UP
WBC # BLD: 8.24 K/UL — SIGNIFICANT CHANGE UP (ref 3.8–10.5)
WBC # FLD AUTO: 8.24 K/UL — SIGNIFICANT CHANGE UP (ref 3.8–10.5)

## 2021-08-07 PROCEDURE — 87077 CULTURE AEROBIC IDENTIFY: CPT

## 2021-08-07 PROCEDURE — 85027 COMPLETE CBC AUTOMATED: CPT

## 2021-08-07 PROCEDURE — 73552 X-RAY EXAM OF FEMUR 2/>: CPT | Mod: 26,50

## 2021-08-07 PROCEDURE — 83036 HEMOGLOBIN GLYCOSYLATED A1C: CPT

## 2021-08-07 PROCEDURE — 93010 ELECTROCARDIOGRAM REPORT: CPT

## 2021-08-07 PROCEDURE — 99285 EMERGENCY DEPT VISIT HI MDM: CPT

## 2021-08-07 PROCEDURE — 72192 CT PELVIS W/O DYE: CPT | Mod: 26,MA

## 2021-08-07 PROCEDURE — 97116 GAIT TRAINING THERAPY: CPT | Mod: GP

## 2021-08-07 PROCEDURE — 73521 X-RAY EXAM HIPS BI 2 VIEWS: CPT | Mod: 26

## 2021-08-07 PROCEDURE — U0005: CPT

## 2021-08-07 PROCEDURE — 97162 PT EVAL MOD COMPLEX 30 MIN: CPT | Mod: GP

## 2021-08-07 PROCEDURE — 82306 VITAMIN D 25 HYDROXY: CPT

## 2021-08-07 PROCEDURE — 76376 3D RENDER W/INTRP POSTPROCES: CPT | Mod: 26

## 2021-08-07 PROCEDURE — 87086 URINE CULTURE/COLONY COUNT: CPT

## 2021-08-07 PROCEDURE — 84443 ASSAY THYROID STIM HORMONE: CPT

## 2021-08-07 PROCEDURE — 83880 ASSAY OF NATRIURETIC PEPTIDE: CPT

## 2021-08-07 PROCEDURE — 71045 X-RAY EXAM CHEST 1 VIEW: CPT | Mod: 26

## 2021-08-07 PROCEDURE — 82607 VITAMIN B-12: CPT

## 2021-08-07 PROCEDURE — 72125 CT NECK SPINE W/O DYE: CPT | Mod: 26,ME

## 2021-08-07 PROCEDURE — G1004: CPT

## 2021-08-07 PROCEDURE — 82550 ASSAY OF CK (CPK): CPT

## 2021-08-07 PROCEDURE — 86769 SARS-COV-2 COVID-19 ANTIBODY: CPT

## 2021-08-07 PROCEDURE — 84484 ASSAY OF TROPONIN QUANT: CPT

## 2021-08-07 PROCEDURE — 93306 TTE W/DOPPLER COMPLETE: CPT

## 2021-08-07 PROCEDURE — 82140 ASSAY OF AMMONIA: CPT

## 2021-08-07 PROCEDURE — 97530 THERAPEUTIC ACTIVITIES: CPT | Mod: GP

## 2021-08-07 PROCEDURE — 84100 ASSAY OF PHOSPHORUS: CPT

## 2021-08-07 PROCEDURE — 80053 COMPREHEN METABOLIC PANEL: CPT

## 2021-08-07 PROCEDURE — U0003: CPT

## 2021-08-07 PROCEDURE — 70450 CT HEAD/BRAIN W/O DYE: CPT | Mod: 26,ME

## 2021-08-07 PROCEDURE — 83735 ASSAY OF MAGNESIUM: CPT

## 2021-08-07 PROCEDURE — 80048 BASIC METABOLIC PNL TOTAL CA: CPT

## 2021-08-07 PROCEDURE — 85025 COMPLETE CBC W/AUTO DIFF WBC: CPT

## 2021-08-07 PROCEDURE — 87186 SC STD MICRODIL/AGAR DIL: CPT

## 2021-08-07 PROCEDURE — 36415 COLL VENOUS BLD VENIPUNCTURE: CPT

## 2021-08-07 PROCEDURE — 85379 FIBRIN DEGRADATION QUANT: CPT

## 2021-08-07 PROCEDURE — 81001 URINALYSIS AUTO W/SCOPE: CPT

## 2021-08-07 PROCEDURE — 72170 X-RAY EXAM OF PELVIS: CPT | Mod: 26

## 2021-08-07 RX ORDER — ACETAMINOPHEN 500 MG
650 TABLET ORAL ONCE
Refills: 0 | Status: COMPLETED | OUTPATIENT
Start: 2021-08-07 | End: 2021-08-07

## 2021-08-07 RX ORDER — ALPRAZOLAM 0.25 MG
0.5 TABLET ORAL ONCE
Refills: 0 | Status: DISCONTINUED | OUTPATIENT
Start: 2021-08-07 | End: 2021-08-07

## 2021-08-07 RX ADMIN — Medication 650 MILLIGRAM(S): at 23:17

## 2021-08-07 RX ADMIN — Medication 0.5 MILLIGRAM(S): at 23:17

## 2021-08-07 RX ADMIN — Medication 650 MILLIGRAM(S): at 16:45

## 2021-08-07 NOTE — ED PROVIDER NOTE - ENMT, MLM
Airway patent, Nasal mucosa clear. Mouth with fairly moist mucosa. Throat has no vesicles,  uvula is midline. OP clear.  no clinical  evidence  of facial injury.

## 2021-08-07 NOTE — ED PROVIDER NOTE - OBJECTIVE STATEMENT
81 y/o female with a PMHx of  CAD s/p stents placed , Afib on Eliquis, dementia, urge incontinence of urine, depressive disorder,  anxiety, HTN, RA, OP, right renal CA presents to the ED BIBA s/p fall. [facility]ides at [facility]. States she was reaching for something in  bathroom , lost balance and fell. +strike to right side of head. +neck pain initially sore but pt has hx of arthritis, has improved. No LOC  Non ambulatory after fall. Ambulatory with walker. Denies HA, bleeding, visual changes  No complaints of  pain.  FULL CODE. DISCHARGE 81 y/o female with a PMHx of  CAD s/p stents placed , Afib on Eliquis, dementia, urge incontinence of urine, depressive disorder,  anxiety, HTN, RA, OP, right renal CA presents to the ED BIBA s/p mechanical fall, hitting her head. Resides at [facility]. States she was reaching for something in  bathroom , lost balance and fell. +strike to right side of head. +neck pain initially sore but pt has hx of arthritis, has improved. No LOC  Non ambulatory after fall. Ambulatory with walker baseline. Denies HA, bleeding, visual changes  No complaints of  pain.  FULL CODE.

## 2021-08-07 NOTE — ED ADULT NURSE REASSESSMENT NOTE - NS ED NURSE REASSESS COMMENT FT1
Pt received at 1900. Pt AA&Ox3. EKG completed . Pt c/o neck pain 6/10. No other complains at this time.,

## 2021-08-07 NOTE — ED ADULT NURSE NOTE - OBJECTIVE STATEMENT
Patient brought in by ambulance for fall [facility]. Patient states she was on her way to the bathroom without assist and fell because her legs "gave out" Patient alert and oriented. Color pale. No visible injury noted.

## 2021-08-07 NOTE — ED PROVIDER NOTE - MUSCULOSKELETAL, MLM
Spine appears normal, range of motion is not limited, no muscle or joint tenderness,  no neck tenderness and supple, +left trapezius tenderness,. Back ,chest wall,  pelvis  non- tender and  stable. MYLES x4 no focal deformities or swelling .

## 2021-08-07 NOTE — ED PROVIDER NOTE - SKIN, MLM
Skin normal color for race, warm, dry and intact. No evidence of rash. small right parietal  scalp hematoma with skull no defect , overlying skin intact.

## 2021-08-07 NOTE — ED PROVIDER NOTE - PROGRESS NOTE DETAILS
JEN Lema MD:  Informed by ED RN that pt failed ambulation trial.  [facility] facility reports pt ambulatory with 1 person assistance, which is not the case presently.  XRs, CT & admission labs ordered, as pt failed ambulation trial unfit for return to [facility] facility. Larissa Whitehead for attending Dr. Adan: Received sign out from my colleague Dr. Lema, pt presents s/p fall from [facility], unable to ambulate, CT pelvis non actionable, accept into medicine.

## 2021-08-07 NOTE — ED PROVIDER NOTE - CONSTITUTIONAL, MLM
normal... Elderly white female in no respiratory distress, non-toxic appearing, awake, alert, oriented to person, place, time/situation.

## 2021-08-07 NOTE — ED PROVIDER NOTE - EYES, MLM
Clear bilaterally, pupils equal, round and reactive to light. EOMI, Negative raccoons' eyes. Negative puga signs.

## 2021-08-07 NOTE — ED PROVIDER NOTE - CLINICAL SUMMARY MEDICAL DECISION MAKING FREE TEXT BOX
79 y/o female with a PMHx of  CAD s/p stents placed , Afib on Eliquis, dementia, urge incontinence of urine, depressive disorder,  anxiety, HTN, RA, OP, right renal CA presents to the ED BIBA s/p mechanical fall, hitting her head.  Pt w/o c/o's upon ED arrival.  Plan: Neuro alert: CT head & c-spine, 81 y/o female with a PMHx of  CAD s/p stents placed , Afib on Eliquis, dementia, urge incontinence of urine, depressive disorder,  anxiety, HTN, RA, OP, right renal CA presents to the ED BIBA s/p mechanical fall, hitting her head.  Pt w/o c/o's upon ED arrival.  Plan: Neuro alert: CT head & c-spine, pelvis XR.  Ambulation trial if radioimaging negative.  Addendum:   Informed by ED RN that pt failed ambulation trial.  [facility] facility reports pt ambulatory with 1 person assistance, which is not the case presently.  XRs, CT & admission labs ordered, as pt failed ambulation trial unfit for return to [facility] facility.  Further efforts at ambulation trial, after meds, unsuccessful --> pt admitted to [facility] [place].

## 2021-08-07 NOTE — ED ADULT TRIAGE NOTE - CHIEF COMPLAINT QUOTE
Patient comes in s/p fall. Patient hit head on floor, + blood thinners. Patient with c/o head and neck pain. Neuro alert called at 0940 by MD Lema.

## 2021-08-07 NOTE — ED PROVIDER NOTE - CARE PLAN
Principal Discharge DX:	Head injury   Principal Discharge DX:	Head injury  Secondary Diagnosis:	Unable to ambulate

## 2021-08-08 LAB
24R-OH-CALCIDIOL SERPL-MCNC: 50.9 NG/ML — SIGNIFICANT CHANGE UP (ref 30–80)
AMMONIA BLD-MCNC: 26 UMOL/L — SIGNIFICANT CHANGE UP (ref 11–32)
ANION GAP SERPL CALC-SCNC: 5 MMOL/L — SIGNIFICANT CHANGE UP (ref 5–17)
BUN SERPL-MCNC: 31 MG/DL — HIGH (ref 7–23)
CALCIUM SERPL-MCNC: 8.8 MG/DL — SIGNIFICANT CHANGE UP (ref 8.5–10.1)
CHLORIDE SERPL-SCNC: 110 MMOL/L — HIGH (ref 96–108)
CK SERPL-CCNC: 82 U/L — SIGNIFICANT CHANGE UP (ref 26–192)
CO2 SERPL-SCNC: 24 MMOL/L — SIGNIFICANT CHANGE UP (ref 22–31)
COVID-19 SPIKE DOMAIN AB INTERP: POSITIVE
COVID-19 SPIKE DOMAIN ANTIBODY RESULT: 113 U/ML — HIGH
CREAT SERPL-MCNC: 1.17 MG/DL — SIGNIFICANT CHANGE UP (ref 0.5–1.3)
D DIMER BLD IA.RAPID-MCNC: <150 NG/ML DDU — SIGNIFICANT CHANGE UP
GLUCOSE SERPL-MCNC: 125 MG/DL — HIGH (ref 70–99)
NT-PROBNP SERPL-SCNC: 2338 PG/ML — HIGH (ref 0–450)
POTASSIUM SERPL-MCNC: 4.3 MMOL/L — SIGNIFICANT CHANGE UP (ref 3.5–5.3)
POTASSIUM SERPL-SCNC: 4.3 MMOL/L — SIGNIFICANT CHANGE UP (ref 3.5–5.3)
SARS-COV-2 IGG+IGM SERPL QL IA: 113 U/ML — HIGH
SARS-COV-2 IGG+IGM SERPL QL IA: POSITIVE
SODIUM SERPL-SCNC: 139 MMOL/L — SIGNIFICANT CHANGE UP (ref 135–145)
TROPONIN I SERPL-MCNC: <0.015 NG/ML — SIGNIFICANT CHANGE UP (ref 0.01–0.04)
TSH SERPL-MCNC: 1.12 UU/ML — SIGNIFICANT CHANGE UP (ref 0.34–4.82)
VIT B12 SERPL-MCNC: 1337 PG/ML — HIGH (ref 232–1245)

## 2021-08-08 PROCEDURE — 12345: CPT | Mod: NC

## 2021-08-08 PROCEDURE — 99222 1ST HOSP IP/OBS MODERATE 55: CPT

## 2021-08-08 RX ORDER — CEFTRIAXONE 500 MG/1
1000 INJECTION, POWDER, FOR SOLUTION INTRAMUSCULAR; INTRAVENOUS EVERY 24 HOURS
Refills: 0 | Status: DISCONTINUED | OUTPATIENT
Start: 2021-08-09 | End: 2021-08-09

## 2021-08-08 RX ORDER — ENOXAPARIN SODIUM 100 MG/ML
40 INJECTION SUBCUTANEOUS DAILY
Refills: 0 | Status: DISCONTINUED | OUTPATIENT
Start: 2021-08-08 | End: 2021-08-08

## 2021-08-08 RX ORDER — APIXABAN 2.5 MG/1
2.5 TABLET, FILM COATED ORAL EVERY 12 HOURS
Refills: 0 | Status: DISCONTINUED | OUTPATIENT
Start: 2021-08-08 | End: 2021-08-11

## 2021-08-08 RX ORDER — ASPIRIN/CALCIUM CARB/MAGNESIUM 324 MG
1 TABLET ORAL
Qty: 0 | Refills: 0 | DISCHARGE

## 2021-08-08 RX ORDER — FUROSEMIDE 40 MG
20 TABLET ORAL DAILY
Refills: 0 | Status: DISCONTINUED | OUTPATIENT
Start: 2021-08-08 | End: 2021-08-08

## 2021-08-08 RX ORDER — PANTOPRAZOLE SODIUM 20 MG/1
40 TABLET, DELAYED RELEASE ORAL
Refills: 0 | Status: DISCONTINUED | OUTPATIENT
Start: 2021-08-08 | End: 2021-08-11

## 2021-08-08 RX ORDER — CARVEDILOL PHOSPHATE 80 MG/1
3.12 CAPSULE, EXTENDED RELEASE ORAL DAILY
Refills: 0 | Status: DISCONTINUED | OUTPATIENT
Start: 2021-08-08 | End: 2021-08-11

## 2021-08-08 RX ORDER — CELECOXIB 200 MG/1
200 CAPSULE ORAL DAILY
Refills: 0 | Status: DISCONTINUED | OUTPATIENT
Start: 2021-08-08 | End: 2021-08-08

## 2021-08-08 RX ORDER — LIDOCAINE 4 G/100G
1 CREAM TOPICAL DAILY
Refills: 0 | Status: DISCONTINUED | OUTPATIENT
Start: 2021-08-08 | End: 2021-08-11

## 2021-08-08 RX ORDER — CEFTRIAXONE 500 MG/1
1000 INJECTION, POWDER, FOR SOLUTION INTRAMUSCULAR; INTRAVENOUS ONCE
Refills: 0 | Status: COMPLETED | OUTPATIENT
Start: 2021-08-08 | End: 2021-08-08

## 2021-08-08 RX ORDER — LANOLIN ALCOHOL/MO/W.PET/CERES
3 CREAM (GRAM) TOPICAL AT BEDTIME
Refills: 0 | Status: DISCONTINUED | OUTPATIENT
Start: 2021-08-08 | End: 2021-08-11

## 2021-08-08 RX ORDER — SERTRALINE 25 MG/1
100 TABLET, FILM COATED ORAL DAILY
Refills: 0 | Status: DISCONTINUED | OUTPATIENT
Start: 2021-08-08 | End: 2021-08-11

## 2021-08-08 RX ORDER — CEFUROXIME AXETIL 250 MG
1 TABLET ORAL
Qty: 0 | Refills: 0 | DISCHARGE

## 2021-08-08 RX ORDER — LIDOCAINE 4 G/100G
1 CREAM TOPICAL DAILY
Refills: 0 | Status: DISCONTINUED | OUTPATIENT
Start: 2021-08-08 | End: 2021-08-08

## 2021-08-08 RX ORDER — ACETAMINOPHEN 500 MG
650 TABLET ORAL EVERY 6 HOURS
Refills: 0 | Status: DISCONTINUED | OUTPATIENT
Start: 2021-08-08 | End: 2021-08-11

## 2021-08-08 RX ORDER — OMEGA-3 ACID ETHYL ESTERS 1 G
2 CAPSULE ORAL
Refills: 0 | Status: DISCONTINUED | OUTPATIENT
Start: 2021-08-08 | End: 2021-08-11

## 2021-08-08 RX ORDER — AMLODIPINE BESYLATE 2.5 MG/1
5 TABLET ORAL DAILY
Refills: 0 | Status: DISCONTINUED | OUTPATIENT
Start: 2021-08-08 | End: 2021-08-11

## 2021-08-08 RX ORDER — CHOLECALCIFEROL (VITAMIN D3) 125 MCG
2000 CAPSULE ORAL DAILY
Refills: 0 | Status: DISCONTINUED | OUTPATIENT
Start: 2021-08-08 | End: 2021-08-11

## 2021-08-08 RX ORDER — CYCLOBENZAPRINE HYDROCHLORIDE 10 MG/1
5 TABLET, FILM COATED ORAL THREE TIMES A DAY
Refills: 0 | Status: DISCONTINUED | OUTPATIENT
Start: 2021-08-08 | End: 2021-08-11

## 2021-08-08 RX ORDER — SODIUM CHLORIDE 9 MG/ML
1000 INJECTION INTRAMUSCULAR; INTRAVENOUS; SUBCUTANEOUS
Refills: 0 | Status: DISCONTINUED | OUTPATIENT
Start: 2021-08-08 | End: 2021-08-10

## 2021-08-08 RX ORDER — CEFTRIAXONE 500 MG/1
INJECTION, POWDER, FOR SOLUTION INTRAMUSCULAR; INTRAVENOUS
Refills: 0 | Status: DISCONTINUED | OUTPATIENT
Start: 2021-08-09 | End: 2021-08-09

## 2021-08-08 RX ORDER — CALCIUM CARBONATE 500(1250)
1 TABLET ORAL
Refills: 0 | Status: DISCONTINUED | OUTPATIENT
Start: 2021-08-08 | End: 2021-08-11

## 2021-08-08 RX ORDER — CARVEDILOL PHOSPHATE 80 MG/1
6.25 CAPSULE, EXTENDED RELEASE ORAL AT BEDTIME
Refills: 0 | Status: DISCONTINUED | OUTPATIENT
Start: 2021-08-08 | End: 2021-08-11

## 2021-08-08 RX ORDER — ONDANSETRON 8 MG/1
4 TABLET, FILM COATED ORAL EVERY 8 HOURS
Refills: 0 | Status: DISCONTINUED | OUTPATIENT
Start: 2021-08-08 | End: 2021-08-11

## 2021-08-08 RX ADMIN — Medication 1 TABLET(S): at 10:00

## 2021-08-08 RX ADMIN — Medication 1 TABLET(S): at 22:02

## 2021-08-08 RX ADMIN — CELECOXIB 200 MILLIGRAM(S): 200 CAPSULE ORAL at 11:00

## 2021-08-08 RX ADMIN — Medication 2 GRAM(S): at 22:02

## 2021-08-08 RX ADMIN — CARVEDILOL PHOSPHATE 3.12 MILLIGRAM(S): 80 CAPSULE, EXTENDED RELEASE ORAL at 10:00

## 2021-08-08 RX ADMIN — SERTRALINE 100 MILLIGRAM(S): 25 TABLET, FILM COATED ORAL at 09:58

## 2021-08-08 RX ADMIN — Medication 20 MILLIGRAM(S): at 09:58

## 2021-08-08 RX ADMIN — Medication 2000 UNIT(S): at 09:59

## 2021-08-08 RX ADMIN — APIXABAN 2.5 MILLIGRAM(S): 2.5 TABLET, FILM COATED ORAL at 22:02

## 2021-08-08 RX ADMIN — PANTOPRAZOLE SODIUM 40 MILLIGRAM(S): 20 TABLET, DELAYED RELEASE ORAL at 06:08

## 2021-08-08 RX ADMIN — Medication 650 MILLIGRAM(S): at 10:05

## 2021-08-08 RX ADMIN — APIXABAN 2.5 MILLIGRAM(S): 2.5 TABLET, FILM COATED ORAL at 09:59

## 2021-08-08 RX ADMIN — CYCLOBENZAPRINE HYDROCHLORIDE 5 MILLIGRAM(S): 10 TABLET, FILM COATED ORAL at 06:08

## 2021-08-08 RX ADMIN — CELECOXIB 200 MILLIGRAM(S): 200 CAPSULE ORAL at 09:59

## 2021-08-08 RX ADMIN — CARVEDILOL PHOSPHATE 6.25 MILLIGRAM(S): 80 CAPSULE, EXTENDED RELEASE ORAL at 22:02

## 2021-08-08 RX ADMIN — Medication 1 TABLET(S): at 09:58

## 2021-08-08 RX ADMIN — Medication 2 GRAM(S): at 09:59

## 2021-08-08 RX ADMIN — SODIUM CHLORIDE 75 MILLILITER(S): 9 INJECTION INTRAMUSCULAR; INTRAVENOUS; SUBCUTANEOUS at 14:48

## 2021-08-08 RX ADMIN — AMLODIPINE BESYLATE 5 MILLIGRAM(S): 2.5 TABLET ORAL at 09:59

## 2021-08-08 RX ADMIN — Medication 650 MILLIGRAM(S): at 11:00

## 2021-08-08 NOTE — PHYSICAL THERAPY INITIAL EVALUATION ADULT - DIAGNOSIS, PT EVAL
s/p mechanical fall with inability to ambulate, Cervical spine CT: Stable exam. No acute cervical spinal fracture or evidence of traumatic malalignment. Cervical spondylosis most notable C5/C6,  Head CT: Stable intracranial findings. No acute intracranial hemorrhage, extra-axial collection, vasogenic edema or calvarial fracture. Moderate chronic microvascular changes and ventriculomegaly superimposed on cerebral volume loss.

## 2021-08-08 NOTE — PHYSICAL THERAPY INITIAL EVALUATION ADULT - RANGE OF MOTION EXAMINATION, REHAB EVAL
except b/l DF to neutral/bilateral upper extremity ROM was WFL (within functional limits)/bilateral lower extremity ROM was WFL (within functional limits)

## 2021-08-08 NOTE — H&P ADULT - NSHPPHYSICALEXAM_GEN_ALL_CORE
ICU Vital Signs Last 24 Hrs  T(C): 36.6 (07 Aug 2021 23:18), Max: 36.8 (07 Aug 2021 19:57)  T(F): 97.8 (07 Aug 2021 23:18), Max: 98.3 (07 Aug 2021 19:57)  HR: 90 (07 Aug 2021 23:18) (90 - 93)  BP: 142/87 (07 Aug 2021 23:18) (105/64 - 142/87)  BP(mean): 96 (07 Aug 2021 22:40) (96 - 96)  ABP: --  ABP(mean): --  RR: 15 (07 Aug 2021 23:18) (14 - 17)  SpO2: 96% (07 Aug 2021 23:18) (96% - 100%)    General: Awake and alert, cooperative with exam. No acute distress.   Skin: Warm, dry, and pink.   Eyes: Pupils equal and reactive to light. Extraocular eye movements intact. No conjunctival injection, discharge, or scleral icterus.   HEENT: Atraumatic, normocephalic. Moist mucus membranes.   Cardiology: Normal S1, S2. No murmurs, rubs, or gallops. Regular rate and rhythm.   Respiratory: Lungs clear to ascultation bilaterally. Good air exchange. No wheezes, rales, or rhonchi. Normal chest expansion.   Gastrointestinal: Positive bowel sounds. Soft, non-tender, non-distended. No guarding, rigidity, or rebound tenderness. No hepatosplenomegaly.   Musculoskeletal: No tenderness on palapation of the lumbar spine or paraspinal region. Negative straight leg raise test bilaterally. 5/5 motor strength in all extremities. Normal range of motion.   Extremities: No peripheral edema bilaterally. Dorsalis pedis pulses 2+ bilaterally.   Neurological: A+Ox3 (person, place, and time). Cranial nerves 2-12 intact. Normal speech. No facial droop. No focal neurological deficits. 5/5 motor strength in all extremities.  Psychiatric: Normal affect. Normal mood.

## 2021-08-08 NOTE — H&P ADULT - NSHPSOCIALHISTORY_GEN_ALL_CORE
Lives at [facility] assisted living. Completes all ADLs and IADLs on his ow. Smoked 1 ppd for 5 years and quit 40 years ago. Drinks a glass of wine occasionally. Denies drug use.

## 2021-08-08 NOTE — H&P ADULT - NSHPREVIEWOFSYSTEMS_GEN_ALL_CORE
Constitutional: negative for fatigue, negative for fever, negative for chills, negative for decreased appetite.  Skin: negative for rashes, negative for open wounds.   Eyes: negative for blurry vision, negative for double vision.   Ears, nose, throat: negative for ear pain, negative for nasal congestion, negative for sore throat  Cardiovascular: negative for chest pain, negative for palpitations, negative for lower extremity swelling.   Respiratory: negative for shortness of breath, negative for wheezing, negative for cough.   Gastrointestinal: negative for abdominal pain, positive for nausea, negative for vomiting, negative for diarrhea, negative for constipation  Hematologic: negative for easy bruising or bleeding.   Musculoskeletal: positive for low back pain, positive for muscle/joint pain, negative for decreased range of motion.   Neurological: negative for loss of consciousness, negative for motor weakness, negative for sensory deficits.   Psychiatric: negative for depression, negative for anxiety.

## 2021-08-08 NOTE — H&P ADULT - ASSESSMENT
79 y/o F presents for mechanical fall with inability to ambulate in the ER    1. Mechanical fall with difficulty ambulating   - Will prescribe tylenol PRN   - Meloxicam daily   - Flexeril PRN for neck pain   - PT evaluation  - Social work consult - will likely need rehab placement     2. Cervical spondylosis at C5/C6   - If worsening/persistence of neck pain can consider steroid taper     3. Ventriculomegaly superimposed on cerebral volume loss  - Monitor for urinary incontinence, unsteadiness/balance issues, AMS - NPH signs and symptoms   - If continued symptoms can consider LP to drain CSF and see if there is improvement    4. History of anxiety, A fib, CAD s/p stent, depression, HTN, Osteoporosis, RA, right renal cancer, Takotsubo syndrome, urge incontinence   - Continue home medications     DVT ppx: Eliquis   Code status: Full code (pt agrees to chest compressions and intubation if needed)

## 2021-08-08 NOTE — H&P ADULT - NSHPLABSRESULTS_GEN_ALL_CORE
EKG: NSR with HR 94 bpm, normal intervals, slightly prolonged , no ST segment changes (personally reviewed).     Imaging:   - Head CT: Stable intracranial findings. No acute intracranial hemorrhage, extra-axial collection, vasogenic edema or calvarial fracture. Moderate chronic microvascular changes and ventriculomegaly superimposed on cerebral volume loss.  - Cervical spine CT: Stable exam. No acute cervical spinal fracture or evidence of traumatic malalignment. Cervical spondylosis most notable C5/C6.  - XR pelvis: Unremarkable radiographs of the pelvis.  - XR femur: No sign of acute fracture, osseous destruction or soft tissue abnormality.  - CT wholebody: No acute fracture or traumatic malalignment.  - CT pelvis: No acute fracture or traumatic malalignment.  - CXR: cardiomegaly, no pleural effusion, no consolidation, no pneumothorax, increased vascular congestion (personally reviewed).   - XR hips and pelvis: official read pending

## 2021-08-08 NOTE — H&P ADULT - HISTORY OF PRESENT ILLNESS
79 y/o F presents for a mechanical fall. Patient reports that she was reaching for something in the bathroom and lost her balance. She fell down onto the tile floor and hit the right side of her head. Was also complaining of neck pain. Reports having a headache located at the back of her head and at the temples, described as pressure, pain 8/1, given Tylenol with some relief. No lacerations or bruises that she noticed. She was having difficulty walking and has back pain but this is not different than the chronic back pain she has. Reports having nausea. No lower extremity numbness/tingling or shooting pain down the lower extremities bilaterally. In the ER, she failed her ambulation trial.     ER course: VSS. Labs: WNL. EKG: NSR with HR 94 bpm, normal intervals, slightly prolonged , no ST segment changes (personally reviewed).     Imaging:   - Head CT: Stable intracranial findings. No acute intracranial hemorrhage, extra-axial collection, vasogenic edema or calvarial fracture. Moderate chronic microvascular changes and ventriculomegaly superimposed on cerebral volume loss.  - Cervical spine CT: Stable exam. No acute cervical spinal fracture or evidence of traumatic malalignment. Cervical spondylosis most notable C5/C6.  - XR pelvis: Unremarkable radiographs of the pelvis.  - XR femur: No sign of acute fracture, osseous destruction or soft tissue abnormality.  - CT wholebody: No acute fracture or traumatic malalignment.  - CT pelvis: No acute fracture or traumatic malalignment.  - CXR: cardiomegaly, no pleural effusion, no consolidation, no pneumothorax, increased vascular congestion (personally reviewed).   - XR hips and pelvis: official read pending     Pt was given tylenol and is being admitted to med/surg for further management.

## 2021-08-08 NOTE — PHYSICAL THERAPY INITIAL EVALUATION ADULT - PERTINENT HX OF CURRENT PROBLEM, REHAB EVAL
80F presents for a mechanical fall. Pt reports that she was reaching for something in the bathroom and lost her balance. She fell down onto the tile floor and hit the right side of her head. Was also complaining of neck pain. Reports having a headache located at the back of her head and at the temples, described as pressure, given Tylenol with some relief. She was having difficulty walking and has back pain but this is not different than the chronic back pain she has. R

## 2021-08-09 LAB
A1C WITH ESTIMATED AVERAGE GLUCOSE RESULT: 5.3 % — SIGNIFICANT CHANGE UP (ref 4–5.6)
ALBUMIN SERPL ELPH-MCNC: 3 G/DL — LOW (ref 3.3–5)
ALP SERPL-CCNC: 72 U/L — SIGNIFICANT CHANGE UP (ref 40–120)
ALT FLD-CCNC: 21 U/L — SIGNIFICANT CHANGE UP (ref 12–78)
ANION GAP SERPL CALC-SCNC: 3 MMOL/L — LOW (ref 5–17)
APPEARANCE UR: ABNORMAL
AST SERPL-CCNC: 20 U/L — SIGNIFICANT CHANGE UP (ref 15–37)
BASOPHILS # BLD AUTO: 0.04 K/UL — SIGNIFICANT CHANGE UP (ref 0–0.2)
BASOPHILS NFR BLD AUTO: 0.5 % — SIGNIFICANT CHANGE UP (ref 0–2)
BILIRUB SERPL-MCNC: 0.4 MG/DL — SIGNIFICANT CHANGE UP (ref 0.2–1.2)
BILIRUB UR-MCNC: NEGATIVE — SIGNIFICANT CHANGE UP
BUN SERPL-MCNC: 26 MG/DL — HIGH (ref 7–23)
CALCIUM SERPL-MCNC: 9.1 MG/DL — SIGNIFICANT CHANGE UP (ref 8.5–10.1)
CHLORIDE SERPL-SCNC: 110 MMOL/L — HIGH (ref 96–108)
CO2 SERPL-SCNC: 27 MMOL/L — SIGNIFICANT CHANGE UP (ref 22–31)
COLOR SPEC: YELLOW — SIGNIFICANT CHANGE UP
CREAT SERPL-MCNC: 1.15 MG/DL — SIGNIFICANT CHANGE UP (ref 0.5–1.3)
DIFF PNL FLD: ABNORMAL
EOSINOPHIL # BLD AUTO: 0.18 K/UL — SIGNIFICANT CHANGE UP (ref 0–0.5)
EOSINOPHIL NFR BLD AUTO: 2.2 % — SIGNIFICANT CHANGE UP (ref 0–6)
ESTIMATED AVERAGE GLUCOSE: 105 MG/DL — SIGNIFICANT CHANGE UP (ref 68–114)
GLUCOSE SERPL-MCNC: 93 MG/DL — SIGNIFICANT CHANGE UP (ref 70–99)
GLUCOSE UR QL: NEGATIVE MG/DL — SIGNIFICANT CHANGE UP
HCT VFR BLD CALC: 41 % — SIGNIFICANT CHANGE UP (ref 34.5–45)
HGB BLD-MCNC: 12.9 G/DL — SIGNIFICANT CHANGE UP (ref 11.5–15.5)
IMM GRANULOCYTES NFR BLD AUTO: 0.4 % — SIGNIFICANT CHANGE UP (ref 0–1.5)
KETONES UR-MCNC: NEGATIVE — SIGNIFICANT CHANGE UP
LEUKOCYTE ESTERASE UR-ACNC: ABNORMAL
LYMPHOCYTES # BLD AUTO: 1.43 K/UL — SIGNIFICANT CHANGE UP (ref 1–3.3)
LYMPHOCYTES # BLD AUTO: 17.7 % — SIGNIFICANT CHANGE UP (ref 13–44)
MAGNESIUM SERPL-MCNC: 2.3 MG/DL — SIGNIFICANT CHANGE UP (ref 1.6–2.6)
MCHC RBC-ENTMCNC: 30.3 PG — SIGNIFICANT CHANGE UP (ref 27–34)
MCHC RBC-ENTMCNC: 31.5 GM/DL — LOW (ref 32–36)
MCV RBC AUTO: 96.2 FL — SIGNIFICANT CHANGE UP (ref 80–100)
MONOCYTES # BLD AUTO: 0.76 K/UL — SIGNIFICANT CHANGE UP (ref 0–0.9)
MONOCYTES NFR BLD AUTO: 9.4 % — SIGNIFICANT CHANGE UP (ref 2–14)
NEUTROPHILS # BLD AUTO: 5.65 K/UL — SIGNIFICANT CHANGE UP (ref 1.8–7.4)
NEUTROPHILS NFR BLD AUTO: 69.8 % — SIGNIFICANT CHANGE UP (ref 43–77)
NITRITE UR-MCNC: NEGATIVE — SIGNIFICANT CHANGE UP
PH UR: 5 — SIGNIFICANT CHANGE UP (ref 5–8)
PHOSPHATE SERPL-MCNC: 2.9 MG/DL — SIGNIFICANT CHANGE UP (ref 2.5–4.5)
PLATELET # BLD AUTO: 208 K/UL — SIGNIFICANT CHANGE UP (ref 150–400)
POTASSIUM SERPL-MCNC: 4.5 MMOL/L — SIGNIFICANT CHANGE UP (ref 3.5–5.3)
POTASSIUM SERPL-SCNC: 4.5 MMOL/L — SIGNIFICANT CHANGE UP (ref 3.5–5.3)
PROT SERPL-MCNC: 6.8 GM/DL — SIGNIFICANT CHANGE UP (ref 6–8.3)
PROT UR-MCNC: 30 MG/DL
RBC # BLD: 4.26 M/UL — SIGNIFICANT CHANGE UP (ref 3.8–5.2)
RBC # FLD: 13.3 % — SIGNIFICANT CHANGE UP (ref 10.3–14.5)
SODIUM SERPL-SCNC: 140 MMOL/L — SIGNIFICANT CHANGE UP (ref 135–145)
SP GR SPEC: 1.01 — SIGNIFICANT CHANGE UP (ref 1.01–1.02)
UROBILINOGEN FLD QL: NEGATIVE MG/DL — SIGNIFICANT CHANGE UP
WBC # BLD: 8.09 K/UL — SIGNIFICANT CHANGE UP (ref 3.8–10.5)
WBC # FLD AUTO: 8.09 K/UL — SIGNIFICANT CHANGE UP (ref 3.8–10.5)

## 2021-08-09 PROCEDURE — 93306 TTE W/DOPPLER COMPLETE: CPT | Mod: 26

## 2021-08-09 PROCEDURE — 99232 SBSQ HOSP IP/OBS MODERATE 35: CPT

## 2021-08-09 RX ORDER — OMEGA-3 ACID ETHYL ESTERS 1 G
1 CAPSULE ORAL
Qty: 0 | Refills: 0 | DISCHARGE

## 2021-08-09 RX ORDER — CEFTRIAXONE 500 MG/1
INJECTION, POWDER, FOR SOLUTION INTRAMUSCULAR; INTRAVENOUS
Refills: 0 | Status: DISCONTINUED | OUTPATIENT
Start: 2021-08-09 | End: 2021-08-11

## 2021-08-09 RX ORDER — CEFTRIAXONE 500 MG/1
1000 INJECTION, POWDER, FOR SOLUTION INTRAMUSCULAR; INTRAVENOUS ONCE
Refills: 0 | Status: COMPLETED | OUTPATIENT
Start: 2021-08-09 | End: 2021-08-09

## 2021-08-09 RX ORDER — ACETAMINOPHEN 500 MG
1 TABLET ORAL
Qty: 0 | Refills: 0 | DISCHARGE

## 2021-08-09 RX ORDER — CEFTRIAXONE 500 MG/1
1000 INJECTION, POWDER, FOR SOLUTION INTRAMUSCULAR; INTRAVENOUS EVERY 24 HOURS
Refills: 0 | Status: DISCONTINUED | OUTPATIENT
Start: 2021-08-10 | End: 2021-08-11

## 2021-08-09 RX ADMIN — LIDOCAINE 1 PATCH: 4 CREAM TOPICAL at 11:46

## 2021-08-09 RX ADMIN — APIXABAN 2.5 MILLIGRAM(S): 2.5 TABLET, FILM COATED ORAL at 21:09

## 2021-08-09 RX ADMIN — AMLODIPINE BESYLATE 5 MILLIGRAM(S): 2.5 TABLET ORAL at 11:45

## 2021-08-09 RX ADMIN — CARVEDILOL PHOSPHATE 3.12 MILLIGRAM(S): 80 CAPSULE, EXTENDED RELEASE ORAL at 11:46

## 2021-08-09 RX ADMIN — Medication 1 TABLET(S): at 11:46

## 2021-08-09 RX ADMIN — CEFTRIAXONE 1000 MILLIGRAM(S): 500 INJECTION, POWDER, FOR SOLUTION INTRAMUSCULAR; INTRAVENOUS at 06:10

## 2021-08-09 RX ADMIN — Medication 2 GRAM(S): at 21:09

## 2021-08-09 RX ADMIN — PANTOPRAZOLE SODIUM 40 MILLIGRAM(S): 20 TABLET, DELAYED RELEASE ORAL at 05:07

## 2021-08-09 RX ADMIN — Medication 1 TABLET(S): at 21:09

## 2021-08-09 RX ADMIN — Medication 2000 UNIT(S): at 11:45

## 2021-08-09 RX ADMIN — Medication 1 TABLET(S): at 11:45

## 2021-08-09 RX ADMIN — SODIUM CHLORIDE 75 MILLILITER(S): 9 INJECTION INTRAMUSCULAR; INTRAVENOUS; SUBCUTANEOUS at 17:15

## 2021-08-09 RX ADMIN — CARVEDILOL PHOSPHATE 6.25 MILLIGRAM(S): 80 CAPSULE, EXTENDED RELEASE ORAL at 21:09

## 2021-08-09 RX ADMIN — SODIUM CHLORIDE 75 MILLILITER(S): 9 INJECTION INTRAMUSCULAR; INTRAVENOUS; SUBCUTANEOUS at 05:06

## 2021-08-09 RX ADMIN — LIDOCAINE 1 PATCH: 4 CREAM TOPICAL at 18:02

## 2021-08-09 RX ADMIN — APIXABAN 2.5 MILLIGRAM(S): 2.5 TABLET, FILM COATED ORAL at 11:45

## 2021-08-09 RX ADMIN — Medication 2 GRAM(S): at 11:46

## 2021-08-09 RX ADMIN — SERTRALINE 100 MILLIGRAM(S): 25 TABLET, FILM COATED ORAL at 11:45

## 2021-08-10 LAB
ANION GAP SERPL CALC-SCNC: 5 MMOL/L — SIGNIFICANT CHANGE UP (ref 5–17)
BUN SERPL-MCNC: 22 MG/DL — SIGNIFICANT CHANGE UP (ref 7–23)
CALCIUM SERPL-MCNC: 9.4 MG/DL — SIGNIFICANT CHANGE UP (ref 8.5–10.1)
CHLORIDE SERPL-SCNC: 107 MMOL/L — SIGNIFICANT CHANGE UP (ref 96–108)
CO2 SERPL-SCNC: 28 MMOL/L — SIGNIFICANT CHANGE UP (ref 22–31)
CREAT SERPL-MCNC: 0.99 MG/DL — SIGNIFICANT CHANGE UP (ref 0.5–1.3)
GLUCOSE SERPL-MCNC: 92 MG/DL — SIGNIFICANT CHANGE UP (ref 70–99)
POTASSIUM SERPL-MCNC: 4.1 MMOL/L — SIGNIFICANT CHANGE UP (ref 3.5–5.3)
POTASSIUM SERPL-SCNC: 4.1 MMOL/L — SIGNIFICANT CHANGE UP (ref 3.5–5.3)
SODIUM SERPL-SCNC: 140 MMOL/L — SIGNIFICANT CHANGE UP (ref 135–145)

## 2021-08-10 PROCEDURE — 99232 SBSQ HOSP IP/OBS MODERATE 35: CPT

## 2021-08-10 RX ORDER — OXYCODONE AND ACETAMINOPHEN 5; 325 MG/1; MG/1
1 TABLET ORAL EVERY 6 HOURS
Refills: 0 | Status: DISCONTINUED | OUTPATIENT
Start: 2021-08-10 | End: 2021-08-10

## 2021-08-10 RX ADMIN — OXYCODONE AND ACETAMINOPHEN 1 TABLET(S): 5; 325 TABLET ORAL at 22:27

## 2021-08-10 RX ADMIN — Medication 2 GRAM(S): at 21:23

## 2021-08-10 RX ADMIN — AMLODIPINE BESYLATE 5 MILLIGRAM(S): 2.5 TABLET ORAL at 09:33

## 2021-08-10 RX ADMIN — APIXABAN 2.5 MILLIGRAM(S): 2.5 TABLET, FILM COATED ORAL at 21:24

## 2021-08-10 RX ADMIN — Medication 1 TABLET(S): at 21:24

## 2021-08-10 RX ADMIN — CEFTRIAXONE 1000 MILLIGRAM(S): 500 INJECTION, POWDER, FOR SOLUTION INTRAMUSCULAR; INTRAVENOUS at 05:25

## 2021-08-10 RX ADMIN — SODIUM CHLORIDE 75 MILLILITER(S): 9 INJECTION INTRAMUSCULAR; INTRAVENOUS; SUBCUTANEOUS at 05:25

## 2021-08-10 RX ADMIN — LIDOCAINE 1 PATCH: 4 CREAM TOPICAL at 09:33

## 2021-08-10 RX ADMIN — Medication 1 TABLET(S): at 09:34

## 2021-08-10 RX ADMIN — OXYCODONE AND ACETAMINOPHEN 1 TABLET(S): 5; 325 TABLET ORAL at 21:27

## 2021-08-10 RX ADMIN — SERTRALINE 100 MILLIGRAM(S): 25 TABLET, FILM COATED ORAL at 09:33

## 2021-08-10 RX ADMIN — LIDOCAINE 1 PATCH: 4 CREAM TOPICAL at 01:34

## 2021-08-10 RX ADMIN — CARVEDILOL PHOSPHATE 6.25 MILLIGRAM(S): 80 CAPSULE, EXTENDED RELEASE ORAL at 21:24

## 2021-08-10 RX ADMIN — LIDOCAINE 1 PATCH: 4 CREAM TOPICAL at 21:24

## 2021-08-10 RX ADMIN — OXYCODONE AND ACETAMINOPHEN 1 TABLET(S): 5; 325 TABLET ORAL at 02:35

## 2021-08-10 RX ADMIN — Medication 2000 UNIT(S): at 09:34

## 2021-08-10 RX ADMIN — Medication 3 MILLIGRAM(S): at 21:24

## 2021-08-10 RX ADMIN — Medication 2 GRAM(S): at 09:33

## 2021-08-10 RX ADMIN — APIXABAN 2.5 MILLIGRAM(S): 2.5 TABLET, FILM COATED ORAL at 09:34

## 2021-08-10 RX ADMIN — PANTOPRAZOLE SODIUM 40 MILLIGRAM(S): 20 TABLET, DELAYED RELEASE ORAL at 05:29

## 2021-08-10 RX ADMIN — CARVEDILOL PHOSPHATE 3.12 MILLIGRAM(S): 80 CAPSULE, EXTENDED RELEASE ORAL at 09:33

## 2021-08-10 RX ADMIN — OXYCODONE AND ACETAMINOPHEN 1 TABLET(S): 5; 325 TABLET ORAL at 01:35

## 2021-08-10 NOTE — PROGRESS NOTE ADULT - ASSESSMENT
81 y/o female with PMH of Moderate MR, AR,  CAD s/p PCI, paroxysmal A fib on Eliquis, h/o stress induced cardiomyopathy , h/o RCC s/p surgery, HTN, RA , depression , anxiety,   presents for a mechanical fall.     Mechanical fall , multifactorial - RA, leg weakness, gait disorder , multiple centrally acting medications   ( SSRI, Flexeril)     -  check orthostatic vitals, CT head - noted, check vitamin D, B12 , folate level, TSH , d-dimers - normal , troponin x 1 neg   -  PT evaluation  - Social work consult - will likely need rehab placement   HTN- overcontrolled - hold lasix, IV hydration due to dehydration  Dehydration   - baseline Cr 0.9, elevated BUN  - start IV fluids, hold lasix ? indications  Pyuria suspected UTI  - send urine culture, start IV abx   Cervical spondylosis at C5/C6   - tylenol prn , flexeril prn , lidocaine patch  Ventriculomegaly superimposed on cerebral volume loss  - Monitor for urinary incontinence, unsteadiness/balance issues, AMS - NPH signs and symptoms   - If continued symptoms can consider LP to drain CSF and see if there is improvement   A fib on Eliquis CAD s/p stent h/o stress induced  cardiomyopathy    - c/w Eliquis, c./w BB, amlodipine  - check orthostatic    Depression and anxiety - c/w SSR   h/o RCC      DVT ppx: Eliquis   Code status: Full code (pt agrees to chest compressions and intubation if needed)  Specify which ones are on chart	Health Care Proxy (HCP)  HCP Agent's Name	Madiha Valdez  HCP Agent's Phone	[phone]  updated 8/8/21  
81 y/o female with PMH of Moderate MR, AR,  CAD s/p PCI, paroxysmal A fib on Eliquis, h/o stress induced cardiomyopathy , h/o RCC s/p surgery, HTN, RA , depression , anxiety,   presents for a mechanical fall.     Mechanical fall , multifactorial - RA, leg weakness, gait disorder , multiple centrally acting medications   ( SSRI, Flexeril)     -  orthostatic vitals - neg , CT head - noted, check vitamin D, B12 , folate level, TSH , d-dimers - normal , troponin x 1 neg   -  PT evaluation- IAM , 2 d echo - EF 45%, mod 2+ MR , AR, diffuse hypokinesis   - Social work consult - will likely need rehab placement   Pyuria suspected UTI with  Klebsiella oxytoca/Raoutella ornithinolytica  -  urine culture - pending sensitivities,  c/w IV abx Ceftriaxone   HTN- overcontrolled   - hold lasix, IV hydration due to dehydration  Dehydration, improving  - baseline Cr 0.9, elevated BUN  - s/p  IV fluids  HFrEF of 45% with valvular heart disease MR, AR   - EF 45 % restart lasix if renal function stable   Cervical spondylosis at C5/C6   - tylenol prn , flexeril prn , lidocaine patch  Ventriculomegaly superimposed on cerebral volume loss  - Monitor for urinary incontinence, unsteadiness/balance issues, AMS - NPH signs and symptoms   - If continued symptoms can consider LP to drain CSF and see if there is improvement   A fib on Eliquis CAD s/p stent h/o stress induced  cardiomyopathy    - c/w Eliquis, c./w BB, amlodipine  - check orthostatic  - neg   Depression and anxiety - c/w SSR   h/o RCC  - o/p follow up with PCP    DVT ppx: Eliquis   Code status: Full code (pt agrees to chest compressions and intubation if needed)  Specify which ones are on chart	Health Care Proxy (HCP)  HCP Agent's Name	Madiha Valdez  HCP Agent's Phone	[phone]  updated 8/8/21, 8/10left message  
79 y/o female with PMH of Moderate MR, AR,  CAD s/p PCI, paroxysmal A fib on Eliquis, h/o stress induced cardiomyopathy , h/o RCC s/p surgery, HTN, RA , depression , anxiety,   presents for a mechanical fall.     Mechanical fall , multifactorial - RA, leg weakness, gait disorder , multiple centrally acting medications   ( SSRI, Flexeril)     -  check orthostatic vitals, CT head - noted, check vitamin D, B12 , folate level, TSH , d-dimers - normal , troponin x 1 neg   -  PT evaluation, 2 d echo - pending   - Social work consult - will likely need rehab placement   HTN- overcontrolled   - hold lasix, IV hydration due to dehydration  Dehydration, improving  - baseline Cr 0.9, elevated BUN  - start IV fluids, hold lasix ? indications  Pyuria suspected UTI   -  urine culture - pending, start IV abx   Cervical spondylosis at C5/C6   - tylenol prn , flexeril prn , lidocaine patch  Ventriculomegaly superimposed on cerebral volume loss  - Monitor for urinary incontinence, unsteadiness/balance issues, AMS - NPH signs and symptoms   - If continued symptoms can consider LP to drain CSF and see if there is improvement   A fib on Eliquis CAD s/p stent h/o stress induced  cardiomyopathy    - c/w Eliquis, c./w BB, amlodipine  - check orthostatic  - neg   Depression and anxiety - c/w SSR   h/o RCC  - o/p follow up with PCP    DVT ppx: Eliquis   Code status: Full code (pt agrees to chest compressions and intubation if needed)  Specify which ones are on chart	Health Care Proxy (HCP)  HCP Agent's Name	Madihaharley Valdez  HCP Agent's Phone	[phone]  updated 8/8/21

## 2021-08-10 NOTE — PROGRESS NOTE ADULT - SUBJECTIVE AND OBJECTIVE BOX
Subjective:  Patient is a 80y old  Female who presents with a chief complaint of Mechanical fall     HPI:     81 y/o female with PMH of Moderate MR, AR,  CAD s/p PCI, paroxysmal A fib on Eliquis, h/o stress induced cardiomyopathy , h/o RCC s/p surgery, HTN, RA , depression , anxiety,   presents for a mechanical fall. Patient reports that she was reaching for something in the bathroom and lost her balance. She fell down onto the tile floor and hit the right side of her head. Was also complaining of neck pain. Reports having a headache located at the back of her head and at the temples, described as pressure, pain , given Tylenol with some relief. No lacerations or bruises that she noticed. She was having difficulty walking and has back pain but this is not different than the chronic back pain she has. Reports having nausea. No lower extremity numbness/tingling or shooting pain down the lower extremities bilaterally. In the ER, she failed her ambulation trial.   ER course: VSS. Labs: WNL. EKG: NSR with HR 94 bpm, normal intervals, slightly prolonged    Imaging:   - Head CT: Stable intracranial findings. No acute intracranial hemorrhage, extra-axial collection, vasogenic edema or calvarial fracture. Moderate chronic microvascular changes and ventriculomegaly superimposed on cerebral volume loss.  - Cervical spine CT: Stable exam. No acute cervical spinal fracture or evidence of traumatic malalignment. Cervical spondylosis most notable C5/C6.  - XR pelvis: Unremarkable radiographs of the pelvis.  - XR femur: No sign of acute fracture, osseous destruction or soft tissue abnormality.  - CT wholebody: No acute fracture or traumatic malalignment.  - CT pelvis: No acute fracture or traumatic malalignment.  - CXR: cardiomegaly, no pleural effusion, no consolidation, no pneumothorax, increased vascular congestion (personally reviewed).   - XR hips and pelvis: neg for fx     -    Patient seen and examined at bedside earlier today, + bilateral leg weakness, denies dizziness, cp, dyspnea, abdominal pain, reports dysuria, afebrile    Review of system- Rest of the review of system are negative except mentioned in HPI    Objective:   T(C): 36.5 (21 @ 07:49), Max: 36.8 (21 @ 19:57)  HR: 91 (21 @ 07:49) (90 - 93)  BP: 126/81 (21 @ 07:49) (103/67 - 142/87)  RR: 19 (21 @ 07:49) (14 - 19)  SpO2: 98% (21 @ 07:49) (96% - 100%)  Wt(kg): --  Daily     Daily   CAPILLARY BLOOD GLUCOSE    Physical exam:   GENERAL: NAD  NERVOUS SYSTEM:  Alert & Oriented X3, non- focal exam, Motor Strength 5/5 B/L upper and lower extremities; DTRs 2+ intact and symmetric  HEAD:  Atraumatic, Normocephalic  EYES: EOMI, PERRLA, conjunctiva and sclera clear  HEENT: Moist mucous membranes  NECK: Supple, No JVD  CHEST/LUNG: Clear to auscultation bilaterally; No rales, no rhonchi, no wheezing  HEART: Regular rate and rhythm; No murmurs, no rubs or gallops  ABDOMEN: Soft, Non-tender, Non-distended; Bowel sounds present  GENITOURINARY- Voiding, no suprapubic tenderness  EXTREMITIES:  2+ Peripheral Pulses, No clubbing, cyanosis,   edema  MUSCULOSKELETAL:- No muscle tenderness, Muscle tone normal, No joint tenderness, no Joint swelling,  Joint ROM –normal   SKIN-no rash, no lesion    LABS: all reviewed                        13.6   8.24  )-----------( 222      ( 07 Aug 2021 14:58 )             41.5     08-08    139  |  110<H>  |  31<H>  ----------------------------<  125<H>  4.3   |  24  |  1.17    Ca    8.8      08 Aug 2021 13:00    TPro  7.4  /  Alb  3.6  /  TBili  0.5  /  DBili  x   /  AST  24  /  ALT  26  /  AlkPhos  81  0807    PT/INR - ( 07 Aug 2021 14:58 )   PT: 13.4 sec;   INR: 1.16 ratio         PTT - ( 07 Aug 2021 14:58 )  PTT:32.6 sec    CARDIAC MARKERS ( 08 Aug 2021 13:00 )  <0.015 ng/mL / x     / 82 U/L / x     / x          Urinalysis Basic - ( 07 Aug 2021 23:30 )    Color: Yellow / Appearance: very cloudy / S.010 / pH: x  Gluc: x / Ketone: Negative  / Bili: Negative / Urobili: Negative mg/dL   Blood: x / Protein: 30 mg/dL / Nitrite: Negative   Leuk Esterase: Moderate / RBC: 3-5 /HPF / WBC >50   Sq Epi: x / Non Sq Epi: Few / Bacteria: Moderate    RECENT CULTURES:    RADIOLOGY & ADDITIONAL TESTS: all reviewed EKG reviewed - NSST changes    < from: CT Head No Cont (21 @ 09:55) >  IMPRESSION:  Head CT: Stable intracranial findings. No acute intracranial hemorrhage, extra-axial collection, vasogenic edema or calvarial fracture. Moderate chronic microvascular changes and ventriculomegaly superimposed on cerebral volume loss.    Cervical spine CT: Stable exam. No acute cervical spinal fracture or evidence of traumatic malalignment. Cervical spondylosis most notable C5/C6.    < end of copied text >    Current medications:  acetaminophen   Tablet .. 650 milliGRAM(s) Oral every 6 hours PRN  amLODIPine   Tablet 5 milliGRAM(s) Oral daily  apixaban 2.5 milliGRAM(s) Oral every 12 hours  calcium carbonate   1250 mG (OsCal) 1 Tablet(s) Oral two times a day  carvedilol 6.25 milliGRAM(s) Oral at bedtime  carvedilol Oral Tab/Cap - Peds 3.125 milliGRAM(s) Oral daily  celecoxib 200 milliGRAM(s) Oral daily  cholecalciferol 2000 Unit(s) Oral daily  cyclobenzaprine 5 milliGRAM(s) Oral three times a day PRN  melatonin 3 milliGRAM(s) Oral at bedtime PRN  multivitamin 1 Tablet(s) Oral daily  omega-3-Acid Ethyl Esters 2 Gram(s) Oral two times a day  ondansetron Injectable 4 milliGRAM(s) IV Push every 8 hours PRN  pantoprazole    Tablet 40 milliGRAM(s) Oral before breakfast  sertraline 100 milliGRAM(s) Oral daily  sodium chloride 0.9%. 1000 milliLiter(s) IV Continuous <Continuous>            
Subjective:  Patient is a 80y old  Female who presents with a chief complaint of Mechanical fall     HPI:     79 y/o female with PMH of Moderate MR, AR,  CAD s/p PCI, paroxysmal A fib on Eliquis, h/o stress induced cardiomyopathy , h/o RCC s/p surgery, HTN, RA , depression , anxiety,   presents for a mechanical fall. Patient reports that she was reaching for something in the bathroom and lost her balance. She fell down onto the tile floor and hit the right side of her head. Was also complaining of neck pain. Reports having a headache located at the back of her head and at the temples, described as pressure, pain , given Tylenol with some relief. No lacerations or bruises that she noticed. She was having difficulty walking and has back pain but this is not different than the chronic back pain she has. Reports having nausea. No lower extremity numbness/tingling or shooting pain down the lower extremities bilaterally. In the ER, she failed her ambulation trial.   ER course: VSS. Labs: WNL. EKG: NSR with HR 94 bpm, normal intervals, slightly prolonged    Imaging:   - Head CT: Stable intracranial findings. No acute intracranial hemorrhage, extra-axial collection, vasogenic edema or calvarial fracture. Moderate chronic microvascular changes and ventriculomegaly superimposed on cerebral volume loss.  - Cervical spine CT: Stable exam. No acute cervical spinal fracture or evidence of traumatic malalignment. Cervical spondylosis most notable C5/C6.  - XR pelvis: Unremarkable radiographs of the pelvis.  - XR femur: No sign of acute fracture, osseous destruction or soft tissue abnormality.  - CT wholebody: No acute fracture or traumatic malalignment.  - CT pelvis: No acute fracture or traumatic malalignment.  - CXR: cardiomegaly, no pleural effusion, no consolidation, no pneumothorax, increased vascular congestion (personally reviewed).   - XR hips and pelvis: neg for fx     -    Patient seen and examined at bedside earlier today, + bilateral leg weakness, denies dizziness, cp, dyspnea, abdominal pain, reports dysuria, afebrile    - pt seen and examined, afebrile, denies cp , dyspnea cp, dyspnea, + gen weakness, no urinary symptoms, tolerating po intake    Review of system- Rest of the review of system are negative except mentioned in HPI    Objective:   T(C): 36.3 (21 @ 08:12), Max: 36.6 (21 @ 21:58)  T(F): 97.4 (21 @ 08:12), Max: 97.8 (21 @ 21:58)  HR: 91 (21 @ 08:12) (88 - 96)  BP: 141/80 (21 @ 08:12) (122/81 - 141/80)  RR: 16 (21 @ 08:12) (16 - 18)  SpO2: 94% (21 @ 08:12) (94% - 96%)  Wt(kg): --    Physical exam:   GENERAL: NAD  NERVOUS SYSTEM:  Alert & Oriented X3, non- focal exam, Motor Strength 5/5 B/L upper and lower extremities; DTRs 2+ intact and symmetric  HEAD:  Atraumatic, Normocephalic  EYES: EOMI, PERRLA, conjunctiva and sclera clear  HEENT: Moist mucous membranes  NECK: Supple, No JVD  CHEST/LUNG: Clear to auscultation bilaterally; No rales, no rhonchi, no wheezing  HEART: Regular rate and rhythm; No murmurs, no rubs or gallops  ABDOMEN: Soft, Non-tender, Non-distended; Bowel sounds present  GENITOURINARY- Voiding, no suprapubic tenderness  EXTREMITIES:  2+ Peripheral Pulses, No clubbing, cyanosis,   edema  MUSCULOSKELETAL:- No muscle tenderness, Muscle tone normal, No joint tenderness, no Joint swelling,  Joint ROM –normal   SKIN-no rash, no lesion    LABS: all reviewed      140  |  110<H>  |  26<H>  ----------------------------<  93  4.5   |  27  |  1.15    Ca    9.1      09 Aug 2021 08:01  Phos  2.9       Mg     2.3         TPro  6.8  /  Alb  3.0<L>  /  TBili  0.4  /  DBili  x   /  AST  20  /  ALT  21  /  AlkPhos  72                              12.9   8.09  )-----------( 208      ( 09 Aug 2021 08:01 )             41.0       CARDIAC MARKERS ( 08 Aug 2021 13:00 )  <0.015 ng/mL / x     / 82 U/L / x     / x            LIVER FUNCTIONS - ( 09 Aug 2021 08:01 )  Alb: 3.0 g/dL / Pro: 6.8 gm/dL / ALK PHOS: 72 U/L / ALT: 21 U/L / AST: 20 U/L / GGT: x                 Urinalysis Basic - ( 09 Aug 2021 05:40 )    Color: Yellow / Appearance: Slightly Turbid / S.010 / pH: x  Gluc: x / Ketone: Negative  / Bili: Negative / Urobili: Negative mg/dL   Blood: x / Protein: 30 mg/dL / Nitrite: Negative   Leuk Esterase: Moderate / RBC: 6-10 /HPF / WBC >50   Sq Epi: x / Non Sq Epi: Occasional / Bacteria: Many    RECENT CULTURES:      RADIOLOGY & ADDITIONAL TESTS: all reviewed EKG reviewed - NSST changes  < from: Xray Hip w/ Pelvis 2 Views, Bilateral (21 @ 15:44) >    IMPRESSION:    No definite pelvic or hip fractures.    < end of copied text >    < from: CT Head No Cont (21 @ 09:55) >  IMPRESSION:  Head CT: Stable intracranial findings. No acute intracranial hemorrhage, extra-axial collection, vasogenic edema or calvarial fracture. Moderate chronic microvascular changes and ventriculomegaly superimposed on cerebral volume loss.    Cervical spine CT: Stable exam. No acute cervical spinal fracture or evidence of traumatic malalignment. Cervical spondylosis most notable C5/C6.    < end of copied text >    Current medications:  acetaminophen   Tablet .. 650 milliGRAM(s) Oral every 6 hours PRN  amLODIPine   Tablet 5 milliGRAM(s) Oral daily  apixaban 2.5 milliGRAM(s) Oral every 12 hours  calcium carbonate   1250 mG (OsCal) 1 Tablet(s) Oral two times a day  carvedilol 6.25 milliGRAM(s) Oral at bedtime  carvedilol Oral Tab/Cap - Peds 3.125 milliGRAM(s) Oral daily  celecoxib 200 milliGRAM(s) Oral daily  cholecalciferol 2000 Unit(s) Oral daily  cyclobenzaprine 5 milliGRAM(s) Oral three times a day PRN  melatonin 3 milliGRAM(s) Oral at bedtime PRN  multivitamin 1 Tablet(s) Oral daily  omega-3-Acid Ethyl Esters 2 Gram(s) Oral two times a day  ondansetron Injectable 4 milliGRAM(s) IV Push every 8 hours PRN  pantoprazole    Tablet 40 milliGRAM(s) Oral before breakfast  sertraline 100 milliGRAM(s) Oral daily  sodium chloride 0.9%. 1000 milliLiter(s) IV Continuous <Continuous>            
Subjective:  Patient is a 80y old  Female who presents with a chief complaint of Mechanical fall     HPI:     81 y/o female with PMH of Moderate MR, AR,  CAD s/p PCI, paroxysmal A fib on Eliquis, h/o stress induced cardiomyopathy , h/o RCC s/p surgery, HTN, RA , depression , anxiety,   presents for a mechanical fall. Patient reports that she was reaching for something in the bathroom and lost her balance. She fell down onto the tile floor and hit the right side of her head. Was also complaining of neck pain. Reports having a headache located at the back of her head and at the temples, described as pressure, pain , given Tylenol with some relief. No lacerations or bruises that she noticed. She was having difficulty walking and has back pain but this is not different than the chronic back pain she has. Reports having nausea. No lower extremity numbness/tingling or shooting pain down the lower extremities bilaterally. In the ER, she failed her ambulation trial.   ER course: VSS. Labs: WNL. EKG: NSR with HR 94 bpm, normal intervals, slightly prolonged    Imaging:   - Head CT: Stable intracranial findings. No acute intracranial hemorrhage, extra-axial collection, vasogenic edema or calvarial fracture. Moderate chronic microvascular changes and ventriculomegaly superimposed on cerebral volume loss.  - Cervical spine CT: Stable exam. No acute cervical spinal fracture or evidence of traumatic malalignment. Cervical spondylosis most notable C5/C6.  - XR pelvis: Unremarkable radiographs of the pelvis.  - XR femur: No sign of acute fracture, osseous destruction or soft tissue abnormality.  - CT wholebody: No acute fracture or traumatic malalignment.  - CT pelvis: No acute fracture or traumatic malalignment.  - CXR: cardiomegaly, no pleural effusion, no consolidation, no pneumothorax, increased vascular congestion (personally reviewed).   - XR hips and pelvis: neg for fx     -    Patient seen and examined at bedside earlier today, + bilateral leg weakness, denies dizziness, cp, dyspnea, abdominal pain, reports dysuria, afebrile    - pt seen and examined, afebrile, denies cp , dyspnea cp, dyspnea, + gen weakness, no urinary symptoms, tolerating po intake  8/10 - pt seen and examined, denies cp, dyspnea, afebrile, denies abdominal pain, plan discussed     Review of system- Rest of the review of system are negative except mentioned in HPI    Objective:   T(C): 37 (08-10-21 @ 16:35), Max: 37 (08-10-21 @ 16:35)  T(F): 98.6 (08-10-21 @ 16:35), Max: 98.6 (08-10-21 @ 16:35)  HR: 92 (08-10-21 @ 16:35) (81 - 92)  BP: 140/87 (08-10-21 @ 16:35) (128/81 - 140/87)  RR: 18 (08-10-21 @ 16:35) (18 - 18)  SpO2: 98% (08-10-21 @ 16:35) (97% - 98%)  Wt(kg): --    Physical exam:   GENERAL: NAD  NERVOUS SYSTEM:  Alert & Oriented X3, non- focal exam, Motor Strength 5/5 B/L upper and lower extremities; DTRs 2+ intact and symmetric  HEAD:  Atraumatic, Normocephalic  EYES: EOMI, PERRLA, conjunctiva and sclera clear  HEENT: Moist mucous membranes  NECK: Supple, No JVD  CHEST/LUNG: Clear to auscultation bilaterally; No rales, no rhonchi, no wheezing  HEART: Regular rate and rhythm; No murmurs, no rubs or gallops  ABDOMEN: Soft, Non-tender, Non-distended; Bowel sounds present  GENITOURINARY- Voiding, no suprapubic tenderness  EXTREMITIES:  2+ Peripheral Pulses, No clubbing, cyanosis,   edema  MUSCULOSKELETAL:- No muscle tenderness, Muscle tone normal, No joint tenderness, no Joint swelling,  Joint ROM –normal   SKIN-no rash, no lesion    LABS: all reviewed  08-10    140  |  107  |  22  ----------------------------<  92  4.1   |  28  |  0.99    Ca    9.4      10 Aug 2021 05:45  Phos  2.9     -  Mg     2.3         TPro  6.8  /  Alb  3.0<L>  /  TBili  0.4  /  DBili  x   /  AST  20  /  ALT  21  /  AlkPhos  72                          12.9   8.09  )-----------( 208      ( 09 Aug 2021 08:01 )             41.0       LIVER FUNCTIONS - ( 09 Aug 2021 08:01 )  Alb: 3.0 g/dL / Pro: 6.8 gm/dL / ALK PHOS: 72 U/L / ALT: 21 U/L / AST: 20 U/L / GGT: x             Urinalysis Basic - ( 09 Aug 2021 05:40 )    Color: Yellow / Appearance: Slightly Turbid / S.010 / pH: x  Gluc: x / Ketone: Negative  / Bili: Negative / Urobili: Negative mg/dL   Blood: x / Protein: 30 mg/dL / Nitrite: Negative   Leuk Esterase: Moderate / RBC: 6-10 /HPF / WBC >50   Sq Epi: x / Non Sq Epi: Occasional / Bacteria: Many    RECENT CULTURES:  Culture - Urine (21 @ 05:40)    Specimen Source: Clean Catch Clean Catch (Midstream)    Culture Results:   >100,000 CFU/ml Klebsiella oxytoca/Raoutella ornithinolytica        RADIOLOGY & ADDITIONAL TESTS: all reviewed EKG reviewed - NSST changes  < from: Xray Hip w/ Pelvis 2 Views, Bilateral (21 @ 15:44) >    IMPRESSION:    No definite pelvic or hip fractures.    < end of copied text >    < from: CT Head No Cont (21 @ 09:55) >  IMPRESSION:  Head CT: Stable intracranial findings. No acute intracranial hemorrhage, extra-axial collection, vasogenic edema or calvarial fracture. Moderate chronic microvascular changes and ventriculomegaly superimposed on cerebral volume loss.    Cervical spine CT: Stable exam. No acute cervical spinal fracture or evidence of traumatic malalignment. Cervical spondylosis most notable C5/C6.    < end of copied text >    Current medications:  acetaminophen   Tablet .. 650 milliGRAM(s) Oral every 6 hours PRN  amLODIPine   Tablet 5 milliGRAM(s) Oral daily  apixaban 2.5 milliGRAM(s) Oral every 12 hours  calcium carbonate   1250 mG (OsCal) 1 Tablet(s) Oral two times a day  carvedilol 6.25 milliGRAM(s) Oral at bedtime  carvedilol Oral Tab/Cap - Peds 3.125 milliGRAM(s) Oral daily  celecoxib 200 milliGRAM(s) Oral daily  cholecalciferol 2000 Unit(s) Oral daily  cyclobenzaprine 5 milliGRAM(s) Oral three times a day PRN  melatonin 3 milliGRAM(s) Oral at bedtime PRN  multivitamin 1 Tablet(s) Oral daily  omega-3-Acid Ethyl Esters 2 Gram(s) Oral two times a day  ondansetron Injectable 4 milliGRAM(s) IV Push every 8 hours PRN  pantoprazole    Tablet 40 milliGRAM(s) Oral before breakfast  sertraline 100 milliGRAM(s) Oral daily  sodium chloride 0.9%. 1000 milliLiter(s) IV Continuous <Continuous>

## 2021-08-11 ENCOUNTER — TRANSCRIPTION ENCOUNTER (OUTPATIENT)
Age: 81
End: 2021-08-11

## 2021-08-11 VITALS
SYSTOLIC BLOOD PRESSURE: 125 MMHG | DIASTOLIC BLOOD PRESSURE: 85 MMHG | RESPIRATION RATE: 18 BRPM | OXYGEN SATURATION: 97 % | HEART RATE: 93 BPM | TEMPERATURE: 97 F

## 2021-08-11 LAB
-  AMIKACIN: SIGNIFICANT CHANGE UP
-  AMOXICILLIN/CLAVULANIC ACID: SIGNIFICANT CHANGE UP
-  AMPICILLIN/SULBACTAM: SIGNIFICANT CHANGE UP
-  AMPICILLIN: SIGNIFICANT CHANGE UP
-  AZTREONAM: SIGNIFICANT CHANGE UP
-  CEFAZOLIN: SIGNIFICANT CHANGE UP
-  CEFEPIME: SIGNIFICANT CHANGE UP
-  CEFOXITIN: SIGNIFICANT CHANGE UP
-  CEFTRIAXONE: SIGNIFICANT CHANGE UP
-  CIPROFLOXACIN: SIGNIFICANT CHANGE UP
-  ERTAPENEM: SIGNIFICANT CHANGE UP
-  GENTAMICIN: SIGNIFICANT CHANGE UP
-  IMIPENEM: SIGNIFICANT CHANGE UP
-  LEVOFLOXACIN: SIGNIFICANT CHANGE UP
-  MEROPENEM: SIGNIFICANT CHANGE UP
-  NITROFURANTOIN: SIGNIFICANT CHANGE UP
-  PIPERACILLIN/TAZOBACTAM: SIGNIFICANT CHANGE UP
-  TIGECYCLINE: SIGNIFICANT CHANGE UP
-  TOBRAMYCIN: SIGNIFICANT CHANGE UP
-  TRIMETHOPRIM/SULFAMETHOXAZOLE: SIGNIFICANT CHANGE UP
ANION GAP SERPL CALC-SCNC: 6 MMOL/L — SIGNIFICANT CHANGE UP (ref 5–17)
BUN SERPL-MCNC: 20 MG/DL — SIGNIFICANT CHANGE UP (ref 7–23)
CALCIUM SERPL-MCNC: 9 MG/DL — SIGNIFICANT CHANGE UP (ref 8.5–10.1)
CHLORIDE SERPL-SCNC: 110 MMOL/L — HIGH (ref 96–108)
CO2 SERPL-SCNC: 25 MMOL/L — SIGNIFICANT CHANGE UP (ref 22–31)
CREAT SERPL-MCNC: 0.91 MG/DL — SIGNIFICANT CHANGE UP (ref 0.5–1.3)
CULTURE RESULTS: SIGNIFICANT CHANGE UP
GLUCOSE SERPL-MCNC: 87 MG/DL — SIGNIFICANT CHANGE UP (ref 70–99)
HCT VFR BLD CALC: 38.1 % — SIGNIFICANT CHANGE UP (ref 34.5–45)
HGB BLD-MCNC: 12.2 G/DL — SIGNIFICANT CHANGE UP (ref 11.5–15.5)
MCHC RBC-ENTMCNC: 30.7 PG — SIGNIFICANT CHANGE UP (ref 27–34)
MCHC RBC-ENTMCNC: 32 GM/DL — SIGNIFICANT CHANGE UP (ref 32–36)
MCV RBC AUTO: 95.7 FL — SIGNIFICANT CHANGE UP (ref 80–100)
METHOD TYPE: SIGNIFICANT CHANGE UP
ORGANISM # SPEC MICROSCOPIC CNT: SIGNIFICANT CHANGE UP
ORGANISM # SPEC MICROSCOPIC CNT: SIGNIFICANT CHANGE UP
PLATELET # BLD AUTO: 208 K/UL — SIGNIFICANT CHANGE UP (ref 150–400)
POTASSIUM SERPL-MCNC: 3.9 MMOL/L — SIGNIFICANT CHANGE UP (ref 3.5–5.3)
POTASSIUM SERPL-SCNC: 3.9 MMOL/L — SIGNIFICANT CHANGE UP (ref 3.5–5.3)
RBC # BLD: 3.98 M/UL — SIGNIFICANT CHANGE UP (ref 3.8–5.2)
RBC # FLD: 13.2 % — SIGNIFICANT CHANGE UP (ref 10.3–14.5)
SARS-COV-2 RNA SPEC QL NAA+PROBE: SIGNIFICANT CHANGE UP
SODIUM SERPL-SCNC: 141 MMOL/L — SIGNIFICANT CHANGE UP (ref 135–145)
SPECIMEN SOURCE: SIGNIFICANT CHANGE UP
WBC # BLD: 6.5 K/UL — SIGNIFICANT CHANGE UP (ref 3.8–10.5)
WBC # FLD AUTO: 6.5 K/UL — SIGNIFICANT CHANGE UP (ref 3.8–10.5)

## 2021-08-11 PROCEDURE — 99239 HOSP IP/OBS DSCHRG MGMT >30: CPT

## 2021-08-11 RX ORDER — OMEGA-3 ACID ETHYL ESTERS 1 G
2 CAPSULE ORAL
Qty: 0 | Refills: 0 | DISCHARGE
Start: 2021-08-11

## 2021-08-11 RX ORDER — TROLAMINE SALICYLATE 10 %
0 CREAM (GRAM) TOPICAL
Qty: 0 | Refills: 0 | DISCHARGE

## 2021-08-11 RX ORDER — CYCLOBENZAPRINE HYDROCHLORIDE 10 MG/1
1 TABLET, FILM COATED ORAL
Qty: 0 | Refills: 0 | DISCHARGE
Start: 2021-08-11

## 2021-08-11 RX ORDER — CALCIUM CARBONATE 500(1250)
1 TABLET ORAL
Qty: 0 | Refills: 0 | DISCHARGE

## 2021-08-11 RX ADMIN — LIDOCAINE 1 PATCH: 4 CREAM TOPICAL at 10:00

## 2021-08-11 RX ADMIN — Medication 1 TABLET(S): at 09:59

## 2021-08-11 RX ADMIN — SERTRALINE 100 MILLIGRAM(S): 25 TABLET, FILM COATED ORAL at 10:00

## 2021-08-11 RX ADMIN — Medication 1 TABLET(S): at 10:00

## 2021-08-11 RX ADMIN — CEFTRIAXONE 1000 MILLIGRAM(S): 500 INJECTION, POWDER, FOR SOLUTION INTRAMUSCULAR; INTRAVENOUS at 06:04

## 2021-08-11 RX ADMIN — AMLODIPINE BESYLATE 5 MILLIGRAM(S): 2.5 TABLET ORAL at 09:59

## 2021-08-11 RX ADMIN — Medication 2000 UNIT(S): at 10:00

## 2021-08-11 RX ADMIN — PANTOPRAZOLE SODIUM 40 MILLIGRAM(S): 20 TABLET, DELAYED RELEASE ORAL at 06:04

## 2021-08-11 RX ADMIN — Medication 650 MILLIGRAM(S): at 06:40

## 2021-08-11 RX ADMIN — APIXABAN 2.5 MILLIGRAM(S): 2.5 TABLET, FILM COATED ORAL at 10:00

## 2021-08-11 RX ADMIN — CARVEDILOL PHOSPHATE 3.12 MILLIGRAM(S): 80 CAPSULE, EXTENDED RELEASE ORAL at 10:00

## 2021-08-11 RX ADMIN — Medication 2 GRAM(S): at 10:00

## 2021-08-11 RX ADMIN — Medication 650 MILLIGRAM(S): at 06:04

## 2021-08-11 NOTE — DISCHARGE NOTE NURSING/CASE MANAGEMENT/SOCIAL WORK - NSDCVIVACCINE_GEN_ALL_CORE_FT
Tdap; 01-Jun-2018 11:12; Alfonso Wick (RN); [employer]; [id number]; IntraMuscular; Deltoid Right.; 0.5 milliLiter(s); VIS (VIS Published: 09-May-2013, VIS Presented: 01-Jun-2018);

## 2021-08-11 NOTE — DISCHARGE NOTE PROVIDER - PROVIDER TOKENS
PROVIDER:[TOKEN:[[id number]],FOLLOWUP:[1 week]],FREE:[LAST:[cardiologist],PHONE:[(   )    -],FAX:[(   )    -],FOLLOWUP:[1 week]]

## 2021-08-11 NOTE — DISCHARGE NOTE PROVIDER - NSDCMRMEDTOKEN_GEN_ALL_CORE_FT
amLODIPine 5 mg oral tablet: 1 tab(s) orally once a day  Aspirin Enteric Coated 81 mg oral delayed release tablet: 1 tab(s) orally once a day  Colace 100 mg oral capsule: 1 cap(s) orally , As Needed  Coreg 3.125 mg oral tablet: 1 tab(s) orally once a day  Coreg 3.125 mg oral tablet: 2 tab(s) orally once a day (at bedtime)  Cranberry oral capsule: 1 cap(s) orally once a day  cyclobenzaprine 5 mg oral tablet: 1 tab(s) orally 3 times a day, As needed, Muscle Spasm  Eliquis 2.5 mg oral tablet: 1 tab(s) orally 2 times a day     Indication: Afib  furosemide 20 mg oral tablet: 1 tab(s) orally once a day, As Needed for swelling in addition to the daily furosemide  Lasix 20 mg oral tablet: 1 tab(s) orally once a day  Multiple Vitamins oral tablet: 1 tab(s) orally once a day  omega-3 polyunsaturated fatty acids ethyl esters 1000 mg oral capsule: 2 cap(s) orally 2 times a day  Oyster Shell Calcium 500 (1250 mg calcium carbonate) oral tablet: 1 tab(s) orally 2 times a day  pantoprazole 40 mg oral delayed release tablet: 1 tab(s) orally once a day (before a meal)  Refresh ophthalmic solution: 1 drop(s) to each affected eye , As Needed  sertraline 100 mg oral tablet: 1 tab(s) orally once a day  Tylenol Extra Strength 500 mg oral tablet: 1 tab(s) orally 3 times a day, As Needed - for mild pain  Vitamin D3 50 mcg (2000 intl units) oral tablet: 1 tab(s) orally once a day

## 2021-08-11 NOTE — DISCHARGE NOTE PROVIDER - HOSPITAL COURSE
Subjective:  Patient is a 80y old  Female who presents with a chief complaint of Mechanical fall     HPI:     81 y/o female with PMH of Moderate MR, AR,  CAD s/p PCI, paroxysmal A fib on Eliquis, h/o stress induced cardiomyopathy , h/o RCC s/p surgery, HTN, RA , depression , anxiety,   presents for a mechanical fall. Patient reports that she was reaching for something in the bathroom and lost her balance. She fell down onto the tile floor and hit the right side of her head. Was also complaining of neck pain. Reports having a headache located at the back of her head and at the temples, described as pressure, pain 8/1, given Tylenol with some relief. No lacerations or bruises that she noticed. She was having difficulty walking and has back pain but this is not different than the chronic back pain she has. Reports having nausea. No lower extremity numbness/tingling or shooting pain down the lower extremities bilaterally. In the ER, she failed her ambulation trial.   ER course: VSS. Labs: WNL. EKG: NSR with HR 94 bpm, normal intervals, slightly prolonged    Imaging:   - Head CT: Stable intracranial findings. No acute intracranial hemorrhage, extra-axial collection, vasogenic edema or calvarial fracture. Moderate chronic microvascular changes and ventriculomegaly superimposed on cerebral volume loss.  - Cervical spine CT: Stable exam. No acute cervical spinal fracture or evidence of traumatic malalignment. Cervical spondylosis most notable C5/C6.  - XR pelvis: Unremarkable radiographs of the pelvis.  - XR femur: No sign of acute fracture, osseous destruction or soft tissue abnormality.  - CT wholebody: No acute fracture or traumatic malalignment.  - CT pelvis: No acute fracture or traumatic malalignment.  - CXR: cardiomegaly, no pleural effusion, no consolidation, no pneumothorax, increased vascular congestion (personally reviewed).   - XR hips and pelvis: neg for fx    8/8 -    Patient seen and examined at bedside earlier today, + bilateral leg weakness, denies dizziness, cp, dyspnea, abdominal pain, reports dysuria, afebrile  8/9  - pt seen and examined, afebrile, denies cp , dyspnea cp, dyspnea, + gen weakness, no urinary symptoms, tolerating po intake  8/10 - pt seen and examined, denies cp, dyspnea, afebrile, denies abdominal pain, plan discussed   8/11 -pt criselda and examined, afebrile, denies cp, dyspnea, abdominal pain, tolerating po intake, plan discussed     Review of system- Rest of the review of system are negative except mentioned in HPI  Objective:   T(C): 37 (08-10-21 @ 16:35), Max: 37 (08-10-21 @ 16:35)  T(F): 98.6 (08-10-21 @ 16:35), Max: 98.6 (08-10-21 @ 16:35)  HR: 92 (08-10-21 @ 16:35) (81 - 92)  BP: 140/87 (08-10-21 @ 16:35) (128/81 - 140/87)  RR: 18 (08-10-21 @ 16:35) (18 - 18)  SpO2: 98% (08-10-21 @ 16:35) (97% - 98%)  Wt(kg): --    Physical exam:   GENERAL: NAD  NERVOUS SYSTEM:  Alert & Oriented X3, non- focal exam, Motor Strength 5/5 B/L upper and lower extremities; DTRs 2+ intact and symmetric  HEAD:  Atraumatic, Normocephalic  EYES: EOMI, PERRLA, conjunctiva and sclera clear  HEENT: Moist mucous membranes  NECK: Supple, No JVD  CHEST/LUNG: Clear to auscultation bilaterally; No rales, no rhonchi, no wheezing  HEART: Regular rate and rhythm; No murmurs, no rubs or gallops  ABDOMEN: Soft, Non-tender, Non-distended; Bowel sounds present  GENITOURINARY- Voiding, no suprapubic tenderness  EXTREMITIES:  2+ Peripheral Pulses, No clubbing, cyanosis,   edema  MUSCULOSKELETAL:- No muscle tenderness, Muscle tone normal, No joint tenderness, no Joint swelling,  Joint ROM –normal   SKIN-no rash, no lesion  LABS: all reviewed  Mechanical fall , multifactorial - RA, leg weakness, gait disorder , multiple centrally acting medications   ( SSRI, Flexeril)     -  orthostatic vitals - neg , CT head - noted, check vitamin D, B12 , folate level, TSH , d-dimers - normal , troponin x 1 neg   -  PT evaluation- IAM , 2 d echo - EF 45%, mod 2+ MR , AR, diffuse hypokinesis   - Social work consult - will likely need rehab placement   Pyuria suspected UTI with  Klebsiella oxytoca/Raoutella ornithinolytica  -  urine culture - pending sensitivities,  c/w IV abx Ceftriaxone   HTN- overcontrolled   - hold lasix, IV hydration due to dehydration, restart lasix  Dehydration, improving  - baseline Cr 0.9, elevated BUN  - s/p  IV fluids  HFrEF of 45% with valvular heart disease MR, AR   - EF 45 % restart lasix upon discharge   Cervical spondylosis at C5/C6   - tylenol prn , flexeril prn , lidocaine patch  Ventriculomegaly superimposed on cerebral volume loss  - Monitor for urinary incontinence, unsteadiness/balance issues, AMS - NPH signs and symptoms   - If continued symptoms can consider LP to drain CSF and see if there is improvement   A fib on Eliquis CAD s/p stent h/o stress induced  cardiomyopathy    - c/w Eliquis, c./w BB, amlodipine  - check orthostatic  - neg   Depression and anxiety - c/w SSR   h/o RCC  - o/p follow up with PCP  DVT ppx: Eliquis   Code status: Full code (pt agrees to chest compressions and intubation if needed)  Specify which ones are on chart  Health Care Proxy (HCP)  HCP Agent's Name  Madiha Valdez  HCP Agent's Phone  [phone]   updated 8/8/21, 8/10 left message, 8/11 called mailbox full can not except any messages    Disposition - medically optimized to be discharged home with close follow up with PCP  complete antibiotics   return to ED if fever, abdominal pain, nausea, vomiting, chest pain, dyspnea  Discharge plan discussed with patient, RN  Patient advised to follow up with PCP within 3-7 days  time spend 40 min  Discharge note faxed to PCP with my contact information to call me back   PCP Dr. Damian

## 2021-08-11 NOTE — DISCHARGE NOTE PROVIDER - CARE PROVIDER_API CALL
SAADIA ESCOBEDO [place] [id number], [address]  [place] [address]  Phone: ()-  Fax: ()-  Follow Up Time: 1 week    cardiologist,   Phone: (   )    -  Fax: (   )    -  Follow Up Time: 1 week

## 2021-08-11 NOTE — DISCHARGE NOTE PROVIDER - NSDCCPCAREPLAN_GEN_ALL_CORE_FT
PRINCIPAL DISCHARGE DIAGNOSIS  Diagnosis: Unable to ambulate  Assessment and Plan of Treatment: physical therapy at [facility] , follow up with PCP within1 week for further preventive care      SECONDARY DISCHARGE DIAGNOSES  Diagnosis: UTI due to Klebsiella species  Assessment and Plan of Treatment: complete 3 days of antibiotics    Diagnosis: CHF (congestive heart failure)  Assessment and Plan of Treatment: restart lasix    Diagnosis: CAD (coronary artery disease)  Assessment and Plan of Treatment: take home medications follow up with cardiologist monroein1 week    Diagnosis: Atrial fibrillation  Assessment and Plan of Treatment: take eliquis , follow up with cardiologist

## 2021-08-11 NOTE — DISCHARGE NOTE NURSING/CASE MANAGEMENT/SOCIAL WORK - PATIENT PORTAL LINK FT
You can access the FollowMyHealth Patient Portal offered by [facility] by registering at the following website: [email or web address]. By joining [facility]’s FollowMyHealth portal, you will also be able to view your health information using other applications (apps) compatible with our system.

## 2021-08-17 DIAGNOSIS — R26.9 UNSPECIFIED ABNORMALITIES OF GAIT AND MOBILITY: ICD-10-CM

## 2021-08-17 DIAGNOSIS — R29.6 REPEATED FALLS: ICD-10-CM

## 2021-08-17 DIAGNOSIS — F03.90 UNSPECIFIED DEMENTIA WITHOUT BEHAVIORAL DISTURBANCE: ICD-10-CM

## 2021-08-17 DIAGNOSIS — I08.0 RHEUMATIC DISORDERS OF BOTH MITRAL AND AORTIC VALVES: ICD-10-CM

## 2021-08-17 DIAGNOSIS — M06.9 RHEUMATOID ARTHRITIS, UNSPECIFIED: ICD-10-CM

## 2021-08-17 DIAGNOSIS — N39.0 URINARY TRACT INFECTION, SITE NOT SPECIFIED: ICD-10-CM

## 2021-08-17 DIAGNOSIS — Z95.5 PRESENCE OF CORONARY ANGIOPLASTY IMPLANT AND GRAFT: ICD-10-CM

## 2021-08-17 DIAGNOSIS — Z87.891 PERSONAL HISTORY OF NICOTINE DEPENDENCE: ICD-10-CM

## 2021-08-17 DIAGNOSIS — Z88.0 ALLERGY STATUS TO PENICILLIN: ICD-10-CM

## 2021-08-17 DIAGNOSIS — M81.0 AGE-RELATED OSTEOPOROSIS WITHOUT CURRENT PATHOLOGICAL FRACTURE: ICD-10-CM

## 2021-08-17 DIAGNOSIS — F41.9 ANXIETY DISORDER, UNSPECIFIED: ICD-10-CM

## 2021-08-17 DIAGNOSIS — Z79.01 LONG TERM (CURRENT) USE OF ANTICOAGULANTS: ICD-10-CM

## 2021-08-17 DIAGNOSIS — E86.0 DEHYDRATION: ICD-10-CM

## 2021-08-17 DIAGNOSIS — I25.10 ATHEROSCLEROTIC HEART DISEASE OF NATIVE CORONARY ARTERY WITHOUT ANGINA PECTORIS: ICD-10-CM

## 2021-08-17 DIAGNOSIS — Z85.528 PERSONAL HISTORY OF OTHER MALIGNANT NEOPLASM OF KIDNEY: ICD-10-CM

## 2021-08-17 DIAGNOSIS — I11.0 HYPERTENSIVE HEART DISEASE WITH HEART FAILURE: ICD-10-CM

## 2021-08-17 DIAGNOSIS — B96.1 KLEBSIELLA PNEUMONIAE [K. PNEUMONIAE] AS THE CAUSE OF DISEASES CLASSIFIED ELSEWHERE: ICD-10-CM

## 2021-08-17 DIAGNOSIS — R29.898 OTHER SYMPTOMS AND SIGNS INVOLVING THE MUSCULOSKELETAL SYSTEM: ICD-10-CM

## 2021-08-17 DIAGNOSIS — M47.812 SPONDYLOSIS WITHOUT MYELOPATHY OR RADICULOPATHY, CERVICAL REGION: ICD-10-CM

## 2021-08-17 DIAGNOSIS — I50.22 CHRONIC SYSTOLIC (CONGESTIVE) HEART FAILURE: ICD-10-CM

## 2021-08-17 DIAGNOSIS — N39.41 URGE INCONTINENCE: ICD-10-CM

## 2021-08-17 DIAGNOSIS — I48.0 PAROXYSMAL ATRIAL FIBRILLATION: ICD-10-CM

## 2021-08-17 DIAGNOSIS — G93.89 OTHER SPECIFIED DISORDERS OF BRAIN: ICD-10-CM

## 2021-08-17 DIAGNOSIS — F32.9 MAJOR DEPRESSIVE DISORDER, SINGLE EPISODE, UNSPECIFIED: ICD-10-CM

## 2021-08-17 DIAGNOSIS — Z91.048 OTHER NONMEDICINAL SUBSTANCE ALLERGY STATUS: ICD-10-CM

## 2021-09-03 NOTE — CONSULT NOTE ADULT - PROVIDER SPECIALTY LIST ADULT
Chronic. Has never had severe asthma attack, does get exacerbations with illness and exercise. Using albuterol as needed and QVAR BID as needed when sick. Doesn't need refills.     Last PFT showed mild restriction and obstruction   Cardiology

## 2021-09-05 NOTE — ED PROVIDER NOTE - PROGRESS NOTE DETAILS
Larissa Tobias: Pt refused pelvis XR, back reexamined. Thoracic area non tender but positive mild lumbar spine tenderness. No vertebral step off, deformity, nor swelling. Pt daughter agreeable with CT L spine. I, Connie Tobias, am scribing for and in the presence of Dr. Lema. DISCHARGE

## 2021-12-17 NOTE — PHYSICAL THERAPY INITIAL EVALUATION ADULT - LEVEL OF INDEPENDENCE, REHAB EVAL
minimum assist (75% patients effort)
No-Patient/Caregiver offered and refused free interpretation services.

## 2022-02-18 NOTE — DISCHARGE NOTE ADULT - MEDICATION SUMMARY - MEDICATIONS TO TAKE
normal I will START or STAY ON the medications listed below when I get home from the hospital:    enalapril 2.5 mg oral tablet  -- 1 tab(s) by mouth once a day (at bedtime)  -- Indication: For HTN    digoxin 125 mcg (0.125 mg) oral tablet  -- 1 tab(s) by mouth once a day (at bedtime)  -- Indication: For afib    warfarin 2.5 mg oral tablet  -- 1 tab(s) by mouth 4 times a week, tues, thurs, sat, sun  -- Indication: For afib    warfarin 5 mg oral tablet  -- 1 tab(s) by mouth 3 times a week, mon, wed, fri  -- Indication: For afib    DULoxetine 20 mg oral delayed release capsule  -- 1 cap(s) by mouth once a day  -- Indication: For depression    atenolol 25 mg oral tablet  -- 1 tab(s) by mouth once a day (at bedtime)  -- Indication: For afib    docusate potassium 100 mg oral capsule  -- 2  by mouth once a day  -- Indication: For constipation    magnesium oxide 200 mg oral tablet  -- 1  by mouth once a day  -- Indication: For constipation    Omega-3 oral capsule  -- 1  by mouth once a day  -- Indication: For ppx    pantoprazole 40 mg oral delayed release tablet  -- 1 tab(s) by mouth once a day (before a meal)  -- Indication: For ppx    oxybutynin 5 mg/24 hours oral tablet, extended release  -- 1 tab(s) by mouth once a day  -- Indication: For Urinary incontience    Multiple Vitamins oral tablet  -- 1 tab(s) by mouth once a day  -- Indication: For ppx    Vitamin D3 1000 intl units oral tablet  -- 1 tab(s) by mouth once a day  -- Indication: For ppx    ascorbic acid 500 mg oral tablet  -- 1 tab(s) by mouth once a day  -- Indication: For ppx

## 2022-02-25 ENCOUNTER — EMERGENCY (EMERGENCY)
Facility: HOSPITAL | Age: 82
LOS: 0 days | Discharge: NURSING, REHAB OR LONG-TERM CARE | End: 2022-02-26
Attending: STUDENT IN AN ORGANIZED HEALTH CARE EDUCATION/TRAINING PROGRAM
Payer: MEDICARE

## 2022-02-25 VITALS
HEART RATE: 68 BPM | DIASTOLIC BLOOD PRESSURE: 94 MMHG | WEIGHT: 149.03 LBS | RESPIRATION RATE: 17 BRPM | OXYGEN SATURATION: 98 % | TEMPERATURE: 98 F | SYSTOLIC BLOOD PRESSURE: 172 MMHG | HEIGHT: 59 IN

## 2022-02-25 DIAGNOSIS — R78.89 FINDING OF OTHER SPECIFIED SUBSTANCES, NOT NORMALLY FOUND IN BLOOD: ICD-10-CM

## 2022-02-25 DIAGNOSIS — Z20.822 CONTACT WITH AND (SUSPECTED) EXPOSURE TO COVID-19: ICD-10-CM

## 2022-02-25 DIAGNOSIS — R06.00 DYSPNEA, UNSPECIFIED: ICD-10-CM

## 2022-02-25 DIAGNOSIS — Z98.890 OTHER SPECIFIED POSTPROCEDURAL STATES: Chronic | ICD-10-CM

## 2022-02-25 DIAGNOSIS — Z79.01 LONG TERM (CURRENT) USE OF ANTICOAGULANTS: ICD-10-CM

## 2022-02-25 DIAGNOSIS — Z95.5 PRESENCE OF CORONARY ANGIOPLASTY IMPLANT AND GRAFT: ICD-10-CM

## 2022-02-25 DIAGNOSIS — R11.0 NAUSEA: ICD-10-CM

## 2022-02-25 DIAGNOSIS — Z79.82 LONG TERM (CURRENT) USE OF ASPIRIN: ICD-10-CM

## 2022-02-25 DIAGNOSIS — Z85.528 PERSONAL HISTORY OF OTHER MALIGNANT NEOPLASM OF KIDNEY: ICD-10-CM

## 2022-02-25 DIAGNOSIS — Z98.62 PERIPHERAL VASCULAR ANGIOPLASTY STATUS: Chronic | ICD-10-CM

## 2022-02-25 DIAGNOSIS — I50.9 HEART FAILURE, UNSPECIFIED: ICD-10-CM

## 2022-02-25 DIAGNOSIS — Z90.89 ACQUIRED ABSENCE OF OTHER ORGANS: Chronic | ICD-10-CM

## 2022-02-25 DIAGNOSIS — I11.0 HYPERTENSIVE HEART DISEASE WITH HEART FAILURE: ICD-10-CM

## 2022-02-25 DIAGNOSIS — R51.9 HEADACHE, UNSPECIFIED: ICD-10-CM

## 2022-02-25 DIAGNOSIS — Z90.5 ACQUIRED ABSENCE OF KIDNEY: Chronic | ICD-10-CM

## 2022-02-25 DIAGNOSIS — M06.9 RHEUMATOID ARTHRITIS, UNSPECIFIED: ICD-10-CM

## 2022-02-25 DIAGNOSIS — I51.81 TAKOTSUBO SYNDROME: ICD-10-CM

## 2022-02-25 DIAGNOSIS — F32.9 MAJOR DEPRESSIVE DISORDER, SINGLE EPISODE, UNSPECIFIED: ICD-10-CM

## 2022-02-25 DIAGNOSIS — R29.810 FACIAL WEAKNESS: ICD-10-CM

## 2022-02-25 DIAGNOSIS — Z88.0 ALLERGY STATUS TO PENICILLIN: ICD-10-CM

## 2022-02-25 DIAGNOSIS — Z82.49 FAMILY HISTORY OF ISCHEMIC HEART DISEASE AND OTHER DISEASES OF THE CIRCULATORY SYSTEM: ICD-10-CM

## 2022-02-25 DIAGNOSIS — Z98.49 CATARACT EXTRACTION STATUS, UNSPECIFIED EYE: Chronic | ICD-10-CM

## 2022-02-25 DIAGNOSIS — Z90.5 ACQUIRED ABSENCE OF KIDNEY: ICD-10-CM

## 2022-02-25 DIAGNOSIS — R03.0 ELEVATED BLOOD-PRESSURE READING, WITHOUT DIAGNOSIS OF HYPERTENSION: ICD-10-CM

## 2022-02-25 DIAGNOSIS — I25.10 ATHEROSCLEROTIC HEART DISEASE OF NATIVE CORONARY ARTERY WITHOUT ANGINA PECTORIS: ICD-10-CM

## 2022-02-25 DIAGNOSIS — I48.91 UNSPECIFIED ATRIAL FIBRILLATION: ICD-10-CM

## 2022-02-25 DIAGNOSIS — Z88.8 ALLERGY STATUS TO OTHER DRUGS, MEDICAMENTS AND BIOLOGICAL SUBSTANCES: ICD-10-CM

## 2022-02-25 DIAGNOSIS — Z98.89 OTHER SPECIFIED POSTPROCEDURAL STATES: Chronic | ICD-10-CM

## 2022-02-25 DIAGNOSIS — F41.9 ANXIETY DISORDER, UNSPECIFIED: ICD-10-CM

## 2022-02-25 LAB
ADD ON TEST-SPECIMEN IN LAB: SIGNIFICANT CHANGE UP
APPEARANCE UR: CLEAR — SIGNIFICANT CHANGE UP
APTT BLD: 38.5 SEC — HIGH (ref 27.5–35.5)
BASOPHILS # BLD AUTO: 0.05 K/UL — SIGNIFICANT CHANGE UP (ref 0–0.2)
BASOPHILS NFR BLD AUTO: 0.6 % — SIGNIFICANT CHANGE UP (ref 0–2)
BILIRUB UR-MCNC: NEGATIVE — SIGNIFICANT CHANGE UP
COLOR SPEC: YELLOW — SIGNIFICANT CHANGE UP
DIFF PNL FLD: ABNORMAL
EOSINOPHIL # BLD AUTO: 0.43 K/UL — SIGNIFICANT CHANGE UP (ref 0–0.5)
EOSINOPHIL NFR BLD AUTO: 5.5 % — SIGNIFICANT CHANGE UP (ref 0–6)
GLUCOSE UR QL: NEGATIVE — SIGNIFICANT CHANGE UP
HCT VFR BLD CALC: 41.3 % — SIGNIFICANT CHANGE UP (ref 34.5–45)
HGB BLD-MCNC: 13 G/DL — SIGNIFICANT CHANGE UP (ref 11.5–15.5)
IMM GRANULOCYTES NFR BLD AUTO: 0.3 % — SIGNIFICANT CHANGE UP (ref 0–1.5)
INR BLD: 2.32 RATIO — HIGH (ref 0.88–1.16)
KETONES UR-MCNC: NEGATIVE — SIGNIFICANT CHANGE UP
LEUKOCYTE ESTERASE UR-ACNC: ABNORMAL
LYMPHOCYTES # BLD AUTO: 1.85 K/UL — SIGNIFICANT CHANGE UP (ref 1–3.3)
LYMPHOCYTES # BLD AUTO: 23.7 % — SIGNIFICANT CHANGE UP (ref 13–44)
MCHC RBC-ENTMCNC: 30.2 PG — SIGNIFICANT CHANGE UP (ref 27–34)
MCHC RBC-ENTMCNC: 31.5 GM/DL — LOW (ref 32–36)
MCV RBC AUTO: 96 FL — SIGNIFICANT CHANGE UP (ref 80–100)
MONOCYTES # BLD AUTO: 0.89 K/UL — SIGNIFICANT CHANGE UP (ref 0–0.9)
MONOCYTES NFR BLD AUTO: 11.4 % — SIGNIFICANT CHANGE UP (ref 2–14)
NEUTROPHILS # BLD AUTO: 4.55 K/UL — SIGNIFICANT CHANGE UP (ref 1.8–7.4)
NEUTROPHILS NFR BLD AUTO: 58.5 % — SIGNIFICANT CHANGE UP (ref 43–77)
NITRITE UR-MCNC: NEGATIVE — SIGNIFICANT CHANGE UP
PH UR: 5 — SIGNIFICANT CHANGE UP (ref 5–8)
PLATELET # BLD AUTO: 234 K/UL — SIGNIFICANT CHANGE UP (ref 150–400)
PROT UR-MCNC: 30 MG/DL
PROTHROM AB SERPL-ACNC: 27.2 SEC — HIGH (ref 10.5–13.4)
RBC # BLD: 4.3 M/UL — SIGNIFICANT CHANGE UP (ref 3.8–5.2)
RBC # FLD: 15.9 % — HIGH (ref 10.3–14.5)
SARS-COV-2 RNA SPEC QL NAA+PROBE: SIGNIFICANT CHANGE UP
SP GR SPEC: 1.01 — SIGNIFICANT CHANGE UP (ref 1.01–1.02)
TROPONIN I, HIGH SENSITIVITY RESULT: 63.05 NG/L — HIGH
TROPONIN I, HIGH SENSITIVITY RESULT: 64.16 NG/L — HIGH
UROBILINOGEN FLD QL: NEGATIVE — SIGNIFICANT CHANGE UP
WBC # BLD: 7.79 K/UL — SIGNIFICANT CHANGE UP (ref 3.8–10.5)
WBC # FLD AUTO: 7.79 K/UL — SIGNIFICANT CHANGE UP (ref 3.8–10.5)

## 2022-02-25 PROCEDURE — 87186 SC STD MICRODIL/AGAR DIL: CPT

## 2022-02-25 PROCEDURE — 99285 EMERGENCY DEPT VISIT HI MDM: CPT | Mod: 25

## 2022-02-25 PROCEDURE — U0005: CPT

## 2022-02-25 PROCEDURE — 81001 URINALYSIS AUTO W/SCOPE: CPT

## 2022-02-25 PROCEDURE — 83880 ASSAY OF NATRIURETIC PEPTIDE: CPT

## 2022-02-25 PROCEDURE — 80162 ASSAY OF DIGOXIN TOTAL: CPT

## 2022-02-25 PROCEDURE — G0378: CPT

## 2022-02-25 PROCEDURE — 83735 ASSAY OF MAGNESIUM: CPT | Mod: 91

## 2022-02-25 PROCEDURE — 83690 ASSAY OF LIPASE: CPT

## 2022-02-25 PROCEDURE — 71045 X-RAY EXAM CHEST 1 VIEW: CPT | Mod: 26

## 2022-02-25 PROCEDURE — 36415 COLL VENOUS BLD VENIPUNCTURE: CPT

## 2022-02-25 PROCEDURE — 85730 THROMBOPLASTIN TIME PARTIAL: CPT

## 2022-02-25 PROCEDURE — 70450 CT HEAD/BRAIN W/O DYE: CPT | Mod: MA

## 2022-02-25 PROCEDURE — 86901 BLOOD TYPING SEROLOGIC RH(D): CPT

## 2022-02-25 PROCEDURE — 99236 HOSP IP/OBS SAME DATE HI 85: CPT

## 2022-02-25 PROCEDURE — 70450 CT HEAD/BRAIN W/O DYE: CPT | Mod: 26,MA

## 2022-02-25 PROCEDURE — 93010 ELECTROCARDIOGRAM REPORT: CPT

## 2022-02-25 PROCEDURE — 93005 ELECTROCARDIOGRAM TRACING: CPT

## 2022-02-25 PROCEDURE — 85027 COMPLETE CBC AUTOMATED: CPT

## 2022-02-25 PROCEDURE — 80053 COMPREHEN METABOLIC PANEL: CPT

## 2022-02-25 PROCEDURE — 85610 PROTHROMBIN TIME: CPT

## 2022-02-25 PROCEDURE — 84443 ASSAY THYROID STIM HORMONE: CPT

## 2022-02-25 PROCEDURE — 96374 THER/PROPH/DIAG INJ IV PUSH: CPT

## 2022-02-25 PROCEDURE — 99285 EMERGENCY DEPT VISIT HI MDM: CPT

## 2022-02-25 PROCEDURE — 82962 GLUCOSE BLOOD TEST: CPT

## 2022-02-25 PROCEDURE — 71045 X-RAY EXAM CHEST 1 VIEW: CPT

## 2022-02-25 PROCEDURE — 84484 ASSAY OF TROPONIN QUANT: CPT | Mod: 91

## 2022-02-25 PROCEDURE — U0003: CPT

## 2022-02-25 PROCEDURE — 86850 RBC ANTIBODY SCREEN: CPT

## 2022-02-25 PROCEDURE — 85025 COMPLETE CBC W/AUTO DIFF WBC: CPT

## 2022-02-25 PROCEDURE — 86900 BLOOD TYPING SEROLOGIC ABO: CPT

## 2022-02-25 PROCEDURE — 87086 URINE CULTURE/COLONY COUNT: CPT

## 2022-02-25 RX ORDER — PANTOPRAZOLE SODIUM 20 MG/1
40 TABLET, DELAYED RELEASE ORAL
Refills: 0 | Status: DISCONTINUED | OUTPATIENT
Start: 2022-02-25 | End: 2022-02-26

## 2022-02-25 RX ORDER — CARVEDILOL PHOSPHATE 80 MG/1
6.25 CAPSULE, EXTENDED RELEASE ORAL AT BEDTIME
Refills: 0 | Status: DISCONTINUED | OUTPATIENT
Start: 2022-02-25 | End: 2022-02-25

## 2022-02-25 RX ORDER — ONDANSETRON 8 MG/1
4 TABLET, FILM COATED ORAL EVERY 8 HOURS
Refills: 0 | Status: DISCONTINUED | OUTPATIENT
Start: 2022-02-25 | End: 2022-02-26

## 2022-02-25 RX ORDER — ACETAMINOPHEN 500 MG
650 TABLET ORAL EVERY 6 HOURS
Refills: 0 | Status: DISCONTINUED | OUTPATIENT
Start: 2022-02-25 | End: 2022-02-26

## 2022-02-25 RX ORDER — AMLODIPINE BESYLATE 2.5 MG/1
5 TABLET ORAL DAILY
Refills: 0 | Status: DISCONTINUED | OUTPATIENT
Start: 2022-02-25 | End: 2022-02-26

## 2022-02-25 RX ORDER — SODIUM CHLORIDE 9 MG/ML
500 INJECTION INTRAMUSCULAR; INTRAVENOUS; SUBCUTANEOUS ONCE
Refills: 0 | Status: COMPLETED | OUTPATIENT
Start: 2022-02-25 | End: 2022-02-25

## 2022-02-25 RX ORDER — ASPIRIN/CALCIUM CARB/MAGNESIUM 324 MG
81 TABLET ORAL DAILY
Refills: 0 | Status: DISCONTINUED | OUTPATIENT
Start: 2022-02-25 | End: 2022-02-26

## 2022-02-25 RX ORDER — SERTRALINE 25 MG/1
50 TABLET, FILM COATED ORAL DAILY
Refills: 0 | Status: DISCONTINUED | OUTPATIENT
Start: 2022-02-25 | End: 2022-02-26

## 2022-02-25 RX ORDER — DOCUSATE SODIUM 100 MG
1 CAPSULE ORAL
Qty: 0 | Refills: 0 | DISCHARGE

## 2022-02-25 RX ORDER — FUROSEMIDE 40 MG
1 TABLET ORAL
Qty: 0 | Refills: 0 | DISCHARGE

## 2022-02-25 RX ORDER — CALCIUM CARBONATE 500(1250)
1 TABLET ORAL
Refills: 0 | Status: DISCONTINUED | OUTPATIENT
Start: 2022-02-25 | End: 2022-02-26

## 2022-02-25 RX ORDER — OMEGA-3 ACID ETHYL ESTERS 1 G
2 CAPSULE ORAL
Refills: 0 | Status: DISCONTINUED | OUTPATIENT
Start: 2022-02-25 | End: 2022-02-26

## 2022-02-25 RX ORDER — CALCIUM CARBONATE 500(1250)
1 TABLET ORAL
Qty: 0 | Refills: 0 | DISCHARGE

## 2022-02-25 RX ORDER — CALCIUM CARBONATE 500(1250)
1 TABLET ORAL
Refills: 0 | Status: DISCONTINUED | OUTPATIENT
Start: 2022-02-25 | End: 2022-02-25

## 2022-02-25 RX ORDER — FUROSEMIDE 40 MG
20 TABLET ORAL DAILY
Refills: 0 | Status: DISCONTINUED | OUTPATIENT
Start: 2022-02-25 | End: 2022-02-26

## 2022-02-25 RX ORDER — CHOLECALCIFEROL (VITAMIN D3) 125 MCG
1000 CAPSULE ORAL DAILY
Refills: 0 | Status: DISCONTINUED | OUTPATIENT
Start: 2022-02-25 | End: 2022-02-26

## 2022-02-25 RX ORDER — CHOLESTYRAMINE 4 G/9G
4 POWDER, FOR SUSPENSION ORAL
Refills: 0 | Status: DISCONTINUED | OUTPATIENT
Start: 2022-02-25 | End: 2022-02-26

## 2022-02-25 RX ORDER — CARVEDILOL PHOSPHATE 80 MG/1
3.12 CAPSULE, EXTENDED RELEASE ORAL DAILY
Refills: 0 | Status: DISCONTINUED | OUTPATIENT
Start: 2022-02-25 | End: 2022-02-25

## 2022-02-25 RX ORDER — APIXABAN 2.5 MG/1
2.5 TABLET, FILM COATED ORAL
Refills: 0 | Status: DISCONTINUED | OUTPATIENT
Start: 2022-02-25 | End: 2022-02-25

## 2022-02-25 RX ORDER — CHOLECALCIFEROL (VITAMIN D3) 125 MCG
1 CAPSULE ORAL
Qty: 0 | Refills: 0 | DISCHARGE

## 2022-02-25 RX ORDER — ACETAMINOPHEN 500 MG
1 TABLET ORAL
Qty: 0 | Refills: 0 | DISCHARGE

## 2022-02-25 RX ORDER — CARVEDILOL PHOSPHATE 80 MG/1
2 CAPSULE, EXTENDED RELEASE ORAL
Qty: 0 | Refills: 0 | DISCHARGE

## 2022-02-25 RX ORDER — ONDANSETRON 8 MG/1
4 TABLET, FILM COATED ORAL ONCE
Refills: 0 | Status: COMPLETED | OUTPATIENT
Start: 2022-02-25 | End: 2022-02-25

## 2022-02-25 RX ORDER — SERTRALINE 25 MG/1
1 TABLET, FILM COATED ORAL
Qty: 0 | Refills: 0 | DISCHARGE

## 2022-02-25 RX ORDER — METOPROLOL TARTRATE 50 MG
100 TABLET ORAL DAILY
Refills: 0 | Status: DISCONTINUED | OUTPATIENT
Start: 2022-02-25 | End: 2022-02-26

## 2022-02-25 RX ORDER — ASPIRIN/CALCIUM CARB/MAGNESIUM 324 MG
1 TABLET ORAL
Qty: 0 | Refills: 0 | DISCHARGE

## 2022-02-25 RX ORDER — LANOLIN ALCOHOL/MO/W.PET/CERES
5 CREAM (GRAM) TOPICAL AT BEDTIME
Refills: 0 | Status: DISCONTINUED | OUTPATIENT
Start: 2022-02-25 | End: 2022-02-26

## 2022-02-25 RX ORDER — APIXABAN 2.5 MG/1
5 TABLET, FILM COATED ORAL
Refills: 0 | Status: DISCONTINUED | OUTPATIENT
Start: 2022-02-25 | End: 2022-02-26

## 2022-02-25 RX ORDER — SERTRALINE 25 MG/1
100 TABLET, FILM COATED ORAL DAILY
Refills: 0 | Status: DISCONTINUED | OUTPATIENT
Start: 2022-02-25 | End: 2022-02-25

## 2022-02-25 RX ADMIN — Medication 1 TABLET(S): at 23:04

## 2022-02-25 RX ADMIN — ONDANSETRON 4 MILLIGRAM(S): 8 TABLET, FILM COATED ORAL at 11:53

## 2022-02-25 RX ADMIN — CHOLESTYRAMINE 4 GRAM(S): 4 POWDER, FOR SUSPENSION ORAL at 22:52

## 2022-02-25 RX ADMIN — Medication 5 MILLIGRAM(S): at 23:05

## 2022-02-25 RX ADMIN — SODIUM CHLORIDE 500 MILLILITER(S): 9 INJECTION INTRAMUSCULAR; INTRAVENOUS; SUBCUTANEOUS at 11:53

## 2022-02-25 RX ADMIN — Medication 2 GRAM(S): at 22:53

## 2022-02-25 RX ADMIN — APIXABAN 5 MILLIGRAM(S): 2.5 TABLET, FILM COATED ORAL at 23:04

## 2022-02-25 RX ADMIN — AMLODIPINE BESYLATE 5 MILLIGRAM(S): 2.5 TABLET ORAL at 17:42

## 2022-02-25 RX ADMIN — Medication 20 MILLIGRAM(S): at 17:42

## 2022-02-25 NOTE — H&P ADULT - NSHPPHYSICALEXAM_GEN_ALL_CORE
Vital Signs Last 24 Hrs  T(C): 36.8 (25 Feb 2022 14:35), Max: 36.9 (25 Feb 2022 10:52)  T(F): 98.2 (25 Feb 2022 14:35), Max: 98.4 (25 Feb 2022 10:52)  HR: 72 (25 Feb 2022 14:35) (68 - 72)  BP: 162/78 (25 Feb 2022 14:35) (162/78 - 172/94)  BP(mean): 114 (25 Feb 2022 11:17) (114 - 114)  RR: 17 (25 Feb 2022 14:35) (17 - 20)  SpO2: 97% (25 Feb 2022 14:35) (95% - 98%)    General: WN/WD NAD  Head- Atraumatic, normocephalic   Neurology: A&Ox3, nonfocal, CN II to XII intact, power intact 5/5 in all muscle group, mild left sided droop, right lower extremity 3/5 strength, left lower extremity 4/5 strength, right upper extremity 4/5, left upper extremity 4/5 strength, ROM intact, sensation normal  HEENT- PERRLA, moist mucous membrane  Neck-supple, no JVD  Respiratory: No respiratory distress, No wheezing, no rhonci or rales, Air entry equal b/l, CTA   CVS:  Irregularly irregular, S1, S2, no murmurs  Abdominal: Soft, NT, ND +BS,   Genitourinary- voiding, non palpable bladder  Extremities: 1+ pitting edema over lower extremities, + peripheral pulses  Skin- no rash, no ulcer  Psychiatric- mood stable   LN- no lymphadenopathy

## 2022-02-25 NOTE — ED PROVIDER NOTE - CLINICAL SUMMARY MEDICAL DECISION MAKING FREE TEXT BOX
82 y/o female with HA and nausea, resolved upon arrival. As pt on ASA and Eliquis, pt brought to CT. Plan: CT, chest XR, EKG, labs, reeval. 82 y/o female with HA and nausea, resolved upon arrival. As pt on ASA and Eliquis, pt brought to CT. Plan: CT head, chest XR, EKG, labs, reeval.

## 2022-02-25 NOTE — PATIENT PROFILE ADULT - FUNCTIONAL ASSESSMENT - DAILY ACTIVITY 2.
Attempted to call pt, no answer. Got a busy signal and could not leave a voicemail message.     Multiple attempts to contact pt with no answer.     Encounter closed.    2 = A lot of assistance

## 2022-02-25 NOTE — PATIENT PROFILE ADULT - FALL HARM RISK - HARM RISK INTERVENTIONS
Assistance with ambulation/Assistance OOB with selected safe patient handling equipment/Communicate Risk of Fall with Harm to all staff/Discuss with provider need for PT consult/Monitor gait and stability/Reinforce activity limits and safety measures with patient and family/Tailored Fall Risk Interventions/Visual Cue: Yellow wristband and red socks/Bed in lowest position, wheels locked, appropriate side rails in place/Call bell, personal items and telephone in reach/Instruct patient to call for assistance before getting out of bed or chair/Non-slip footwear when patient is out of bed/[place] to call system/Physically safe environment - no spills, clutter or unnecessary equipment/Purposeful Proactive Rounding/Room/bathroom lighting operational, light cord in reach

## 2022-02-25 NOTE — H&P ADULT - ASSESSMENT
79 y/o female with PMH of Moderate MR, AR,  CAD s/p PCI, chronic Afib anticoagulated on Eliquis, on Digoxin, h/o stress induced cardiomyopathy , h/o RCC s/p surgery, HTN, RA , depression , anxiety, presents from nursing home via EMS after complaining of nausea, headache and shortness of breath with an elevated blood pressure of 170/118.    Pt's GI symptoms and EKG changes suggestive of adverse effect of Digoxin (level 2.09). Pt is on Lasix. Pt's EKG has scooped ST depressions in I, II and T wave inversions V3-V6 compared to ekg from 8/9/21 which are findings suggestive of effects of Digoxin vs ischemia. Troponin is negative. Pt does not have any chest pain. Pt's left sided mild facial droop is pt's baseline as per daughter although pt has no hx of CVA. Pt has chronic AFIB fully anticoagulated on Eliquis. Pt currently without symptoms. Will hold Digoxin. Check Mg. K normal, renal function normal. No QT changes on EKG. Consult cardiology.    EKG interpretation: AFIB, rate 64, QTc 400 ms, Left axis deviation, scooped ST depressions in I, II and T wave inversions V3-V6. 79 y/o female with PMH of Moderate MR, AR,  CAD s/p PCI, chronic Afib anticoagulated on Eliquis, on Digoxin, h/o stress induced cardiomyopathy , h/o RCC s/p surgery, HTN, RA , depression , anxiety, presents from nursing home via EMS after complaining of nausea, headache and shortness of breath with an elevated blood pressure of 170/118.    Pt's GI symptoms and EKG changes suggestive of adverse effect of Digoxin (level 2.09). Pt is on Lasix. Pt's EKG has scooped ST depressions in I, II and T wave inversions V3-V6 compared to ekg from 8/9/21 which are findings suggestive of effects of Digoxin vs ischemia. Troponin is negative. Pt does not have any chest pain. Pt's left sided mild facial droop is pt's baseline as per daughter although pt has no hx of CVA. Pt has chronic AFIB fully anticoagulated on Eliquis. Pt currently without symptoms. Will hold Digoxin. Check Mg. K normal, renal function normal. No QT changes on EKG. Consult cardiology.    EKG interpretation: AFIB, rate 64, QTc 400 ms, Left axis deviation, scooped ST depressions in I, II and T wave inversions V3-V6.    CT Head: No acute hemorrhage or infarct  CXR: No effusions, no infiltrates 81 y/o female with PMH of Moderate MR, AR,  CAD s/p PCI, chronic Afib anticoagulated on Eliquis, on Digoxin, h/o stress induced cardiomyopathy , h/o RCC s/p surgery, HTN, RA , depression , anxiety, presents from nursing home via EMS after complaining of nausea, headache and shortness of breath with an elevated blood pressure of 170/118.    Pt's GI symptoms and EKG changes suggestive of adverse effect of Digoxin (level 2.09) vs myocardial ischemia. Pt is on Lasix. Pt's EKG has scooped ST depressions in I, II and T wave inversions V3-V6 compared to ekg from 8/9/21 which are findings suggestive of effects of Digoxin vs ischemia. 1st Troponin is negative. Pt does not have any chest pain. Pt's left sided mild facial droop is pt's baseline as per daughter although pt has no hx of CVA. Pt has chronic AFIB fully anticoagulated on Eliquis. Pt currently without symptoms. Will hold Digoxin. Check Mg. K normal, renal function normal. No QT changes on EKG. Consult cardiology.    EKG interpretation: AFIB, rate 64, QTc 400 ms, Left axis deviation, scooped ST depressions in I, II and T wave inversions V3-V6.    CT Head: No acute hemorrhage or infarct  CXR: No effusions, no infiltrates 79 y/o female with PMH of Moderate MR, AR,  CAD s/p PCI, chronic Afib anticoagulated on Eliquis, on Digoxin, h/o stress induced cardiomyopathy , h/o RCC s/p surgery, HTN, RA , depression , anxiety, presents from nursing home via EMS after complaining of nausea, headache and shortness of breath with an elevated blood pressure of 170/118.    Pt's GI symptoms and EKG changes suggestive of adverse effect of Digoxin (level 2.09) vs myocardial ischemia. Pt is on Lasix. Pt's EKG has scooped ST depressions in I, II and ST depression of 1 mm in and T wave inversions V3-V6 compared to ekg from 8/9/21 which are findings suggestive of effects of Digoxin vs ischemia. 1st Troponin is negative. Pt does not have any chest pain. Pt's left sided mild facial droop is pt's baseline as per daughter although pt has no hx of CVA. Pt has chronic AFIB fully anticoagulated on Eliquis. Pt currently without symptoms. Will hold Digoxin. Check Mg. K normal, renal function normal. No QT changes on EKG. Consult cardiology.    EKG interpretation: AFIB, rate 64, QTc 400 ms, Left axis deviation, scooped ST depressions in I, II and ST depression 1 mm in V3-V6 with T wave inversions V3-V6.    CT Head: No acute hemorrhage or infarct  CXR: No effusions, no infiltrates

## 2022-02-25 NOTE — ED PROVIDER NOTE - CONSTITUTIONAL, MLM
Well appearing, awake, alert, oriented to person, place, time/situation and in no apparent distress. normal... Well appearing, awake, alert, oriented to person, place, not time and in no apparent distress.

## 2022-02-25 NOTE — PHARMACOTHERAPY INTERVENTION NOTE - COMMENTS
Medication reconciliation completed.  Reviewed Medication list and confirmed med allergies with patient; confirmed with Dr. First Medtiago. Medication reconciliation completed.  Reviewed Medication list and confirmed med allergies with list from Care Facility; confirmed with Dr. First Medtiago.

## 2022-02-25 NOTE — ED PROVIDER NOTE - NEUROLOGICAL, MLM
Alert and oriented, no focal deficits, no motor or sensory deficits. Alert and oriented to person, place, not time, no focal deficits, no motor or sensory deficits.

## 2022-02-25 NOTE — H&P ADULT - HISTORY OF PRESENT ILLNESS
79 y/o female with PMH of Moderate MR, AR,  CAD s/p PCI, chronic Afib anticoagulated on Eliquis, on Digoxin, h/o stress induced cardiomyopathy , h/o RCC s/p surgery, HTN, RA , depression , anxiety, presents from nursing home via EMS after complaining of nausea, headache and shortness of breath with an elevated blood pressure of 170/118. Hx provided by pt and daughter. Pt states she woke up this morning feeling short of breath and felt nauseous but did not vomit. Pt states she has had a decreased appetite as of late but denies abdominal pain, constipation or diarrhea. Pt denies having chest pressure/pain and pt states her symptoms have currently resolved. Pt was noted by EMS to have left facial droop but daughter states pt appears to be at baseline with mild droop, no hx of CVA. Denies new extremity weakness, numbness, tingling, slurred speech, amnesia, incontinence, change in vision. Denies having COVID or any hx of URI sx recently such as cough, sorethroat, runny nose, fever, myalgias.

## 2022-02-25 NOTE — ED PROVIDER NOTE - OBJECTIVE STATEMENT
82 y/o female with PMHx of takotsubo syndrome, s/p coronary stent, CAD, urge incontinence of urine, depressive disorder, RA, OP, anxiety disorder, Afib on Eliquis, GERD, CHF, and spinal issues presents to ED [facility] from [facility] for evaluation. Per EMS, they were called for HA, nausea and HTN. BP at facility was 170/118. Upon EMS arrival, pt was noted to have a left facial droop, unknown last known normal. Pt notes HA now resolved. Denies CP, SOB, abd pain, weakness. On ASA and Eliquis. No other complaints at this time.

## 2022-02-25 NOTE — ED ADULT TRIAGE NOTE - CHIEF COMPLAINT QUOTE
Sent in from assisted living for AMS, headache, HTN. Last known well around 12 hours ago. On Eliquis for A-Fib. At triage, patient alert and oriented x2, unaware of date/time. Oriented to self and situation. States headache has resolved. Left sided facial droop noted, speech clear, denies any balance or vision changes. Moving all extremities with full sensation.  at triage. + BEFAST. MD Garcia at triage, no code stroke per MD. Patient taken to CT.

## 2022-02-25 NOTE — H&P ADULT - PROBLEM SELECTOR PLAN 2
Recheck level in AM.  Hold Digoxin for now.  Repeat EKG with next troponin.  Check Mg level.  Consult Cardiology.    Zofran prn Nausea/Vomiting.  Neuro checks.

## 2022-02-25 NOTE — H&P ADULT - PROBLEM SELECTOR PLAN 5
Trending down. No sign of End organ damage. Monitor and continue current home medications. Continue Lasix.  Daily weight.

## 2022-02-25 NOTE — H&P ADULT - PROBLEM SELECTOR PLAN 3
Continue Eliquis and Coreg for anticoagulation and rate control. Continue Eliquis and Metoprolol for anticoagulation and rate control. Pt on ASA and BB. Not on ACEi. Intolerant of statin.  - Tele monitoring  - Serial EKG with trops  - Cardiology consulted  - Pt currently has no symptoms: No CP, dyspnea, N/V, upset stomach, etc.

## 2022-02-25 NOTE — H&P ADULT - PROBLEM SELECTOR PLAN 1
Resolved. Pulse ox normal. Lung sounds normal. BNP elevated at 2220 but last baseline 2338 (August 2021). Hx of HFrEF. Do not suspect acute HF exacerbation at this time. Resolved. Pulse ox normal. Lung sounds normal. BNP elevated at 2220 but last baseline 2338 (August 2021). Hx of HFrEF. Do not suspect acute HF exacerbation at this time.  R/o ACS  - Trend Trops, EKG  - Continue ASA  - Tele monitoring  - Cardiology consulted (Dr. Turcios)

## 2022-02-25 NOTE — H&P ADULT - PROBLEM SELECTOR PLAN 4
Continue Lasix.  Daily weight. Continue Eliquis and Metoprolol for anticoagulation and rate control.

## 2022-02-25 NOTE — ED PROVIDER NOTE - ATTENDING CONTRIBUTION TO CARE
I, Tessie Garcia DO,  performed the initial face to face bedside interview with this patient regarding history of present illness, review of symptoms and relevant past medical, social and family history.  I completed an independent physical examination.  I was the initial provider who evaluated this patient. I have signed out the follow up of any pending tests (i.e. labs, radiological studies) to the resident.  I have communicated the patient’s plan of care and disposition with the resident.  The history, relevant review of systems, past medical and surgical history, medical decision making, and physical examination was documented by the scribe in my presence and I attest to the accuracy of the documentation.

## 2022-02-25 NOTE — ED PROVIDER NOTE - PROGRESS NOTE DETAILS
Radha DO: neuro exam non focal at this time; patient with new t wave inversions in V3-V6- changed from 2020; no chest pain; nausea improved at this time; headache improved; all results d/w daughter at bedside and plan of care discussed; will observe; cardiac monitoring; endorsed to Dr. Barnes for observation.

## 2022-02-25 NOTE — H&P ADULT - NSHPREVIEWOFSYSTEMS_GEN_ALL_CORE
Review of Systems:  CONSTITUTIONAL: No weakness, fevers or chills  EYES/ENT: No visual changes;  No vertigo or throat pain   NECK: No pain or stiffness  RESPIRATORY: No cough, wheezing, hemoptysis; + shortness of breath,   CARDIOVASCULAR: No chest pain or palpitations  GASTROINTESTINAL: No abdominal or epigastric pain. + nausea, No vomiting, or hematemesis; No diarrhea or constipation.   GENITOURINARY: No dysuria, frequency or hematuria  NEUROLOGICAL: No numbness or weakness  SKIN: No itching, burning, rashes, or lesions   All other review of systems is negative unless indicated above

## 2022-02-26 ENCOUNTER — TRANSCRIPTION ENCOUNTER (OUTPATIENT)
Age: 82
End: 2022-02-26

## 2022-02-26 VITALS
SYSTOLIC BLOOD PRESSURE: 149 MMHG | RESPIRATION RATE: 16 BRPM | HEART RATE: 81 BPM | OXYGEN SATURATION: 96 % | TEMPERATURE: 98 F | DIASTOLIC BLOOD PRESSURE: 75 MMHG

## 2022-02-26 DIAGNOSIS — R77.8 OTHER SPECIFIED ABNORMALITIES OF PLASMA PROTEINS: ICD-10-CM

## 2022-02-26 DIAGNOSIS — R11.0 NAUSEA: ICD-10-CM

## 2022-02-26 DIAGNOSIS — Z29.9 ENCOUNTER FOR PROPHYLACTIC MEASURES, UNSPECIFIED: ICD-10-CM

## 2022-02-26 LAB
ALBUMIN SERPL ELPH-MCNC: 2.8 G/DL — LOW (ref 3.3–5)
ALP SERPL-CCNC: 64 U/L — SIGNIFICANT CHANGE UP (ref 40–120)
ALT FLD-CCNC: 27 U/L — SIGNIFICANT CHANGE UP (ref 12–78)
ANION GAP SERPL CALC-SCNC: 8 MMOL/L — SIGNIFICANT CHANGE UP (ref 5–17)
AST SERPL-CCNC: 26 U/L — SIGNIFICANT CHANGE UP (ref 15–37)
BILIRUB SERPL-MCNC: 0.4 MG/DL — SIGNIFICANT CHANGE UP (ref 0.2–1.2)
BUN SERPL-MCNC: 16 MG/DL — SIGNIFICANT CHANGE UP (ref 7–23)
CALCIUM SERPL-MCNC: 9.4 MG/DL — SIGNIFICANT CHANGE UP (ref 8.5–10.1)
CHLORIDE SERPL-SCNC: 110 MMOL/L — HIGH (ref 96–108)
CO2 SERPL-SCNC: 24 MMOL/L — SIGNIFICANT CHANGE UP (ref 22–31)
CREAT SERPL-MCNC: 0.84 MG/DL — SIGNIFICANT CHANGE UP (ref 0.5–1.3)
DIGOXIN SERPL-MCNC: 1.53 NG/ML — SIGNIFICANT CHANGE UP (ref 0.8–2)
GLUCOSE SERPL-MCNC: 105 MG/DL — HIGH (ref 70–99)
HCT VFR BLD CALC: 40.3 % — SIGNIFICANT CHANGE UP (ref 34.5–45)
HGB BLD-MCNC: 12.7 G/DL — SIGNIFICANT CHANGE UP (ref 11.5–15.5)
MAGNESIUM SERPL-MCNC: 1.8 MG/DL — SIGNIFICANT CHANGE UP (ref 1.6–2.6)
MCHC RBC-ENTMCNC: 30.4 PG — SIGNIFICANT CHANGE UP (ref 27–34)
MCHC RBC-ENTMCNC: 31.5 GM/DL — LOW (ref 32–36)
MCV RBC AUTO: 96.4 FL — SIGNIFICANT CHANGE UP (ref 80–100)
PLATELET # BLD AUTO: 228 K/UL — SIGNIFICANT CHANGE UP (ref 150–400)
POTASSIUM SERPL-MCNC: 3.6 MMOL/L — SIGNIFICANT CHANGE UP (ref 3.5–5.3)
POTASSIUM SERPL-SCNC: 3.6 MMOL/L — SIGNIFICANT CHANGE UP (ref 3.5–5.3)
PROT SERPL-MCNC: 6.6 GM/DL — SIGNIFICANT CHANGE UP (ref 6–8.3)
RBC # BLD: 4.18 M/UL — SIGNIFICANT CHANGE UP (ref 3.8–5.2)
RBC # FLD: 15.6 % — HIGH (ref 10.3–14.5)
SODIUM SERPL-SCNC: 142 MMOL/L — SIGNIFICANT CHANGE UP (ref 135–145)
WBC # BLD: 8.5 K/UL — SIGNIFICANT CHANGE UP (ref 3.8–10.5)
WBC # FLD AUTO: 8.5 K/UL — SIGNIFICANT CHANGE UP (ref 3.8–10.5)

## 2022-02-26 PROCEDURE — 99233 SBSQ HOSP IP/OBS HIGH 50: CPT

## 2022-02-26 PROCEDURE — 93010 ELECTROCARDIOGRAM REPORT: CPT

## 2022-02-26 RX ORDER — ASPIRIN/CALCIUM CARB/MAGNESIUM 324 MG
81 TABLET ORAL DAILY
Refills: 0 | Status: DISCONTINUED | OUTPATIENT
Start: 2022-02-26 | End: 2022-02-26

## 2022-02-26 RX ORDER — AMLODIPINE BESYLATE 2.5 MG/1
10 TABLET ORAL DAILY
Refills: 0 | Status: DISCONTINUED | OUTPATIENT
Start: 2022-02-26 | End: 2022-02-26

## 2022-02-26 RX ORDER — DIGOXIN 250 MCG
1 TABLET ORAL
Qty: 0 | Refills: 0 | DISCHARGE

## 2022-02-26 RX ORDER — LIDOCAINE 4 G/100G
1 CREAM TOPICAL
Qty: 0 | Refills: 0 | DISCHARGE

## 2022-02-26 RX ORDER — AMLODIPINE BESYLATE 2.5 MG/1
1 TABLET ORAL
Qty: 0 | Refills: 0 | DISCHARGE
Start: 2022-02-26

## 2022-02-26 RX ORDER — CYCLOBENZAPRINE HYDROCHLORIDE 10 MG/1
1 TABLET, FILM COATED ORAL
Qty: 0 | Refills: 0 | DISCHARGE
Start: 2022-02-26

## 2022-02-26 RX ORDER — CALCIUM CARBONATE 500(1250)
1 TABLET ORAL THREE TIMES A DAY
Refills: 0 | Status: DISCONTINUED | OUTPATIENT
Start: 2022-02-26 | End: 2022-02-26

## 2022-02-26 RX ORDER — NYSTATIN CREAM 100000 [USP'U]/G
1 CREAM TOPICAL
Qty: 0 | Refills: 0 | DISCHARGE

## 2022-02-26 RX ORDER — ACETAMINOPHEN 500 MG
2 TABLET ORAL
Qty: 0 | Refills: 0 | DISCHARGE

## 2022-02-26 RX ORDER — APIXABAN 2.5 MG/1
2.5 TABLET, FILM COATED ORAL
Refills: 0 | Status: DISCONTINUED | OUTPATIENT
Start: 2022-02-26 | End: 2022-02-26

## 2022-02-26 RX ORDER — APIXABAN 2.5 MG/1
5 TABLET, FILM COATED ORAL
Refills: 0 | Status: DISCONTINUED | OUTPATIENT
Start: 2022-02-26 | End: 2022-02-26

## 2022-02-26 RX ORDER — CYCLOBENZAPRINE HYDROCHLORIDE 10 MG/1
5 TABLET, FILM COATED ORAL THREE TIMES A DAY
Refills: 0 | Status: DISCONTINUED | OUTPATIENT
Start: 2022-02-26 | End: 2022-02-26

## 2022-02-26 RX ORDER — FUROSEMIDE 40 MG
1 TABLET ORAL
Qty: 0 | Refills: 0 | DISCHARGE
Start: 2022-02-26

## 2022-02-26 RX ADMIN — PANTOPRAZOLE SODIUM 40 MILLIGRAM(S): 20 TABLET, DELAYED RELEASE ORAL at 11:17

## 2022-02-26 RX ADMIN — Medication 100 MILLIGRAM(S): at 11:19

## 2022-02-26 RX ADMIN — AMLODIPINE BESYLATE 5 MILLIGRAM(S): 2.5 TABLET ORAL at 11:12

## 2022-02-26 RX ADMIN — Medication 1000 UNIT(S): at 11:16

## 2022-02-26 RX ADMIN — Medication 20 MILLIGRAM(S): at 11:16

## 2022-02-26 RX ADMIN — CHOLESTYRAMINE 4 GRAM(S): 4 POWDER, FOR SUSPENSION ORAL at 11:15

## 2022-02-26 RX ADMIN — Medication 2 GRAM(S): at 11:18

## 2022-02-26 RX ADMIN — Medication 1 TABLET(S): at 11:17

## 2022-02-26 RX ADMIN — Medication 1 TABLET(S): at 11:14

## 2022-02-26 RX ADMIN — SERTRALINE 50 MILLIGRAM(S): 25 TABLET, FILM COATED ORAL at 11:20

## 2022-02-26 RX ADMIN — APIXABAN 5 MILLIGRAM(S): 2.5 TABLET, FILM COATED ORAL at 11:12

## 2022-02-26 RX ADMIN — Medication 81 MILLIGRAM(S): at 11:12

## 2022-02-26 NOTE — DISCHARGE NOTE PROVIDER - HOSPITAL COURSE
81 y/o female with PMH of Moderate MR, AR,  CAD s/p PCI, chronic Afib anticoagulated on Eliquis, on Digoxin, h/o stress induced cardiomyopathy , h/o RCC s/p surgery, HTN, RA , depression , anxiety, presents from nursing home via EMS after complaining of nausea, headache and shortness of breath with an elevated blood pressure of 170/118.    Pt's GI symptoms and EKG changes suggestive of adverse effect of Digoxin (level 2.09) vs myocardial ischemia. Pt is on Lasix. Pt's EKG has scooped ST depressions in I, II and ST depression of 1 mm in and T wave inversions V3-V6 compared to ekg from 8/9/21 which are findings suggestive of effects of Digoxin vs ischemia. 1st Troponin is negative. Pt does not have any chest pain. Pt's left sided mild facial droop is pt's baseline as per daughter although pt has no hx of CVA. Pt has chronic AFIB fully anticoagulated on Eliquis. Pt currently without symptoms. Will hold Digoxin. Mg normal. K normal, renal function normal. No QT changes on EKG. Consulted cardiology and d/w them no intervention    EKG interpretation: AFIB, rate 64, QTc 400 ms, Left axis deviation, scooped ST depressions in I, II and ST depression 1 mm in V3-V6 with T wave inversions V3-V6. No change in repeat EKG and repeat digoxin level was normal      81 y/o female with PMH of Moderate MR, AR,  CAD s/p PCI, chronic Afib anticoagulated on Eliquis, on Digoxin, h/o stress induced cardiomyopathy , h/o RCC s/p surgery, HTN, RA , depression , anxiety, presents from nursing home via EMS after complaining of nausea, headache and shortness of breath with an elevated blood pressure of 170/118.    Pt  was placed under OBSERVATION status at [facility]    Pt's GI symptoms and EKG changes suggestive of adverse effect of Digoxin (level 2.09) vs myocardial ischemia. Pt is on Lasix. Pt's EKG has scooped ST depressions in I, II and ST depression of 1 mm in and T wave inversions V3-V6 compared to ekg from 8/9/21 which are findings suggestive of effects of Digoxin vs ischemia. 1st Troponin is negative. Pt does not have any chest pain. Pt's left sided mild facial droop is pt's baseline as per daughter although pt has no hx of CVA. Pt has chronic AFIB fully anticoagulated on Eliquis. Pt currently without symptoms. Will hold Digoxin. Mg normal. K normal, renal function normal. No QT changes on EKG. Consulted cardiology and d/w them no intervention    EKG interpretation: AFIB, rate 64, QTc 400 ms, Left axis deviation, scooped ST depressions in I, II and ST depression 1 mm in V3-V6 with T wave inversions V3-V6. No change in repeat EKG and repeat digoxin level was normal

## 2022-02-26 NOTE — PROGRESS NOTE ADULT - PROBLEM SELECTOR PLAN 2
Slight increase but "flat"2nd and 3rd trops  Cardiology consult Dr Turcios pending  Pt denies chest pain at this time  follow up serial EKGs  continue aspirin metoprolol

## 2022-02-26 NOTE — DISCHARGE NOTE PROVIDER - CARE PROVIDER_API CALL
SAADIA ESCOBEDO [place] [id number], [address]  [place] [address]  Phone: [phone]  Fax: [phone]  Established Patient  Follow Up Time:

## 2022-02-26 NOTE — DISCHARGE NOTE NURSING/CASE MANAGEMENT/SOCIAL WORK - PATIENT PORTAL LINK FT
You can access the FollowMyHealth Patient Portal offered by [facility] by registering at the following website: [email or web address]. By joining [facility]’s FollowMyHealth portal, you will also be able to view your health information using other applications (apps) compatible with our system.

## 2022-02-26 NOTE — PROGRESS NOTE ADULT - SUBJECTIVE AND OBJECTIVE BOX
Patient is a 81y old  Female who presents with a chief complaint of Nausea, upset stomach (2022 14:26)      History, interim events and clinically pertinent issues reviewed; patient interviewed and examined.  81 F who presented w/ above, now denies any nausea vomiting abd pain. Denies chest pain or palpitations, cough, fever SOB GUPTA     ALLERGIES:  penicillin (Hives)  penicillins (Short breath; Rash)  statins (Unknown)  statins (Muscle Pain)    MEDICATIONS  (STANDING):  amLODIPine   Tablet 5 milliGRAM(s) Oral daily  apixaban 5 milliGRAM(s) Oral two times a day  aspirin  chewable 81 milliGRAM(s) Oral daily  aspirin enteric coated 81 milliGRAM(s) Oral daily  calcium carbonate   1250 mG (OsCal) 1 Tablet(s) Oral two times a day  cholecalciferol Oral Tab/Cap - Peds 1000 Unit(s) Oral daily  cholestyramine Powder (Sugar-Free) 4 Gram(s) Oral two times a day  furosemide    Tablet 20 milliGRAM(s) Oral daily  melatonin 5 milliGRAM(s) Oral at bedtime  metoprolol succinate  milliGRAM(s) Oral daily  multivitamin 1 Tablet(s) Oral daily  omega-3-Acid Ethyl Esters 2 Gram(s) Oral two times a day  pantoprazole    Tablet 40 milliGRAM(s) Oral before breakfast  sertraline 50 milliGRAM(s) Oral daily    MEDICATIONS  (PRN):  acetaminophen     Tablet .. 650 milliGRAM(s) Oral every 6 hours PRN Temp greater or equal to 38C (100.4F), Mild Pain (1 - 3)  aluminum hydroxide/magnesium hydroxide/simethicone Suspension 30 milliLiter(s) Oral every 4 hours PRN Dyspepsia  artificial tears (preservative free) Ophthalmic Solution 1 Drop(s) Both EYES daily PRN Dry Eyes  furosemide    Tablet 20 milliGRAM(s) Oral daily PRN Swelling  ondansetron Injectable 4 milliGRAM(s) IV Push every 8 hours PRN Nausea and/or Vomiting    Vital Signs Last 24 Hrs  T(F): 97.4 (2022 23:05), Max: 98.4 (2022 10:52)  HR: 68 (2022 23:05) (68 - 72)  BP: 150/94 (2022 23:05) (150/94 - 172/94)  RR: 17 (2022 23:05) (17 - 20)  SpO2: 95% (2022 23:05) (95% - 98%)  I&O's Summary    2022 07:01  -  2022 07:00  --------------------------------------------------------  IN: 0 mL / OUT: 475 mL / NET: -475 mL      BMI (kg/m2): 30.1 (22 @ 10:52)        POCT Blood Glucose.: 100 mg/dL (2022 10:55)    PHYSICAL EXAM:  General: NAD, A/O x 3  ENT: MMM  Neck: Supple, No JVD  Lungs: Clear to auscultation bilaterally  Cardio: RRR, S1/S2, No murmurs  Abdomen: Soft, Nontender, Nondistended; Bowel sounds present  Extremities: No calf tenderness, No  edema  Neuro: no new deficits     LABS:                        12.7   8.50  )-----------( 228      ( 2022 07:29 )             40.3           142  |  113  |  20  ----------------------------<  115  3.8   |  22  |  0.95    Ca    8.8      2022 11:37  Mg     1.8         TPro  6.7  /  Alb  2.8  /  TBili  0.4  /  DBili  x   /  AST  26  /  ALT  28  /  AlkPhos  64       eGFR if : 64 mL/min/1.73M2 (22 @ 11:37)  eGFR if Non African American: 55 mL/min/1.73M2 (22 @ 11:37)    PT/INR - ( 2022 10:53 )   PT: 27.2 sec;   INR: 2.32 ratio         PTT - ( 2022 10:53 )  PTT:38.5 sec     CARDIAC MARKERS ( 2022 18:28 )  x     / 63.05 ng/L / x     / x     / x      CARDIAC MARKERS ( 2022 14:52 )  x     / 64.16 ng/L / x     / x     / x      CARDIAC MARKERS ( 2022 11:37 )  x     / 43.35 ng/L / x     / x     / x            TSH 1.76   TSH with FT4 reflex --  Total T3 --            Urinalysis Basic - ( 2022 13:31 )    Color: Yellow / Appearance: Clear / S.015 / pH: x  Gluc: x / Ketone: Negative  / Bili: Negative / Urobili: Negative   Blood: x / Protein: 30 mg/dL / Nitrite: Negative   Leuk Esterase: Trace / RBC: 0-2 /HPF / WBC 0-2   Sq Epi: x / Non Sq Epi: Few / Bacteria: Moderate        COVID-19 PCR: NotDetec (22 @ 10:53)

## 2022-02-26 NOTE — PROGRESS NOTE ADULT - PROBLEM SELECTOR PLAN 5
Pt on ASA and BB. Not on ACEi. Intolerant of statin.  Serial EKG with trops  Cardiology consulted  Pt currently asymptomatic

## 2022-02-26 NOTE — CHART NOTE - NSCHARTNOTEFT_GEN_A_CORE
case d/w cardio no further intervention  After trying to reach [facility] all morning finally reached them and they are accepting her back

## 2022-02-26 NOTE — PROGRESS NOTE ADULT - PROBLEM SELECTOR PLAN 1
Resolved. Pulse ox normal. Lung sounds normal. BNP elevated at 2220 but last baseline 2338 (August 2021).

## 2022-02-26 NOTE — DISCHARGE NOTE NURSING/CASE MANAGEMENT/SOCIAL WORK - NSDCPEFALRISK_GEN_ALL_CORE
For information on Fall & Injury Prevention, visit: [email or web address] OR  [email or web address] OR  https://www.cdc.gov/steadi/patient.html

## 2022-02-26 NOTE — PROGRESS NOTE ADULT - ASSESSMENT
81 y/o female with PMH of Moderate MR, AR,  CAD s/p PCI, chronic Afib anticoagulated on Eliquis, on Digoxin, h/o stress induced cardiomyopathy , h/o RCC s/p surgery, HTN, RA , depression , anxiety, presents from nursing home via EMS after complaining of nausea, headache and shortness of breath with an elevated blood pressure of 170/118.    Pt's GI symptoms and EKG changes suggestive of adverse effect of Digoxin (level 2.09) vs myocardial ischemia. Pt is on Lasix. Pt's EKG has scooped ST depressions in I, II and ST depression of 1 mm in and T wave inversions V3-V6 compared to ekg from 8/9/21 which are findings suggestive of effects of Digoxin vs ischemia. 1st Troponin is negative. Pt does not have any chest pain. Pt's left sided mild facial droop is pt's baseline as per daughter although pt has no hx of CVA. Pt has chronic AFIB fully anticoagulated on Eliquis. Pt currently without symptoms. Will hold Digoxin. Mg normal. K normal, renal function normal. No QT changes on EKG. Consulted cardiology    EKG interpretation: AFIB, rate 64, QTc 400 ms, Left axis deviation, scooped ST depressions in I, II and ST depression 1 mm in V3-V6 with T wave inversions V3-V6.    CT Head: No acute hemorrhage or infarct  CXR: No effusions, no infiltrates

## 2022-02-26 NOTE — DISCHARGE NOTE PROVIDER - NSDCMRMEDTOKEN_GEN_ALL_CORE_FT
aspirin 81 mg oral tablet, chewable: 1 tab(s) orally once a day  cholestyramine 4 g/4.8 g oral powder for reconstitution: 4 gram(s) mixed in water orally 2 times a day  Eliquis 5 mg oral tablet: 1 tab(s) orally 2 times a day  furosemide 20 mg oral tablet: 1 tab(s) orally once a day  Melatonin 5 mg oral tablet: 1 tab(s) orally once a day (at bedtime)  Metoprolol Succinate  mg oral tablet, extended release: 1 tab(s) orally once a day  pantoprazole 40 mg oral delayed release tablet: 1 tab(s) orally once a day (before a meal)  Refresh ophthalmic solution: 1 drop(s) in each eye every 4 hours, As Needed  sertraline 50 mg oral tablet: 1 tab(s) orally once a day  Tums 500 oral tablet, chewable (obsolete): 1 tab(s) orally every 8 hours, As Needed - for heartburn   amLODIPine 10 mg oral tablet: 1 tab(s) orally once a day  aspirin 81 mg oral tablet, chewable: 1 tab(s) orally once a day  cholestyramine 4 g/4.8 g oral powder for reconstitution: 4 gram(s) mixed in water orally 2 times a day  cyclobenzaprine 5 mg oral tablet: 1 tab(s) orally 3 times a day, As needed, Muscle Spasm  Eliquis 5 mg oral tablet: 1 tab(s) orally 2 times a day  furosemide 20 mg oral tablet: 1 tab(s) orally once a day  Melatonin 5 mg oral tablet: 1 tab(s) orally once a day (at bedtime)  Metoprolol Succinate  mg oral tablet, extended release: 1 tab(s) orally once a day  pantoprazole 40 mg oral delayed release tablet: 1 tab(s) orally once a day (before a meal)  Refresh ophthalmic solution: 1 drop(s) in each eye every 4 hours, As Needed  sertraline 50 mg oral tablet: 1 tab(s) orally once a day  Tums 500 oral tablet, chewable (obsolete): 1 tab(s) orally every 8 hours, As Needed - for heartburn

## 2022-02-26 NOTE — DISCHARGE NOTE NURSING/CASE MANAGEMENT/SOCIAL WORK - NSDCVIVACCINE_GEN_ALL_CORE_FT
Tdap; 01-Jun-2018 11:12; Alfonso Wick (RN); [employer]; [id number]; IntraMuscular; Deltoid Right.; 0.5 milliLiter(s); VIS (VIS Published: 09-May-2013, VIS Presented: 01-Jun-2018);

## 2022-02-26 NOTE — CONSULT NOTE ADULT - SUBJECTIVE AND OBJECTIVE BOX
Patient is a 81y old  Female who presents with a chief complaint of Nausea, upset stomach (2022 13:23)    ________________________________  VENTURA DAVEY is a 79y year old Female with a past medical history of moderate aortic regurgitation, coronary artery disease status post remote PCI, Parox atrial fibrillation maintained on anticoagulation with Eliquis, history of stress-induced cardiomyopathy, with improvement in LVEF to >50% , moderate to severe mitral valve regurgitation, history of renal cell carcinoma status post surgery, prior tobacco abuse, dementia, hypertension, and osteoarthritis.    AAOx1-2 during eval so history limited.    She was sent in from nursing home via EMS after complaining of nausea, headache and shortness of breath with an elevated blood pressure of 170/118.  Pt states she woke up this morning feeling short of breath and felt nauseous but did not vomit. Pt states she has had a decreased appetite as of late but denies abdominal pain, constipation or diarrhea. Pt denies having chest pressure/pain and pt states her symptoms have currently resolved. Pt was noted by EMS to have left facial droop but daughter states pt appears to be at baseline with mild droop, no hx of CVA.    CT head neg.  BP elevated on arrival.   Cardiac enzymes mildly pos.   No CP or SOB during my evaluation.  Follows with Dr. Ellis at [place].     PREVIOUS CARDIAC WORKUP:    Echocardiogram 21       Summary     Fibrocalcific changes noted to the mitral valve leaflets with preserved   leaflet excursion.   Mild mitral annular calcification is present.   Moderate (2+) eccentric mitral regurgitation is present.   Fibrocalcific changes noted tothe Aortic valve leaflets with preserved   leaflet excursion.   Mild to Moderate aortic regurgitation is present.   Estimated left ventricular ejection fraction is 45 %.   The left ventricle is normal in size and wall thickness.   Mild diffuse hypokinesis of the left ventricle is present.   Normal appearing right ventricle structure and function.     Stress Test  Cardiac Catheterization  ________________________________  Review of systems: A 10 point review of system has been performed, and is negative except for what has been mentioned in the above history of present illness.     PAST MEDICAL & SURGICAL HISTORY:  Renal cancer, right    OP (osteoporosis)    RA (rheumatoid arthritis)    HTN (hypertension)    Anxiety disorder    Depressive disorder    Urge incontinence of urine    Atrial fibrillation    Knee pain, left    CAD (coronary artery disease)    Stented coronary artery    Takotsubo syndrome    History of percutaneous coronary intervention  stent RCA    S/P angioplasty  1 stent     H/O partial nephrectomy  right 2006    History of cataract surgery, unspecified laterality  b/l     S/P tonsillectomy  194    H/O breast biopsy  right?    S/P colonoscopy      FAMILY HISTORY:  Family history of MI (myocardial infarction)    Family history of other heart disease     SOCIAL HISTORY: The patient denies any history of tobacco abuse, alcohol abuse or illicit drug use.    ALLERGIES:  penicillin (Hives)  penicillins (Short breath; Rash)  statins (Unknown)  statins (Muscle Pain)    Home Medications:  amLODIPine 10 mg oral tablet: 1 tab(s) orally once a day (2022 13:51)  aspirin 81 mg oral tablet, chewable: 1 tab(s) orally once a day (2022 13:48)  cholestyramine 4 g/4.8 g oral powder for reconstitution: 4 gram(s) mixed in water orally 2 times a day (2022 13:48)  cyclobenzaprine 5 mg oral tablet: 1 tab(s) orally 3 times a day, As needed, Muscle Spasm (2022 13:51)  Eliquis 5 mg oral tablet: 1 tab(s) orally 2 times a day (2022 13:48)  furosemide 20 mg oral tablet: 1 tab(s) orally once a day (2022 13:48)  Melatonin 5 mg oral tablet: 1 tab(s) orally once a day (at bedtime) (2022 13:48)  Metoprolol Succinate  mg oral tablet, extended release: 1 tab(s) orally once a day (2022 13:48)  pantoprazole 40 mg oral delayed release tablet: 1 tab(s) orally once a day (before a meal) (2022 13:48)  Refresh ophthalmic solution: 1 drop(s) in each eye every 4 hours, As Needed (2022 13:48)  sertraline 50 mg oral tablet: 1 tab(s) orally once a day (2022 13:48)  Tums 500 oral tablet, chewable (obsolete): 1 tab(s) orally every 8 hours, As Needed - for heartburn (2022 13:48)    MEDICATIONS  (STANDING):  amLODIPine   Tablet 10 milliGRAM(s) Oral daily  apixaban 5 milliGRAM(s) Oral two times a day  aspirin enteric coated 81 milliGRAM(s) Oral daily  calcium carbonate    500 mG (Tums) Chewable 1 Tablet(s) Chew three times a day  calcium carbonate   1250 mG (OsCal) 1 Tablet(s) Oral two times a day  cholecalciferol Oral Tab/Cap - Peds 1000 Unit(s) Oral daily  cholestyramine Powder (Sugar-Free) 4 Gram(s) Oral two times a day  furosemide    Tablet 20 milliGRAM(s) Oral daily  melatonin 5 milliGRAM(s) Oral at bedtime  metoprolol succinate  milliGRAM(s) Oral daily  multivitamin 1 Tablet(s) Oral daily  omega-3-Acid Ethyl Esters 2 Gram(s) Oral two times a day  pantoprazole    Tablet 40 milliGRAM(s) Oral before breakfast  sertraline 50 milliGRAM(s) Oral daily    MEDICATIONS  (PRN):  acetaminophen     Tablet .. 650 milliGRAM(s) Oral every 6 hours PRN Temp greater or equal to 38C (100.4F), Mild Pain (1 - 3)  aluminum hydroxide/magnesium hydroxide/simethicone Suspension 30 milliLiter(s) Oral every 4 hours PRN Dyspepsia  artificial  tears Solution 1 Drop(s) Both EYES every 4 hours PRN Dry Eyes  artificial tears (preservative free) Ophthalmic Solution 1 Drop(s) Both EYES daily PRN Dry Eyes  cyclobenzaprine 5 milliGRAM(s) Oral three times a day PRN Muscle Spasm    Vital Signs Last 24 Hrs  T(C): 36.7 (2022 15:54), Max: 36.7 (2022 15:54)  T(F): 98.1 (2022 15:54), Max: 98.1 (2022 15:54)  HR: 81 (2022 15:54) (57 - 81)  BP: 149/75 (2022 15:54) (148/84 - 150/94)  BP(mean): --  RR: 16 (2022 15:54) (16 - 17)  SpO2: 96% (2022 15:54) (95% - 96%)  I&O's Summary    2022 07:01  -  2022 07:00  --------------------------------------------------------  IN: 0 mL / OUT: 475 mL / NET: -475 mL      ________________________________  GENERAL APPEARANCE:  No acute distress  HEAD: normocephalic, atraumatic  NECK: supple, no jugular venous distention, no carotid bruit    HEART: irregular, S1, S2 normal, 1/6 murmur    CHEST:  No anterior chest wall tenderness    LUNGS:  Clear to auscultation, without any wheezing, rhonchi or rales    ABDOMEN: soft, nontender, nondistended, with positive bowel sounds appreciated  EXTREMITIES: no edema.   NEURO: Alert and oriented x2  PSYC:  Normal affect  SKIN:  Dry  ________________________________   TELEMETRY: atrial fibrillation rate controlled    ECG: AFib w/ non specific changes    LABS:                        12.7   8.50  )-----------( 228      ( 2022 07:29 )             40.3             02-26    142  |  110<H>  |  16  ----------------------------<  105<H>  3.6   |  24  |  0.84    Ca    9.4      2022 07:29  Mg     1.8     02-    TPro  6.6  /  Alb  2.8<L>  /  TBili  0.4  /  DBili  x   /  AST  26  /  ALT  27  /  AlkPhos  64  02-26      LIVER FUNCTIONS - ( 2022 07:29 )  Alb: 2.8 g/dL / Pro: 6.6 gm/dL / ALK PHOS: 64 U/L / ALT: 27 U/L / AST: 26 U/L / GGT: x         PT/INR - ( 2022 10:53 )   PT: 27.2 sec;   INR: 2.32 ratio         PTT - ( 2022 10:53 )  PTT:38.5 sec  Urinalysis Basic - ( 2022 13:31 )    Color: Yellow / Appearance: Clear / S.015 / pH: x  Gluc: x / Ketone: Negative  / Bili: Negative / Urobili: Negative   Blood: x / Protein: 30 mg/dL / Nitrite: Negative   Leuk Esterase: Trace / RBC: 0-2 /HPF / WBC 0-2   Sq Epi: x / Non Sq Epi: Few / Bacteria: Moderate      Pro BNP   @ 11:37  D Dimer  --  @ 11:37    PT/INR - ( 2022 10:53 )   PT: 27.2 sec;   INR: 2.32 ratio         PTT - ( 2022 10:53 )  PTT:38.5 sec  Urinalysis Basic - ( 2022 13:31 )    Color: Yellow / Appearance: Clear / S.015 / pH: x  Gluc: x / Ketone: Negative  / Bili: Negative / Urobili: Negative   Blood: x / Protein: 30 mg/dL / Nitrite: Negative   Leuk Esterase: Trace / RBC: 0-2 /HPF / WBC 0-2   Sq Epi: x / Non Sq Epi: Few / Bacteria: Moderate             ________________________________    RADIOLOGY & ADDITIONAL STUDIES:   IMPRESSION: No acute finding or change.    No evidence of acute lobar infarct, intracranial hemorrhage or mass   effect. No significant interval change.    ________________________________    ASSESSMENT:  Paroxysmal atrial fibrillation on anticoagulation with Eliquis  Demand ischemia w/ hx of CAD status post PCI  Moderate to severe mitral valve regurgitation  Moderate to severe tricuspid valve regurgitation with mild pulmonary hypertension  Systemic hypertension  Hyperlipidemia      PLAN:  In summary, this is a 81y Female with a past medical history of CAD, atrial fibrillation, admitted for SOB that has resolved. ? due to HTN urgency.    Rec increasing norvasc.  HR Stable, no need to cont dig  Cont anticoagulation and ASA.  Trops demand ischemia - EKG changes non digonstic for ischemia. No CP. Trops flat.  Cont statin  Cont beta blocker  DC planning      __________________________________________________________________________  Thank you for allowing me to participate in the care of your patient. Please contact me should any questions arise.    IHSAN Turcios DO, [facility]  Office: [phone]   [email or web address]

## 2022-02-26 NOTE — PROGRESS NOTE ADULT - PROBLEM SELECTOR PROBLEM 1
Anesthesia Consult and Med Hx


Date of service: 04/01/19





- Airway


Anesthetic Teeth Evaluation: Good


ROM Head & Neck: Adequate


Mental/Hyoid Distance: Adequate


Mallampati Class: Class II


Intubation Access Assessment: Good





- Pulmonary Exam


CTA: Yes





- Cardiac Exam


Cardiac Exam: RRR





- Pre-Operative Health Status


ASA Pre-Surgery Classification: ASA3


Proposed Anesthetic Plan: General





- Pulmonary


Hx Smoking: Yes (STOPPED X 1 YRS ( 1 /2 PPD))


Hx Sleep Apnea: No (ROBERT PRE SCREEN LOW RISK.)





- Cardiovascular System


Hx Hypertension: No





- Other Systems


Hx Cancer: No Dyspnea

## 2022-02-26 NOTE — DISCHARGE NOTE PROVIDER - NSDCCPCAREPLAN_GEN_ALL_CORE_FT
PRINCIPAL DISCHARGE DIAGNOSIS  Diagnosis: Elevated digoxin level  Assessment and Plan of Treatment:       SECONDARY DISCHARGE DIAGNOSES  Diagnosis: Unspecified atrial fibrillation  Assessment and Plan of Treatment:     Diagnosis: Nausea  Assessment and Plan of Treatment:

## 2022-02-26 NOTE — PROGRESS NOTE ADULT - NSPROGADDITIONALINFOA_GEN_ALL_CORE
VTE Ppx: on apixaban    I have personally interviewed and examined this patient, reviewed pertinent clinical information, and performed the evaluation and management services provided at today's visit for inpatient medical follow up    I am available to discuss any issues related to the medical care of this patient on the unit, or by phone at [phone]    Will update family VTE Ppx: on apixaban    I have personally interviewed and examined this patient, reviewed pertinent clinical information, and performed the evaluation and management services provided at today's visit for inpatient medical follow up    I am available to discuss any issues related to the medical care of this patient on the unit, or by phone at [phone]    Message left for dtr Madiha Valdez at11AM VTE Ppx: on apixaban    I have personally interviewed and examined this patient, reviewed pertinent clinical information, and performed the evaluation and management services provided at today's visit for inpatient medical follow up    I am available to discuss any issues related to the medical care of this patient on the unit, or by phone at [phone]    Message left for dtr Madiha Valdez at11AM and again at 2PM for discharge

## 2022-04-05 NOTE — ED ADULT NURSE NOTE - CAS DISCH TRANSFER METHOD
Area L Indication Text: Tumors in this location are included in Area L (trunk and extremities).  Mohs surgery is indicated for larger tumors, or tumors with aggressive histologic features, in these anatomic locations. Ambulance

## 2022-04-11 NOTE — PATIENT PROFILE ADULT. - FUNCTIONAL SCREEN CURRENT LEVEL: TOILETING, MLM
No. LYNDSAY screening performed.  STOP BANG Legend: 0-2 = LOW Risk; 3-4 = INTERMEDIATE Risk; 5-8 = HIGH Risk (3) assistive equipment and person

## 2022-04-20 NOTE — PROVIDER CONTACT NOTE (OTHER) - BACKGROUND
Chief Complaint  Diabetes    Subjective          Sudhakar Saul presents to [facility] ENDOCRINOLOGY  History of Present Illness       You have chosen to receive care through a telehealth visit.  Do you consent to use a video/audio connection for your medical care today? Yes          TELEHEALTH VIDEO VISIT     This a video visit due to [facility] current guidelines for social distancing due to the COVID 19 pandemic     Primary provider Jermaine Ferro MD      45 year old female presents for follow up     Reason diabetes mellitus type 2     Duration diagnosed in late 20 s          Timing is constant     Quality is not controlled     Severity is high due to complications           Complications, neuropathy, hyperglycemia         Severity (Complication/Hospitalizations)        Secondary Macrovascular-- no CAD, no PAD, no CVA          Secondary Microvascular--- neuropathy, mild diabetic retinopathy , CKD stage II     COPD , hepatitis C      Context--- found during pre-op evaluation bg was 300            Diabetes Regimen     Lab Results   Component Value Date    HGBA1C 7.60 (H) 01/26/2021          Blood Glucose Readings     Checking 4 times daily      States at goal               Wearing Omni Pod insulin pump         Diet-   low-carb        Exercises walking                 Associated Signs/Symptoms          Hyperglycemic Symptoms: none       Hypoglycemic Episodes:  None                   Review of Systems - General ROS: positive for  - fatigue        Objective   Vital Signs:   There were no vitals taken for this visit.    Physical Exam  Constitutional:       Appearance: Normal appearance.   Musculoskeletal:      Cervical back: Normal range of motion.   Neurological:      General: No focal deficit present.      Mental Status: She is alert.   Psychiatric:         Mood and Affect: Mood normal.         Thought Content: Thought content normal.         Judgment: Judgment normal.        Result Review :   The  Diarrhea started Friday and Saturday - this has since stopped. Also had chills, no fever though. The chills has stopped as well    Sx now weakness and fatigue. He states he is not dizzy though. No appetite and has lost 10#s    Sunday he did a home Covid test and it was negative     Pt will go to  at [facility] for eval and possibly labs - might repeat Covid test or check BMP - I did advise depending on IC Dr thomas he will order what is appropriate    FYI to Dr Doe    Pt will report to [facility]            following data was reviewed by: ROD Chawla on 04/06/2021:              Assessment and Plan    Diagnoses and all orders for this visit:    1. Type 2 diabetes mellitus with hyperglycemia, with long-term current use of insulin (CMS/[facility]) (Primary)    2. Vitamin D deficiency    3. Diabetic polyneuropathy associated with type 2 diabetes mellitus (CMS/[facility])    4. Essential hypertension    5. Mixed hyperlipidemia    Other orders  -     Continuous Blood Gluc Transmit (Dexcom G6 Transmitter) misc; 1 each Every 3 (Three) Months.  Dispense: 1 each; Refill: 3  -     Continuous Blood Gluc Sensor (Dexcom G6 Sensor); 1 each As Needed (glucose control). Every 10 days  Dispense: 9 each; Refill: 3  -     Continuous Blood Gluc  (Dexcom G6 ) device; 1 each 1 (One) Time for 1 dose.  Dispense: 1 each; Refill: 0         Glycemic Management     Diabetes mellitus type 2 not controlled      Lab Results   Component Value Date    HGBA1C 7.60 (H) 01/26/2021        Omni pod         Basal      MN - 2.65         Carb ratio      4      Correction         14     Average bg 120               Taking trulicity 3 mg weekly      Taking Metformin 1000 mg po BID      Taking Steglatro 15 mg one daily         Previous regimen      basaglar taking 63 units ---stop        admelog--stop      Taking about 30 units            Aim for 45 grams of Carbohydrate for meals      Aim for 15 grams of Carbohydrate for snack                        Lipid Management           Taking Simvastatin 40 mg        Total Cholesterol   Date Value Ref Range Status   01/26/2021 114 0 - 200 mg/dL Final     Triglycerides   Date Value Ref Range Status   01/26/2021 169 (H) 0 - 150 mg/dL Final     HDL Cholesterol   Date Value Ref Range Status   01/26/2021 39 (L) 40 - 60 mg/dL Final     LDL Cholesterol    Date Value Ref Range Status   01/26/2021 47 0 - 100 mg/dL Final           Blood Pressure Management          Taking Lisinopril 40 mg daily                Microvascular Complication Monitoring        Last eye exam -due Jan. 2021         Component      Latest Ref Rng & Units 9/9/2020   Microalbumin/Creatinine Ratio           Creatinine, Urine      mg/dL 52.8   Microalbumin, Urine      mg/dL <1.2            Neuropathy        Taking Gabapentin 300 mg tid          Bone Health            taking vitamin d daily      Component      Latest Ref Rng & Units 9/9/2020   25 Hydroxy, Vitamin D      30.0 - 100.0 ng/ml 41.6               Other Diabetes Related Aspects               Lab Results   Component Value Date     [id number] 546 09/09/2020         Thyroid health         Lab Results   Component Value Date    TSH 1.610 01/26/2021               Follow Up   No follow-ups on file.  Patient was given instructions and counseling regarding her condition or for health maintenance advice. Please see specific information pulled into the AVS if appropriate.         I provided advice regarding diabetes management, dietary changes, adjustments of medication, self titration of insulin, refilled medications, ordering labs, future appointments    Patient was advised to contact the office if there are unanswered questions, trouble with blood glucose management, or any concerns    Pt admitted s/p unwitnessed fall. Hx of A fib on Coumadin. Pt on atenolol. CT head negative.

## 2022-05-10 ENCOUNTER — INPATIENT (INPATIENT)
Facility: HOSPITAL | Age: 82
LOS: 5 days | Discharge: HOME | DRG: 291 | End: 2022-05-16
Attending: FAMILY MEDICINE | Admitting: FAMILY MEDICINE
Payer: MEDICARE

## 2022-05-10 VITALS
SYSTOLIC BLOOD PRESSURE: 141 MMHG | HEIGHT: 65 IN | RESPIRATION RATE: 19 BRPM | HEART RATE: 117 BPM | WEIGHT: 175.93 LBS | OXYGEN SATURATION: 97 % | DIASTOLIC BLOOD PRESSURE: 82 MMHG

## 2022-05-10 DIAGNOSIS — Z98.89 OTHER SPECIFIED POSTPROCEDURAL STATES: Chronic | ICD-10-CM

## 2022-05-10 DIAGNOSIS — Z98.890 OTHER SPECIFIED POSTPROCEDURAL STATES: Chronic | ICD-10-CM

## 2022-05-10 DIAGNOSIS — Z98.62 PERIPHERAL VASCULAR ANGIOPLASTY STATUS: Chronic | ICD-10-CM

## 2022-05-10 DIAGNOSIS — Z90.89 ACQUIRED ABSENCE OF OTHER ORGANS: Chronic | ICD-10-CM

## 2022-05-10 DIAGNOSIS — Z90.5 ACQUIRED ABSENCE OF KIDNEY: Chronic | ICD-10-CM

## 2022-05-10 DIAGNOSIS — Z98.49 CATARACT EXTRACTION STATUS, UNSPECIFIED EYE: Chronic | ICD-10-CM

## 2022-05-10 DIAGNOSIS — I50.21 ACUTE SYSTOLIC (CONGESTIVE) HEART FAILURE: ICD-10-CM

## 2022-05-10 LAB
ALBUMIN SERPL ELPH-MCNC: 3.4 G/DL — SIGNIFICANT CHANGE UP (ref 3.3–5)
ALP SERPL-CCNC: 68 U/L — SIGNIFICANT CHANGE UP (ref 40–120)
ALT FLD-CCNC: 150 U/L — HIGH (ref 12–78)
ANION GAP SERPL CALC-SCNC: 8 MMOL/L — SIGNIFICANT CHANGE UP (ref 5–17)
APTT BLD: 34.8 SEC — SIGNIFICANT CHANGE UP (ref 27.5–35.5)
AST SERPL-CCNC: 137 U/L — HIGH (ref 15–37)
BASOPHILS # BLD AUTO: 0.05 K/UL — SIGNIFICANT CHANGE UP (ref 0–0.2)
BASOPHILS NFR BLD AUTO: 0.6 % — SIGNIFICANT CHANGE UP (ref 0–2)
BILIRUB SERPL-MCNC: 0.8 MG/DL — SIGNIFICANT CHANGE UP (ref 0.2–1.2)
BUN SERPL-MCNC: 37 MG/DL — HIGH (ref 7–23)
CALCIUM SERPL-MCNC: 9.3 MG/DL — SIGNIFICANT CHANGE UP (ref 8.5–10.1)
CHLORIDE SERPL-SCNC: 112 MMOL/L — HIGH (ref 96–108)
CO2 SERPL-SCNC: 18 MMOL/L — LOW (ref 22–31)
CREAT SERPL-MCNC: 1.35 MG/DL — HIGH (ref 0.5–1.3)
D DIMER BLD IA.RAPID-MCNC: 618 NG/ML DDU — HIGH
DIGOXIN SERPL-MCNC: 0.15 NG/ML — LOW (ref 0.8–2)
EGFR: 39 ML/MIN/1.73M2 — LOW
EOSINOPHIL # BLD AUTO: 0.03 K/UL — SIGNIFICANT CHANGE UP (ref 0–0.5)
EOSINOPHIL NFR BLD AUTO: 0.4 % — SIGNIFICANT CHANGE UP (ref 0–6)
GLUCOSE SERPL-MCNC: 107 MG/DL — HIGH (ref 70–99)
HCT VFR BLD CALC: 41 % — SIGNIFICANT CHANGE UP (ref 34.5–45)
HGB BLD-MCNC: 12.7 G/DL — SIGNIFICANT CHANGE UP (ref 11.5–15.5)
IMM GRANULOCYTES NFR BLD AUTO: 0.5 % — SIGNIFICANT CHANGE UP (ref 0–1.5)
INR BLD: 4.52 RATIO — HIGH (ref 0.88–1.16)
LYMPHOCYTES # BLD AUTO: 1.69 K/UL — SIGNIFICANT CHANGE UP (ref 1–3.3)
LYMPHOCYTES # BLD AUTO: 20.2 % — SIGNIFICANT CHANGE UP (ref 13–44)
MCHC RBC-ENTMCNC: 29.8 PG — SIGNIFICANT CHANGE UP (ref 27–34)
MCHC RBC-ENTMCNC: 31 GM/DL — LOW (ref 32–36)
MCV RBC AUTO: 96.2 FL — SIGNIFICANT CHANGE UP (ref 80–100)
MONOCYTES # BLD AUTO: 1.1 K/UL — HIGH (ref 0–0.9)
MONOCYTES NFR BLD AUTO: 13.1 % — SIGNIFICANT CHANGE UP (ref 2–14)
NEUTROPHILS # BLD AUTO: 5.47 K/UL — SIGNIFICANT CHANGE UP (ref 1.8–7.4)
NEUTROPHILS NFR BLD AUTO: 65.2 % — SIGNIFICANT CHANGE UP (ref 43–77)
NT-PROBNP SERPL-SCNC: HIGH PG/ML (ref 0–450)
PLATELET # BLD AUTO: 270 K/UL — SIGNIFICANT CHANGE UP (ref 150–400)
POTASSIUM SERPL-MCNC: 5.1 MMOL/L — SIGNIFICANT CHANGE UP (ref 3.5–5.3)
POTASSIUM SERPL-SCNC: 5.1 MMOL/L — SIGNIFICANT CHANGE UP (ref 3.5–5.3)
PROT SERPL-MCNC: 7.6 GM/DL — SIGNIFICANT CHANGE UP (ref 6–8.3)
PROTHROM AB SERPL-ACNC: 53.2 SEC — HIGH (ref 10.5–13.4)
RBC # BLD: 4.26 M/UL — SIGNIFICANT CHANGE UP (ref 3.8–5.2)
RBC # FLD: 13.9 % — SIGNIFICANT CHANGE UP (ref 10.3–14.5)
SODIUM SERPL-SCNC: 138 MMOL/L — SIGNIFICANT CHANGE UP (ref 135–145)
TROPONIN I, HIGH SENSITIVITY RESULT: 34.11 NG/L — SIGNIFICANT CHANGE UP
TROPONIN I, HIGH SENSITIVITY RESULT: 34.24 NG/L — SIGNIFICANT CHANGE UP
WBC # BLD: 8.38 K/UL — SIGNIFICANT CHANGE UP (ref 3.8–10.5)
WBC # FLD AUTO: 8.38 K/UL — SIGNIFICANT CHANGE UP (ref 3.8–10.5)

## 2022-05-10 PROCEDURE — 80053 COMPREHEN METABOLIC PANEL: CPT

## 2022-05-10 PROCEDURE — 99285 EMERGENCY DEPT VISIT HI MDM: CPT

## 2022-05-10 PROCEDURE — 71045 X-RAY EXAM CHEST 1 VIEW: CPT

## 2022-05-10 PROCEDURE — 97530 THERAPEUTIC ACTIVITIES: CPT | Mod: GP

## 2022-05-10 PROCEDURE — 97116 GAIT TRAINING THERAPY: CPT | Mod: GP

## 2022-05-10 PROCEDURE — 84484 ASSAY OF TROPONIN QUANT: CPT

## 2022-05-10 PROCEDURE — 99223 1ST HOSP IP/OBS HIGH 75: CPT

## 2022-05-10 PROCEDURE — 87635 SARS-COV-2 COVID-19 AMP PRB: CPT

## 2022-05-10 PROCEDURE — 80076 HEPATIC FUNCTION PANEL: CPT

## 2022-05-10 PROCEDURE — 85027 COMPLETE CBC AUTOMATED: CPT

## 2022-05-10 PROCEDURE — 93306 TTE W/DOPPLER COMPLETE: CPT

## 2022-05-10 PROCEDURE — 80048 BASIC METABOLIC PNL TOTAL CA: CPT

## 2022-05-10 PROCEDURE — 97163 PT EVAL HIGH COMPLEX 45 MIN: CPT | Mod: GP

## 2022-05-10 PROCEDURE — 36415 COLL VENOUS BLD VENIPUNCTURE: CPT

## 2022-05-10 PROCEDURE — 71045 X-RAY EXAM CHEST 1 VIEW: CPT | Mod: 26

## 2022-05-10 PROCEDURE — 93005 ELECTROCARDIOGRAM TRACING: CPT

## 2022-05-10 RX ORDER — APIXABAN 2.5 MG/1
5 TABLET, FILM COATED ORAL
Refills: 0 | Status: DISCONTINUED | OUTPATIENT
Start: 2022-05-10 | End: 2022-05-16

## 2022-05-10 RX ORDER — HYDRALAZINE HCL 50 MG
5 TABLET ORAL ONCE
Refills: 0 | Status: COMPLETED | OUTPATIENT
Start: 2022-05-10 | End: 2022-05-10

## 2022-05-10 RX ORDER — CHOLESTYRAMINE 4 G/9G
4 POWDER, FOR SUSPENSION ORAL
Qty: 0 | Refills: 0 | DISCHARGE

## 2022-05-10 RX ORDER — SERTRALINE 25 MG/1
50 TABLET, FILM COATED ORAL DAILY
Refills: 0 | Status: DISCONTINUED | OUTPATIENT
Start: 2022-05-10 | End: 2022-05-16

## 2022-05-10 RX ORDER — PANTOPRAZOLE SODIUM 20 MG/1
40 TABLET, DELAYED RELEASE ORAL
Refills: 0 | Status: DISCONTINUED | OUTPATIENT
Start: 2022-05-10 | End: 2022-05-16

## 2022-05-10 RX ORDER — METOPROLOL TARTRATE 50 MG
100 TABLET ORAL DAILY
Refills: 0 | Status: DISCONTINUED | OUTPATIENT
Start: 2022-05-10 | End: 2022-05-12

## 2022-05-10 RX ORDER — FUROSEMIDE 40 MG
40 TABLET ORAL DAILY
Refills: 0 | Status: DISCONTINUED | OUTPATIENT
Start: 2022-05-10 | End: 2022-05-11

## 2022-05-10 RX ORDER — AMLODIPINE BESYLATE 2.5 MG/1
10 TABLET ORAL DAILY
Refills: 0 | Status: DISCONTINUED | OUTPATIENT
Start: 2022-05-10 | End: 2022-05-11

## 2022-05-10 RX ADMIN — Medication 40 MILLIGRAM(S): at 15:29

## 2022-05-10 RX ADMIN — Medication 5 MILLIGRAM(S): at 20:16

## 2022-05-10 RX ADMIN — APIXABAN 5 MILLIGRAM(S): 2.5 TABLET, FILM COATED ORAL at 21:04

## 2022-05-10 NOTE — PHARMACOTHERAPY INTERVENTION NOTE - COMMENTS
Medication reconciliation completed.  Reviewed Medication list and confirmed med allergies with list from Care Facility; confirmed with Dr. First Medtiago.

## 2022-05-10 NOTE — ED ADULT NURSE REASSESSMENT NOTE - NS ED NURSE REASSESS COMMENT FT1
Pt A+Ox2. Very pleasant. Pt's daughter at bedside. Lungs are diminished but no SOB. LE's with +3 pitting edema. Skin intact. Primafit in place 700ml clear yellow urine output. Monitored closely. Call bell in reach. Afib 110-120.

## 2022-05-10 NOTE — H&P ADULT - NSHPPHYSICALEXAM_GEN_ALL_CORE
PHYSICAL EXAM:    General: elderly confused female in mild distress with any exertion  Eyes: PERRLA, EOMI; conjunctiva and sclera clear  Head: Normocephalic; atraumatic  ENMT: No nasal discharge; airway clear  Neck: Supple; non tender; no masses  Respiratory: diminished BS at bases with + crackles  Cardiovascular: S1, S2 irreg   Gastrointestinal: Soft non-tender non-distended; Normal bowel sounds  Genitourinary: No costovertebral angle tenderness  Extremities: No clubbing, cyanosis, + 2 BL LE pitting edema up to thighs  Vascular: Peripheral pulses palpable 2+ bilaterally  Neurological: Alert and confused, no gross deficits  Skin: Warm and dry.  Musculoskeletal: Normal tone  Psychiatric: Cooperative and pleasantly confused, confabulates

## 2022-05-10 NOTE — H&P ADULT - ASSESSMENT
1. Acutely decompensated HF mildly reduced EF with elevated BP/HR   - IV diuresis, rate control   - strict I&Os, daily weight, fluid restriction, monitor UO    2. Chronic Afib with RVR - WILTON, e;koki, telemetry, series CE    3. Advanced dementia - unable to make any decisions, daughter states that pt expressed wishes to be DNR, but not ready to sign it now - will discuss with , would not want her to suffer.    4. Abn LFTs due to ADHF (#1) - f/u in am hepatic function    5. VTE - eliquis    Time spent 59 min  All available medical records personally reviewed

## 2022-05-10 NOTE — PATIENT PROFILE ADULT - FALL HARM RISK - HARM RISK INTERVENTIONS

## 2022-05-10 NOTE — ED PROVIDER NOTE - OBJECTIVE STATEMENT
82 y/o female with a PMHx of anxiety, Afib on Eliquis, CAD s/p stent, depressive disorder, HTN, left knee pain, OP, RA, renal cancer, Takotsubo syndrome, urge incontinence, UTI presents to the ED BIBA from [place] for SOB worsening over the last 2 weeks. Denies fevers. Allergies: Penicillin. No other complaints at this time.

## 2022-05-10 NOTE — ED ADULT TRIAGE NOTE - CHIEF COMPLAINT QUOTE
Pt presents to ER from [facility] c/o SOB, weakness and increased HR. Onset of symptoms began this morning. Denies CP

## 2022-05-10 NOTE — ED PROVIDER NOTE - MUSCULOSKELETAL, MLM
Spine appears normal, range of motion is not limited, no muscle or joint tenderness. 3+ pitting edema b/l LE.

## 2022-05-10 NOTE — PATIENT PROFILE ADULT - HISTORY OF COVID-19 VACCINATION
10/27/20: Patient instructed to use topical steroids and cycloplegics.
10/27/20: WE'LL AWAIT RESOLUTION/IMPROVEMENT OF UVEITIS TO CONTINUE W ANTI-VEGF SINCE PT MAY BENEFIT FROM STEROIDS. MONITOR CLINICALLY.
11/6/20: CONSIDER STEROID BUT WILL AWAIT IOP RESPONSE TO PST OD TO DETERMINE SAFETY OF INJ IN OS.
11/6/20: SIGNIFICANT IMPROVEMENT OF INFLAMMATION IN ANTERIOR SEGMENT. LABS UNREMARKABLE. [address] [facility] [address] MCCOY TO REQUEST PET SCAN TO R/O METS FROM UTERINE CA. UNLIKELY TO BE TB, BUT WE'LL SEND TEST. RF + BUT LIKELY FALSE + GIVEN AGE. WILL DO PST OD TO EVAL RESPONSE.
ARTIFICIAL TEARS to affected eye(s) as needed.
BMI above normal limits, recommended weight loss, improve diet and follow up with internist.
Cataract APPEARS BORDERLINE VISUALLY SIGNIFICANT GIVEN PSC AND PS. MONITOR CLOSELY.
Does NOT APPEAR VISUALLY SIGNIFICANT.
FORMER SMOKER-.
IOP LOW LIKELY 2/2 UVEITIS. WE'LL STOP SOME OF MEDS. WILL KEEP DORZOLAMIDE BID OD ONLY, FOR NOW.
LAST LUCENTIS 0,5 INJECTION- OS- DR. HENDRIX- 10/16/2020.
MONITOR response to TREATMENT.
My findings and recommendations are based on patient's symptoms, eye exam, diagnostic testing, and records.
No retinal holes or tears seen on exam. Recommended OBSERVATION. We reviewed the signs and symptoms of retinal tear/retinal detachment and the importance of prompt evaluation should there be increasing floaters, new flashing lights, or decreasing peripheral vision in either eye at any time. Patient understands condition, prognosis and need for follow up care.
Personally reviewed patients records from referring Physician.
Recommended OBSERVATION and MONITORING for progression.
Recommended OBSERVATION and continued MONITORING for progression.
The patient was asked to get an EXTENSIVE SEROLOGICAL WORKUP in an effort to identify any systemic autoimmune or infectious etiology as the cause for their severe inflammation. PT GOT HER BLOOD WORK TODAY. WE'LL REVIEW THE RESULTS AND TREAT ACCORDINGLY. MAIN DDX: INFECTIOUS -GIVEN HER IMMUNOSUPPRESSION-, CA OR AUTOIMMUNE (LESS LIKELY). LOW THRESHOLD TO START PO STEROIDS ONCE INFECTION HAS BEEN RULED OUT.
WILL MANAGE UVEITIS AS ABOVE.
as above.
Yes

## 2022-05-10 NOTE — ED ADULT NURSE REASSESSMENT NOTE - NS ED NURSE REASSESS COMMENT FT1
Pt. found in stable condition, daughter at bedside.  Able to make some needs known, b/p elevated, hydralazine 5mg IVP given.  Report given to 3N, awaiting transport.

## 2022-05-10 NOTE — H&P ADULT - HISTORY OF PRESENT ILLNESS
81 y.o. female with PMHx of CAD s/p PCI, PAF on eliquis, CMP with EF 45%, MR, RCC s/p resection, dementia sent from [facility] due to AMS/SOB/edema for last 2-3 days. Pt is pleasantly confused, denies any complaints, but noted to have elevated BP/HR and significant LE edema.  Daughter at bedside

## 2022-05-11 LAB
ALBUMIN SERPL ELPH-MCNC: 3.2 G/DL — LOW (ref 3.3–5)
ALP SERPL-CCNC: 64 U/L — SIGNIFICANT CHANGE UP (ref 40–120)
ALT FLD-CCNC: 138 U/L — HIGH (ref 12–78)
ANION GAP SERPL CALC-SCNC: 11 MMOL/L — SIGNIFICANT CHANGE UP (ref 5–17)
AST SERPL-CCNC: 91 U/L — HIGH (ref 15–37)
BILIRUB DIRECT SERPL-MCNC: 0.3 MG/DL — SIGNIFICANT CHANGE UP (ref 0–0.3)
BILIRUB INDIRECT FLD-MCNC: 0.6 MG/DL — SIGNIFICANT CHANGE UP (ref 0.2–1)
BILIRUB SERPL-MCNC: 0.9 MG/DL — SIGNIFICANT CHANGE UP (ref 0.2–1.2)
BUN SERPL-MCNC: 33 MG/DL — HIGH (ref 7–23)
CALCIUM SERPL-MCNC: 9.4 MG/DL — SIGNIFICANT CHANGE UP (ref 8.5–10.1)
CHLORIDE SERPL-SCNC: 107 MMOL/L — SIGNIFICANT CHANGE UP (ref 96–108)
CO2 SERPL-SCNC: 24 MMOL/L — SIGNIFICANT CHANGE UP (ref 22–31)
CREAT SERPL-MCNC: 1.26 MG/DL — SIGNIFICANT CHANGE UP (ref 0.5–1.3)
EGFR: 42 ML/MIN/1.73M2 — LOW
GLUCOSE SERPL-MCNC: 100 MG/DL — HIGH (ref 70–99)
HCT VFR BLD CALC: 41 % — SIGNIFICANT CHANGE UP (ref 34.5–45)
HGB BLD-MCNC: 12.3 G/DL — SIGNIFICANT CHANGE UP (ref 11.5–15.5)
MCHC RBC-ENTMCNC: 29.1 PG — SIGNIFICANT CHANGE UP (ref 27–34)
MCHC RBC-ENTMCNC: 30 GM/DL — LOW (ref 32–36)
MCV RBC AUTO: 96.9 FL — SIGNIFICANT CHANGE UP (ref 80–100)
PLATELET # BLD AUTO: 207 K/UL — SIGNIFICANT CHANGE UP (ref 150–400)
POTASSIUM SERPL-MCNC: 3.6 MMOL/L — SIGNIFICANT CHANGE UP (ref 3.5–5.3)
POTASSIUM SERPL-SCNC: 3.6 MMOL/L — SIGNIFICANT CHANGE UP (ref 3.5–5.3)
PROT SERPL-MCNC: 6.8 GM/DL — SIGNIFICANT CHANGE UP (ref 6–8.3)
RBC # BLD: 4.23 M/UL — SIGNIFICANT CHANGE UP (ref 3.8–5.2)
RBC # FLD: 13.9 % — SIGNIFICANT CHANGE UP (ref 10.3–14.5)
SODIUM SERPL-SCNC: 142 MMOL/L — SIGNIFICANT CHANGE UP (ref 135–145)
TROPONIN I, HIGH SENSITIVITY RESULT: 34.34 NG/L — SIGNIFICANT CHANGE UP
WBC # BLD: 7.38 K/UL — SIGNIFICANT CHANGE UP (ref 3.8–10.5)
WBC # FLD AUTO: 7.38 K/UL — SIGNIFICANT CHANGE UP (ref 3.8–10.5)

## 2022-05-11 PROCEDURE — 93306 TTE W/DOPPLER COMPLETE: CPT | Mod: 26

## 2022-05-11 PROCEDURE — 99233 SBSQ HOSP IP/OBS HIGH 50: CPT

## 2022-05-11 PROCEDURE — 93010 ELECTROCARDIOGRAM REPORT: CPT

## 2022-05-11 RX ORDER — ACETAMINOPHEN 500 MG
650 TABLET ORAL EVERY 6 HOURS
Refills: 0 | Status: DISCONTINUED | OUTPATIENT
Start: 2022-05-11 | End: 2022-05-16

## 2022-05-11 RX ORDER — DILTIAZEM HCL 120 MG
60 CAPSULE, EXT RELEASE 24 HR ORAL
Refills: 0 | Status: DISCONTINUED | OUTPATIENT
Start: 2022-05-11 | End: 2022-05-12

## 2022-05-11 RX ORDER — METOPROLOL TARTRATE 50 MG
5 TABLET ORAL EVERY 6 HOURS
Refills: 0 | Status: DISCONTINUED | OUTPATIENT
Start: 2022-05-11 | End: 2022-05-16

## 2022-05-11 RX ORDER — POTASSIUM CHLORIDE 20 MEQ
20 PACKET (EA) ORAL
Refills: 0 | Status: COMPLETED | OUTPATIENT
Start: 2022-05-11 | End: 2022-05-11

## 2022-05-11 RX ORDER — METOPROLOL TARTRATE 50 MG
5 TABLET ORAL ONCE
Refills: 0 | Status: COMPLETED | OUTPATIENT
Start: 2022-05-11 | End: 2022-05-11

## 2022-05-11 RX ORDER — FUROSEMIDE 40 MG
40 TABLET ORAL
Refills: 0 | Status: DISCONTINUED | OUTPATIENT
Start: 2022-05-11 | End: 2022-05-13

## 2022-05-11 RX ADMIN — Medication 20 MILLIEQUIVALENT(S): at 18:22

## 2022-05-11 RX ADMIN — APIXABAN 5 MILLIGRAM(S): 2.5 TABLET, FILM COATED ORAL at 09:55

## 2022-05-11 RX ADMIN — Medication 40 MILLIGRAM(S): at 09:56

## 2022-05-11 RX ADMIN — SERTRALINE 50 MILLIGRAM(S): 25 TABLET, FILM COATED ORAL at 09:58

## 2022-05-11 RX ADMIN — Medication 60 MILLIGRAM(S): at 18:22

## 2022-05-11 RX ADMIN — Medication 60 MILLIGRAM(S): at 12:24

## 2022-05-11 RX ADMIN — Medication 100 MILLIGRAM(S): at 09:55

## 2022-05-11 RX ADMIN — Medication 20 MILLIEQUIVALENT(S): at 15:49

## 2022-05-11 RX ADMIN — Medication 20 MILLIEQUIVALENT(S): at 17:04

## 2022-05-11 RX ADMIN — PANTOPRAZOLE SODIUM 40 MILLIGRAM(S): 20 TABLET, DELAYED RELEASE ORAL at 05:09

## 2022-05-11 RX ADMIN — Medication 650 MILLIGRAM(S): at 17:04

## 2022-05-11 RX ADMIN — Medication 40 MILLIGRAM(S): at 21:37

## 2022-05-11 RX ADMIN — APIXABAN 5 MILLIGRAM(S): 2.5 TABLET, FILM COATED ORAL at 21:37

## 2022-05-11 RX ADMIN — Medication 5 MILLIGRAM(S): at 13:39

## 2022-05-11 NOTE — PHYSICAL THERAPY INITIAL EVALUATION ADULT - ACTIVE RANGE OF MOTION EXAMINATION, REHAB EVAL
except Bilat shld ; L ankle DF -10; R ankle DF neutral approx/no Active ROM deficits were identified

## 2022-05-11 NOTE — PHYSICAL THERAPY INITIAL EVALUATION ADULT - CRITERIA FOR SKILLED THERAPEUTIC INTERVENTIONS
will attempt completion of Eval @ later date @ Dr. Turcios's request; further course of PT intervention pending

## 2022-05-11 NOTE — PHYSICAL THERAPY INITIAL EVALUATION ADULT - MODALITIES TREATMENT COMMENTS
pt left in bed supine post Eval; bed alarm on; HM in place; daughter present; callbell in reach; chris well; denied pain

## 2022-05-11 NOTE — CONSULT NOTE ADULT - SUBJECTIVE AND OBJECTIVE BOX
Patient is a 81y old  Female who presents with a chief complaint of SOB/AMS (10 May 2022 16:40)    ________________________________  VENTURA DAVEY is a 81y year old Female with a past medical history of moderate aortic regurgitation, coronary artery disease status post remote PCI, Parox atrial fibrillation maintained on anticoagulation with Eliquis, history of stress-induced cardiomyopathy, with improvement in LVEF to >50% , moderate to severe mitral valve regurgitation, history of renal cell carcinoma status post surgery, prior tobacco abuse, advanced dementia, hypertension, and osteoarthritis.  Recently admitted for SOB and HTN urgency.    Now sent in from [facility] due to AMS/SOB/edema for last 2-3 days. Pt is pleasantly confused, denies any complaints, but noted to have elevated BP/HR and significant LE edema.  Daughter at bedside (10 May 2022 16:40)      Follows with Dr. Ellis at [place].     PREVIOUS CARDIAC WORKUP:    Echocardiogram 8.9.21       Summary     Fibrocalcific changes noted to the mitral valve leaflets with preserved   leaflet excursion.   Mild mitral annular calcification is present.   Moderate (2+) eccentric mitral regurgitation is present.   Fibrocalcific changes noted tothe Aortic valve leaflets with preserved   leaflet excursion.   Mild to Moderate aortic regurgitation is present.   Estimated left ventricular ejection fraction is 45 %.   The left ventricle is normal in size and wall thickness.   Mild diffuse hypokinesis of the left ventricle is present.   Normal appearing right ventricle structure and function.      ________________________________  Review of systems: A 10 point review of system has been performed, and is negative except for what has been mentioned in the above history of present illness.     PAST MEDICAL & SURGICAL HISTORY:  Renal cancer, right      OP (osteoporosis)      RA (rheumatoid arthritis)      HTN (hypertension)      Anxiety disorder      Depressive disorder      Urge incontinence of urine      Atrial fibrillation      Knee pain, left      CAD (coronary artery disease)      Stented coronary artery      Takotsubo syndrome      History of percutaneous coronary intervention  stent RCA      S/P angioplasty  1 stent 1998      H/O partial nephrectomy  right 2006      History of cataract surgery, unspecified laterality  b/l 2016      S/P tonsillectomy  1948      H/O breast biopsy  right?      S/P colonoscopy     FAMILY HISTORY:  Family history of MI (myocardial infarction)     SOCIAL HISTORY: The patient denies any history of tobacco abuse, alcohol abuse or illicit drug use.    ALLERGIES:  penicillin (Hives)  penicillins (Short breath; Rash)  statins (Unknown)  statins (Muscle Pain)    Home Medications:  aspirin 81 mg oral tablet, chewable: 1 tab(s) orally once a day (10 May 2022 17:55)  dicyclomine 10 mg oral capsule: 1 cap(s) orally 2 times a day (10 May 2022 17:55)  Eliquis 5 mg oral tablet: 1 tab(s) orally 2 times a day (10 May 2022 17:55)  lidocaine 4% patch: Apply topically to affected area once a day (in the morning) (10 May 2022 17:55)  Melatonin 5 mg oral tablet: 1 tab(s) orally once a day (at bedtime) (10 May 2022 17:55)  Metoprolol Succinate  mg oral tablet, extended release: 1 tab(s) orally once a day (10 May 2022 17:55)  Nystop 100,000 units/g topical powder: Apply topically to affected area once a day (at bedtime) (10 May 2022 17:55)  pantoprazole 40 mg oral delayed release tablet: 1 tab(s) orally once a day (before a meal) (10 May 2022 17:55)  Refresh ophthalmic solution: 1 drop(s) in each eye every 4 hours, As Needed (10 May 2022 17:55)  sertraline 50 mg oral tablet: 1 tab(s) orally once a day (10 May 2022 17:55)  Toprol-XL 50 mg oral tablet, extended release: 1 tab(s) orally once a day (at bedtime) (10 May 2022 17:55)  Tums 500 oral tablet, chewable (obsolete): 1 tab(s) orally every 8 hours, As Needed - for heartburn (10 May 2022 17:55)  Tylenol 325 mg oral tablet: 2 tab(s) orally every 6 hours, As Needed - for moderate pain (10 May 2022 17:55)  Tylenol 500 mg oral tablet: 2 tab(s) orally 3 times a day (10 May 2022 17:55)    MEDICATIONS  (STANDING):  amLODIPine   Tablet 10 milliGRAM(s) Oral daily  apixaban 5 milliGRAM(s) Oral two times a day  furosemide   Injectable 40 milliGRAM(s) IV Push daily  metoprolol succinate  milliGRAM(s) Oral daily  pantoprazole    Tablet 40 milliGRAM(s) Oral before breakfast  sertraline 50 milliGRAM(s) Oral daily    MEDICATIONS  (PRN):    Vital Signs Last 24 Hrs  T(C): 36.5 (11 May 2022 08:50), Max: 36.8 (10 May 2022 20:07)  T(F): 97.7 (11 May 2022 08:50), Max: 98.2 (10 May 2022 20:07)  HR: 123 (11 May 2022 08:50) (107 - 125)  BP: 136/95 (11 May 2022 08:50) (124/85 - 161/98)  BP(mean): --  RR: 18 (11 May 2022 08:50) (18 - 21)  SpO2: 96% (11 May 2022 08:50) (95% - 97%)  I&O's Summary    10 May 2022 07:01  -  11 May 2022 07:00  --------------------------------------------------------  IN: 0 mL / OUT: 1700 mL / NET: -1700 mL       ________________________________  GENERAL APPEARANCE:  No acute distress  HEAD: normocephalic, atraumatic  NECK: supple, no jugular venous distention, no carotid bruit    HEART: irregular, S1, S2 normal, 1/6 murmur    CHEST:  No anterior chest wall tenderness    LUNGS:  Clear to auscultation, without any wheezing, rhonchi or rales    ABDOMEN: soft, nontender, nondistended, with positive bowel sounds appreciated  EXTREMITIES: no edema.   NEURO: Alert and oriented x2  PSYC:  Normal affect  SKIN:  Dry    ________________________________   TELEMETRY: atrial fibrillation with RVR    ECG: atrial fibrillation     LABS:                        12.3   7.38  )-----------( 207      ( 11 May 2022 07:36 )             41.0             05-11    142  |  107  |  33<H>  ----------------------------<  100<H>  3.6   |  24  |  1.26    Ca    9.4      11 May 2022 07:36    TPro  6.8  /  Alb  3.2<L>  /  TBili  0.9  /  DBili  0.3  /  AST  91<H>  /  ALT  138<H>  /  AlkPhos  64  05-11      LIVER FUNCTIONS - ( 11 May 2022 07:36 )  Alb: 3.2 g/dL / Pro: 6.8 gm/dL / ALK PHOS: 64 U/L / ALT: 138 U/L / AST: 91 U/L / GGT: x         PT/INR - ( 10 May 2022 13:37 )   PT: 53.2 sec;   INR: 4.52 ratio         PTT - ( 10 May 2022 13:37 )  PTT:34.8 sec    Pro BNP  25600 05-10 @ 13:37  D Dimer  618 05-10 @ 13:37    PT/INR - ( 10 May 2022 13:37 )   PT: 53.2 sec;   INR: 4.52 ratio         PTT - ( 10 May 2022 13:37 )  PTT:34.8 sec    ________________________________    RADIOLOGY & ADDITIONAL STUDIES: INTERPRETATION:  AP erect chest on May 10, 2022 at 2:04 PM. Patient is   short of breath and has atrial fibrillation.    Heart enlargementagain noted.    There is mild infiltration emanating around the right hilum increased   from August 7, 2021.    ________________________________    ASSESSMENT:  Paroxysmal atrial fibrillation on anticoagulation with Eliquis  History of CAD status post PCI  Moderate to severe mitral valve regurgitation  Moderate to severe tricuspid valve regurgitation with mild pulmonary hypertension  Systemic hypertension  Hyperlipidemia      PLAN:  In summary, this is a 81y Female with a past medical history of CAD, atrial fibrillation, admitted for SOB due to congestive heart failure and atrial fibrillation with RVR.  Rate control (CCB and BB) and IV lasix.  Cont anticoagulation  Full note to follow.    __________________________________________________________________________  Thank you for allowing me to participate in the care of your patient. Please contact me should any questions arise.    IHSAN Turcios DO, [facility]  Office: [phone]    Patient is a 81y old  Female who presents with a chief complaint of SOB/AMS (10 May 2022 16:40)    ________________________________  VENTURA DAVEY is a 81y year old Female with a past medical history of moderate aortic regurgitation, coronary artery disease status post remote PCI, Parox atrial fibrillation maintained on anticoagulation with Eliquis, history of stress-induced cardiomyopathy, with improvement in LVEF to >50% , moderate to severe mitral valve regurgitation, history of renal cell carcinoma status post surgery, prior tobacco abuse, advanced dementia, hypertension, and osteoarthritis.  Recently admitted for SOB and HTN urgency.  Failed TAISHA DCCV prev.   Hx of ELLIS thrombus     Now sent in from [facility] due to AMS/SOB/edema for last 2-3 days. Pt is pleasantly confused, denies any complaints, but noted to have elevated BP/HR and significant LE edema.  Daughter at bedside.    Follows with Dr. Ellis at [place].     PREVIOUS CARDIAC WORKUP:    Echocardiogram 8.9.21       Summary     Fibrocalcific changes noted to the mitral valve leaflets with preserved   leaflet excursion.   Mild mitral annular calcification is present.   Moderate (2+) eccentric mitral regurgitation is present.   Fibrocalcific changes noted tothe Aortic valve leaflets with preserved   leaflet excursion.   Mild to Moderate aortic regurgitation is present.   Estimated left ventricular ejection fraction is 45 %.   The left ventricle is normal in size and wall thickness.   Mild diffuse hypokinesis of the left ventricle is present.   Normal appearing right ventricle structure and function.      ________________________________  Review of systems: A 10 point review of system has been performed, and is negative except for what has been mentioned in the above history of present illness.     PAST MEDICAL & SURGICAL HISTORY:  Renal cancer, right      OP (osteoporosis)      RA (rheumatoid arthritis)      HTN (hypertension)      Anxiety disorder      Depressive disorder      Urge incontinence of urine      Atrial fibrillation      Knee pain, left      CAD (coronary artery disease)      Stented coronary artery      Takotsubo syndrome      History of percutaneous coronary intervention  stent RCA      S/P angioplasty  1 stent 1998      H/O partial nephrectomy  right 2006      History of cataract surgery, unspecified laterality  b/l 2016      S/P tonsillectomy  1948      H/O breast biopsy  right?      S/P colonoscopy     FAMILY HISTORY:  Family history of MI (myocardial infarction)     SOCIAL HISTORY: The patient denies any history of tobacco abuse, alcohol abuse or illicit drug use.    ALLERGIES:  penicillin (Hives)  penicillins (Short breath; Rash)  statins (Unknown)  statins (Muscle Pain)    Home Medications:  aspirin 81 mg oral tablet, chewable: 1 tab(s) orally once a day (10 May 2022 17:55)  dicyclomine 10 mg oral capsule: 1 cap(s) orally 2 times a day (10 May 2022 17:55)  Eliquis 5 mg oral tablet: 1 tab(s) orally 2 times a day (10 May 2022 17:55)  lidocaine 4% patch: Apply topically to affected area once a day (in the morning) (10 May 2022 17:55)  Melatonin 5 mg oral tablet: 1 tab(s) orally once a day (at bedtime) (10 May 2022 17:55)  Metoprolol Succinate  mg oral tablet, extended release: 1 tab(s) orally once a day (10 May 2022 17:55)  Nystop 100,000 units/g topical powder: Apply topically to affected area once a day (at bedtime) (10 May 2022 17:55)  pantoprazole 40 mg oral delayed release tablet: 1 tab(s) orally once a day (before a meal) (10 May 2022 17:55)  Refresh ophthalmic solution: 1 drop(s) in each eye every 4 hours, As Needed (10 May 2022 17:55)  sertraline 50 mg oral tablet: 1 tab(s) orally once a day (10 May 2022 17:55)  Toprol-XL 50 mg oral tablet, extended release: 1 tab(s) orally once a day (at bedtime) (10 May 2022 17:55)  Tums 500 oral tablet, chewable (obsolete): 1 tab(s) orally every 8 hours, As Needed - for heartburn (10 May 2022 17:55)  Tylenol 325 mg oral tablet: 2 tab(s) orally every 6 hours, As Needed - for moderate pain (10 May 2022 17:55)  Tylenol 500 mg oral tablet: 2 tab(s) orally 3 times a day (10 May 2022 17:55)    MEDICATIONS  (STANDING):  amLODIPine   Tablet 10 milliGRAM(s) Oral daily  apixaban 5 milliGRAM(s) Oral two times a day  furosemide   Injectable 40 milliGRAM(s) IV Push daily  metoprolol succinate  milliGRAM(s) Oral daily  pantoprazole    Tablet 40 milliGRAM(s) Oral before breakfast  sertraline 50 milliGRAM(s) Oral daily    MEDICATIONS  (PRN):    Vital Signs Last 24 Hrs  T(C): 36.5 (11 May 2022 08:50), Max: 36.8 (10 May 2022 20:07)  T(F): 97.7 (11 May 2022 08:50), Max: 98.2 (10 May 2022 20:07)  HR: 123 (11 May 2022 08:50) (107 - 125)  BP: 136/95 (11 May 2022 08:50) (124/85 - 161/98)  BP(mean): --  RR: 18 (11 May 2022 08:50) (18 - 21)  SpO2: 96% (11 May 2022 08:50) (95% - 97%)  I&O's Summary    10 May 2022 07:01  -  11 May 2022 07:00  --------------------------------------------------------  IN: 0 mL / OUT: 1700 mL / NET: -1700 mL       ________________________________  GENERAL APPEARANCE:  No acute distress  HEAD: normocephalic, atraumatic  NECK: supple, no jugular venous distention, no carotid bruit    HEART: irregular, S1, S2 normal, 1/6 murmur    CHEST:  No anterior chest wall tenderness    LUNGS:  +crackles    ABDOMEN: soft, nontender, nondistended, with positive bowel sounds appreciated  EXTREMITIES: no edema.   NEURO: Alert and oriented x2  PSYC:  Normal affect  SKIN:  Dry    ________________________________   TELEMETRY: atrial fibrillation with RVR    ECG: atrial fibrillation     LABS:                        12.3   7.38  )-----------( 207      ( 11 May 2022 07:36 )             41.0             05-11    142  |  107  |  33<H>  ----------------------------<  100<H>  3.6   |  24  |  1.26    Ca    9.4      11 May 2022 07:36    TPro  6.8  /  Alb  3.2<L>  /  TBili  0.9  /  DBili  0.3  /  AST  91<H>  /  ALT  138<H>  /  AlkPhos  64  05-11      LIVER FUNCTIONS - ( 11 May 2022 07:36 )  Alb: 3.2 g/dL / Pro: 6.8 gm/dL / ALK PHOS: 64 U/L / ALT: 138 U/L / AST: 91 U/L / GGT: x         PT/INR - ( 10 May 2022 13:37 )   PT: 53.2 sec;   INR: 4.52 ratio         PTT - ( 10 May 2022 13:37 )  PTT:34.8 sec    Pro BNP  15369 05-10 @ 13:37  D Dimer  618 05-10 @ 13:37    PT/INR - ( 10 May 2022 13:37 )   PT: 53.2 sec;   INR: 4.52 ratio         PTT - ( 10 May 2022 13:37 )  PTT:34.8 sec    ________________________________    RADIOLOGY & ADDITIONAL STUDIES: INTERPRETATION:  AP   chest on May 10, 2022 at 2:04 PM. Patient is   short of breath and has atrial fibrillation.    Heart enlargementagain noted.    There is mild infiltration emanating around the right hilum increased   from August 7, 2021.    ________________________________    ASSESSMENT:  Acute systolic congestive heart failure with new cardiomyopathy  Paroxysmal atrial fibrillation on anticoagulation with Eliquis  History of CAD status post PCI  Moderate to severe mitral valve regurgitation  Moderate to severe tricuspid valve regurgitation with mild pulmonary hypertension  Systemic hypertension  Hyperlipidemia      PLAN:  In summary, this is a 81y Female with a past medical history of CAD, atrial fibrillation, admitted for SOB due to congestive heart failure and atrial fibrillation with RVR.  Rate control (CCB and BB) and IV lasix.  Cont anticoagulation  Cont IV lasix  Reg new drop in LVEF - poss due to mitral regurg vs atrial fibrillation - discussed with dtr at length - will tx medically for now as she is responding well.  Will follow up w/ Dr Ellis for further mgt - given dementia, dtr will likely pursue conservative mgt.  __________________________________________________________________________  Thank you for allowing me to participate in the care of your patient. Please contact me should any questions arise.    IHSAN Turcios DO, FACC  Office: [phone]

## 2022-05-12 LAB
ANION GAP SERPL CALC-SCNC: 9 MMOL/L — SIGNIFICANT CHANGE UP (ref 5–17)
BUN SERPL-MCNC: 35 MG/DL — HIGH (ref 7–23)
CALCIUM SERPL-MCNC: 9.3 MG/DL — SIGNIFICANT CHANGE UP (ref 8.5–10.1)
CHLORIDE SERPL-SCNC: 106 MMOL/L — SIGNIFICANT CHANGE UP (ref 96–108)
CO2 SERPL-SCNC: 25 MMOL/L — SIGNIFICANT CHANGE UP (ref 22–31)
CREAT SERPL-MCNC: 1.2 MG/DL — SIGNIFICANT CHANGE UP (ref 0.5–1.3)
EGFR: 45 ML/MIN/1.73M2 — LOW
GLUCOSE SERPL-MCNC: 103 MG/DL — HIGH (ref 70–99)
POTASSIUM SERPL-MCNC: 4 MMOL/L — SIGNIFICANT CHANGE UP (ref 3.5–5.3)
POTASSIUM SERPL-SCNC: 4 MMOL/L — SIGNIFICANT CHANGE UP (ref 3.5–5.3)
SODIUM SERPL-SCNC: 140 MMOL/L — SIGNIFICANT CHANGE UP (ref 135–145)

## 2022-05-12 PROCEDURE — 99232 SBSQ HOSP IP/OBS MODERATE 35: CPT

## 2022-05-12 RX ORDER — DILTIAZEM HCL 120 MG
300 CAPSULE, EXT RELEASE 24 HR ORAL DAILY
Refills: 0 | Status: DISCONTINUED | OUTPATIENT
Start: 2022-05-12 | End: 2022-05-15

## 2022-05-12 RX ORDER — METOPROLOL TARTRATE 50 MG
100 TABLET ORAL
Refills: 0 | Status: DISCONTINUED | OUTPATIENT
Start: 2022-05-12 | End: 2022-05-16

## 2022-05-12 RX ADMIN — APIXABAN 5 MILLIGRAM(S): 2.5 TABLET, FILM COATED ORAL at 09:53

## 2022-05-12 RX ADMIN — APIXABAN 5 MILLIGRAM(S): 2.5 TABLET, FILM COATED ORAL at 21:54

## 2022-05-12 RX ADMIN — Medication 60 MILLIGRAM(S): at 00:15

## 2022-05-12 RX ADMIN — Medication 100 MILLIGRAM(S): at 09:53

## 2022-05-12 RX ADMIN — Medication 60 MILLIGRAM(S): at 05:08

## 2022-05-12 RX ADMIN — SERTRALINE 50 MILLIGRAM(S): 25 TABLET, FILM COATED ORAL at 09:53

## 2022-05-12 RX ADMIN — Medication 40 MILLIGRAM(S): at 09:53

## 2022-05-12 RX ADMIN — Medication 40 MILLIGRAM(S): at 21:54

## 2022-05-12 RX ADMIN — Medication 100 MILLIGRAM(S): at 21:54

## 2022-05-12 RX ADMIN — Medication 60 MILLIGRAM(S): at 17:22

## 2022-05-12 RX ADMIN — PANTOPRAZOLE SODIUM 40 MILLIGRAM(S): 20 TABLET, DELAYED RELEASE ORAL at 06:09

## 2022-05-12 RX ADMIN — Medication 60 MILLIGRAM(S): at 12:06

## 2022-05-13 ENCOUNTER — TRANSCRIPTION ENCOUNTER (OUTPATIENT)
Age: 82
End: 2022-05-13

## 2022-05-13 LAB
ALBUMIN SERPL ELPH-MCNC: 3.2 G/DL — LOW (ref 3.3–5)
ALP SERPL-CCNC: 69 U/L — SIGNIFICANT CHANGE UP (ref 40–120)
ALT FLD-CCNC: 99 U/L — HIGH (ref 12–78)
ANION GAP SERPL CALC-SCNC: 7 MMOL/L — SIGNIFICANT CHANGE UP (ref 5–17)
AST SERPL-CCNC: 51 U/L — HIGH (ref 15–37)
BILIRUB SERPL-MCNC: 0.8 MG/DL — SIGNIFICANT CHANGE UP (ref 0.2–1.2)
BUN SERPL-MCNC: 29 MG/DL — HIGH (ref 7–23)
CALCIUM SERPL-MCNC: 9.3 MG/DL — SIGNIFICANT CHANGE UP (ref 8.5–10.1)
CHLORIDE SERPL-SCNC: 99 MMOL/L — SIGNIFICANT CHANGE UP (ref 96–108)
CO2 SERPL-SCNC: 31 MMOL/L — SIGNIFICANT CHANGE UP (ref 22–31)
CREAT SERPL-MCNC: 1.25 MG/DL — SIGNIFICANT CHANGE UP (ref 0.5–1.3)
EGFR: 43 ML/MIN/1.73M2 — LOW
GLUCOSE SERPL-MCNC: 106 MG/DL — HIGH (ref 70–99)
POTASSIUM SERPL-MCNC: 3.4 MMOL/L — LOW (ref 3.5–5.3)
POTASSIUM SERPL-SCNC: 3.4 MMOL/L — LOW (ref 3.5–5.3)
PROT SERPL-MCNC: 6.9 GM/DL — SIGNIFICANT CHANGE UP (ref 6–8.3)
SODIUM SERPL-SCNC: 137 MMOL/L — SIGNIFICANT CHANGE UP (ref 135–145)

## 2022-05-13 PROCEDURE — 99232 SBSQ HOSP IP/OBS MODERATE 35: CPT

## 2022-05-13 RX ORDER — POTASSIUM CHLORIDE 20 MEQ
40 PACKET (EA) ORAL ONCE
Refills: 0 | Status: COMPLETED | OUTPATIENT
Start: 2022-05-13 | End: 2022-05-13

## 2022-05-13 RX ORDER — AMIODARONE HYDROCHLORIDE 400 MG/1
400 TABLET ORAL EVERY 8 HOURS
Refills: 0 | Status: DISCONTINUED | OUTPATIENT
Start: 2022-05-13 | End: 2022-05-14

## 2022-05-13 RX ORDER — AMIODARONE HYDROCHLORIDE 400 MG/1
TABLET ORAL
Refills: 0 | Status: DISCONTINUED | OUTPATIENT
Start: 2022-05-13 | End: 2022-05-14

## 2022-05-13 RX ORDER — FUROSEMIDE 40 MG
40 TABLET ORAL
Refills: 0 | Status: DISCONTINUED | OUTPATIENT
Start: 2022-05-13 | End: 2022-05-16

## 2022-05-13 RX ADMIN — SERTRALINE 50 MILLIGRAM(S): 25 TABLET, FILM COATED ORAL at 09:52

## 2022-05-13 RX ADMIN — Medication 40 MILLIGRAM(S): at 17:39

## 2022-05-13 RX ADMIN — Medication 5 MILLIGRAM(S): at 11:45

## 2022-05-13 RX ADMIN — APIXABAN 5 MILLIGRAM(S): 2.5 TABLET, FILM COATED ORAL at 21:59

## 2022-05-13 RX ADMIN — Medication 40 MILLIEQUIVALENT(S): at 11:30

## 2022-05-13 RX ADMIN — PANTOPRAZOLE SODIUM 40 MILLIGRAM(S): 20 TABLET, DELAYED RELEASE ORAL at 06:22

## 2022-05-13 RX ADMIN — Medication 300 MILLIGRAM(S): at 09:52

## 2022-05-13 RX ADMIN — AMIODARONE HYDROCHLORIDE 400 MILLIGRAM(S): 400 TABLET ORAL at 13:34

## 2022-05-13 RX ADMIN — AMIODARONE HYDROCHLORIDE 400 MILLIGRAM(S): 400 TABLET ORAL at 21:59

## 2022-05-13 RX ADMIN — Medication 100 MILLIGRAM(S): at 09:52

## 2022-05-13 RX ADMIN — Medication 100 MILLIGRAM(S): at 21:59

## 2022-05-13 RX ADMIN — APIXABAN 5 MILLIGRAM(S): 2.5 TABLET, FILM COATED ORAL at 09:52

## 2022-05-13 RX ADMIN — Medication 40 MILLIGRAM(S): at 09:52

## 2022-05-13 NOTE — DISCHARGE NOTE NURSING/CASE MANAGEMENT/SOCIAL WORK - NSDCPEFALRISK_GEN_ALL_CORE
For information on Fall & Injury Prevention, visit: [email or web address] OR  [email or web address] OR  https://www.cdc.gov/steadi/patient.html

## 2022-05-13 NOTE — DISCHARGE NOTE NURSING/CASE MANAGEMENT/SOCIAL WORK - NSDCFUADDAPPT_GEN_ALL_CORE_FT
Follow-up appointment with Dr. Turcios    Day: Tuesday  Date: 5/17/22  Time: 4:00pm  Phone: [phone]

## 2022-05-13 NOTE — DISCHARGE NOTE NURSING/CASE MANAGEMENT/SOCIAL WORK - PATIENT PORTAL LINK FT
You can access the FollowMyHealth Patient Portal offered by [facility] by registering at the following website: [email or web address]. By joining [facility]’s FollowMyHealth portal, you will also be able to view your health information using other applications (apps) compatible with our system.

## 2022-05-13 NOTE — DISCHARGE NOTE NURSING/CASE MANAGEMENT/SOCIAL WORK - NSDCVIVACCINE_GEN_ALL_CORE_FT
Tdap; 01-Jun-2018 11:12; Alfonso Wick (RN); [employer]; [id number]; IntraMuscular; Deltoid Right.; 0.5 milliLiter(s); VIS (VIS Published: 09-May-2013, VIS Presented: 01-Jun-2018);

## 2022-05-14 PROCEDURE — 93010 ELECTROCARDIOGRAM REPORT: CPT

## 2022-05-14 PROCEDURE — 99232 SBSQ HOSP IP/OBS MODERATE 35: CPT

## 2022-05-14 RX ORDER — AMIODARONE HYDROCHLORIDE 400 MG/1
400 TABLET ORAL EVERY 8 HOURS
Refills: 0 | Status: DISCONTINUED | OUTPATIENT
Start: 2022-05-14 | End: 2022-05-15

## 2022-05-14 RX ORDER — POTASSIUM CHLORIDE 20 MEQ
20 PACKET (EA) ORAL
Refills: 0 | Status: DISCONTINUED | OUTPATIENT
Start: 2022-05-14 | End: 2022-05-16

## 2022-05-14 RX ORDER — AMIODARONE HYDROCHLORIDE 400 MG/1
TABLET ORAL
Refills: 0 | Status: DISCONTINUED | OUTPATIENT
Start: 2022-05-14 | End: 2022-05-15

## 2022-05-14 RX ADMIN — AMIODARONE HYDROCHLORIDE 400 MILLIGRAM(S): 400 TABLET ORAL at 06:02

## 2022-05-14 RX ADMIN — AMIODARONE HYDROCHLORIDE 400 MILLIGRAM(S): 400 TABLET ORAL at 21:41

## 2022-05-14 RX ADMIN — Medication 40 MILLIGRAM(S): at 18:30

## 2022-05-14 RX ADMIN — APIXABAN 5 MILLIGRAM(S): 2.5 TABLET, FILM COATED ORAL at 21:41

## 2022-05-14 RX ADMIN — SERTRALINE 50 MILLIGRAM(S): 25 TABLET, FILM COATED ORAL at 10:06

## 2022-05-14 RX ADMIN — Medication 40 MILLIGRAM(S): at 10:03

## 2022-05-14 RX ADMIN — AMIODARONE HYDROCHLORIDE 400 MILLIGRAM(S): 400 TABLET ORAL at 14:01

## 2022-05-14 RX ADMIN — Medication 20 MILLIEQUIVALENT(S): at 14:17

## 2022-05-14 RX ADMIN — PANTOPRAZOLE SODIUM 40 MILLIGRAM(S): 20 TABLET, DELAYED RELEASE ORAL at 06:02

## 2022-05-14 RX ADMIN — APIXABAN 5 MILLIGRAM(S): 2.5 TABLET, FILM COATED ORAL at 10:03

## 2022-05-14 RX ADMIN — Medication 300 MILLIGRAM(S): at 10:04

## 2022-05-14 RX ADMIN — Medication 20 MILLIEQUIVALENT(S): at 18:30

## 2022-05-14 RX ADMIN — Medication 100 MILLIGRAM(S): at 21:41

## 2022-05-14 RX ADMIN — Medication 100 MILLIGRAM(S): at 10:04

## 2022-05-15 LAB
ANION GAP SERPL CALC-SCNC: 9 MMOL/L — SIGNIFICANT CHANGE UP (ref 5–17)
BUN SERPL-MCNC: 30 MG/DL — HIGH (ref 7–23)
CALCIUM SERPL-MCNC: 9.3 MG/DL — SIGNIFICANT CHANGE UP (ref 8.5–10.1)
CHLORIDE SERPL-SCNC: 104 MMOL/L — SIGNIFICANT CHANGE UP (ref 96–108)
CO2 SERPL-SCNC: 26 MMOL/L — SIGNIFICANT CHANGE UP (ref 22–31)
CREAT SERPL-MCNC: 1.27 MG/DL — SIGNIFICANT CHANGE UP (ref 0.5–1.3)
EGFR: 42 ML/MIN/1.73M2 — LOW
GLUCOSE SERPL-MCNC: 104 MG/DL — HIGH (ref 70–99)
POTASSIUM SERPL-MCNC: 3.9 MMOL/L — SIGNIFICANT CHANGE UP (ref 3.5–5.3)
POTASSIUM SERPL-SCNC: 3.9 MMOL/L — SIGNIFICANT CHANGE UP (ref 3.5–5.3)
SODIUM SERPL-SCNC: 139 MMOL/L — SIGNIFICANT CHANGE UP (ref 135–145)

## 2022-05-15 PROCEDURE — 99232 SBSQ HOSP IP/OBS MODERATE 35: CPT

## 2022-05-15 PROCEDURE — 93010 ELECTROCARDIOGRAM REPORT: CPT

## 2022-05-15 RX ORDER — DILTIAZEM HCL 120 MG
120 CAPSULE, EXT RELEASE 24 HR ORAL DAILY
Refills: 0 | Status: DISCONTINUED | OUTPATIENT
Start: 2022-05-15 | End: 2022-05-16

## 2022-05-15 RX ORDER — AMIODARONE HYDROCHLORIDE 400 MG/1
200 TABLET ORAL DAILY
Refills: 0 | Status: DISCONTINUED | OUTPATIENT
Start: 2022-05-15 | End: 2022-05-16

## 2022-05-15 RX ADMIN — Medication 100 MILLIGRAM(S): at 21:30

## 2022-05-15 RX ADMIN — PANTOPRAZOLE SODIUM 40 MILLIGRAM(S): 20 TABLET, DELAYED RELEASE ORAL at 05:27

## 2022-05-15 RX ADMIN — APIXABAN 5 MILLIGRAM(S): 2.5 TABLET, FILM COATED ORAL at 21:30

## 2022-05-15 RX ADMIN — AMIODARONE HYDROCHLORIDE 400 MILLIGRAM(S): 400 TABLET ORAL at 14:33

## 2022-05-15 RX ADMIN — Medication 20 MILLIEQUIVALENT(S): at 17:39

## 2022-05-15 RX ADMIN — AMIODARONE HYDROCHLORIDE 400 MILLIGRAM(S): 400 TABLET ORAL at 05:27

## 2022-05-15 RX ADMIN — Medication 20 MILLIEQUIVALENT(S): at 05:27

## 2022-05-15 RX ADMIN — Medication 40 MILLIGRAM(S): at 09:51

## 2022-05-15 RX ADMIN — Medication 100 MILLIGRAM(S): at 09:51

## 2022-05-15 RX ADMIN — Medication 40 MILLIGRAM(S): at 17:38

## 2022-05-15 RX ADMIN — Medication 650 MILLIGRAM(S): at 21:30

## 2022-05-15 RX ADMIN — Medication 300 MILLIGRAM(S): at 09:51

## 2022-05-15 RX ADMIN — Medication 650 MILLIGRAM(S): at 22:31

## 2022-05-15 RX ADMIN — SERTRALINE 50 MILLIGRAM(S): 25 TABLET, FILM COATED ORAL at 09:51

## 2022-05-15 RX ADMIN — APIXABAN 5 MILLIGRAM(S): 2.5 TABLET, FILM COATED ORAL at 09:51

## 2022-05-16 ENCOUNTER — TRANSCRIPTION ENCOUNTER (OUTPATIENT)
Age: 82
End: 2022-05-16

## 2022-05-16 VITALS
DIASTOLIC BLOOD PRESSURE: 71 MMHG | TEMPERATURE: 97 F | SYSTOLIC BLOOD PRESSURE: 117 MMHG | OXYGEN SATURATION: 95 % | HEART RATE: 57 BPM | RESPIRATION RATE: 18 BRPM

## 2022-05-16 LAB — SARS-COV-2 RNA SPEC QL NAA+PROBE: SIGNIFICANT CHANGE UP

## 2022-05-16 PROCEDURE — 99239 HOSP IP/OBS DSCHRG MGMT >30: CPT

## 2022-05-16 RX ORDER — METOPROLOL TARTRATE 50 MG
1 TABLET ORAL
Qty: 0 | Refills: 0 | DISCHARGE

## 2022-05-16 RX ORDER — ACETAMINOPHEN 500 MG
2 TABLET ORAL
Qty: 0 | Refills: 0 | DISCHARGE

## 2022-05-16 RX ORDER — ASPIRIN/CALCIUM CARB/MAGNESIUM 324 MG
1 TABLET ORAL
Qty: 0 | Refills: 0 | DISCHARGE

## 2022-05-16 RX ORDER — LOSARTAN POTASSIUM 100 MG/1
1 TABLET, FILM COATED ORAL
Qty: 30 | Refills: 0
Start: 2022-05-16 | End: 2022-06-14

## 2022-05-16 RX ORDER — METOPROLOL TARTRATE 50 MG
1 TABLET ORAL
Qty: 30 | Refills: 0
Start: 2022-05-16 | End: 2022-06-14

## 2022-05-16 RX ORDER — METOPROLOL TARTRATE 50 MG
50 TABLET ORAL DAILY
Refills: 0 | Status: DISCONTINUED | OUTPATIENT
Start: 2022-05-16 | End: 2022-05-16

## 2022-05-16 RX ORDER — LOSARTAN POTASSIUM 100 MG/1
25 TABLET, FILM COATED ORAL DAILY
Refills: 0 | Status: DISCONTINUED | OUTPATIENT
Start: 2022-05-16 | End: 2022-05-16

## 2022-05-16 RX ORDER — POTASSIUM CHLORIDE 20 MEQ
1 PACKET (EA) ORAL
Qty: 60 | Refills: 0
Start: 2022-05-16 | End: 2022-06-14

## 2022-05-16 RX ORDER — FUROSEMIDE 40 MG
1 TABLET ORAL
Qty: 60 | Refills: 0
Start: 2022-05-16 | End: 2022-06-14

## 2022-05-16 RX ADMIN — Medication 50 MILLIGRAM(S): at 10:40

## 2022-05-16 RX ADMIN — LOSARTAN POTASSIUM 25 MILLIGRAM(S): 100 TABLET, FILM COATED ORAL at 10:40

## 2022-05-16 RX ADMIN — SERTRALINE 50 MILLIGRAM(S): 25 TABLET, FILM COATED ORAL at 09:40

## 2022-05-16 RX ADMIN — Medication 20 MILLIEQUIVALENT(S): at 05:23

## 2022-05-16 RX ADMIN — AMIODARONE HYDROCHLORIDE 200 MILLIGRAM(S): 400 TABLET ORAL at 09:39

## 2022-05-16 RX ADMIN — APIXABAN 5 MILLIGRAM(S): 2.5 TABLET, FILM COATED ORAL at 09:40

## 2022-05-16 RX ADMIN — PANTOPRAZOLE SODIUM 40 MILLIGRAM(S): 20 TABLET, DELAYED RELEASE ORAL at 05:23

## 2022-05-16 RX ADMIN — Medication 40 MILLIGRAM(S): at 09:41

## 2022-05-16 NOTE — DISCHARGE NOTE PROVIDER - HOSPITAL COURSE
81 y.o. female with PMHx of CAD s/p PCI, PAF on eliquis, CMP with EF 45%, MR, RCC s/p resection, dementia [facility] from [facility] due to AMS/SOB/edema for last 2-3 days. Pt is pleasantly confused, denies any complaints, but noted to have elevated BP/HR and significant LE edema. -diuresed with IV Lasix with improvement in dypnea/hypoxia  -developed rapid Afib requiring Amiodarone loading    # Acute on chronic systolic  HFrEF   - s/p IV diuresis: Lasix IV Bid; now on po lasix 40mg Bid  c/w KCl Bid  - cont. losartan  decrease toprol xl to 50 mg po daily   renal fct stable     # Chronic Afib with RVR  episodes of bradycardia noticed   - dc cardizem   Toprol XL decreased to 50 mg po daily - D/W Dr. Turcios   start losartan 25 mg po daily   c/w Amiodarone loading , change to Amio 200mg/day starting tomorrow 5/16  c/w Apixaban  Plan D/W Dr. Turcios   follow up with cardiology outpatient     #Severe MR  Ideally should get surgical repair, however likely medical management due to dementia     # Abn LFTs due to ADHF   LFTs improving   blood work for liver function at PCP office in one week     Pt. is stable for discharge, will follow up with cardiology - tomorrow and with PCP.     PHYSICAL EXAM:  Vital Signs Last 24 Hrs  T(C): 36.7 (16 May 2022 08:28), Max: 36.7 (15 May 2022 21:35)  T(F): 98.1 (16 May 2022 08:28), Max: 98.1 (16 May 2022 08:28)  HR: 53 (16 May 2022 08:28) (53 - 89)  BP: 129/78 (16 May 2022 08:28) (103/73 - 129/78)  BP(mean): 90 (15 May 2022 17:20) (90 - 90)  RR: 18 (16 May 2022 08:28) (16 - 18)  SpO2: 96% (16 May 2022 08:28) (95% - 97%)  GENERAL: NAD  HEENT:  NC/AT, EOMI, PERRLA, No scleral icterus, Moist mucous membranes  NECK: Supple, No JVD  CNS:  Alert & Oriented X2, Motor Strength 5/5 B/L upper and lower extremities; DTRs 2+ intact   LUNG: Normal Breath sounds, Clear to auscultation bilaterally, No rales, No rhonchi, No wheezing  HEART: RRR; No murmurs, No rubs  ABDOMEN: +BS, ST/ND/NT  GENITOURINARY: Voiding, Bladder not distended  EXTREMITIES:  2+ Peripheral Pulses, No clubbing, No cyanosis, No tibial edema  MUSCULOSKELTAL: Joints normal ROM, No TTP, No effusion  VAGINAL: deferred  SKIN: no rashes  RECTAL: deferred, not indicated  BREAST: deferred

## 2022-05-16 NOTE — PROGRESS NOTE ADULT - PROVIDER SPECIALTY LIST ADULT
Cardiology
Cardiology
Hospitalist
Cardiology
Hospitalist

## 2022-05-16 NOTE — PROGRESS NOTE ADULT - SUBJECTIVE AND OBJECTIVE BOX
Reason for Admission: SOB/AMS  History of Present Illness:   81 y.o. female with PMHx of CAD s/p PCI, PAF on eliquis, CMP with EF 45%, MR, RCC s/p resection, dementia sent from [facility] due to AMS/SOB/edema for last 2-3 days. Pt is pleasantly confused, denies any complaints, but noted to have elevated BP/HR and significant LE edema.  Daughter at bedside    5.11: pleasantly confused , less dyspnea, good urine output  5.12: reports no dyspnea   5.13: no dyspnea, Tele Afib 147/min          REVIEW OF SYSTEMS:    CONSTITUTIONAL: No weakness, No fevers or chills  ENT: No ear ache, No sorethroat  NECK: No pain, No stiffness  RESPIRATORY: No cough, No wheezing, No hemoptysis; No dyspnea  CARDIOVASCULAR: No chest pain, No palpitations  GASTROINTESTINAL: No abd pain, No nausea, No vomiting, No hematemesis, No diarrhea or constipation. No melena, No hematochezia.  GENITOURINARY: No dysuria, No  hematuria  NEUROLOGICAL: No diplopia, No paresthesia, No motor dysfunction  MUSCULOSKELETAL: No arthralgia, No myalgia  SKIN: No rashes, or lesions   PSYCH: no anxiety, no suicidal ideation    All other review of systems is negative unless indicated above    Vital Signs Last 24 Hrs  T(C): 37.1 (13 May 2022 08:38), Max: 37.1 (13 May 2022 08:38)  T(F): 98.8 (13 May 2022 08:38), Max: 98.8 (13 May 2022 08:38)  HR: 144 (13 May 2022 13:35) (72 - 144)  BP: 106/84 (13 May 2022 13:35) (106/84 - 125/78)  BP(mean): 92 (12 May 2022 21:27) (87 - 92)  RR: 16 (13 May 2022 13:35) (16 - 18)  SpO2: 94% (13 May 2022 13:35) (94% - 95%)    PHYSICAL EXAM:    GENERAL: NAD  HEENT:  NC/AT, EOMI, PERRLA, No scleral icterus, Moist mucous membranes  NECK: Supple, No JVD  CNS:  Alert & Oriented X2, Motor Strength 5/5 B/L upper and lower extremities; DTRs 2+ intact   LUNG: Normal Breath sounds, Clear to auscultation bilaterally, No rales, No rhonchi, No wheezing  HEART: RRR; No murmurs, No rubs  ABDOMEN: +BS, ST/ND/NT  GENITOURINARY: Voiding, Bladder not distended  EXTREMITIES:  2+ Peripheral Pulses, No clubbing, No cyanosis, No tibial edema  MUSCULOSKELTAL: Joints normal ROM, No TTP, No effusion  VAGINAL: deferred  SKIN: no rashes  RECTAL: deferred, not indicated  BREAST: deferred               Labs:                        12.3   7.38  )-----------( 207      ( 11 May 2022 07:36 )             41.0     05-12    140  |  106  |  35<H>  ----------------------------<  103<H>  4.0   |  25  |  1.20    Ca    9.3      12 May 2022 08:02    TPro  6.8  /  Alb  3.2<L>  /  TBili  0.9  /  DBili  0.3  /  AST  91<H>  /  ALT  138<H>  /  AlkPhos  64  05-11           MEDICATIONS  (STANDING):  apixaban 5 milliGRAM(s) Oral two times a day  diltiazem    Tablet 60 milliGRAM(s) Oral four times a day  furosemide   Injectable 40 milliGRAM(s) IV Push two times a day  metoprolol succinate  milliGRAM(s) Oral daily  pantoprazole    Tablet 40 milliGRAM(s) Oral before breakfast  sertraline 50 milliGRAM(s) Oral daily        Assessment:  	  1. Acute on chronic systolic Chf   - IV diuresis: Lasix IV Bid; will change to po    - strict I&Os, daily weight, fluid restriction  renal fct stable     2. Chronic Afib with RVR:  on Cardizem po 60mg q 6h  Toprol XL 100mg /day, will increase to 100mg Bid  Lopressor 5mg IV q5h prn  Cardiology to start Amiodarone for better rate control   c/w Apixaban    3. Severe MR:  Ideally should get surgical repair    4. GOC:   d/w daughter (HCP) DNR, she is not ready at this time to sign DNR  Patient is full Code     4. Abn LFTs due to ADHF (#1) - f/u in am hepatic function    5. VTE - eliquis      
Reason for Admission: SOB/AMS  History of Present Illness:   81 y.o. female with PMHx of CAD s/p PCI, PAF on eliquis, CMP with EF 45%, MR, RCC s/p resection, dementia sent from [facility] due to AMS/SOB/edema for last 2-3 days. Pt is pleasantly confused, denies any complaints, but noted to have elevated BP/HR and significant LE edema.  -diuresed with IV Lasix with improvement in dypnea/hypoxia  -developed rapid Afib requiring Amiodarone loading     5.11: pleasantly confused , less dyspnea, good urine output  5.12: reports no dyspnea   5.13: no dyspnea, Tele Afib 147/min  5.14: started on Amiodarone, HR improved   5.15: mildly confused, no distress  Tele: Afib 80s          REVIEW OF SYSTEMS:    CONSTITUTIONAL: No weakness, No fevers or chills  ENT: No ear ache, No sorethroat  NECK: No pain, No stiffness  RESPIRATORY: No cough, No wheezing, No hemoptysis; No dyspnea  CARDIOVASCULAR: No chest pain, No palpitations  GASTROINTESTINAL: No abd pain, No nausea, No vomiting, No hematemesis, No diarrhea or constipation. No melena, No hematochezia.  GENITOURINARY: No dysuria, No  hematuria  NEUROLOGICAL: No diplopia, No paresthesia, No motor dysfunction  MUSCULOSKELETAL: No arthralgia, No myalgia  SKIN: No rashes, or lesions   PSYCH: no anxiety, no suicidal ideation    All other review of systems is negative unless indicated above    Vital Signs Last 24 Hrs  T(C): 36.8 (15 May 2022 08:49), Max: 36.9 (14 May 2022 18:24)  T(F): 98.2 (15 May 2022 08:49), Max: 98.5 (14 May 2022 21:44)  HR: 66 (15 May 2022 08:49) (65 - 83)  BP: 122/84 (15 May 2022 08:49) (110/74 - 135/86)  BP(mean): 98 (14 May 2022 18:24) (86 - 98)  RR: 18 (15 May 2022 08:49) (16 - 18)  SpO2: 96% (15 May 2022 08:49) (94% - 96%)  PHYSICAL EXAM:    GENERAL: NAD  HEENT:  NC/AT, EOMI, PERRLA, No scleral icterus, Moist mucous membranes  NECK: Supple, No JVD  CNS:  Alert & Oriented X2, Motor Strength 5/5 B/L upper and lower extremities; DTRs 2+ intact   LUNG: Normal Breath sounds, Clear to auscultation bilaterally, No rales, No rhonchi, No wheezing  HEART: RRR; No murmurs, No rubs  ABDOMEN: +BS, ST/ND/NT  GENITOURINARY: Voiding, Bladder not distended  EXTREMITIES:  2+ Peripheral Pulses, No clubbing, No cyanosis, No tibial edema  MUSCULOSKELTAL: Joints normal ROM, No TTP, No effusion  VAGINAL: deferred  SKIN: no rashes  RECTAL: deferred, not indicated  BREAST: deferred               Labs:    05-15    139  |  104  |  30<H>  ----------------------------<  104<H>  3.9   |  26  |  1.27    Ca    9.3      15 May 2022 08:54             MEDICATIONS  (STANDING):  aMIOdarone    Tablet 400 milliGRAM(s) Oral every 8 hours  apixaban 5 milliGRAM(s) Oral two times a day  diltiazem    milliGRAM(s) Oral daily  furosemide    Tablet 40 milliGRAM(s) Oral two times a day  metoprolol succinate  milliGRAM(s) Oral two times a day  pantoprazole    Tablet 40 milliGRAM(s) Oral before breakfast  potassium chloride    Tablet ER 20 milliEquivalent(s) Oral two times a day  sertraline 50 milliGRAM(s) Oral daily        Assessment:  	  1. Acute on chronic systolic Chf   - s/p IV diuresis: Lasix IV Bid; now on po lasix 40mg Bid  c/w KCl Bid  renal fct stable     2. Chronic Afib with RVR:  on Cardizem po 60mg q 6h  Toprol XL 100mg /day, will increase to 100mg Bid  c/w Amiodarone loading , change to Amio 200mg/day starting tomorrow 5.16  c/w Apixaban    3. Severe MR:  Ideally should get surgical repair; outpatient eval    4. GOC:   d/w daughter (HCP) DNR, she is not ready at this time to sign DNR  Patient is full Code     4. Abn LFTs due to ADHF (#1)  LFTs improving     Plan to d/c to [facility] tomorrow on Amio 200/day      
Reason for Admission: SOB/AMS  History of Present Illness:   81 y.o. female with PMHx of CAD s/p PCI, PAF on eliquis, CMP with EF 45%, MR, RCC s/p resection, dementia sent from [facility] due to AMS/SOB/edema for last 2-3 days. Pt is pleasantly confused, denies any complaints, but noted to have elevated BP/HR and significant LE edema.  Daughter at bedside    5.11: pleasantly confused , less dyspnea, good urine output            REVIEW OF SYSTEMS:    CONSTITUTIONAL: No weakness, No fevers or chills  ENT: No ear ache, No sorethroat  NECK: No pain, No stiffness  RESPIRATORY: No cough, No wheezing, No hemoptysis; No dyspnea  CARDIOVASCULAR: No chest pain, No palpitations  GASTROINTESTINAL: No abd pain, No nausea, No vomiting, No hematemesis, No diarrhea or constipation. No melena, No hematochezia.  GENITOURINARY: No dysuria, No  hematuria  NEUROLOGICAL: No diplopia, No paresthesia, No motor dysfunction  MUSCULOSKELETAL: No arthralgia, No myalgia  SKIN: No rashes, or lesions   PSYCH: no anxiety, no suicidal ideation    All other review of systems is negative unless indicated above    Vital Signs Last 24 Hrs  T(C): 36.5 (11 May 2022 08:50), Max: 36.8 (10 May 2022 20:07)  T(F): 97.7 (11 May 2022 08:50), Max: 98.2 (10 May 2022 20:07)  HR: 130 (11 May 2022 13:23) (107 - 130)  BP: 116/101 (11 May 2022 13:23) (116/101 - 161/98)  BP(mean): 107 (11 May 2022 13:23) (107 - 107)  RR: 18 (11 May 2022 08:50) (18 - 21)  SpO2: 96% (11 May 2022 08:50) (95% - 97%)    PHYSICAL EXAM:    GENERAL: NAD  HEENT:  NC/AT, EOMI, PERRLA, No scleral icterus, Moist mucous membranes  NECK: Supple, No JVD  CNS:  Alert & Oriented X2, Motor Strength 5/5 B/L upper and lower extremities; DTRs 2+ intact   LUNG: Normal Breath sounds, Clear to auscultation bilaterally, No rales, No rhonchi, No wheezing  HEART: RRR; No murmurs, No rubs  ABDOMEN: +BS, ST/ND/NT  GENITOURINARY: Voiding, Bladder not distended  EXTREMITIES:  2+ Peripheral Pulses, No clubbing, No cyanosis, No tibial edema  MUSCULOSKELTAL: Joints normal ROM, No TTP, No effusion  VAGINAL: deferred  SKIN: no rashes  RECTAL: deferred, not indicated  BREAST: deferred                          12.3   7.38  )-----------( 207      ( 11 May 2022 07:36 )             41.0     05-11    142  |  107  |  33<H>  ----------------------------<  100<H>  3.6   |  24  |  1.26    Ca    9.4      11 May 2022 07:36    TPro  6.8  /  Alb  3.2<L>  /  TBili  0.9  /  DBili  0.3  /  AST  91<H>  /  ALT  138<H>  /  AlkPhos  64  05-11    Vancomycin levels:   Cultures:     MEDICATIONS  (STANDING):  apixaban 5 milliGRAM(s) Oral two times a day  diltiazem    Tablet 60 milliGRAM(s) Oral four times a day  furosemide   Injectable 40 milliGRAM(s) IV Push two times a day  metoprolol succinate  milliGRAM(s) Oral daily  pantoprazole    Tablet 40 milliGRAM(s) Oral before breakfast  sertraline 50 milliGRAM(s) Oral daily    MEDICATIONS  (PRN):  metoprolol tartrate Injectable 5 milliGRAM(s) IV Push every 6 hours PRN HR>120      all labs reviewed  all imaging reviewed      Assessment:  	  1. Acute on chronic systolic Chf   - IV diuresis: Lasix IV Bid   - strict I&Os, daily weight, fluid restriction    2. Chronic Afib with RVR:  on Cardizem po 60mg q 6h  Toprol XL 100mg /day, will increase to 100mg Bid  Lopressor 5mg IV q5h prn    3. Severe MR:  Ideally should get surgical repair    4. GOC:   d/w daughter (HCP) DNR, she is not ready at this time to sign DNR  Patient is full Code     4. Abn LFTs due to ADHF (#1) - f/u in am hepatic function    5. VTE - eliquis      
Reason for Admission: SOB/AMS  History of Present Illness:   81 y.o. female with PMHx of CAD s/p PCI, PAF on eliquis, CMP with EF 45%, MR, RCC s/p resection, dementia sent from [facility] due to AMS/SOB/edema for last 2-3 days. Pt is pleasantly confused, denies any complaints, but noted to have elevated BP/HR and significant LE edema.  Daughter at bedside    5.11: pleasantly confused , less dyspnea, good urine output  5.12: reports no dyspnea             REVIEW OF SYSTEMS:    CONSTITUTIONAL: No weakness, No fevers or chills  ENT: No ear ache, No sorethroat  NECK: No pain, No stiffness  RESPIRATORY: No cough, No wheezing, No hemoptysis; No dyspnea  CARDIOVASCULAR: No chest pain, No palpitations  GASTROINTESTINAL: No abd pain, No nausea, No vomiting, No hematemesis, No diarrhea or constipation. No melena, No hematochezia.  GENITOURINARY: No dysuria, No  hematuria  NEUROLOGICAL: No diplopia, No paresthesia, No motor dysfunction  MUSCULOSKELETAL: No arthralgia, No myalgia  SKIN: No rashes, or lesions   PSYCH: no anxiety, no suicidal ideation    All other review of systems is negative unless indicated above    Vital Signs Last 24 Hrs  T(C): 36.3 (12 May 2022 08:55), Max: 37.1 (12 May 2022 05:00)  T(F): 97.4 (12 May 2022 08:55), Max: 98.7 (12 May 2022 05:00)  HR: 86 (12 May 2022 12:04) (61 - 136)  BP: 130/81 (12 May 2022 12:04) (115/90 - 130/81)  BP(mean): 97 (12 May 2022 12:04) (97 - 98)  RR: 18 (12 May 2022 08:55) (18 - 19)  SpO2: 96% (12 May 2022 08:55) (93% - 96%)    PHYSICAL EXAM:    GENERAL: NAD  HEENT:  NC/AT, EOMI, PERRLA, No scleral icterus, Moist mucous membranes  NECK: Supple, No JVD  CNS:  Alert & Oriented X2, Motor Strength 5/5 B/L upper and lower extremities; DTRs 2+ intact   LUNG: Normal Breath sounds, Clear to auscultation bilaterally, No rales, No rhonchi, No wheezing  HEART: RRR; No murmurs, No rubs  ABDOMEN: +BS, ST/ND/NT  GENITOURINARY: Voiding, Bladder not distended  EXTREMITIES:  2+ Peripheral Pulses, No clubbing, No cyanosis, No tibial edema  MUSCULOSKELTAL: Joints normal ROM, No TTP, No effusion  VAGINAL: deferred  SKIN: no rashes  RECTAL: deferred, not indicated  BREAST: deferred               Labs:                        12.3   7.38  )-----------( 207      ( 11 May 2022 07:36 )             41.0     05-12    140  |  106  |  35<H>  ----------------------------<  103<H>  4.0   |  25  |  1.20    Ca    9.3      12 May 2022 08:02    TPro  6.8  /  Alb  3.2<L>  /  TBili  0.9  /  DBili  0.3  /  AST  91<H>  /  ALT  138<H>  /  AlkPhos  64  05-11           MEDICATIONS  (STANDING):  apixaban 5 milliGRAM(s) Oral two times a day  diltiazem    Tablet 60 milliGRAM(s) Oral four times a day  furosemide   Injectable 40 milliGRAM(s) IV Push two times a day  metoprolol succinate  milliGRAM(s) Oral daily  pantoprazole    Tablet 40 milliGRAM(s) Oral before breakfast  sertraline 50 milliGRAM(s) Oral daily        Assessment:  	  1. Acute on chronic systolic Chf   - IV diuresis: Lasix IV Bid   - strict I&Os, daily weight, fluid restriction  renal fct stable     2. Chronic Afib with RVR:  on Cardizem po 60mg q 6h  Toprol XL 100mg /day, will increase to 100mg Bid  Lopressor 5mg IV q5h prn    3. Severe MR:  Ideally should get surgical repair    4. GOC:   d/w daughter (HCP) DNR, she is not ready at this time to sign DNR  Patient is full Code     4. Abn LFTs due to ADHF (#1) - f/u in am hepatic function    5. VTE - eliquis      
The patient was seen and examined.  Heart rate on the low side, but no dizziness.  No chest discomfort.  Shortness of breath back to baseline.  __________________________  VENTURA DAVEY is a 81y year old Female with a past medical history of moderate aortic regurgitation, coronary artery disease status post remote PCI, Parox atrial fibrillation maintained on anticoagulation with Eliquis, history of stress-induced cardiomyopathy, with improvement in LVEF to >50% , moderate to severe mitral valve regurgitation, history of renal cell carcinoma status post surgery, prior tobacco abuse, advanced dementia, hypertension, and osteoarthritis.  Recently admitted for SOB and HTN urgency.  Failed TAISHA DCCV prev.   Hx of ELLIS thrombus     Now sent in from [facility] due to AMS/SOB/edema for last 2-3 days. Pt is pleasantly confused, denies any complaints, but noted to have elevated BP/HR and significant LE edema.  Daughter at bedside.    Follows with Dr. Ellis at [facility].     PREVIOUS CARDIAC WORKUP:    Echocardiogram 8.9.21       Summary     Fibrocalcific changes noted to the mitral valve leaflets with preserved   leaflet excursion.   Mild mitral annular calcification is present.   Moderate (2+) eccentric mitral regurgitation is present.   Fibrocalcific changes noted tothe Aortic valve leaflets with preserved   leaflet excursion.   Mild to Moderate aortic regurgitation is present.   Estimated left ventricular ejection fraction is 45 %.   The left ventricle is normal in size and wall thickness.   Mild diffuse hypokinesis of the left ventricle is present.   Normal appearing right ventricle structure and function.      ________________________________  Review of systems: A 10 point review of system has been performed, and is negative except for what has been mentioned in the above history of present illness.     PAST MEDICAL & SURGICAL HISTORY:  Renal cancer, right      OP (osteoporosis)      RA (rheumatoid arthritis)      HTN (hypertension)      Anxiety disorder      Depressive disorder      Urge incontinence of urine      Atrial fibrillation      Knee pain, left      CAD (coronary artery disease)      Stented coronary artery      Takotsubo syndrome      History of percutaneous coronary intervention  stent RCA      S/P angioplasty  1 stent 1998      H/O partial nephrectomy  right 2006      History of cataract surgery, unspecified laterality  b/l 2016      S/P tonsillectomy  1948      H/O breast biopsy  right?      S/P colonoscopy     FAMILY HISTORY:  Family history of MI (myocardial infarction)     SOCIAL HISTORY: The patient denies any history of tobacco abuse, alcohol abuse or illicit drug use.    ALLERGIES:  penicillin (Hives)  penicillins (Short breath; Rash)  statins (Unknown)  statins (Muscle Pain)    Home Medications:  aspirin 81 mg oral tablet, chewable: 1 tab(s) orally once a day (10 May 2022 17:55)  dicyclomine 10 mg oral capsule: 1 cap(s) orally 2 times a day (10 May 2022 17:55)  Eliquis 5 mg oral tablet: 1 tab(s) orally 2 times a day (10 May 2022 17:55)  lidocaine 4% patch: Apply topically to affected area once a day (in the morning) (10 May 2022 17:55)  Melatonin 5 mg oral tablet: 1 tab(s) orally once a day (at bedtime) (10 May 2022 17:55)  Metoprolol Succinate  mg oral tablet, extended release: 1 tab(s) orally once a day (10 May 2022 17:55)  Nystop 100,000 units/g topical powder: Apply topically to affected area once a day (at bedtime) (10 May 2022 17:55)  pantoprazole 40 mg oral delayed release tablet: 1 tab(s) orally once a day (before a meal) (10 May 2022 17:55)  Refresh ophthalmic solution: 1 drop(s) in each eye every 4 hours, As Needed (10 May 2022 17:55)  sertraline 50 mg oral tablet: 1 tab(s) orally once a day (10 May 2022 17:55)  Toprol-XL 50 mg oral tablet, extended release: 1 tab(s) orally once a day (at bedtime) (10 May 2022 17:55)  Tums 500 oral tablet, chewable (obsolete): 1 tab(s) orally every 8 hours, As Needed - for heartburn (10 May 2022 17:55)  Tylenol 325 mg oral tablet: 2 tab(s) orally every 6 hours, As Needed - for moderate pain (10 May 2022 17:55)  Tylenol 500 mg oral tablet: 2 tab(s) orally 3 times a day (10 May 2022 17:55)        ________________________________  GENERAL APPEARANCE:  No acute distress  HEAD: normocephalic, atraumatic  NECK: supple, no jugular venous distention, no carotid bruit    HEART: irregular, S1, S2 normal, 1/6 murmur    CHEST:  No anterior chest wall tenderness    LUNGS:  +crackles    ABDOMEN: soft, nontender, nondistended, with positive bowel sounds appreciated  EXTREMITIES: no edema.   NEURO: Alert and oriented x2  PSYC:  Normal affect  SKIN:  Dry    ________________________________   TELEMETRY: Sinus Rhythm     ECG: atrial fibrillation              05-15    139  |  104  |  30<H>  ----------------------------<  104<H>  3.9   |  26  |  1.27    Ca    9.3      15 May 2022 08:54                  PT/INR - ( 10 May 2022 13:37 )   PT: 53.2 sec;   INR: 4.52 ratio         PTT - ( 10 May 2022 13:37 )  PTT:34.8 sec    ________________________________    RADIOLOGY & ADDITIONAL STUDIES: INTERPRETATION:  AP   chest on May 10, 2022 at 2:04 PM. Patient is   short of breath and has atrial fibrillation.    Heart enlargementagain noted.    There is mild infiltration emanating around the right hilum increased   from August 7, 2021.    ________________________________    ASSESSMENT:  Acute systolic congestive heart failure with new cardiomyopathy  Paroxysmal atrial fibrillation on anticoagulation with Eliquis  History of CAD status post PCI  Moderate to severe mitral valve regurgitation  Moderate to severe tricuspid valve regurgitation with mild pulmonary hypertension  Systemic hypertension  Hyperlipidemia      PLAN:  In summary, this is a 81y Female with a past medical history of CAD, atrial fibrillation, admitted for SOB due to congestive heart failure and atrial fibrillation with RVR.    She remains in sinus rhythm, with sinus bradycardia on telemetry.  We will decrease metoprolol.  Discontinue Cardizem.  Add losartan as blood pressure will increase with down titration of antihypertensive/AV uzair blockers.  Continue full dose anticoagulation.    (Reg new drop in LVEF - poss due to mitral regurg vs atrial fibrillation - discussed with dtr at length - will tx medically for now as she is responding well.  Will follow up w/ Dr Ellis for further mgt - given dementia, dtr will likely pursue conservative mgt).  __________________________________________________________________________  Thank you for allowing me to participate in the care of your patient. Please contact me should any questions arise.    IHSAN Turcios DO, FACC  Office: [phone]   
Reason for Admission: SOB/AMS  History of Present Illness:   81 y.o. female with PMHx of CAD s/p PCI, PAF on eliquis, CMP with EF 45%, MR, RCC s/p resection, dementia sent from [facility] due to AMS/SOB/edema for last 2-3 days. Pt is pleasantly confused, denies any complaints, but noted to have elevated BP/HR and significant LE edema.  Daughter at bedside    5.11: pleasantly confused , less dyspnea, good urine output  5.12: reports no dyspnea   5.13: no dyspnea, Tele Afib 147/min  5.14: started on Amiodarone, HR improved           REVIEW OF SYSTEMS:    CONSTITUTIONAL: No weakness, No fevers or chills  ENT: No ear ache, No sorethroat  NECK: No pain, No stiffness  RESPIRATORY: No cough, No wheezing, No hemoptysis; No dyspnea  CARDIOVASCULAR: No chest pain, No palpitations  GASTROINTESTINAL: No abd pain, No nausea, No vomiting, No hematemesis, No diarrhea or constipation. No melena, No hematochezia.  GENITOURINARY: No dysuria, No  hematuria  NEUROLOGICAL: No diplopia, No paresthesia, No motor dysfunction  MUSCULOSKELETAL: No arthralgia, No myalgia  SKIN: No rashes, or lesions   PSYCH: no anxiety, no suicidal ideation    All other review of systems is negative unless indicated above    Vital Signs Last 24 Hrs  T(C): 36.8 (14 May 2022 10:01), Max: 36.8 (13 May 2022 17:23)  T(F): 98.2 (14 May 2022 10:01), Max: 98.2 (13 May 2022 17:23)  HR: 80 (14 May 2022 10:01) (69 - 98)  BP: 139/80 (14 May 2022 10:01) (109/88 - 143/99)  BP(mean): 98 (14 May 2022 10:01) (77 - 98)  RR: 18 (14 May 2022 10:01) (17 - 18)  SpO2: 96% (14 May 2022 10:01) (94% - 96%)    PHYSICAL EXAM:    GENERAL: NAD  HEENT:  NC/AT, EOMI, PERRLA, No scleral icterus, Moist mucous membranes  NECK: Supple, No JVD  CNS:  Alert & Oriented X2, Motor Strength 5/5 B/L upper and lower extremities; DTRs 2+ intact   LUNG: Normal Breath sounds, Clear to auscultation bilaterally, No rales, No rhonchi, No wheezing  HEART: RRR; No murmurs, No rubs  ABDOMEN: +BS, ST/ND/NT  GENITOURINARY: Voiding, Bladder not distended  EXTREMITIES:  2+ Peripheral Pulses, No clubbing, No cyanosis, No tibial edema  MUSCULOSKELTAL: Joints normal ROM, No TTP, No effusion  VAGINAL: deferred  SKIN: no rashes  RECTAL: deferred, not indicated  BREAST: deferred               Labs:                        12.3   7.38  )-----------( 207      ( 11 May 2022 07:36 )             41.0     05-12    140  |  106  |  35<H>  ----------------------------<  103<H>  4.0   |  25  |  1.20    Ca    9.3      12 May 2022 08:02    TPro  6.8  /  Alb  3.2<L>  /  TBili  0.9  /  DBili  0.3  /  AST  91<H>  /  ALT  138<H>  /  AlkPhos  64  05-11           MEDICATIONS  (STANDING):  apixaban 5 milliGRAM(s) Oral two times a day  diltiazem    Tablet 60 milliGRAM(s) Oral four times a day  furosemide   Injectable 40 milliGRAM(s) IV Push two times a day  metoprolol succinate  milliGRAM(s) Oral daily  pantoprazole    Tablet 40 milliGRAM(s) Oral before breakfast  sertraline 50 milliGRAM(s) Oral daily        Assessment:  	  1. Acute on chronic systolic Chf   - s/p IV diuresis: Lasix IV Bid; now on po lasix Bid   - strict I&Os, daily weight, fluid restriction  renal fct stable     2. Chronic Afib with RVR:  on Cardizem po 60mg q 6h  Toprol XL 100mg /day, will increase to 100mg Bid  c/w Amiodarone loading   c/w Apixaban    3. Severe MR:  Ideally should get surgical repair; outpatient eval    4. GOC:   d/w daughter (HCP) DNR, she is not ready at this time to sign DNR  Patient is full Code     4. Abn LFTs due to ADHF (#1)  LFTs improving     5. VTE - eliquis      
The patient was seen and examined.  She remains in sinus rhythm.  No acute events overnight patient's vitals baseline.    ________________________________  VENTURA DAVEY is a 81y year old Female with a past medical history of moderate aortic regurgitation, coronary artery disease status post remote PCI, Parox atrial fibrillation maintained on anticoagulation with Eliquis, history of stress-induced cardiomyopathy, with improvement in LVEF to >50% , moderate to severe mitral valve regurgitation, history of renal cell carcinoma status post surgery, prior tobacco abuse, advanced dementia, hypertension, and osteoarthritis.  Recently admitted for SOB and HTN urgency.  Failed TAISHA DCCV prev.   Hx of ELLIS thrombus     Now sent in from intermediate due to AMS/SOB/edema for last 2-3 days. Pt is pleasantly confused, denies any complaints, but noted to have elevated BP/HR and significant LE edema.  Daughter at bedside.    Follows with Dr. Ellis at [place].     PREVIOUS CARDIAC WORKUP:    Echocardiogram 8.9.21       Summary     Fibrocalcific changes noted to the mitral valve leaflets with preserved   leaflet excursion.   Mild mitral annular calcification is present.   Moderate (2+) eccentric mitral regurgitation is present.   Fibrocalcific changes noted tothe Aortic valve leaflets with preserved   leaflet excursion.   Mild to Moderate aortic regurgitation is present.   Estimated left ventricular ejection fraction is 45 %.   The left ventricle is normal in size and wall thickness.   Mild diffuse hypokinesis of the left ventricle is present.   Normal appearing right ventricle structure and function.      ________________________________  Review of systems: A 10 point review of system has been performed, and is negative except for what has been mentioned in the above history of present illness.     PAST MEDICAL & SURGICAL HISTORY:  Renal cancer, right      OP (osteoporosis)      RA (rheumatoid arthritis)      HTN (hypertension)      Anxiety disorder      Depressive disorder      Urge incontinence of urine      Atrial fibrillation      Knee pain, left      CAD (coronary artery disease)      Stented coronary artery      Takotsubo syndrome      History of percutaneous coronary intervention  stent RCA      S/P angioplasty  1 stent 1998      H/O partial nephrectomy  right 2006      History of cataract surgery, unspecified laterality  b/l 2016      S/P tonsillectomy  1948      H/O breast biopsy  right?      S/P colonoscopy     FAMILY HISTORY:  Family history of MI (myocardial infarction)     SOCIAL HISTORY: The patient denies any history of tobacco abuse, alcohol abuse or illicit drug use.    ALLERGIES:  penicillin (Hives)  penicillins (Short breath; Rash)  statins (Unknown)  statins (Muscle Pain)    Home Medications:  aspirin 81 mg oral tablet, chewable: 1 tab(s) orally once a day (10 May 2022 17:55)  dicyclomine 10 mg oral capsule: 1 cap(s) orally 2 times a day (10 May 2022 17:55)  Eliquis 5 mg oral tablet: 1 tab(s) orally 2 times a day (10 May 2022 17:55)  lidocaine 4% patch: Apply topically to affected area once a day (in the morning) (10 May 2022 17:55)  Melatonin 5 mg oral tablet: 1 tab(s) orally once a day (at bedtime) (10 May 2022 17:55)  Metoprolol Succinate  mg oral tablet, extended release: 1 tab(s) orally once a day (10 May 2022 17:55)  Nystop 100,000 units/g topical powder: Apply topically to affected area once a day (at bedtime) (10 May 2022 17:55)  pantoprazole 40 mg oral delayed release tablet: 1 tab(s) orally once a day (before a meal) (10 May 2022 17:55)  Refresh ophthalmic solution: 1 drop(s) in each eye every 4 hours, As Needed (10 May 2022 17:55)  sertraline 50 mg oral tablet: 1 tab(s) orally once a day (10 May 2022 17:55)  Toprol-XL 50 mg oral tablet, extended release: 1 tab(s) orally once a day (at bedtime) (10 May 2022 17:55)  Tums 500 oral tablet, chewable (obsolete): 1 tab(s) orally every 8 hours, As Needed - for heartburn (10 May 2022 17:55)  Tylenol 325 mg oral tablet: 2 tab(s) orally every 6 hours, As Needed - for moderate pain (10 May 2022 17:55)  Tylenol 500 mg oral tablet: 2 tab(s) orally 3 times a day (10 May 2022 17:55)     MEDICATIONS  (STANDING):  aMIOdarone    Tablet 200 milliGRAM(s) Oral daily  apixaban 5 milliGRAM(s) Oral two times a day  diltiazem    Tablet 120 milliGRAM(s) Oral daily  furosemide    Tablet 40 milliGRAM(s) Oral two times a day  metoprolol succinate  milliGRAM(s) Oral two times a day  pantoprazole    Tablet 40 milliGRAM(s) Oral before breakfast  potassium chloride    Tablet ER 20 milliEquivalent(s) Oral two times a day  sertraline 50 milliGRAM(s) Oral daily    MEDICATIONS  (PRN):  acetaminophen     Tablet .. 650 milliGRAM(s) Oral every 6 hours PRN Mild Pain (1 - 3)  metoprolol tartrate Injectable 5 milliGRAM(s) IV Push every 6 hours PRN HR>120      Vital Signs Last 24 Hrs  T(C): 36.6 (15 May 2022 16:56), Max: 36.9 (14 May 2022 21:44)  T(F): 97.8 (15 May 2022 16:56), Max: 98.5 (14 May 2022 21:44)  HR: 70 (15 May 2022 17:20) (65 - 89)  BP: 111/78 (15 May 2022 17:20) (103/73 - 135/86)  BP(mean): 90 (15 May 2022 17:20) (90 - 90)  RR: 18 (15 May 2022 16:56) (16 - 18)  SpO2: 97% (15 May 2022 16:56) (94% - 97%)  I&O's Summary    14 May 2022 07:01  -  15 May 2022 07:00  --------------------------------------------------------  IN: 0 mL / OUT: 700 mL / NET: -700 mL      ________________________________  GENERAL APPEARANCE:  No acute distress  HEAD: normocephalic, atraumatic  NECK: supple, no jugular venous distention, no carotid bruit    HEART: irregular, S1, S2 normal, 1/6 murmur    CHEST:  No anterior chest wall tenderness    LUNGS:  +crackles    ABDOMEN: soft, nontender, nondistended, with positive bowel sounds appreciated  EXTREMITIES: no edema.   NEURO: Alert and oriented x2  PSYC:  Normal affect  SKIN:  Dry    ________________________________   TELEMETRY: atrial fibrillation with RVR    ECG: atrial fibrillation     LABS:                   05-15    139  |  104  |  30<H>  ----------------------------<  104<H>  3.9   |  26  |  1.27    Ca    9.3      15 May 2022 08:54                  PT/INR - ( 10 May 2022 13:37 )   PT: 53.2 sec;   INR: 4.52 ratio         PTT - ( 10 May 2022 13:37 )  PTT:34.8 sec    ________________________________    RADIOLOGY & ADDITIONAL STUDIES: INTERPRETATION:  AP   chest on May 10, 2022 at 2:04 PM. Patient is   short of breath and has atrial fibrillation.    Heart enlargementagain noted.    There is mild infiltration emanating around the right hilum increased   from August 7, 2021.    ________________________________    ASSESSMENT:  Acute systolic congestive heart failure with new cardiomyopathy  Paroxysmal atrial fibrillation on anticoagulation with Eliquis  History of CAD status post PCI  Moderate to severe mitral valve regurgitation  Moderate to severe tricuspid valve regurgitation with mild pulmonary hypertension  Systemic hypertension  Hyperlipidemia      PLAN:  In summary, this is a 81y Female with a past medical history of CAD, atrial fibrillation, admitted for SOB due to congestive heart failure and atrial fibrillation with RVR.  She remains in sinus rhythm.  Continue Cardizem and decrease dose.  Continue metoprolol.  Decrease amiodarone to 200 mg daily tomorrow.  Continue oral diuretic.      (Reg new drop in LVEF - poss due to mitral regurg vs atrial fibrillation - discussed with dtr at length - will tx medically for now as she is responding well.  Will follow up w/ Dr Ellis for further mgt - given dementia, dtr will likely pursue conservative mgt).  __________________________________________________________________________  Thank you for allowing me to participate in the care of your patient. Please contact me should any questions arise.    IHSAN Turcios DO, [facility]  Office: [phone]   
The patient was seen and examined. No acute events overnight.  No chest pain.  Back in Sinus Rhythm - SOB improving.  No palpitations.    Initial Consult HPI    ______________________  VENTURA DAVEY is a 81y year old Female with a past medical history of moderate aortic regurgitation, coronary artery disease status post remote PCI, Parox atrial fibrillation maintained on anticoagulation with Eliquis, history of stress-induced cardiomyopathy, with improvement in LVEF to >50% , moderate to severe mitral valve regurgitation, history of renal cell carcinoma status post surgery, prior tobacco abuse, advanced dementia, hypertension, and osteoarthritis.  Recently admitted for SOB and HTN urgency.  Failed TAISHA DCCV prev.   Hx of ELLIS thrombus     [facility] due to AMS/SOB/edema for last 2-3 days. Pt is pleasantly confused, denies any complaints, but noted to have elevated BP/HR and significant LE edema.  Daughter at bedside.    Follows with Dr. Ellis at [place].     PREVIOUS CARDIAC WORKUP:    Echocardiogram 8.9.21   Fibrocalcific changes noted to the mitral valve leaflets with preserved   leaflet excursion.   Mild mitral annular calcification is present.   Moderate (2+) eccentric mitral regurgitation is present.   Fibrocalcific changes noted tothe Aortic valve leaflets with preserved   leaflet excursion.   Mild to Moderate aortic regurgitation is present.   Estimated left ventricular ejection fraction is 45 %.   The left ventricle is normal in size and wall thickness.   Mild diffuse hypokinesis of the left ventricle is present.   Normal appearing right ventricle structure and function.      ________________________________  Review of systems: A 10 point review of system has been performed, and is negative except for what has been mentioned in the above history of present illness.     PAST MEDICAL & SURGICAL HISTORY:  Renal cancer, right      OP (osteoporosis)      RA (rheumatoid arthritis)      HTN (hypertension)      Anxiety disorder      Depressive disorder      Urge incontinence of urine      Atrial fibrillation      Knee pain, left      CAD (coronary artery disease)      Stented coronary artery      Takotsubo syndrome      History of percutaneous coronary intervention  stent RCA      S/P angioplasty  1 stent 1998      H/O partial nephrectomy  right 2006      History of cataract surgery, unspecified laterality  b/l 2016      S/P tonsillectomy  1948      H/O breast biopsy  right?      S/P colonoscopy     ALLERGIES:  penicillin (Hives)  penicillins (Short breath; Rash)  statins (Unknown)  statins (Muscle Pain)    Home Medications:  aspirin 81 mg oral tablet, chewable: 1 tab(s) orally once a day (10 May 2022 17:55)  dicyclomine 10 mg oral capsule: 1 cap(s) orally 2 times a day (10 May 2022 17:55)  Eliquis 5 mg oral tablet: 1 tab(s) orally 2 times a day (10 May 2022 17:55)  lidocaine 4% patch: Apply topically to affected area once a day (in the morning) (10 May 2022 17:55)  Melatonin 5 mg oral tablet: 1 tab(s) orally once a day (at bedtime) (10 May 2022 17:55)  Metoprolol Succinate  mg oral tablet, extended release: 1 tab(s) orally once a day (10 May 2022 17:55)  Nystop 100,000 units/g topical powder: Apply topically to affected area once a day (at bedtime) (10 May 2022 17:55)  pantoprazole 40 mg oral delayed release tablet: 1 tab(s) orally once a day (before a meal) (10 May 2022 17:55)  Refresh ophthalmic solution: 1 drop(s) in each eye every 4 hours, As Needed (10 May 2022 17:55)  sertraline 50 mg oral tablet: 1 tab(s) orally once a day (10 May 2022 17:55)  Toprol-XL 50 mg oral tablet, extended release: 1 tab(s) orally once a day (at bedtime) (10 May 2022 17:55)  Tums 500 oral tablet, chewable (obsolete): 1 tab(s) orally every 8 hours, As Needed - for heartburn (10 May 2022 17:55)  Tylenol 325 mg oral tablet: 2 tab(s) orally every 6 hours, As Needed - for moderate pain (10 May 2022 17:55)  Tylenol 500 mg oral tablet: 2 tab(s) orally 3 times a day (10 May 2022 17:55)      MEDICATIONS  (STANDING):  aMIOdarone    Tablet   Oral   apixaban 5 milliGRAM(s) Oral two times a day  diltiazem    milliGRAM(s) Oral daily  furosemide    Tablet 40 milliGRAM(s) Oral two times a day  metoprolol succinate  milliGRAM(s) Oral two times a day  pantoprazole    Tablet 40 milliGRAM(s) Oral before breakfast  potassium chloride    Tablet ER 20 milliEquivalent(s) Oral two times a day  sertraline 50 milliGRAM(s) Oral daily    MEDICATIONS  (PRN):  acetaminophen     Tablet .. 650 milliGRAM(s) Oral every 6 hours PRN Mild Pain (1 - 3)  metoprolol tartrate Injectable 5 milliGRAM(s) IV Push every 6 hours PRN HR>120      Vital Signs Last 24 Hrs  T(C): 36.7 (14 May 2022 13:55), Max: 36.8 (13 May 2022 17:23)  T(F): 98 (14 May 2022 13:55), Max: 98.2 (13 May 2022 17:23)  HR: 69 (14 May 2022 13:55) (69 - 98)  BP: 105/69 (14 May 2022 13:55) (105/69 - 143/99)  BP(mean): 80 (14 May 2022 13:55) (77 - 98)  RR: 18 (14 May 2022 10:01) (17 - 18)  SpO2: 96% (14 May 2022 10:01) (94% - 96%)  I&O's Summary    13 May 2022 07:01  -  14 May 2022 07:00  --------------------------------------------------------  IN: 230 mL / OUT: 1700 mL / NET: -1470 mL           ________________________________  GENERAL APPEARANCE:  No acute distress  HEAD: normocephalic, atraumatic  NECK: supple, no jugular venous distention, no carotid bruit    HEART: regular, S1, S2 normal, 1/6 murmur    CHEST:  No anterior chest wall tenderness    LUNGS:  improved crackles    ABDOMEN: soft, nontender, nondistended, with positive bowel sounds appreciated  EXTREMITIES: no edema.   NEURO: Alert and oriented x2  PSYC:  Normal affect  SKIN:  Dry    ________________________________   TELEMETRY: Sinus Rhythm     ECG: atrial fibrillation     LABS:                                    12.3   7.38  )-----------( 207      ( 11 May 2022 07:36 )             41.0          05-12    140  |  106  |  35<H>  ----------------------------<  103<H>  4.0   |  25  |  1.20    Ca    9.3      12 May 2022 08:02    TPro  6.8  /  Alb  3.2<L>  /  TBili  0.9  /  DBili  0.3  /  AST  91<H>  /  ALT  138<H>  /  AlkPhos  64  05-11           PT/INR - ( 10 May 2022 13:37 )   PT: 53.2 sec;   INR: 4.52 ratio         PTT - ( 10 May 2022 13:37 )  PTT:34.8 sec    ________________________________    RADIOLOGY & ADDITIONAL STUDIES: INTERPRETATION:  AP   chest on May 10, 2022 at 2:04 PM. Patient is   short of breath and has atrial fibrillation.    Heart enlargementagain noted.    There is mild infiltration emanating around the right hilum increased   from August 7, 2021.      Echo 5/11/22   Left ventricle systolic function appearsimpaired in the presence of a   cardiac arrhythmia. Left ventricle size and structure are within normal   limitations. Visual estimation of left ventricle ejection fraction is 40   %.   Mild diffuse hypokinesis of the left ventricle is present with severe   hypokinesis of the anterior/anteroseptal wall.   The left atrium is moderately dilated.   Calcific changes noted to the Aortic valve leaflets with preserved   leaflet   excursion.   Mild to Moderate aortic regurgitation is present.   The mitral valve leaflets appear thickened.   Moderate (2+) mitral regurgitation is present.   Moderate (2+) tricuspid valve regurgitation is present.   Moderate-severe pulmonary HTN is suggested.   Mild to moderate pulmonic valvular regurgitation is present.   The IVC is not dilated with decreased respiratory variation.    ________________________________    ASSESSMENT:  Acute systolic congestive heart failure with new cardiomyopathy  Paroxysmal atrial fibrillation on anticoagulation with Eliquis  History of CAD status post PCI  Moderate to severe mitral valve regurgitation  Moderate to severe tricuspid valve regurgitation with mild pulmonary hypertension  Systemic hypertension  Hyperlipidemia      PLAN:  In summary, this is a 81y Female with a past medical history of CAD, atrial fibrillation, admitted for SOB due to congestive heart failure and atrial fibrillation with RVR.    Back in Sinus Rhythm.  Cont beta blocker and amio. Cont Cardizem  Cont anticoagulation.    Will follow up w/ Dr Ellis for further mgt - given dementia, dtr will likely pursue conservative mgt.  __________________________________________________________________________  Thank you for allowing me to participate in the care of your patient. Please contact me should any questions arise.    IHSAN Turcios DO, F[facility]  Office: [phone]   
The patient was seen and examined. No acute events overnight.  No chest pain.  Improved shortness of breath but HR elevated.  No palpitations.    Initial Consult HPI    ______________________  VENTURA DAVEY is a 81y year old Female with a past medical history of moderate aortic regurgitation, coronary artery disease status post remote PCI, Parox atrial fibrillation maintained on anticoagulation with Eliquis, history of stress-induced cardiomyopathy, with improvement in LVEF to >50% , moderate to severe mitral valve regurgitation, history of renal cell carcinoma status post surgery, prior tobacco abuse, advanced dementia, hypertension, and osteoarthritis.  Recently admitted for SOB and HTN urgency.  Failed TAISHA DCCV prev.   Hx of ELLIS thrombus     Now sent in from [place] due to AMS/SOB/edema for last 2-3 days. Pt is pleasantly confused, denies any complaints, but noted to have elevated BP/HR and significant LE edema.  Daughter at bedside.    Follows with Dr. Ellis at [place].     PREVIOUS CARDIAC WORKUP:    Echocardiogram 8.9.21   Fibrocalcific changes noted to the mitral valve leaflets with preserved   leaflet excursion.   Mild mitral annular calcification is present.   Moderate (2+) eccentric mitral regurgitation is present.   Fibrocalcific changes noted tothe Aortic valve leaflets with preserved   leaflet excursion.   Mild to Moderate aortic regurgitation is present.   Estimated left ventricular ejection fraction is 45 %.   The left ventricle is normal in size and wall thickness.   Mild diffuse hypokinesis of the left ventricle is present.   Normal appearing right ventricle structure and function.      ________________________________  Review of systems: A 10 point review of system has been performed, and is negative except for what has been mentioned in the above history of present illness.     PAST MEDICAL & SURGICAL HISTORY:  Renal cancer, right      OP (osteoporosis)      RA (rheumatoid arthritis)      HTN (hypertension)      Anxiety disorder      Depressive disorder      Urge incontinence of urine      Atrial fibrillation      Knee pain, left      CAD (coronary artery disease)      Stented coronary artery      Takotsubo syndrome      History of percutaneous coronary intervention  stent RCA      S/P angioplasty  1 stent 1998      H/O partial nephrectomy  right 2006      History of cataract surgery, unspecified laterality  b/l 2016      S/P tonsillectomy  1948      H/O breast biopsy  right?      S/P colonoscopy     ALLERGIES:  penicillin (Hives)  penicillins (Short breath; Rash)  statins (Unknown)  statins (Muscle Pain)    Home Medications:  aspirin 81 mg oral tablet, chewable: 1 tab(s) orally once a day (10 May 2022 17:55)  dicyclomine 10 mg oral capsule: 1 cap(s) orally 2 times a day (10 May 2022 17:55)  Eliquis 5 mg oral tablet: 1 tab(s) orally 2 times a day (10 May 2022 17:55)  lidocaine 4% patch: Apply topically to affected area once a day (in the morning) (10 May 2022 17:55)  Melatonin 5 mg oral tablet: 1 tab(s) orally once a day (at bedtime) (10 May 2022 17:55)  Metoprolol Succinate  mg oral tablet, extended release: 1 tab(s) orally once a day (10 May 2022 17:55)  Nystop 100,000 units/g topical powder: Apply topically to affected area once a day (at bedtime) (10 May 2022 17:55)  pantoprazole 40 mg oral delayed release tablet: 1 tab(s) orally once a day (before a meal) (10 May 2022 17:55)  Refresh ophthalmic solution: 1 drop(s) in each eye every 4 hours, As Needed (10 May 2022 17:55)  sertraline 50 mg oral tablet: 1 tab(s) orally once a day (10 May 2022 17:55)  Toprol-XL 50 mg oral tablet, extended release: 1 tab(s) orally once a day (at bedtime) (10 May 2022 17:55)  Tums 500 oral tablet, chewable (obsolete): 1 tab(s) orally every 8 hours, As Needed - for heartburn (10 May 2022 17:55)  Tylenol 325 mg oral tablet: 2 tab(s) orally every 6 hours, As Needed - for moderate pain (10 May 2022 17:55)  Tylenol 500 mg oral tablet: 2 tab(s) orally 3 times a day (10 May 2022 17:55)     MEDICATIONS  (STANDING):  aMIOdarone    Tablet   Oral   aMIOdarone    Tablet 400 milliGRAM(s) Oral every 8 hours  apixaban 5 milliGRAM(s) Oral two times a day  diltiazem    milliGRAM(s) Oral daily  furosemide   Injectable 40 milliGRAM(s) IV Push two times a day  metoprolol succinate  milliGRAM(s) Oral two times a day  pantoprazole    Tablet 40 milliGRAM(s) Oral before breakfast  sertraline 50 milliGRAM(s) Oral daily    MEDICATIONS  (PRN):  acetaminophen     Tablet .. 650 milliGRAM(s) Oral every 6 hours PRN Mild Pain (1 - 3)  metoprolol tartrate Injectable 5 milliGRAM(s) IV Push every 6 hours PRN HR>120      Vital Signs Last 24 Hrs  T(C): 37.1 (13 May 2022 08:38), Max: 37.1 (13 May 2022 08:38)  T(F): 98.8 (13 May 2022 08:38), Max: 98.8 (13 May 2022 08:38)  HR: 140 (13 May 2022 11:46) (72 - 140)  BP: 113/87 (13 May 2022 11:46) (108/75 - 125/78)  BP(mean): 92 (12 May 2022 21:27) (87 - 92)  RR: 18 (13 May 2022 08:38) (18 - 18)  SpO2: 94% (13 May 2022 08:38) (94% - 95%)  I&O's Summary    12 May 2022 07:01  -  13 May 2022 07:00  --------------------------------------------------------  IN: 0 mL / OUT: 1250 mL / NET: -1250 mL    13 May 2022 07:01  -  13 May 2022 13:29  --------------------------------------------------------  IN: 0 mL / OUT: 900 mL / NET: -900 mL       ________________________________  GENERAL APPEARANCE:  No acute distress  HEAD: normocephalic, atraumatic  NECK: supple, no jugular venous distention, no carotid bruit    HEART: irregular, S1, S2 normal, 1/6 murmur    CHEST:  No anterior chest wall tenderness    LUNGS:  +crackles    ABDOMEN: soft, nontender, nondistended, with positive bowel sounds appreciated  EXTREMITIES: no edema.   NEURO: Alert and oriented x2  PSYC:  Normal affect  SKIN:  Dry    ________________________________   TELEMETRY: atrial fibrillation with controlled vent response    ECG: atrial fibrillation     LABS:                                    12.3   7.38  )-----------( 207      ( 11 May 2022 07:36 )             41.0          05-12    140  |  106  |  35<H>  ----------------------------<  103<H>  4.0   |  25  |  1.20    Ca    9.3      12 May 2022 08:02    TPro  6.8  /  Alb  3.2<L>  /  TBili  0.9  /  DBili  0.3  /  AST  91<H>  /  ALT  138<H>  /  AlkPhos  64  05-11           PT/INR - ( 10 May 2022 13:37 )   PT: 53.2 sec;   INR: 4.52 ratio         PTT - ( 10 May 2022 13:37 )  PTT:34.8 sec    ________________________________    RADIOLOGY & ADDITIONAL STUDIES: INTERPRETATION:  AP   chest on May 10, 2022 at 2:04 PM. Patient is   short of breath and has atrial fibrillation.    Heart enlargementagain noted.    There is mild infiltration emanating around the right hilum increased   from August 7, 2021.      Echo 5/11/22   Left ventricle systolic function appearsimpaired in the presence of a   cardiac arrhythmia. Left ventricle size and structure are within normal   limitations. Visual estimation of left ventricle ejection fraction is 40   %.   Mild diffuse hypokinesis of the left ventricle is present with severe   hypokinesis of the anterior/anteroseptal wall.   The left atrium is moderately dilated.   Calcific changes noted to the Aortic valve leaflets with preserved   leaflet   excursion.   Mild to Moderate aortic regurgitation is present.   The mitral valve leaflets appear thickened.   Moderate (2+) mitral regurgitation is present.   Moderate (2+) tricuspid valve regurgitation is present.   Moderate-severe pulmonary HTN is suggested.   Mild to moderate pulmonic valvular regurgitation is present.   The IVC is not dilated with decreased respiratory variation.    ________________________________    ASSESSMENT:  Acute systolic congestive heart failure with new cardiomyopathy  Paroxysmal atrial fibrillation on anticoagulation with Eliquis  History of CAD status post PCI  Moderate to severe mitral valve regurgitation  Moderate to severe tricuspid valve regurgitation with mild pulmonary hypertension  Systemic hypertension  Hyperlipidemia      PLAN:  In summary, this is a 81y Female with a past medical history of CAD, atrial fibrillation, admitted for SOB due to congestive heart failure and atrial fibrillation with RVR.    HR elevated this AM - cont cardizem, add amio (has been on anticoagulation for over 1 mth).  Cont beta blocker.  Cont anticoagulation.  Cont IV lasix, still has crackles. Likely transition to oral in AM.     Will follow up w/ Dr Ellis for further mgt - given dementia, dtr will likely pursue conservative mgt.  __________________________________________________________________________  Thank you for allowing me to participate in the care of your patient. Please contact me should any questions arise.    IHSAN Turcios DO, FACC  Office: [phone]   
The patient was seen and examined. No acute events overnight.  No chest pain.  Improved shortness of breath.  No palpitations.    Initial Consult HPI    ______________________  VENTURA DAVEY is a 81y year old Female with a past medical history of moderate aortic regurgitation, coronary artery disease status post remote PCI, Parox atrial fibrillation maintained on anticoagulation with Eliquis, history of stress-induced cardiomyopathy, with improvement in LVEF to >50% , moderate to severe mitral valve regurgitation, history of renal cell carcinoma status post surgery, prior tobacco abuse, advanced dementia, hypertension, and osteoarthritis.  Recently admitted for SOB and HTN urgency.  Failed TAISHA DCCV prev.   Hx of ELLIS thrombus     Now sent in from [facility] due to AMS/SOB/edema for last 2-3 days. Pt is pleasantly confused, denies any complaints, but noted to have elevated BP/HR and significant LE edema.  Daughter at bedside.    Follows with Dr. Ellis at [place].     PREVIOUS CARDIAC WORKUP:    Echocardiogram 8.9.21       Summary     Fibrocalcific changes noted to the mitral valve leaflets with preserved   leaflet excursion.   Mild mitral annular calcification is present.   Moderate (2+) eccentric mitral regurgitation is present.   Fibrocalcific changes noted tothe Aortic valve leaflets with preserved   leaflet excursion.   Mild to Moderate aortic regurgitation is present.   Estimated left ventricular ejection fraction is 45 %.   The left ventricle is normal in size and wall thickness.   Mild diffuse hypokinesis of the left ventricle is present.   Normal appearing right ventricle structure and function.      ________________________________  Review of systems: A 10 point review of system has been performed, and is negative except for what has been mentioned in the above history of present illness.     PAST MEDICAL & SURGICAL HISTORY:  Renal cancer, right      OP (osteoporosis)      RA (rheumatoid arthritis)      HTN (hypertension)      Anxiety disorder      Depressive disorder      Urge incontinence of urine      Atrial fibrillation      Knee pain, left      CAD (coronary artery disease)      Stented coronary artery      Takotsubo syndrome      History of percutaneous coronary intervention  stent RCA      S/P angioplasty  1 stent 1998      H/O partial nephrectomy  right 2006      History of cataract surgery, unspecified laterality  b/l 2016      S/P tonsillectomy  1948      H/O breast biopsy  right?      S/P colonoscopy     FAMILY HISTORY:  Family history of MI (myocardial infarction)     SOCIAL HISTORY: The patient denies any history of tobacco abuse, alcohol abuse or illicit drug use.    ALLERGIES:  penicillin (Hives)  penicillins (Short breath; Rash)  statins (Unknown)  statins (Muscle Pain)    Home Medications:  aspirin 81 mg oral tablet, chewable: 1 tab(s) orally once a day (10 May 2022 17:55)  dicyclomine 10 mg oral capsule: 1 cap(s) orally 2 times a day (10 May 2022 17:55)  Eliquis 5 mg oral tablet: 1 tab(s) orally 2 times a day (10 May 2022 17:55)  lidocaine 4% patch: Apply topically to affected area once a day (in the morning) (10 May 2022 17:55)  Melatonin 5 mg oral tablet: 1 tab(s) orally once a day (at bedtime) (10 May 2022 17:55)  Metoprolol Succinate  mg oral tablet, extended release: 1 tab(s) orally once a day (10 May 2022 17:55)  Nystop 100,000 units/g topical powder: Apply topically to affected area once a day (at bedtime) (10 May 2022 17:55)  pantoprazole 40 mg oral delayed release tablet: 1 tab(s) orally once a day (before a meal) (10 May 2022 17:55)  Refresh ophthalmic solution: 1 drop(s) in each eye every 4 hours, As Needed (10 May 2022 17:55)  sertraline 50 mg oral tablet: 1 tab(s) orally once a day (10 May 2022 17:55)  Toprol-XL 50 mg oral tablet, extended release: 1 tab(s) orally once a day (at bedtime) (10 May 2022 17:55)  Tums 500 oral tablet, chewable (obsolete): 1 tab(s) orally every 8 hours, As Needed - for heartburn (10 May 2022 17:55)  Tylenol 325 mg oral tablet: 2 tab(s) orally every 6 hours, As Needed - for moderate pain (10 May 2022 17:55)  Tylenol 500 mg oral tablet: 2 tab(s) orally 3 times a day (10 May 2022 17:55)     MEDICATIONS  (STANDING):  apixaban 5 milliGRAM(s) Oral two times a day  diltiazem    milliGRAM(s) Oral daily  furosemide   Injectable 40 milliGRAM(s) IV Push two times a day  metoprolol succinate  milliGRAM(s) Oral two times a day  pantoprazole    Tablet 40 milliGRAM(s) Oral before breakfast  sertraline 50 milliGRAM(s) Oral daily    MEDICATIONS  (PRN):  acetaminophen     Tablet .. 650 milliGRAM(s) Oral every 6 hours PRN Mild Pain (1 - 3)  metoprolol tartrate Injectable 5 milliGRAM(s) IV Push every 6 hours PRN HR>120      Vital Signs Last 24 Hrs  T(C): 36.9 (12 May 2022 15:38), Max: 37.1 (12 May 2022 05:00)  T(F): 98.5 (12 May 2022 15:38), Max: 98.7 (12 May 2022 05:00)  HR: 73 (12 May 2022 16:56) (61 - 108)  BP: 108/75 (12 May 2022 16:56) (108/75 - 130/81)  BP(mean): 87 (12 May 2022 16:56) (87 - 97)  RR: 18 (12 May 2022 15:38) (18 - 19)  SpO2: 95% (12 May 2022 15:38) (94% - 96%)  I&O's Summary       ________________________________  GENERAL APPEARANCE:  No acute distress  HEAD: normocephalic, atraumatic  NECK: supple, no jugular venous distention, no carotid bruit    HEART: irregular, S1, S2 normal, 1/6 murmur    CHEST:  No anterior chest wall tenderness    LUNGS:  +crackles    ABDOMEN: soft, nontender, nondistended, with positive bowel sounds appreciated  EXTREMITIES: no edema.   NEURO: Alert and oriented x2  PSYC:  Normal affect  SKIN:  Dry    ________________________________   TELEMETRY: atrial fibrillation with controlled vent response    ECG: atrial fibrillation     LABS:                                    12.3   7.38  )-----------( 207      ( 11 May 2022 07:36 )             41.0          05-12    140  |  106  |  35<H>  ----------------------------<  103<H>  4.0   |  25  |  1.20    Ca    9.3      12 May 2022 08:02    TPro  6.8  /  Alb  3.2<L>  /  TBili  0.9  /  DBili  0.3  /  AST  91<H>  /  ALT  138<H>  /  AlkPhos  64  05-11           PT/INR - ( 10 May 2022 13:37 )   PT: 53.2 sec;   INR: 4.52 ratio         PTT - ( 10 May 2022 13:37 )  PTT:34.8 sec    ________________________________    RADIOLOGY & ADDITIONAL STUDIES: INTERPRETATION:  AP   chest on May 10, 2022 at 2:04 PM. Patient is   short of breath and has atrial fibrillation.    Heart enlargementagain noted.    There is mild infiltration emanating around the right hilum increased   from August 7, 2021.      Echo 5/11/22   Left ventricle systolic function appearsimpaired in the presence of a   cardiac arrhythmia. Left ventricle size and structure are within normal   limitations. Visual estimation of left ventricle ejection fraction is 40   %.   Mild diffuse hypokinesis of the left ventricle is present with severe   hypokinesis of the anterior/anteroseptal wall.   The left atrium is moderately dilated.   Calcific changes noted to the Aortic valve leaflets with preserved   leaflet   excursion.   Mild to Moderate aortic regurgitation is present.   The mitral valve leaflets appear thickened.   Moderate (2+) mitral regurgitation is present.   Moderate (2+) tricuspid valve regurgitation is present.   Moderate-severe pulmonary HTN is suggested.   Mild to moderate pulmonic valvular regurgitation is present.   The IVC is not dilated with decreased respiratory variation.    ________________________________    ASSESSMENT:  Acute systolic congestive heart failure with new cardiomyopathy  Paroxysmal atrial fibrillation on anticoagulation with Eliquis  History of CAD status post PCI  Moderate to severe mitral valve regurgitation  Moderate to severe tricuspid valve regurgitation with mild pulmonary hypertension  Systemic hypertension  Hyperlipidemia      PLAN:  In summary, this is a 81y Female with a past medical history of CAD, atrial fibrillation, admitted for SOB due to congestive heart failure and atrial fibrillation with RVR.    HR improved - cont Cardizem but will switch to long acting tomm. Cont metoprolol  Cont anticoagulation.  Cont IV lasix, likely transition to oral in AM.       Will follow up w/ Dr Ellis for further mgt - given dementia, dtr will likely pursue conservative mgt.  __________________________________________________________________________  Thank you for allowing me to participate in the care of your patient. Please contact me should any questions arise.    IHSAN Turcios DO, FACC  Office: [phone]

## 2022-05-16 NOTE — DISCHARGE NOTE PROVIDER - NSDCFUADDAPPT_GEN_ALL_CORE_FT
Follow-up appointment with Dr. Turcios    Day: Tuesday  Date: 5/17/22  Time: 4:00pm  Phone: [phone]

## 2022-05-16 NOTE — DISCHARGE NOTE PROVIDER - CARE PROVIDER_API CALL
Rehan Turcios (DO; [facility])  Cardiology  [address] [place] [address]  Phone: [phone]  Fax: [phone]  Follow Up Time:     Marcio Lux  [address], [place] [address]  Phone: [phone]  Fax: [phone]  Follow Up Time:

## 2022-05-16 NOTE — DISCHARGE NOTE PROVIDER - NSDCMRMEDTOKEN_GEN_ALL_CORE_FT
amiodarone 200 mg oral tablet: 1 tab(s) orally once a day  dicyclomine 10 mg oral capsule: 1 cap(s) orally 2 times a day  Eliquis 5 mg oral tablet: 1 tab(s) orally 2 times a day  furosemide 40 mg oral tablet: 1 tab(s) orally 2 times a day  lidocaine 4% patch: Apply topically to affected area once a day (in the morning)  losartan 25 mg oral tablet: 1 tab(s) orally once a day  Melatonin 5 mg oral tablet: 1 tab(s) orally once a day (at bedtime)  metoprolol succinate 50 mg oral tablet, extended release: 1 tab(s) orally once a day  Nystop 100,000 units/g topical powder: Apply topically to affected area once a day (at bedtime)  pantoprazole 40 mg oral delayed release tablet: 1 tab(s) orally once a day (before a meal)  potassium chloride 20 mEq oral tablet, extended release: 1 tab(s) orally 2 times a day  Refresh ophthalmic solution: 1 drop(s) in each eye every 4 hours, As Needed  sertraline 50 mg oral tablet: 1 tab(s) orally once a day  Tums 500 oral tablet, chewable (obsolete): 1 tab(s) orally every 8 hours, As Needed - for heartburn  Tylenol 325 mg oral tablet: 2 tab(s) orally every 6 hours, As Needed - for moderate pain

## 2022-05-16 NOTE — DISCHARGE NOTE PROVIDER - NSDCCPCAREPLAN_GEN_ALL_CORE_FT
PRINCIPAL DISCHARGE DIAGNOSIS  Diagnosis: Acute on chronic systolic HF (heart failure)  Assessment and Plan of Treatment: Take medications as prescribed   lasix 40 mg orally twice a day   potassium 20 mEq orally twice a day - take with lasix   losartan 25 mg orally daily once a day  metoprolol succinate 50 mg orally daily         SECONDARY DISCHARGE DIAGNOSES  Diagnosis: Chronic atrial fibrillation  Assessment and Plan of Treatment: You have an abnormal heart rhythm for which you need to take medications as prescribed   eliquis 5 mg orally twice a day   amiodarone 200 mg orally daily  metoprolol succinate 50 mg orally daily    Diagnosis: Elevated liver enzymes  Assessment and Plan of Treatment: You need to have repeat blood work to check your liver in one week with your PCP

## 2022-05-16 NOTE — DISCHARGE NOTE PROVIDER - NSDCFUADDINST_GEN_ALL_CORE_FT
- follow up with cardiology tomorrow and with PCP within one week  - check bloodwork - liver enzymes - within one week with PCP

## 2022-05-17 RX ORDER — POTASSIUM CHLORIDE 20 MEQ
1 PACKET (EA) ORAL
Qty: 60 | Refills: 0 | DISCHARGE
Start: 2022-05-17 | End: 2022-06-15

## 2022-05-17 RX ORDER — AMIODARONE HYDROCHLORIDE 400 MG/1
1 TABLET ORAL
Qty: 30 | Refills: 0
Start: 2022-05-17 | End: 2022-06-15

## 2022-05-17 RX ORDER — ACETAMINOPHEN 500 MG
2 TABLET ORAL
Qty: 240 | Refills: 0
Start: 2022-05-17 | End: 2022-06-15

## 2022-05-17 RX ORDER — LOSARTAN POTASSIUM 100 MG/1
1 TABLET, FILM COATED ORAL
Qty: 30 | Refills: 0
Start: 2022-05-17 | End: 2022-06-15

## 2022-05-17 RX ORDER — FUROSEMIDE 40 MG
1 TABLET ORAL
Qty: 60 | Refills: 0
Start: 2022-05-17 | End: 2022-06-15

## 2022-05-17 RX ORDER — POTASSIUM CHLORIDE 20 MEQ
1 PACKET (EA) ORAL
Qty: 60 | Refills: 0
Start: 2022-05-17 | End: 2022-06-15

## 2022-05-17 RX ORDER — METOPROLOL TARTRATE 50 MG
1 TABLET ORAL
Qty: 30 | Refills: 0
Start: 2022-05-17 | End: 2022-06-15

## 2022-05-19 ENCOUNTER — EMERGENCY (EMERGENCY)
Facility: HOSPITAL | Age: 82
LOS: 0 days | Discharge: HOME | End: 2022-05-19
Attending: EMERGENCY MEDICINE
Payer: MEDICARE

## 2022-05-19 ENCOUNTER — APPOINTMENT (OUTPATIENT)
Dept: CARE COORDINATION | Facility: HOME HEALTH | Age: 82
End: 2022-05-19

## 2022-05-19 VITALS
HEART RATE: 76 BPM | OXYGEN SATURATION: 95 % | DIASTOLIC BLOOD PRESSURE: 87 MMHG | RESPIRATION RATE: 18 BRPM | HEIGHT: 65 IN | TEMPERATURE: 98 F | SYSTOLIC BLOOD PRESSURE: 134 MMHG | WEIGHT: 130.07 LBS

## 2022-05-19 VITALS
HEART RATE: 78 BPM | SYSTOLIC BLOOD PRESSURE: 142 MMHG | RESPIRATION RATE: 16 BRPM | OXYGEN SATURATION: 96 % | DIASTOLIC BLOOD PRESSURE: 82 MMHG | TEMPERATURE: 98 F

## 2022-05-19 DIAGNOSIS — F41.9 ANXIETY DISORDER, UNSPECIFIED: ICD-10-CM

## 2022-05-19 DIAGNOSIS — Z90.5 ACQUIRED ABSENCE OF KIDNEY: Chronic | ICD-10-CM

## 2022-05-19 DIAGNOSIS — R06.02 SHORTNESS OF BREATH: ICD-10-CM

## 2022-05-19 DIAGNOSIS — Z98.41 CATARACT EXTRACTION STATUS, RIGHT EYE: ICD-10-CM

## 2022-05-19 DIAGNOSIS — M06.9 RHEUMATOID ARTHRITIS, UNSPECIFIED: ICD-10-CM

## 2022-05-19 DIAGNOSIS — Z88.0 ALLERGY STATUS TO PENICILLIN: ICD-10-CM

## 2022-05-19 DIAGNOSIS — Z79.01 LONG TERM (CURRENT) USE OF ANTICOAGULANTS: ICD-10-CM

## 2022-05-19 DIAGNOSIS — R41.82 ALTERED MENTAL STATUS, UNSPECIFIED: ICD-10-CM

## 2022-05-19 DIAGNOSIS — I48.0 PAROXYSMAL ATRIAL FIBRILLATION: ICD-10-CM

## 2022-05-19 DIAGNOSIS — I25.10 ATHEROSCLEROTIC HEART DISEASE OF NATIVE CORONARY ARTERY WITHOUT ANGINA PECTORIS: ICD-10-CM

## 2022-05-19 DIAGNOSIS — F03.90 UNSPECIFIED DEMENTIA WITHOUT BEHAVIORAL DISTURBANCE: ICD-10-CM

## 2022-05-19 DIAGNOSIS — Z95.5 PRESENCE OF CORONARY ANGIOPLASTY IMPLANT AND GRAFT: ICD-10-CM

## 2022-05-19 DIAGNOSIS — Z20.822 CONTACT WITH AND (SUSPECTED) EXPOSURE TO COVID-19: ICD-10-CM

## 2022-05-19 DIAGNOSIS — F32.A DEPRESSION, UNSPECIFIED: ICD-10-CM

## 2022-05-19 DIAGNOSIS — Z85.528 PERSONAL HISTORY OF OTHER MALIGNANT NEOPLASM OF KIDNEY: ICD-10-CM

## 2022-05-19 DIAGNOSIS — Z98.89 OTHER SPECIFIED POSTPROCEDURAL STATES: Chronic | ICD-10-CM

## 2022-05-19 DIAGNOSIS — Z98.62 PERIPHERAL VASCULAR ANGIOPLASTY STATUS: Chronic | ICD-10-CM

## 2022-05-19 DIAGNOSIS — Z90.89 ACQUIRED ABSENCE OF OTHER ORGANS: Chronic | ICD-10-CM

## 2022-05-19 DIAGNOSIS — Z98.890 OTHER SPECIFIED POSTPROCEDURAL STATES: Chronic | ICD-10-CM

## 2022-05-19 DIAGNOSIS — Z66 DO NOT RESUSCITATE: ICD-10-CM

## 2022-05-19 DIAGNOSIS — R60.9 EDEMA, UNSPECIFIED: ICD-10-CM

## 2022-05-19 DIAGNOSIS — M81.0 AGE-RELATED OSTEOPOROSIS WITHOUT CURRENT PATHOLOGICAL FRACTURE: ICD-10-CM

## 2022-05-19 DIAGNOSIS — Z98.49 CATARACT EXTRACTION STATUS, UNSPECIFIED EYE: Chronic | ICD-10-CM

## 2022-05-19 DIAGNOSIS — Z98.42 CATARACT EXTRACTION STATUS, LEFT EYE: ICD-10-CM

## 2022-05-19 LAB
ALBUMIN SERPL ELPH-MCNC: 3.6 G/DL — SIGNIFICANT CHANGE UP (ref 3.3–5)
ALP SERPL-CCNC: 75 U/L — SIGNIFICANT CHANGE UP (ref 40–120)
ALT FLD-CCNC: 51 U/L — SIGNIFICANT CHANGE UP (ref 12–78)
ANION GAP SERPL CALC-SCNC: 7 MMOL/L — SIGNIFICANT CHANGE UP (ref 5–17)
AST SERPL-CCNC: 29 U/L — SIGNIFICANT CHANGE UP (ref 15–37)
BASOPHILS # BLD AUTO: 0.06 K/UL — SIGNIFICANT CHANGE UP (ref 0–0.2)
BASOPHILS NFR BLD AUTO: 0.7 % — SIGNIFICANT CHANGE UP (ref 0–2)
BILIRUB SERPL-MCNC: 0.7 MG/DL — SIGNIFICANT CHANGE UP (ref 0.2–1.2)
BUN SERPL-MCNC: 28 MG/DL — HIGH (ref 7–23)
CALCIUM SERPL-MCNC: 9.7 MG/DL — SIGNIFICANT CHANGE UP (ref 8.5–10.1)
CHLORIDE SERPL-SCNC: 106 MMOL/L — SIGNIFICANT CHANGE UP (ref 96–108)
CO2 SERPL-SCNC: 25 MMOL/L — SIGNIFICANT CHANGE UP (ref 22–31)
CREAT SERPL-MCNC: 1.3 MG/DL — SIGNIFICANT CHANGE UP (ref 0.5–1.3)
EGFR: 41 ML/MIN/1.73M2 — LOW
EOSINOPHIL # BLD AUTO: 0.04 K/UL — SIGNIFICANT CHANGE UP (ref 0–0.5)
EOSINOPHIL NFR BLD AUTO: 0.5 % — SIGNIFICANT CHANGE UP (ref 0–6)
GLUCOSE SERPL-MCNC: 149 MG/DL — HIGH (ref 70–99)
HCT VFR BLD CALC: 49.7 % — HIGH (ref 34.5–45)
HGB BLD-MCNC: 15.2 G/DL — SIGNIFICANT CHANGE UP (ref 11.5–15.5)
IMM GRANULOCYTES NFR BLD AUTO: 0.3 % — SIGNIFICANT CHANGE UP (ref 0–1.5)
LYMPHOCYTES # BLD AUTO: 1.13 K/UL — SIGNIFICANT CHANGE UP (ref 1–3.3)
LYMPHOCYTES # BLD AUTO: 12.9 % — LOW (ref 13–44)
MCHC RBC-ENTMCNC: 28.8 PG — SIGNIFICANT CHANGE UP (ref 27–34)
MCHC RBC-ENTMCNC: 30.6 GM/DL — LOW (ref 32–36)
MCV RBC AUTO: 94.1 FL — SIGNIFICANT CHANGE UP (ref 80–100)
MONOCYTES # BLD AUTO: 1.05 K/UL — HIGH (ref 0–0.9)
MONOCYTES NFR BLD AUTO: 11.9 % — SIGNIFICANT CHANGE UP (ref 2–14)
NEUTROPHILS # BLD AUTO: 6.48 K/UL — SIGNIFICANT CHANGE UP (ref 1.8–7.4)
NEUTROPHILS NFR BLD AUTO: 73.7 % — SIGNIFICANT CHANGE UP (ref 43–77)
NT-PROBNP SERPL-SCNC: 5642 PG/ML — HIGH (ref 0–450)
PLATELET # BLD AUTO: 255 K/UL — SIGNIFICANT CHANGE UP (ref 150–400)
POTASSIUM SERPL-MCNC: 4 MMOL/L — SIGNIFICANT CHANGE UP (ref 3.5–5.3)
POTASSIUM SERPL-SCNC: 4 MMOL/L — SIGNIFICANT CHANGE UP (ref 3.5–5.3)
PROT SERPL-MCNC: 8.3 GM/DL — SIGNIFICANT CHANGE UP (ref 6–8.3)
RBC # BLD: 5.28 M/UL — HIGH (ref 3.8–5.2)
RBC # FLD: 13.2 % — SIGNIFICANT CHANGE UP (ref 10.3–14.5)
SODIUM SERPL-SCNC: 138 MMOL/L — SIGNIFICANT CHANGE UP (ref 135–145)
TROPONIN I, HIGH SENSITIVITY RESULT: 43.07 NG/L — SIGNIFICANT CHANGE UP
WBC # BLD: 8.79 K/UL — SIGNIFICANT CHANGE UP (ref 3.8–10.5)
WBC # FLD AUTO: 8.79 K/UL — SIGNIFICANT CHANGE UP (ref 3.8–10.5)

## 2022-05-19 PROCEDURE — 80053 COMPREHEN METABOLIC PANEL: CPT

## 2022-05-19 PROCEDURE — 84484 ASSAY OF TROPONIN QUANT: CPT

## 2022-05-19 PROCEDURE — 36415 COLL VENOUS BLD VENIPUNCTURE: CPT

## 2022-05-19 PROCEDURE — 99285 EMERGENCY DEPT VISIT HI MDM: CPT | Mod: 25

## 2022-05-19 PROCEDURE — U0005: CPT

## 2022-05-19 PROCEDURE — 99285 EMERGENCY DEPT VISIT HI MDM: CPT

## 2022-05-19 PROCEDURE — 99283 EMERGENCY DEPT VISIT LOW MDM: CPT

## 2022-05-19 PROCEDURE — 71045 X-RAY EXAM CHEST 1 VIEW: CPT

## 2022-05-19 PROCEDURE — 83880 ASSAY OF NATRIURETIC PEPTIDE: CPT

## 2022-05-19 PROCEDURE — U0003: CPT

## 2022-05-19 PROCEDURE — 71045 X-RAY EXAM CHEST 1 VIEW: CPT | Mod: 26

## 2022-05-19 PROCEDURE — 93005 ELECTROCARDIOGRAM TRACING: CPT

## 2022-05-19 PROCEDURE — 93010 ELECTROCARDIOGRAM REPORT: CPT

## 2022-05-19 PROCEDURE — 85025 COMPLETE CBC W/AUTO DIFF WBC: CPT

## 2022-05-19 RX ORDER — METOPROLOL TARTRATE 50 MG
5 TABLET ORAL ONCE
Refills: 0 | Status: DISCONTINUED | OUTPATIENT
Start: 2022-05-19 | End: 2022-05-19

## 2022-05-19 RX ORDER — ACETAMINOPHEN 325 MG/1
325 TABLET ORAL EVERY 6 HOURS
Refills: 0 | Status: ACTIVE | COMMUNITY
Start: 2019-03-19

## 2022-05-19 RX ORDER — DULOXETINE HYDROCHLORIDE 20 MG/1
20 CAPSULE, DELAYED RELEASE PELLETS ORAL DAILY
Refills: 0 | Status: DISCONTINUED | COMMUNITY
Start: 2019-03-19 | End: 2022-05-19

## 2022-05-19 RX ORDER — AMIODARONE HYDROCHLORIDE 200 MG/1
200 TABLET ORAL DAILY
Refills: 0 | Status: ACTIVE | COMMUNITY
Start: 2022-05-19

## 2022-05-19 RX ORDER — POTASSIUM CHLORIDE 1500 MG/1
20 TABLET, FILM COATED, EXTENDED RELEASE ORAL TWICE DAILY
Refills: 0 | Status: ACTIVE | COMMUNITY
Start: 2022-05-19

## 2022-05-19 RX ORDER — NYSTATIN 100000 1/G
100000 POWDER TOPICAL
Refills: 0 | Status: ACTIVE | COMMUNITY
Start: 2022-05-19

## 2022-05-19 RX ORDER — MULTIVIT-MIN/FOLIC/VIT K/LYCOP 400-300MCG
500 TABLET ORAL DAILY
Refills: 0 | Status: DISCONTINUED | COMMUNITY
Start: 2019-03-19 | End: 2022-05-19

## 2022-05-19 RX ORDER — MELATONIN 5 MG
5 CAPSULE ORAL AT BEDTIME
Refills: 0 | Status: ACTIVE | COMMUNITY
Start: 2022-05-19

## 2022-05-19 RX ORDER — APIXABAN 5 MG/1
5 TABLET, FILM COATED ORAL
Refills: 0 | Status: ACTIVE | COMMUNITY
Start: 2022-05-19

## 2022-05-19 RX ORDER — MAGNESIUM OXIDE 241.3 MG/1000MG
400 TABLET ORAL DAILY
Refills: 0 | Status: DISCONTINUED | COMMUNITY
Start: 2019-03-19 | End: 2022-05-19

## 2022-05-19 RX ORDER — AMIODARONE HYDROCHLORIDE 400 MG/1
200 TABLET ORAL ONCE
Refills: 0 | Status: COMPLETED | OUTPATIENT
Start: 2022-05-19 | End: 2022-05-19

## 2022-05-19 RX ORDER — LIDOCAINE 42 MG/1
4 PATCH PERCUTANEOUS; TOPICAL; TRANSDERMAL
Refills: 0 | Status: ACTIVE | COMMUNITY
Start: 2022-05-19

## 2022-05-19 RX ORDER — L.ACID/L.CASEI/B.BIF/B.LON/FOS 2B CELL-50
CAPSULE ORAL
Refills: 0 | Status: DISCONTINUED | COMMUNITY
Start: 2019-03-19 | End: 2022-05-19

## 2022-05-19 RX ORDER — OXYBUTYNIN CHLORIDE 5 MG/1
5 TABLET, EXTENDED RELEASE ORAL
Qty: 90 | Refills: 3 | Status: DISCONTINUED | COMMUNITY
Start: 2018-04-03 | End: 2022-05-19

## 2022-05-19 RX ORDER — METOPROLOL TARTRATE 50 MG
50 TABLET ORAL ONCE
Refills: 0 | Status: COMPLETED | OUTPATIENT
Start: 2022-05-19 | End: 2022-05-19

## 2022-05-19 RX ORDER — LOSARTAN POTASSIUM 25 MG/1
25 TABLET, FILM COATED ORAL DAILY
Refills: 0 | Status: ACTIVE | COMMUNITY
Start: 2022-05-19

## 2022-05-19 RX ORDER — SERTRALINE 25 MG/1
1 TABLET, FILM COATED ORAL
Qty: 0 | Refills: 0 | DISCHARGE

## 2022-05-19 RX ORDER — CHROMIUM 200 MCG
1000 TABLET ORAL DAILY
Refills: 0 | Status: DISCONTINUED | COMMUNITY
Start: 2019-03-19 | End: 2022-05-19

## 2022-05-19 RX ORDER — METOPROLOL SUCCINATE 50 MG/1
50 TABLET, EXTENDED RELEASE ORAL DAILY
Refills: 0 | Status: ACTIVE | COMMUNITY
Start: 2022-05-19

## 2022-05-19 RX ORDER — SERTRALINE HYDROCHLORIDE 50 MG/1
50 TABLET, FILM COATED ORAL DAILY
Refills: 0 | Status: ACTIVE | COMMUNITY
Start: 2022-05-19

## 2022-05-19 RX ORDER — LIDOCAINE 4 G/100G
1 CREAM TOPICAL
Qty: 0 | Refills: 0 | DISCHARGE

## 2022-05-19 RX ORDER — DICYCLOMINE HYDROCHLORIDE 10 MG/1
10 CAPSULE ORAL TWICE DAILY
Refills: 0 | Status: ACTIVE | COMMUNITY
Start: 2022-05-19

## 2022-05-19 RX ADMIN — Medication 50 MILLIGRAM(S): at 12:41

## 2022-05-19 RX ADMIN — AMIODARONE HYDROCHLORIDE 200 MILLIGRAM(S): 400 TABLET ORAL at 16:32

## 2022-05-19 NOTE — ED PROVIDER NOTE - PROGRESS NOTE DETAILS
Ronan Cooper MD : rapid afib and weakness at [facility] received sign out from dr agustin. pt admitted to hospitalist dr burks. dr burks spoke with dr turcios, cardio for pt, dr turcios stated since she is back in [place], and missed her dose of amio, pt can return to [place]. give amio dose prior to dc back. pt was at  earlier in week for same. well known pt to dr. Turcios. daughter agreeablew ith plan. daughter realized pt was not given her amio, it is a new medicine since dc from  earlier this week. MD LEE

## 2022-05-19 NOTE — PROGRESS NOTE ADULT - SUBJECTIVE AND OBJECTIVE BOX
This is a 81-year-old female with a past history of paroxysmal atrial fibrillation, systolic dysfunction and dementia who was sent to [facility] for atrial fibrillation.  Apparently she was on amiodarone.  After IV Lopressor she self converted back to sinus rhythm.  Patient can be discharged, with outpatient cardiology follow-up as she is not in heart failure.

## 2022-05-19 NOTE — ED PROVIDER NOTE - PATIENT PORTAL LINK FT
You can access the FollowMyHealth Patient Portal offered by [facility] by registering at the following website: [email or web address]. By joining [employer]’s FollowMyHealth portal, you will also be able to view your health information using other applications (apps) compatible with our system.

## 2022-05-19 NOTE — ED ADULT NURSE NOTE - NEURO MENTATION
[FreeTextEntry1] : HTN- BP up, Increase Losartan to 100mg and add Norvasc 5 mg, RTO 3 mos, pt to check home readings.  Wt up, not exercising.  Not doing anything for himself.  BP much better \par \par Tobacco- Unable to get pt to quit.  Offered Pul consult, he defers.  Has white area in back of throat, will refer to ENT for exam, r/o oral leukoplakia.  Saw ENT who did not need to bx.\par \par Sinusitis- Bothered by cough, has seen ENT 4 times for sinusitis. s/p 2 courses on Antx.  GERD could be playing a role, told to add Gaviscon HS.  Trial of Singulair.  Off it now.  Will try Pantoprazole. hE LIKES pRILOSEC\par \par Health Maintainence- colonoscopy 2018\par \par ASHD- CCS 71 in 2017, continue atorvastatin, 118 in 2021\par \par Prediabetes- A1c 5.8\par \par ELEVATED LFTS, DRINKING LESS, RECHECK IN 3 MOS confused

## 2022-05-19 NOTE — ED ADULT NURSE NOTE - PUPILS PERRL
5/13/2019    CHIEF COMPLAINT: Atrial fibrillation. Coronary disease. Hypertension. Hyperlipidemia.    HISTORY OF PRESENT ILLNESS:  70 year old male patient with the above history, who comes to the clinic for a follow up appointment.  He is accompanied by his wife contributes to the history.    On Saturday, May 11,2019 he converted to normal sinus rhythm. On that morning his heart rate was 79 bpm. He felt an immediate difference.    Currently has no chest pain or shortness of breath. He denies PND orthopnea or edema.    He continues to use his CPAP at night.     Current Outpatient Medications   Medication Sig Dispense Refill   • dilTIAZem (CARDIZEM CD) 120 MG 24 hr capsule Take 1 capsule by mouth daily. 60 capsule 5   • metoPROLOL tartrate (LOPRESSOR) 50 MG tablet Take 1 tablet by mouth daily (before breakfast). 180 tablet 1   • digoxin (LANOXIN) 0.125 MG tablet Take 1 tablet by mouth daily. 30 tablet 6   • Multiple Vitamins-Minerals (MULTIVITAMIN ADULT PO)      • Acetaminophen (TYLENOL PO)      • apixaBAN (ELIQUIS) 5 MG Tab Take 1 tablet by mouth every 12 hours. 60 tablet 1   • minocycline (MINOCIN,DYNACIN) 50 MG capsule TAKE 1 CAPSULE BY MOUTH EVERY OTHER DAY 15 capsule 4   • omeprazole (PRILOSEC) 40 MG capsule Take 1 capsule by mouth 2 times daily. On waking up from sleep and 30 minutes before bedtime 60 capsule 11   • hydrochlorothiazide (HYDRODIURIL) 25 MG tablet TAKE 1 TABLET BY MOUTH ONCE DAILY 90 tablet 1   • simvastatin (ZOCOR) 40 MG tablet TAKE 1 TABLET BY MOUTH IN THE EVENING 90 tablet 1   • lisinopril (ZESTRIL) 5 MG tablet TAKE 1 TABLET BY MOUTH ONCE DAILY 90 tablet 1   • pramoxine (PROCTOFOAM) 1 % foam Apply bid prn. 15 g 5   • nitroGLYcerin (NITROSTAT) 0.4 MG sublingual tablet Place 1 tablet under the tongue every 5 minutes as needed for Chest pain. 90 tablet 1   • fluticasone (FLONASE) 50 MCG/ACT nasal spray Spray 1 spray in each nostril daily. (Patient taking differently: Spray 1 spray in each  nostril as needed. ) 16 g 12   • hydrocortisone (ANUSOL-HC) 2.5 % rectal cream Apply bid prn 30 g 0   • Daily Multiple Vitamins TABS Take  by mouth.     • aspirin (ASPIRIN) 81 MG EC tablet Take 81 mg by mouth daily.       No current facility-administered medications for this visit.        Past Medical History:   Diagnosis Date   • Arthritis    • ASHD (arteriosclerotic heart disease)     Calcium score screening 2012: Score of 654.  8./25/15.  Stress test: EF 69%, no perfusion defect.  Echo: EF 63%.  No abnormalities.   • CAD (coronary artery disease)    • Diverticulosis    • Diverticulosis of colon 2019    7TC;Jamie Kessler   • Dyslipidemia    • Esophageal reflux    • Essential hypertension    • GERD (gastroesophageal reflux disease)     19.  EGD:Small HH.GERD without esophagitis.Ruled out High's.No evidence of intestinal metaplasia or malignancy -Dr. Tracy   • HTN (hypertension)    • Hx Diverticulitis 2013    7.TC;Jamie Kessler   • Hyperlipidemia    • Illiterate 2016   • LAM (obstructive sleep apnea)     CPAP   • LAM (obstructive sleep apnea)    • Rosacea 2014   • Umbilical hernia          ALLERGIES:   Allergen Reactions   • Amoxicillin PRURITUS     Social History     Socioeconomic History   • Marital status: /Civil Union     Spouse name: Janeen   • Number of children: 2   • Years of education: Not on file   • Highest education level: Not on file   Occupational History   • Occupation:      Comment: retired   Social Needs   • Financial resource strain: Not on file   • Food insecurity:     Worry: Not on file     Inability: Not on file   • Transportation needs:     Medical: Not on file     Non-medical: Not on file   Tobacco Use   • Smoking status: Former Smoker     Last attempt to quit: 1993     Years since quittin.3   • Smokeless tobacco: Never Used   Substance and Sexual Activity   • Alcohol use: Yes     Alcohol/week: 1.2 oz      Types: 1 Glasses of wine, 1 Cans of beer per week     Comment: weekends    • Drug use: No   • Sexual activity: Not on file   Lifestyle   • Physical activity:     Days per week: Not on file     Minutes per session: Not on file   • Stress: Not on file   Relationships   • Social connections:     Talks on phone: Not on file     Gets together: Not on file     Attends Restorationism service: Not on file     Active member of club or organization: Not on file     Attends meetings of clubs or organizations: Not on file     Relationship status: Not on file   • Intimate partner violence:     Fear of current or ex partner: Not on file     Emotionally abused: Not on file     Physically abused: Not on file     Forced sexual activity: Not on file   Other Topics Concern   •  Service No   • Blood Transfusions Not Asked   • Caffeine Concern Not Asked   • Occupational Exposure Not Asked   • Hobby Hazards Not Asked   • Sleep Concern Not Asked   • Stress Concern Not Asked   • Weight Concern Not Asked   • Special Diet Not Asked   • Back Care Not Asked   • Exercise Not Asked   • Bike Helmet Not Asked   • Seat Belt Yes   • Self-Exams Not Asked   Social History Narrative    Social History     .  2 adult children.  Lives in [place].  Retired .    Former Smoker Quit date1/1/1993    Alcohol Use 1 Cans of beer per week/weekends      No Drug Use     MACI MORGAN Spouse [phone] , [phone]              Family History   Problem Relation Age of Onset   • Myocardial Infarction Mother    • Heart Father    • Heart Brother    • Alcohol Abuse Brother      GENERAL: No fever, chills, night sweats.  HEENT: No change in vision. Denies hearing loss. Denies hoarseness or swallowing problems.  CARDIAC: No shortness of breath. No chest pain. No palpitations. No PND (paroxysmal nocturnal dyspnea). No orthopnea. No edema.  RESPIRATORY: No cough or wheezing.  GASTROINTESTINAL: Denies abdominal pain, heartburn, nausea, emesis. Denies  yes changes in bowel habits.  MUSCULOSKELETAL: Denies joint aches, muscle pains, back pains.  NEUROLOGIC: Denies headache, weakness, seizures, syncope.  PSYCHOLOGIC: Denies depression, memory loss, inappropriate mood swings.  SKIN: Denies easy bruisability, hair loss or rashes.         PHYSICAL EXAMINATION:  VITAL SIGNS:    Visit Vitals  /54 (BP Location: [facility], Patient Position: Sitting, Cuff Size: Large Adult)   Pulse 60 Comment: apical,irregular   Resp 14   Ht 5' 10.5\" (1.791 m) Comment: patient reported   Wt 112.5 kg   SpO2 96%   BMI 35.07 kg/m²     GENERAL: Patient is alert and appropriate, oriented, in no acute distress.  Mood and affect are appropriate.  HEENT: Pupils are equal and reactive to light and accommodation. Conjunctivae clear. Sclerae are non-red and anicteric. Extraocular movements are intact.  NECK: Carotids revealed no bruit or JVD (jugular venous distention). No gross thyromegaly or masses noted. Trachea is midline.  HEART: Apical regular rate and rhythm. S1 and S2 auscultated. No murmurs, gallops, or rubs noted.   LUNGS: Lung sounds clear to auscultation throughout. Respirations even and nonlabored at rest. No rales, rhonchi, or wheezes noted.   EXTREMITIES: Show no clubbing, or cyanosis. No edema. +  SKIN: Color is pink, warm to touch, turgor is good, free from rashes.           TESTS / PROCEDURES REVIEWED:  EKG: May 13, 2019. Normal sinus rhythm. First-degree AV block. PVCs.  EKG: May 10, 2019 Atrial flutter. 2:1 AV conduction. Ventricular response 150  EKG: May 9, 2019. Atrial flutter 2:1 AV conduction-157 bpm  EKG September 13, 2017: Normal sinus rhythm. Normal.     Calcium score July 2, 2012: score of 543     Nuclear stress test: August 25, 2015.. Normal exercise tolerance. No perfusion defect. Normal EF.     Echocardiogram: August 25, 2015.. EF 63%. No valvular abnormalities.  Echocardiogram May 10, 2019. Eection fraction has decreased from 63-50%. RV function is mildly decreased. Mitral  valve thickening. Patient is in atrial flutter during the echo       Pertinent laboratory tests:  Lab Results   Component Value Date    SODIUM 140 05/08/2019    POTASSIUM 3.9 05/08/2019    CO2 27 05/08/2019    BUN 28 (H) 05/08/2019    CREATININE 0.92 05/08/2019    GFRNA 83 05/08/2019       Lab Results   Component Value Date    AST 22 05/08/2019    GPT 32 05/08/2019    ALKPT 53 05/08/2019    BILIRUBIN 0.4 05/08/2019       Lab Results   Component Value Date    GLUCOSE 125 (H) 05/08/2019       Lab Results   Component Value Date    WBC 8.9 05/08/2019    HCT 39.9 05/08/2019    HGB 13.7 05/08/2019     05/08/2019       Lab Results   Component Value Date    TSH 1.256 05/08/2019       Lab Results   Component Value Date    CHOLESTEROL 128 05/01/2019    HDL 44 05/01/2019    CALCLDL 60 05/01/2019    TRIGLYCERIDE 119 05/01/2019       Lab Results   Component Value Date    BUN 28 (H) 05/08/2019    CREATININE 0.92 05/08/2019    GFRNA 83 05/08/2019         IMPRESSION:  1. Coronary artery disease: Calcium score 543. Asymptomatic continue aspirin. Unchanged  2. Hypertension: Blood pressure 110/54. Well-controlled  3. Hyperlipidemia: LDL 60. Continue Zocor. Stable  4. LAM: continue CPAP. Stable  5. Atrial fibrillation/flutter. [id number] 4. Paroxysmal. In normal sinus rhythm. Continue digoxin 0.125 mg a day. Toprol grams once a day. Cardizem CD) 20 mg once a day. Eliquis. EP evaluation for possible ablation      PREVENTIVE COUNSELING.  Discussed atrial fibrillation rate and rhythm control. I discussed medicine changes.  Discussed Eliquis and bleeding. Discussed embolic events. Discussed not being on ladders or worse  Discussed EP evaluation.      TESTS ORDERED:  EMP. Magnesium. Digoxin level. CBC. Today    FOLLOW UP: 3 months                CC: Cj Worthy MD

## 2022-05-19 NOTE — ED PROVIDER NOTE - PHYSICAL EXAMINATION
GEN - NAD; well appearing; A+O x1  HEAD - NC/AT     EYES - EOMI, no conjunctival pallor, no scleral icterus  ENT -   mucous membranes  moist , no discharge      NECK - Neck supple  PULM - CTA b/l,  symmetric breath sounds  COR -  irregular heart rate, S1 S2, no murmurs  ABD - , ND, NT, soft, no guarding, no rebound, no masses    BACK - no CVA tenderness, nontender spine     EXTREMS - no edema, no deformity, warm and well perfused    SKIN - no rash or bruising      NEUROLOGIC - alert, sensation nl, motor 5/5 RUE/LUE/RLE/LLE

## 2022-05-19 NOTE — PHARMACOTHERAPY INTERVENTION NOTE - COMMENTS
Medication history complete, patient from [facility], reviewed and confirmed medication with list provided by facility.

## 2022-05-19 NOTE — ED ADULT NURSE REASSESSMENT NOTE - NS ED NURSE REASSESS COMMENT FT1
Pt alert but pleasantly cofused. Well known to me. A+Ox2. HR now 70-80 RSR. According to daughter pt did not receive amiodarone which was a new med for her when she returned to [place] a few days ago. Daughter believes per conversations with staff at [place] pt did not receive 2 days of amiodarone. Information relayed to Dr Cooper and Dr Figueroa. Pt's daughter at the bedside. Monitored closely. Call bell in reach.

## 2022-05-19 NOTE — CONSULT NOTE ADULT - ASSESSMENT
81 F hx CAD s/p PCI, PAF on eliquis, cardiomyopathy with EF 45%, MR, RCC s/p resection, dementia sent in from [facility] for evaluation for rapid afib.      #Afib with RVR:    S/p metoprolol 50 mg PO in the ER.    Improvement in rate and conversion back to NSR.    As per family, facility was not giving patient amio last 48 hours since arrival back from .    Resume home amio 200 mg x1 now.    Patient now with stable HR 70's.  NSR.    Case d/w Dr. Turcios Cardio.    No need for urgent admission.    Patient with PAF and HR with flucuate.    Cont home amio and metoprolol.    Cardio f/u outpatient in the office.    Cont AC with eliquis.      No need for acute hospitalization.

## 2022-05-19 NOTE — ED ADULT TRIAGE NOTE - CHIEF COMPLAINT QUOTE
pt presents from [facility] stating that she went into rafib . pt currently has no symptoms at this time. Denies sob, palpitations. HR 70's at triage

## 2022-05-19 NOTE — ED PROVIDER NOTE - OBJECTIVE STATEMENT
81 F hx  CAD s/p PCI, PAF on eliquis, CMP with EF 45%, MR, RCC s/p resection, dementia sent in from [facility] for an evaluation. pt unsure why she is here and here with no acute medical complaints.  pt was recently admitted at  recently from 5/10-5/16 from [facility] for  AMS/ shortness of breath / edema.  per [facility], pt is DNR. complete hx unobtainable secondary to dementia. 81 F hx  CAD s/p PCI, PAF on eliquis, CMP with EF 45%, MR, RCC s/p resection, dementia sent in from [facility] for an evaluation. pt unsure why she is here and here with no acute medical complaints.  pt was recently admitted at  recently from 5/10-5/16  for AMS/ shortness of breath / edema.  per [facility], pt is DNR. complete hx unobtainable secondary to dementia.

## 2022-05-19 NOTE — ED PROVIDER NOTE - CLINICAL SUMMARY MEDICAL DECISION MAKING FREE TEXT BOX
pt with hx of dementia, recently admitted here at [facility]. pt states she is now sure why she is here. awaiting collateral. will check CHF labs and chest x ray

## 2022-05-19 NOTE — ED ADULT NURSE NOTE - OBJECTIVE STATEMENT
Patient brought in by ambulance for rapid afib at [facility]. Patient is alert with periods of confusion. No evidence of pain or discomfort. Patient color good. Dry cough noted. Patient is on Eliquis and Metoprolol

## 2022-05-19 NOTE — CONSULT NOTE ADULT - SUBJECTIVE AND OBJECTIVE BOX
PCP:  Cardio:  Dr. Turcios    CHIEF COMPLAINT:  rapid afib    HISTORY OF THE PRESENT ILLNESS:  81 F hx CAD s/p PCI, PAF on eliquis, cardiomyopathy with EF 45%, MR, RCC s/p resection, dementia sent in from [facility] for an evaluation.  Pt unsure why she is here and here with no acute medical complaints.  Pt was recently admitted at  recently from 5/10-5/16  for AMS/ shortness of breath / edema.  Per [facility], pt is DNR. Upon arrival to the ER, patient was in rapid afib with HR in the 130's.  Patent given metoprolol 50 x1 and rate improved and patient converted back to NSR.  After discussions with daugther, patient was just started on amio during last admission and [facility] was not giving her this new medication over the last 48 hours.      PAST MEDICAL HISTORY:  as above    PAST SURGICAL HISTORY:  as above    FAMILY HISTORY:     non-contributory to the patient's current presentation    SOCIAL HISTORY:    lives in [place]..   no smoking, no alcohol, no drugs    REVIEW OF SYSTEMS:   All 10 systems reviewed in detailed and found to be negative with the exception of what has already been described above      Allergies:    penicillin (Hives)  penicillins (Short breath; Rash)  statins (Unknown)    Intolerances:  statins (Muscle Pain)    T(F): 98 (05-19-22 @ 15:25), Max: 98.1 (05-19-22 @ 11:47)  HR: 78 (05-19-22 @ 15:25) (76 - 119)  BP: 142/82 (05-19-22 @ 15:25) (134/87 - 142/82)  RR: 16 (05-19-22 @ 15:25) (16 - 18)  SpO2: 96% (05-19-22 @ 15:25) (95% - 96%)  Wt(kg): --    PHYSICAL EXAM:  HEENT:  pupils equal and reactive, EOMI, no oropharyngeal lesions, erythema, exudates, oral thrush  NECK:   supple, no carotid bruits, no palpable lymph nodes, no thyromegaly  CV:  +S1, +S2, regular, no murmurs or rubs  RESP:   lungs clear to auscultation bilaterally, no wheezing, rales, rhonchi, good air entry bilaterally  BREAST:  not examined  GI:  abdomen soft, non-tender, non-distended, normal BS, no bruits, no abdominal masses, no palpable masses  RECTAL:  not examined  :  not examined  MSK:   normal muscle tone, no atrophy, no rigidity, no contractions  EXT:  no clubbing, no cyanosis, no edema, no calf pain, swelling or erythema  VASCULAR:  pulses equal and symmetric in the upper and lower extremities  NEURO: alert and awake.  confused.    SKIN:  no ulcers, lesions or rashes                          15.2   8.79  )-----------( 255      ( 19 May 2022 11:53 )             49.7     05-19    138  |  106  |  28<H>  ----------------------------<  149<H>  4.0   |  25  |  1.30    Ca    9.7      19 May 2022 11:53    TPro  8.3  /  Alb  3.6  /  TBili  0.7  /  DBili  x   /  AST  29  /  ALT  51  /  AlkPhos  75  05-19        LIVER FUNCTIONS - ( 19 May 2022 11:53 )  Alb: 3.6 g/dL / Pro: 8.3 gm/dL / ALK PHOS: 75 U/L / ALT: 51 U/L / AST: 29 U/L / GGT: x

## 2022-05-22 DIAGNOSIS — I25.10 ATHEROSCLEROTIC HEART DISEASE OF NATIVE CORONARY ARTERY WITHOUT ANGINA PECTORIS: ICD-10-CM

## 2022-05-22 DIAGNOSIS — Z88.8 ALLERGY STATUS TO OTHER DRUGS, MEDICAMENTS AND BIOLOGICAL SUBSTANCES STATUS: ICD-10-CM

## 2022-05-22 DIAGNOSIS — Z88.0 ALLERGY STATUS TO PENICILLIN: ICD-10-CM

## 2022-05-22 DIAGNOSIS — Z79.82 LONG TERM (CURRENT) USE OF ASPIRIN: ICD-10-CM

## 2022-05-22 DIAGNOSIS — M06.9 RHEUMATOID ARTHRITIS, UNSPECIFIED: ICD-10-CM

## 2022-05-22 DIAGNOSIS — Z87.891 PERSONAL HISTORY OF NICOTINE DEPENDENCE: ICD-10-CM

## 2022-05-22 DIAGNOSIS — I11.0 HYPERTENSIVE HEART DISEASE WITH HEART FAILURE: ICD-10-CM

## 2022-05-22 DIAGNOSIS — E78.5 HYPERLIPIDEMIA, UNSPECIFIED: ICD-10-CM

## 2022-05-22 DIAGNOSIS — M15.9 POLYOSTEOARTHRITIS, UNSPECIFIED: ICD-10-CM

## 2022-05-22 DIAGNOSIS — I50.23 ACUTE ON CHRONIC SYSTOLIC (CONGESTIVE) HEART FAILURE: ICD-10-CM

## 2022-05-22 DIAGNOSIS — I48.0 PAROXYSMAL ATRIAL FIBRILLATION: ICD-10-CM

## 2022-05-22 DIAGNOSIS — Z85.528 PERSONAL HISTORY OF OTHER MALIGNANT NEOPLASM OF KIDNEY: ICD-10-CM

## 2022-05-22 DIAGNOSIS — Z95.5 PRESENCE OF CORONARY ANGIOPLASTY IMPLANT AND GRAFT: ICD-10-CM

## 2022-05-22 DIAGNOSIS — F03.90 UNSPECIFIED DEMENTIA WITHOUT BEHAVIORAL DISTURBANCE: ICD-10-CM

## 2022-05-22 DIAGNOSIS — I08.3 COMBINED RHEUMATIC DISORDERS OF MITRAL, AORTIC AND TRICUSPID VALVES: ICD-10-CM

## 2022-05-22 DIAGNOSIS — Z90.5 ACQUIRED ABSENCE OF KIDNEY: ICD-10-CM

## 2022-05-22 DIAGNOSIS — Z79.01 LONG TERM (CURRENT) USE OF ANTICOAGULANTS: ICD-10-CM

## 2022-05-22 DIAGNOSIS — I27.20 PULMONARY HYPERTENSION, UNSPECIFIED: ICD-10-CM

## 2022-05-22 DIAGNOSIS — M81.0 AGE-RELATED OSTEOPOROSIS WITHOUT CURRENT PATHOLOGICAL FRACTURE: ICD-10-CM

## 2022-05-22 DIAGNOSIS — I16.0 HYPERTENSIVE URGENCY: ICD-10-CM

## 2022-05-22 DIAGNOSIS — I42.9 CARDIOMYOPATHY, UNSPECIFIED: ICD-10-CM

## 2022-05-22 DIAGNOSIS — I48.20 CHRONIC ATRIAL FIBRILLATION, UNSPECIFIED: ICD-10-CM

## 2022-05-22 DIAGNOSIS — Z82.49 FAMILY HISTORY OF ISCHEMIC HEART DISEASE AND OTHER DISEASES OF THE CIRCULATORY SYSTEM: ICD-10-CM

## 2022-05-25 ENCOUNTER — APPOINTMENT (OUTPATIENT)
Dept: CARE COORDINATION | Facility: HOME HEALTH | Age: 82
End: 2022-05-25
Payer: MEDICARE

## 2022-05-25 VITALS
OXYGEN SATURATION: 97 % | HEART RATE: 72 BPM | RESPIRATION RATE: 18 BRPM | SYSTOLIC BLOOD PRESSURE: 138 MMHG | DIASTOLIC BLOOD PRESSURE: 86 MMHG

## 2022-05-25 DIAGNOSIS — I50.23 ACUTE ON CHRONIC SYSTOLIC (CONGESTIVE) HEART FAILURE: ICD-10-CM

## 2022-05-25 DIAGNOSIS — I25.10 ATHEROSCLEROTIC HEART DISEASE OF NATIVE CORONARY ARTERY W/OUT ANGINA PECTORIS: ICD-10-CM

## 2022-05-25 PROCEDURE — 99495 TRANSJ CARE MGMT MOD F2F 14D: CPT

## 2022-05-26 PROBLEM — I50.23 ACUTE ON CHRONIC SYSTOLIC CONGESTIVE HEART FAILURE: Status: ACTIVE | Noted: 2022-05-19

## 2022-05-26 PROBLEM — I25.10 CAD (CORONARY ARTERY DISEASE): Status: ACTIVE | Noted: 2022-05-19

## 2022-05-26 NOTE — ASSESSMENT
[FreeTextEntry1] : Pt is being seen after discharge home from [facility]. She was admitted on 05/10/2022 for AMS/SOB and discharged home on 05/16/2022. Discharge medications were reviewed and reconciled with the current medication list and medications in [facility].\par \par CC:\par Pt is seen at home s/p recent admission for CHF exac. Pt is temporarily unable to leave home as it requires a considerable and taxing effort, non ambulatory\par HPI:\par Pt Is a 80 y/o female enrolled in the [facility] seen at home s/p a recent admission for CHF exac. Sent back to ED on 5/19 for RAFIB, given IV BB converted to NSR and sent back to [facility].  [facility] was contacted by TCM and med rec was done within 48 hours of DC. Sent back to ED on 5/19 for RAFIB given BB converted to SR and sent back to facility. \par \par Hospital Course	\par PMHx of CAD s/p PCI, PAF on eliquis, CMP with EF 45%, MR, RCC s/p resection, dementia sent from [facility] due to AMS/SOB/edema for last 2-3 days. Pt is pleasantly confused, denies any complaints, but noted to have elevated BP/HR and significant LE edema. -diuresed with IV Lasix with improvement in dypnea/hypoxia\par -developed rapid Afib requiring Amiodarone loading\par \par # Acute on chronic systolic  HFrEF\par  - s/p IV diuresis: Lasix IV Bid; now on po lasix 40mg Bid\par c/w KCl Bid\par - cont. losartan\par decrease toprol xl to 50 mg po daily \par renal fct stable \par \par # Chronic Afib with RVR\par episodes of bradycardia noticed \par - dc cardizem \par Toprol XL decreased to 50 mg po daily - D/W Dr. Turcios \par start losartan 25 mg po daily \par c/w Amiodarone loading , change to Amio 200mg/day starting tomorrow 5/16\par c/w Apixaban\par Plan D/W Dr. Turcios \par follow up with cardiology outpatient \par \par #Severe MR\par Ideally should get surgical repair, however likely medical management due to dementia \par \par # Abn LFTs due to ADHF \par LFTs improving \par blood work for liver function at PCP office in one week \par \par \par \par \par \par \par

## 2022-05-26 NOTE — HISTORY OF PRESENT ILLNESS
[FreeTextEntry2] : FROM GHASSAN CALDERON NOTE PROVIDER\par  Discharge Note Provider [Charted Location: [facility] [address] [address]] [Authored: 16-May-2022 12:37]- for Visit: [phone], Complete, Appended Only, Signed in Full, Available to Patient\par \par \par Hospital Course \par Discharge Date	16-May-2022\par Admission Date	10-May-2022 15:19\par Reason for Admission	SOB/AMS\par Hospital Course	\par 81 y.o. female with PMHx of CAD s/p PCI, PAF on eliquis, CMP with EF 45%, MR, RCC s/p resection, dementia sent from [facility] due to AMS/SOB/edema for last 2-3 days. Pt is pleasantly confused, denies any complaints, but noted to have elevated BP/HR and significant LE edema. -diuresed with IV Lasix with improvement in dypnea/hypoxia\par -developed rapid Afib requiring Amiodarone loading\par \par # Acute on chronic systolic  HFrEF\par  - s/p IV diuresis: Lasix IV Bid; now on po lasix 40mg Bid\par c/w KCl Bid\par - cont. losartan\par decrease toprol xl to 50 mg po daily \par renal fct stable \par \par # Chronic Afib with RVR\par episodes of bradycardia noticed \par - dc cardizem \par Toprol XL decreased to 50 mg po daily - D/W Dr. Turcios \par start losartan 25 mg po daily \par c/w Amiodarone loading , change to Amio 200mg/day starting tomorrow 5/16\par c/w Apixaban\par Plan D/W Dr. Turcios \par follow up with cardiology outpatient \par \par #Severe MR\par Ideally should get surgical repair, however likely medical management due to dementia \par \par # Abn LFTs due to ADHF \par LFTs improving \par blood work for liver function at PCP office in one week \par \par Pt. is stable for discharge, will follow up with cardiology - tomorrow and with PCP. \par \par PHYSICAL EXAM:\par Vital Signs Last 24 Hrs\par T(C): 36.7 (16 May 2022 08:28), Max: 36.7 (15 May 2022 21:35)\par T(F): 98.1 (16 May 2022 08:28), Max: 98.1 (16 May 2022 08:28)\par HR: 53 (16 May 2022 08:28) (53 - 89)\par BP: 129/78 (16 May 2022 08:28) (103/73 - 129/78)\par BP(mean): 90 (15 May 2022 17:20) (90 - 90)\par RR: 18 (16 May 2022 08:28) (16 - 18)\par SpO2: 96% (16 May 2022 08:28) (95% - 97%)\par GENERAL: NAD\par HEENT:  NC/AT, EOMI, PERRLA, No scleral icterus, Moist mucous membranes\par NECK: Supple, No JVD\par CNS:  Alert & Oriented X2, Motor Strength 5/5 B/L upper and lower extremities; DTRs 2+ intact \par LUNG: Normal Breath sounds, Clear to auscultation bilaterally, No rales, No rhonchi, No wheezing\par HEART: RRR; No murmurs, No rubs\par ABDOMEN: +BS, ST/ND/NT\par GENITOURINARY: Voiding, Bladder not distended\par EXTREMITIES:  2+ Peripheral Pulses, No clubbing, No cyanosis, No tibial edema\par MUSCULOSKELTAL: Joints normal ROM, No TTP, No effusion\par VAGINAL: deferred\par SKIN: no rashes\par RECTAL: deferred, not indicated\par BREAST: deferred\par \par Med Reconciliation \par Medication Reconciliation Status	Admission Reconciliation is Completed\par Discharge Reconciliation is Completed\par Discharge Medications	amiodarone 200 mg oral tablet: 1 tab(s) orally once a day\par dicyclomine 10 mg oral capsule: 1 cap(s) orally 2 times a day\par Eliquis 5 mg oral tablet: 1 tab(s) orally 2 times a day\par furosemide 40 mg oral tablet: 1 tab(s) orally 2 times a day\par lidocaine 4% patch: Apply topically to affected area once a day (in the morning)\par losartan 25 mg oral tablet: 1 tab(s) orally once a day\par Melatonin 5 mg oral tablet: 1 tab(s) orally once a day (at bedtime)\par metoprolol succinate 50 mg oral tablet, extended release: 1 tab(s) orally once a day\par Nystop 100,000 units/g topical powder: Apply topically to affected area once a day (at bedtime)\par pantoprazole 40 mg oral delayed release tablet: 1 tab(s) orally once a day (before a meal)\par potassium chloride 20 mEq oral tablet, extended release: 1 tab(s) orally 2 times a day\par Refresh ophthalmic solution: 1 drop(s) in each eye every 4 hours, As Needed\par sertraline 50 mg oral tablet: 1 tab(s) orally once a day\par Tums 500 oral tablet, chewable (obsolete): 1 tab(s) orally every 8 hours, As Needed - for heartburn\par Tylenol 325 mg oral tablet: 2 tab(s) orally every 6 hours, As Needed - for moderate pain\par ,\par ,\par \par Care Plan/Procedures \par Discharge Diagnoses, Assessment and Plan of Treatment	PRINCIPAL DISCHARGE DIAGNOSIS\par Diagnosis: Acute on chronic systolic HF (heart failure)\par Assessment and Plan of Treatment: Take medications as prescribed \par lasix 40 mg orally twice a day \par potassium 20 mEq orally twice a day - take with lasix \par losartan 25 mg orally daily once a day\par metoprolol succinate 50 mg orally daily \par \par \par \par SECONDARY DISCHARGE DIAGNOSES\par Diagnosis: Chronic atrial fibrillation\par Assessment and Plan of Treatment: You have an abnormal heart rhythm for which you need to take medications as prescribed \par eliquis 5 mg orally twice a day \par amiodarone 200 mg orally daily\par metoprolol succinate 50 mg orally daily\par \par Diagnosis: Elevated liver enzymes\par Assessment and Plan of Treatment: You need to have repeat blood work to check your liver in one week with your PCP\par Goal(s)	To get better and follow your care plan as instructed.\par \par Follow Up \par Care Providers for Follow up (PCP/Outpatient Provider)	Rehan Turcios (DO; [facility])\par Cardiology\par [address]\[place] [address]\par Phone: [phone]\par Fax: [phone]\par Follow Up Time: \par \par Marcio Lux\par MEDICINE\par [address] [facility], [address]\[place] [address]\par Phone: [phone]\par Fax: [phone]\par Follow Up Time:\par Additional Scheduled Appointments	Follow-up appointment with Dr. Turcios\par \par Day: Tuesday\par Date: 5/17/22\par Time: 4:00pm\par Phone: [phone]\par Discharge Diet	DASH Diet\par Activity	Do not drive or operate machinery, Do not make important decisions, No heavy lifting/straining\par Additional Instructions	- follow up with cardiology tomorrow and with PCP within one week\par - check bloodwork - liver enzymes - within one week with PCP\par \par Quality Measures \par Patient Condition	Stable\par Hospice Patient	No\par Tobacco Usage Within the Last Year	No\par Does the patient have difficulty running errands alone like visiting a doctor’s office or shopping?	Yes\par Does the patient have difficulty climbing stairs?	Yes\par Cognition: The patient has	Difficulty concentrating  Difficulty making decisions  Difficulty remembering\par Does the patient have a principal diagnosis of ischemic stroke, hemorrhagic stroke, or TIA?	No\par Does the patient have a principal diagnosis of Acute Myocardial Infarction?	No\par Has the patient had a Percutaneous Coronary Intervention?	No\par \par Home Health \par Discharged with Home Health Care Services?	Yes\par Face-To-Face Contact	As certified below, I, or a nurse practitioner or physician assistant working with me, had a face-to-face encounter that meets the physician face-to-face encounter requirements.\par Need for Skilled Services	Observation and assessment  Rehabilitation services\par Based on the above findings, the following intermittent skilled services are medically necessary home health services:	Nursing  Physical therapy\par Home Bound Status	Fall risk\par Patient Needs Assistance to Leave Residence...\par Attending Certification	My signature below certifies that the above stated patient is homebound and upon completion of the Face-To-Face encounter, has the need for intermittent skilled nursing, physical therapy and/or speech or occupational therapy services in their home for their current diagnosis as outlined in their initial plan of care. These services will continue to be monitored by myself or another physician.\par Encounter Date	16-May-2022\par \par Document Complete \par Care Provider Seen in Hospital	Tamera Marcial MD\par Rehan Turcios DO\par Loida Gatica NP\par Physician Section Complete	This document is complete and the patient is ready for discharge.\par For questions about your prescriptions, please call:	[phone]\par Is this contact telephone number correct?	Yes\par \par \par Electronic Signatures for Addendum Section: \par Tamera Marcial) (Signed Addendum 16-May-2022 23:23)\par 	Pt was seen and examined with NP Loida Gatica, results and POC discussed, agree with D/c recommendations, Physical exam and Assessment and Plan. Note reviewed and edited as appropriate \par Pt admitted for acute on chronic diastolic CHF, s/p IV lasox now switched to PO, also On toprol and Losartan. \par Also AFIB with RVR, s/p amio load, now c/w 200mg qd. C/w Eliquis\par Severe MR, plan for outPt evaluation for poss procedure \par Abnormal LFTS, likely 2/2 CHF hepative congestion, will need to monitor outPt\par Stable for d/c backkto correction\par Total time 40 min\par \par Electronic Signatures:\par Tamera Marcial)  (Signed 16-May-2022 13:12)\par 	Co-Signer: Hospital Course, Med Reconciliation, Care Plan/Procedures, Follow Up, Quality Measures, Home Health, Document Complete\par Loida Gatica (NP)  (Signed 16-May-2022 12:55)\par 	Authored: Hospital Course, Med Reconciliation, Care Plan/Procedures, Follow Up, Quality Measures, Home Health, Document Complete\par \par \par Last Updated: 16-May-2022 23:23 by Tamera Marcial)\par \par \par

## 2022-05-26 NOTE — PLAN
[FreeTextEntry1] : A/P:\par 1. s/p hospitalization for CHF exacerbation:\par - no signs of volume overload \par - con't lasix, KCL\par - per RN at [facility], PCP seeing her weekly to monitor until stable. Weights monthly\par - to call for any SOB/edema/orthopnea\par - f/u with cardiology as scheduled and prn\par \par 2. Afib:\par - back to ED on 5/19 for RAFIB (was not receiving the amiodorone rx on d/c)\par - given IV lopressor  in ED, converted to NSR, given dose of amiodarone d/c back to [facility].\par - con't BB, amiodarone, eliquis\par - f/u cardiology\par \par 3. CAD:\par - no reports of CP\par - con't BB\par - heart healthy diet\par \par 4. Dementia:\par - supportive care in detention\par - decline expected\par -

## 2022-05-26 NOTE — PHYSICAL EXAM
[de-identified] : no thrush [de-identified] : MYLES x 4, arthritis changes to hands, ? trigger fingers L hand digits 3-5 [de-identified] : pleasantly confused alert to self

## 2022-06-08 ENCOUNTER — APPOINTMENT (OUTPATIENT)
Dept: CARE COORDINATION | Facility: HOME HEALTH | Age: 82
End: 2022-06-08
Payer: MEDICARE

## 2022-06-08 VITALS
RESPIRATION RATE: 18 BRPM | OXYGEN SATURATION: 95 % | WEIGHT: 135 LBS | HEART RATE: 60 BPM | BODY MASS INDEX: 22.47 KG/M2 | SYSTOLIC BLOOD PRESSURE: 128 MMHG | DIASTOLIC BLOOD PRESSURE: 78 MMHG

## 2022-06-08 DIAGNOSIS — I50.22 CHRONIC SYSTOLIC (CONGESTIVE) HEART FAILURE: ICD-10-CM

## 2022-06-08 PROCEDURE — 99349 HOME/RES VST EST MOD MDM 40: CPT

## 2022-06-08 RX ORDER — FUROSEMIDE 40 MG/1
40 TABLET ORAL DAILY
Refills: 0 | Status: ACTIVE | COMMUNITY
Start: 2022-05-19

## 2022-06-08 NOTE — REVIEW OF SYSTEMS
[Incontinence] : incontinence [Memory Loss] : memory loss [Negative] : Integumentary [FreeTextEntry7] : incontinent [FreeTextEntry9] : debility, non ambulatory

## 2022-06-08 NOTE — HISTORY OF PRESENT ILLNESS
[Post-hospitalization from ___ Hospital] : Post-hospitalization from [unfilled] Hospital [Admitted on: ___] : The patient was admitted on [unfilled] [Discharged on ___] : discharged on [unfilled] [FreeTextEntry2] : FROM [facility] NOTE PROVIDER\par  Discharge Note Provider [Charted Location: [facility] [address] [id number]] [Authored: 16-May-2022 12:37]- for Visit: [phone], Complete, Appended Only, Signed in Full, Available to Patient\par \par \par Hospital Course \par Discharge Date	16-May-2022\par Admission Date	10-May-2022 15:19\par Reason for Admission	SOB/AMS\par Hospital Course	\par 81 y.o. female with PMHx of CAD s/p PCI, PAF on eliquis, CMP with EF 45%, MR, RCC s/p resection, dementia sent from nursing home due to AMS/SOB/edema for last 2-3 days. Pt is pleasantly confused, denies any complaints, but noted to have elevated BP/HR and significant LE edema. -diuresed with IV Lasix with improvement in dypnea/hypoxia\par -developed rapid Afib requiring Amiodarone loading\par \par # Acute on chronic systolic  HFrEF\par  - s/p IV diuresis: Lasix IV Bid; now on po lasix 40mg Bid\par c/w KCl Bid\par - cont. losartan\par decrease toprol xl to 50 mg po daily \par renal fct stable \par \par # Chronic Afib with RVR\par episodes of bradycardia noticed \par - dc cardizem \par Toprol XL decreased to 50 mg po daily - D/W Dr. Turcios \par start losartan 25 mg po daily \par c/w Amiodarone loading , change to Amio 200mg/day starting tomorrow 5/16\par c/w Apixaban\par Plan D/W Dr. Turcios \par follow up with cardiology outpatient \par \par #Severe MR\par Ideally should get surgical repair, however likely medical management due to dementia \par \par # Abn LFTs due to ADHF \par LFTs improving \par blood work for liver function at PCP office in one week \par \par Pt. is stable for discharge, will follow up with cardiology - tomorrow and with PCP. \par \par PHYSICAL EXAM:\par Vital Signs Last 24 Hrs\par T(C): 36.7 (16 May 2022 08:28), Max: 36.7 (15 May 2022 21:35)\par T(F): 98.1 (16 May 2022 08:28), Max: 98.1 (16 May 2022 08:28)\par HR: 53 (16 May 2022 08:28) (53 - 89)\par BP: 129/78 (16 May 2022 08:28) (103/73 - 129/78)\par BP(mean): 90 (15 May 2022 17:20) (90 - 90)\par RR: 18 (16 May 2022 08:28) (16 - 18)\par SpO2: 96% (16 May 2022 08:28) (95% - 97%)\par GENERAL: NAD\par HEENT:  NC/AT, EOMI, PERRLA, No scleral icterus, Moist mucous membranes\par NECK: Supple, No JVD\par CNS:  Alert & Oriented X2, Motor Strength 5/5 B/L upper and lower extremities; DTRs 2+ intact \par LUNG: Normal Breath sounds, Clear to auscultation bilaterally, No rales, No rhonchi, No wheezing\par HEART: RRR; No murmurs, No rubs\par ABDOMEN: +BS, ST/ND/NT\par GENITOURINARY: Voiding, Bladder not distended\par EXTREMITIES:  2+ Peripheral Pulses, No clubbing, No cyanosis, No tibial edema\par MUSCULOSKELTAL: Joints normal ROM, No TTP, No effusion\par VAGINAL: deferred\par SKIN: no rashes\par RECTAL: deferred, not indicated\par BREAST: deferred\par \par Med Reconciliation \par Medication Reconciliation Status	Admission Reconciliation is Completed\par Discharge Reconciliation is Completed\par Discharge Medications	amiodarone 200 mg oral tablet: 1 tab(s) orally once a day\par dicyclomine 10 mg oral capsule: 1 cap(s) orally 2 times a day\par Eliquis 5 mg oral tablet: 1 tab(s) orally 2 times a day\par furosemide 40 mg oral tablet: 1 tab(s) orally 2 times a day\par lidocaine 4% patch: Apply topically to affected area once a day (in the morning)\par losartan 25 mg oral tablet: 1 tab(s) orally once a day\par Melatonin 5 mg oral tablet: 1 tab(s) orally once a day (at bedtime)\par metoprolol succinate 50 mg oral tablet, extended release: 1 tab(s) orally once a day\par Nystop 100,000 units/g topical powder: Apply topically to affected area once a day (at bedtime)\par pantoprazole 40 mg oral delayed release tablet: 1 tab(s) orally once a day (before a meal)\par potassium chloride 20 mEq oral tablet, extended release: 1 tab(s) orally 2 times a day\par Refresh ophthalmic solution: 1 drop(s) in each eye every 4 hours, As Needed\par sertraline 50 mg oral tablet: 1 tab(s) orally once a day\par Tums 500 oral tablet, chewable (obsolete): 1 tab(s) orally every 8 hours, As Needed - for heartburn\par Tylenol 325 mg oral tablet: 2 tab(s) orally every 6 hours, As Needed - for moderate pain\par ,\par ,\par \par Care Plan/Procedures \par Discharge Diagnoses, Assessment and Plan of Treatment	PRINCIPAL DISCHARGE DIAGNOSIS\par Diagnosis: Acute on chronic systolic HF (heart failure)\par Assessment and Plan of Treatment: Take medications as prescribed \par lasix 40 mg orally twice a day \par potassium 20 mEq orally twice a day - take with lasix \par losartan 25 mg orally daily once a day\par metoprolol succinate 50 mg orally daily \par \par \par \par SECONDARY DISCHARGE DIAGNOSES\par Diagnosis: Chronic atrial fibrillation\par Assessment and Plan of Treatment: You have an abnormal heart rhythm for which you need to take medications as prescribed \par eliquis 5 mg orally twice a day \par amiodarone 200 mg orally daily\par metoprolol succinate 50 mg orally daily\par \par Diagnosis: Elevated liver enzymes\par Assessment and Plan of Treatment: You need to have repeat blood work to check your liver in one week with your PCP\par Goal(s)	To get better and follow your care plan as instructed.\par \par Follow Up \par Care Providers for Follow up (PCP/Outpatient Provider)	Rehan Turcios (DO; [facility])\par Cardiology\par [address]\[place] [address]\par Phone: [phone]\par Fax: [phone]\par Follow Up Time: \par \par Marcio Lux\par MEDICINE\par [address] [facility], [address]\[place] [address]\par Phone: [phone]\par Fax: [phone]\par Follow Up Time:\par Additional Scheduled Appointments	Follow-up appointment with Dr. Turcios\par \par Day: Tuesday\par Date: 5/17/22\par Time: 4:00pm\par Phone: [phone]\par Discharge Diet	DASH Diet\par Activity	Do not drive or operate machinery, Do not make important decisions, No heavy lifting/straining\par Additional Instructions	- follow up with cardiology tomorrow and with PCP within one week\par - check bloodwork - liver enzymes - within one week with PCP\par \par Quality Measures \par Patient Condition	Stable\par Hospice Patient	No\par Tobacco Usage Within the Last Year	No\par Does the patient have difficulty running errands alone like visiting a doctor’s office or shopping?	Yes\par Does the patient have difficulty climbing stairs?	Yes\par Cognition: The patient has	Difficulty concentrating  Difficulty making decisions  Difficulty remembering\par Does the patient have a principal diagnosis of ischemic stroke, hemorrhagic stroke, or TIA?	No\par Does the patient have a principal diagnosis of Acute Myocardial Infarction?	No\par Has the patient had a Percutaneous Coronary Intervention?	No\par \par Home Health \par Discharged with Home Health Care Services?	Yes\par Face-To-Face Contact	As certified below, I, or a nurse practitioner or physician assistant working with me, had a face-to-face encounter that meets the physician face-to-face encounter requirements.\par Need for Skilled Services	Observation and assessment  Rehabilitation services\par Based on the above findings, the following intermittent skilled services are medically necessary home health services:	Nursing  Physical therapy\par Home Bound Status	Fall risk\par Patient Needs Assistance to Leave Residence...\par Attending Certification	My signature below certifies that the above stated patient is homebound and upon completion of the Face-To-Face encounter, has the need for intermittent skilled nursing, physical therapy and/or speech or occupational therapy services in their home for their current diagnosis as outlined in their initial plan of care. These services will continue to be monitored by myself or another physician.\par Encounter Date	16-May-2022\par \par Document Complete \par Care Provider Seen in Hospital	Tamera Marcial MD\par Rehan Turcios DO\par Loida Gatica NP\par Physician Section Complete	This document is complete and the patient is ready for discharge.\par For questions about your prescriptions, please call:	[phone]\par Is this contact telephone number correct?	Yes\par \par \par Electronic Signatures for Addendum Section: \par Tamera Marcial) (Signed Addendum 16-May-2022 23:23)\par 	Pt was seen and examined with NP Loida Gatica, results and POC discussed, agree with D/c recommendations, Physical exam and Assessment and Plan. Note reviewed and edited as appropriate \par Pt admitted for acute on chronic diastolic CHF, s/p IV lasox now switched to PO, also On toprol and Losartan. \par Also AFIB with RVR, s/p amio load, now c/w 200mg qd. C/w Eliquis\par Severe MR, plan for outPt evaluation for poss procedure \par Abnormal LFTS, likely 2/2 CHF hepative congestion, will need to monitor outPt\par Stable for d/c backkto nursing home\par Total time 40 min\par \par Electronic Signatures:\par Tamera Marcial)  (Signed 16-May-2022 13:12)\par 	Co-Signer: Hospital Course, Med Reconciliation, Care Plan/Procedures, Follow Up, Quality Measures, Home Health, Document Complete\par Loida Gatica (NP)  (Signed 16-May-2022 12:55)\par 	Authored: Hospital Course, Med Reconciliation, Care Plan/Procedures, Follow Up, Quality Measures, Home Health, Document Complete\par \par \par Last Updated: 16-May-2022 23:23 by Tamera Marcial)\par \par \par

## 2022-06-08 NOTE — ASSESSMENT
[FreeTextEntry1] : Pt is being seen after discharge home from [facility]. She was admitted on 05/10/2022 for SOB/altered mental status and discharged home on 05/162022. Discharge medications were reviewed and reconciled with the current medication list and medications in home.\par \par CC:\par Pt is seen at home s/p recent admission for CHF exac. Pt is temporarily unable to leave home as it requires a considerable and taxing effort, resides in [facility]. non ambulatory\par HPI\par Pt Is a 82 y/o female enrolled in the [facility] seen at home s/p a recent admission for CHF exac, then presented to ED on 5/19 for episode of CYDNEY as she missed 3 doses of amiodarone.\par \par Hospital Course	\par PMH of CAD s/p PCI, PAF on eliquis, CMP with EF 45%, MR, RCC s/p resection, dementia sent from [facility] due to AMS/SOB/edema for last 2-3 days. Pt is pleasantly confused, denies any complaints, but noted to have elevated BP/HR and significant LE edema. -diuresed with IV Lasix with improvement in dypnea/hypoxia\par -developed rapid Afib requiring Amiodarone loading\par \par # Acute on chronic systolic  HFrEF\par  - s/p IV diuresis: Lasix IV Bid; now on po lasix 40mg Bid\par c/w KCl Bid\par - cont. losartan\par decrease toprol xl to 50 mg po daily \par renal fct stable \par \par # Chronic Afib with RVR\par episodes of bradycardia noticed \par - dc cardizem \par Toprol XL decreased to 50 mg po daily - D/W Dr. Turcios \par start losartan 25 mg po daily \par c/w Amiodarone loading , change to Amio 200mg/day starting tomorrow 5/16\par c/w Apixaban\par Plan D/W Dr. Turcios \par follow up with cardiology outpatient \par \par Upon examination A&O x2, dtg Madiha present for visit. Patient denies CP, SOB, headache, dizziness, abd discomfort or difficulty with bowel or bladder. She reports intermittent neck pain which is chronic. Lasix decreased to daily per Card Dr. Turcios. Per wellness her weight is now 135. No signs of volume overload on exam. [facility] in place .\par \par

## 2022-06-08 NOTE — PHYSICAL EXAM
[No Acute Distress] : no acute distress [Well Nourished] : well nourished [Well Developed] : well developed [Normal Sclera/Conjunctiva] : normal sclera/conjunctiva [Normal Outer Ear/Nose] : the outer ears and nose were normal in appearance [Normal Oropharynx] : the oropharynx was normal [Supple] : supple [No Respiratory Distress] : no respiratory distress  [Clear to Auscultation] : lungs were clear to auscultation bilaterally [No Accessory Muscle Use] : no accessory muscle use [Normal Rate] : normal rate  [Regular Rhythm] : with a regular rhythm [Normal S1, S2] : normal S1 and S2 [Pedal Pulses Present] : the pedal pulses are present [No Edema] : there was no peripheral edema [No Extremity Clubbing/Cyanosis] : no extremity clubbing/cyanosis [Soft] : abdomen soft [Non Tender] : non-tender [Non-distended] : non-distended [Normal Bowel Sounds] : normal bowel sounds [No Spinal Tenderness] : no spinal tenderness [No Rash] : no rash [Coordination Grossly Intact] : coordination grossly intact [No Focal Deficits] : no focal deficits [Normal Affect] : the affect was normal [de-identified] : debility, non ambulatory, RA deformities [de-identified] : A&O x 2

## 2022-06-08 NOTE — PLAN
[FreeTextEntry1] : A/P:\par 1. Systolic Heart Failure:\par - no signs of volume overload at present\par - con't lasix 40mg daily\par - low Na diet\par - call for any suddent wt gain/SOB/edema/Orthopnea\par - f/u cardiology as scheduled and prn\par \par 2. P afib:\par - rate controlled on current meds\par - con't amiodarone, BB, eliquis\par - f/u cardiology\par \par 3. Dementia:\par - pleasantly confused, decline expected\par - supportive care in intermediate.\par

## 2022-10-20 NOTE — ED PROVIDER NOTE - MDM ORDERS SUBMITTED SELECTION
Medications/Labs/Imaging Studies/EKG Cantharidin Pregnancy And Lactation Text: The use of this medication during pregnancy or lactation is not recommended as there is insufficient data.

## 2023-01-20 NOTE — ED ADULT NURSE NOTE - NS PRO PASSIVE SMOKE EXP
Patient presenting with prolonged QTc interval of 490 ms    Plan:  Treating pneumonia with levofloxacin 750 mg every 48 hours in the setting of allergy to penicillins, however this antibiotic cannot continue prolonged QTc interval, first dose done 1/20 3 AM, next dose at 1/22 at 3 AM  Consider switching antibiotic coverage to avoid QTC prolongation and or repeat EKG  Avoid other QTC prolonging medications  Tigan as needed for nausea No

## 2023-02-01 NOTE — PHYSICAL THERAPY INITIAL EVALUATION ADULT - PLANNED THERAPY INTERVENTIONS, PT EVAL
Impression: Combined forms of age-related cataract, bilateral: H25.813. Plan: Discussed cataract diagnosis with the patient. Discussed risks, benefits and alternatives to surgery including but not limited to: bleeding, infection, risk of vision loss, loss of the eye, need for other surgery. Patient voiced understanding and wishes to proceed. Patient elects surgical treatment. Advanced technology options Discussed with patient . Patient desires surgery OU, OD  first (( AIM   -0.25 OU, injectable  1st choice/TRI MOXI, Topical anesthesia,  NO Lensx, ORA OU , NO LRI- AMP)) Patient understands the need for glasses after surgery for BCVA. strengthening/transfer training/gait training/bed mobility training

## 2023-03-01 NOTE — ED PROVIDER NOTE - PROGRESS NOTE DETAILS
Michael Mars DO (Attending): Discussed all results with patient and family.  Discussed concern for elevated WBC while febrile, UTI.  Daughter also expresses concern patient was too weak to get out of bed by herself at home.  Patient agrees with plan for admission, however is refusing CXR - states had CXR performed a few weeks ago s/p fall and does not want repeat.  Discussed importance of CXR in setting of infection, patient refuses.  Lungs CTA B/L. H Plasty Text: Given the location of the defect, shape of the defect and the proximity to free margins a H-plasty was deemed most appropriate for repair.  Using a sterile surgical marker, the appropriate advancement arms of the H-plasty were drawn incorporating the defect and placing the expected incisions within the relaxed skin tension lines where possible. The area thus outlined was incised deep to adipose tissue with a #15 scalpel blade. The skin margins were undermined to an appropriate distance in all directions utilizing iris scissors.  The opposing advancement arms were then advanced into place in opposite direction and anchored with interrupted buried subcutaneous sutures.

## 2023-03-16 NOTE — H&P ADULT - MUSCULOSKELETAL
details… detailed exam ROM intact/no joint swelling Olumiant Pregnancy And Lactation Text: Based on animal studies, Olumiant may cause embryo-fetal harm when administered to pregnant women.  The medication should not be used in pregnancy.  Breastfeeding is not recommended during treatment.

## 2023-04-18 NOTE — ED ADULT NURSE NOTE - NSSISCREENINGQ4_ED_A_ED

## 2023-10-03 ENCOUNTER — INPATIENT (INPATIENT)
Facility: HOSPITAL | Age: 83
LOS: 4 days | Discharge: HOME HEALTH | DRG: 871 | End: 2023-10-08
Attending: HOSPITALIST | Admitting: INTERNAL MEDICINE
Payer: MEDICARE

## 2023-10-03 VITALS
WEIGHT: 160.06 LBS | HEIGHT: 65 IN | OXYGEN SATURATION: 95 % | DIASTOLIC BLOOD PRESSURE: 84 MMHG | SYSTOLIC BLOOD PRESSURE: 116 MMHG | TEMPERATURE: 99 F | HEART RATE: 118 BPM | RESPIRATION RATE: 16 BRPM

## 2023-10-03 DIAGNOSIS — Z98.89 OTHER SPECIFIED POSTPROCEDURAL STATES: Chronic | ICD-10-CM

## 2023-10-03 DIAGNOSIS — J18.9 PNEUMONIA, UNSPECIFIED ORGANISM: ICD-10-CM

## 2023-10-03 DIAGNOSIS — Z90.5 ACQUIRED ABSENCE OF KIDNEY: Chronic | ICD-10-CM

## 2023-10-03 DIAGNOSIS — Z90.89 ACQUIRED ABSENCE OF OTHER ORGANS: Chronic | ICD-10-CM

## 2023-10-03 DIAGNOSIS — Z98.62 PERIPHERAL VASCULAR ANGIOPLASTY STATUS: Chronic | ICD-10-CM

## 2023-10-03 DIAGNOSIS — Z98.890 OTHER SPECIFIED POSTPROCEDURAL STATES: Chronic | ICD-10-CM

## 2023-10-03 DIAGNOSIS — Z98.49 CATARACT EXTRACTION STATUS, UNSPECIFIED EYE: Chronic | ICD-10-CM

## 2023-10-03 LAB
ALBUMIN SERPL ELPH-MCNC: 3 G/DL — LOW (ref 3.3–5)
ALP SERPL-CCNC: 76 U/L — SIGNIFICANT CHANGE UP (ref 40–120)
ALT FLD-CCNC: 31 U/L — SIGNIFICANT CHANGE UP (ref 12–78)
ANION GAP SERPL CALC-SCNC: 5 MMOL/L — SIGNIFICANT CHANGE UP (ref 5–17)
APPEARANCE UR: CLEAR — SIGNIFICANT CHANGE UP
APTT BLD: 35.4 SEC — SIGNIFICANT CHANGE UP (ref 24.5–35.6)
AST SERPL-CCNC: 32 U/L — SIGNIFICANT CHANGE UP (ref 15–37)
BACTERIA # UR AUTO: ABNORMAL /HPF
BASOPHILS # BLD AUTO: 0.03 K/UL — SIGNIFICANT CHANGE UP (ref 0–0.2)
BASOPHILS NFR BLD AUTO: 0.2 % — SIGNIFICANT CHANGE UP (ref 0–2)
BILIRUB SERPL-MCNC: 0.5 MG/DL — SIGNIFICANT CHANGE UP (ref 0.2–1.2)
BILIRUB UR-MCNC: NEGATIVE — SIGNIFICANT CHANGE UP
BUN SERPL-MCNC: 35 MG/DL — HIGH (ref 7–23)
CALCIUM SERPL-MCNC: 9.4 MG/DL — SIGNIFICANT CHANGE UP (ref 8.5–10.1)
CAST: 15 /LPF — HIGH (ref 0–4)
CHLORIDE SERPL-SCNC: 112 MMOL/L — HIGH (ref 96–108)
CO2 SERPL-SCNC: 21 MMOL/L — LOW (ref 22–31)
COLOR SPEC: YELLOW — SIGNIFICANT CHANGE UP
CREAT SERPL-MCNC: 2.04 MG/DL — HIGH (ref 0.5–1.3)
DIFF PNL FLD: NEGATIVE — SIGNIFICANT CHANGE UP
EGFR: 24 ML/MIN/1.73M2 — LOW
EOSINOPHIL # BLD AUTO: 0 K/UL — SIGNIFICANT CHANGE UP (ref 0–0.5)
EOSINOPHIL NFR BLD AUTO: 0 % — SIGNIFICANT CHANGE UP (ref 0–6)
GLUCOSE BLDC GLUCOMTR-MCNC: 146 MG/DL — HIGH (ref 70–99)
GLUCOSE SERPL-MCNC: 131 MG/DL — HIGH (ref 70–99)
GLUCOSE UR QL: NEGATIVE MG/DL — SIGNIFICANT CHANGE UP
HCT VFR BLD CALC: 43 % — SIGNIFICANT CHANGE UP (ref 34.5–45)
HGB BLD-MCNC: 14.1 G/DL — SIGNIFICANT CHANGE UP (ref 11.5–15.5)
HYALINE CASTS # UR AUTO: PRESENT
IMM GRANULOCYTES NFR BLD AUTO: 0.4 % — SIGNIFICANT CHANGE UP (ref 0–0.9)
INR BLD: 1.77 RATIO — HIGH (ref 0.85–1.18)
KETONES UR-MCNC: NEGATIVE MG/DL — SIGNIFICANT CHANGE UP
LACTATE SERPL-SCNC: 1.7 MMOL/L — SIGNIFICANT CHANGE UP (ref 0.7–2)
LEUKOCYTE ESTERASE UR-ACNC: ABNORMAL
LYMPHOCYTES # BLD AUTO: 1.02 K/UL — SIGNIFICANT CHANGE UP (ref 1–3.3)
LYMPHOCYTES # BLD AUTO: 8.1 % — LOW (ref 13–44)
MCHC RBC-ENTMCNC: 31.7 PG — SIGNIFICANT CHANGE UP (ref 27–34)
MCHC RBC-ENTMCNC: 32.8 GM/DL — SIGNIFICANT CHANGE UP (ref 32–36)
MCV RBC AUTO: 96.6 FL — SIGNIFICANT CHANGE UP (ref 80–100)
MONOCYTES # BLD AUTO: 1.15 K/UL — HIGH (ref 0–0.9)
MONOCYTES NFR BLD AUTO: 9.1 % — SIGNIFICANT CHANGE UP (ref 2–14)
NEUTROPHILS # BLD AUTO: 10.34 K/UL — HIGH (ref 1.8–7.4)
NEUTROPHILS NFR BLD AUTO: 82.2 % — HIGH (ref 43–77)
NITRITE UR-MCNC: NEGATIVE — SIGNIFICANT CHANGE UP
PH UR: 5 — SIGNIFICANT CHANGE UP (ref 5–8)
PLATELET # BLD AUTO: 189 K/UL — SIGNIFICANT CHANGE UP (ref 150–400)
POTASSIUM SERPL-MCNC: 4.9 MMOL/L — SIGNIFICANT CHANGE UP (ref 3.5–5.3)
POTASSIUM SERPL-SCNC: 4.9 MMOL/L — SIGNIFICANT CHANGE UP (ref 3.5–5.3)
PROT SERPL-MCNC: 7.8 GM/DL — SIGNIFICANT CHANGE UP (ref 6–8.3)
PROT UR-MCNC: SIGNIFICANT CHANGE UP MG/DL
PROTHROM AB SERPL-ACNC: 19.7 SEC — HIGH (ref 9.5–13)
RAPID RVP RESULT: SIGNIFICANT CHANGE UP
RBC # BLD: 4.45 M/UL — SIGNIFICANT CHANGE UP (ref 3.8–5.2)
RBC # FLD: 13.2 % — SIGNIFICANT CHANGE UP (ref 10.3–14.5)
RBC CASTS # UR COMP ASSIST: 3 /HPF — SIGNIFICANT CHANGE UP (ref 0–4)
SARS-COV-2 RNA SPEC QL NAA+PROBE: SIGNIFICANT CHANGE UP
SODIUM SERPL-SCNC: 138 MMOL/L — SIGNIFICANT CHANGE UP (ref 135–145)
SP GR SPEC: 1.02 — SIGNIFICANT CHANGE UP (ref 1–1.03)
SQUAMOUS # UR AUTO: 3 /HPF — SIGNIFICANT CHANGE UP (ref 0–5)
TROPONIN I, HIGH SENSITIVITY RESULT: 45.26 NG/L — SIGNIFICANT CHANGE UP
UROBILINOGEN FLD QL: 1 MG/DL — SIGNIFICANT CHANGE UP (ref 0.2–1)
WBC # BLD: 12.59 K/UL — HIGH (ref 3.8–10.5)
WBC # FLD AUTO: 12.59 K/UL — HIGH (ref 3.8–10.5)
WBC UR QL: 36 /HPF — HIGH (ref 0–5)

## 2023-10-03 PROCEDURE — 71045 X-RAY EXAM CHEST 1 VIEW: CPT | Mod: 26

## 2023-10-03 PROCEDURE — 82962 GLUCOSE BLOOD TEST: CPT

## 2023-10-03 PROCEDURE — 71275 CT ANGIOGRAPHY CHEST: CPT

## 2023-10-03 PROCEDURE — 70450 CT HEAD/BRAIN W/O DYE: CPT | Mod: 26,MG

## 2023-10-03 PROCEDURE — 78580 LUNG PERFUSION IMAGING: CPT | Mod: ME

## 2023-10-03 PROCEDURE — 97162 PT EVAL MOD COMPLEX 30 MIN: CPT | Mod: GP

## 2023-10-03 PROCEDURE — 80048 BASIC METABOLIC PNL TOTAL CA: CPT

## 2023-10-03 PROCEDURE — 36415 COLL VENOUS BLD VENIPUNCTURE: CPT

## 2023-10-03 PROCEDURE — 71045 X-RAY EXAM CHEST 1 VIEW: CPT

## 2023-10-03 PROCEDURE — 97116 GAIT TRAINING THERAPY: CPT | Mod: GP

## 2023-10-03 PROCEDURE — G1004: CPT

## 2023-10-03 PROCEDURE — 99285 EMERGENCY DEPT VISIT HI MDM: CPT

## 2023-10-03 PROCEDURE — 85027 COMPLETE CBC AUTOMATED: CPT

## 2023-10-03 PROCEDURE — 71250 CT THORAX DX C-: CPT | Mod: 26,ME

## 2023-10-03 PROCEDURE — 87635 SARS-COV-2 COVID-19 AMP PRB: CPT

## 2023-10-03 PROCEDURE — 93010 ELECTROCARDIOGRAM REPORT: CPT

## 2023-10-03 PROCEDURE — A9540: CPT

## 2023-10-03 RX ORDER — APIXABAN 2.5 MG/1
1 TABLET, FILM COATED ORAL
Qty: 0 | Refills: 0 | DISCHARGE

## 2023-10-03 RX ORDER — SERTRALINE 25 MG/1
0.5 TABLET, FILM COATED ORAL
Qty: 0 | Refills: 0 | DISCHARGE

## 2023-10-03 RX ORDER — CEFTRIAXONE 500 MG/1
1000 INJECTION, POWDER, FOR SOLUTION INTRAMUSCULAR; INTRAVENOUS ONCE
Refills: 0 | Status: DISCONTINUED | OUTPATIENT
Start: 2023-10-03 | End: 2023-10-03

## 2023-10-03 RX ORDER — SERTRALINE 25 MG/1
1 TABLET, FILM COATED ORAL
Refills: 0 | DISCHARGE

## 2023-10-03 RX ORDER — CEFTRIAXONE 500 MG/1
1000 INJECTION, POWDER, FOR SOLUTION INTRAMUSCULAR; INTRAVENOUS ONCE
Refills: 0 | Status: COMPLETED | OUTPATIENT
Start: 2023-10-03 | End: 2023-10-03

## 2023-10-03 RX ORDER — AZITHROMYCIN 500 MG/1
500 TABLET, FILM COATED ORAL ONCE
Refills: 0 | Status: COMPLETED | OUTPATIENT
Start: 2023-10-03 | End: 2023-10-03

## 2023-10-03 RX ORDER — CALCIUM CARBONATE 500(1250)
1 TABLET ORAL
Qty: 0 | Refills: 0 | DISCHARGE

## 2023-10-03 RX ORDER — ACETAMINOPHEN 500 MG
1000 TABLET ORAL ONCE
Refills: 0 | Status: COMPLETED | OUTPATIENT
Start: 2023-10-03 | End: 2023-10-03

## 2023-10-03 RX ORDER — FUROSEMIDE 40 MG
1 TABLET ORAL
Refills: 0 | DISCHARGE

## 2023-10-03 RX ORDER — LIDOCAINE 4 G/100G
1 CREAM TOPICAL
Qty: 0 | Refills: 0 | DISCHARGE

## 2023-10-03 RX ORDER — BENZOCAINE AND MENTHOL 5; 1 G/100ML; G/100ML
1 LIQUID ORAL ONCE
Refills: 0 | Status: COMPLETED | OUTPATIENT
Start: 2023-10-03 | End: 2023-10-03

## 2023-10-03 RX ORDER — APIXABAN 2.5 MG/1
1 TABLET, FILM COATED ORAL
Refills: 0 | DISCHARGE

## 2023-10-03 RX ORDER — NYSTATIN CREAM 100000 [USP'U]/G
1 CREAM TOPICAL
Qty: 0 | Refills: 0 | DISCHARGE

## 2023-10-03 RX ORDER — LANOLIN ALCOHOL/MO/W.PET/CERES
1 CREAM (GRAM) TOPICAL
Qty: 0 | Refills: 0 | DISCHARGE

## 2023-10-03 RX ORDER — ACETAMINOPHEN 500 MG
2 TABLET ORAL
Qty: 0 | Refills: 0 | DISCHARGE

## 2023-10-03 RX ADMIN — Medication 400 MILLIGRAM(S): at 15:12

## 2023-10-03 RX ADMIN — CEFTRIAXONE 1000 MILLIGRAM(S): 500 INJECTION, POWDER, FOR SOLUTION INTRAMUSCULAR; INTRAVENOUS at 12:11

## 2023-10-03 RX ADMIN — BENZOCAINE AND MENTHOL 1 LOZENGE: 5; 1 LIQUID ORAL at 15:12

## 2023-10-03 RX ADMIN — AZITHROMYCIN 255 MILLIGRAM(S): 500 TABLET, FILM COATED ORAL at 14:31

## 2023-10-03 NOTE — ED PROVIDER NOTE - CLINICAL SUMMARY MEDICAL DECISION MAKING FREE TEXT BOX
Patient with fevers at assisted living facility, more lethargic today. Will check labs, chest x-ray, UA rule out infection. Also mild left facial droop noted which daughter states is chronic.

## 2023-10-03 NOTE — PHARMACOTHERAPY INTERVENTION NOTE - COMMENTS
Medication reconciliation completed.  Reviewed Medication list and confirmed med allergies with list from Care Facility "[facility]"; confirmed with Dr. First Medtiago.

## 2023-10-03 NOTE — ED ADULT NURSE NOTE - NSFALLHARMRISKINTERV_ED_ALL_ED

## 2023-10-03 NOTE — ED PROVIDER NOTE - OBJECTIVE STATEMENT
83 y/o female with a PMHx of anxiety, Afib on Eliquis, CAD with stents, CHF, depression, HTN, left knee pain, OP, RA, renal cancer, Takotsubo syndrome, urge incontinence of urine presents to the ED for evaluation. Pt had a MARTINEZ yesterday. Today at the facility, pt was tachycardic to 120s and lethargic and sent to the ED for evaluation.

## 2023-10-03 NOTE — ED ADULT NURSE REASSESSMENT NOTE - NS ED NURSE REASSESS COMMENT FT1
Pt is resting in the hallway on 2LNC. Pt awoken and is arousable  but appears lethargic. Rectal temp taken and VS taken and charted in EMAR. RN attempted to contact the admit phone and left a voicemail.

## 2023-10-03 NOTE — ED PROVIDER NOTE - PHYSICAL EXAMINATION
GEN - NAD; well appearing; A+O x3  HEAD - NC/AT    EYES - EOMI, no conjunctival pallor, no scleral icterus  ENT -   mucous membranes  moist , no discharge  NECK - Neck supple  PULM - CTA b/l,  symmetric breath sounds  COR - tachy, S1 S2, no murmurs  ABD - ND, NT, soft, no guarding, no rebound, no masses    BACK - no CVA tenderness, nontender spine     EXTREMS -1+ edema, no deformity, warm and well perfused   SKIN - no rash or bruising      NEUROLOGIC - alert, sensation nl, motor 5/5 RUE/LUE/RLE/LLE, mild left facial droop

## 2023-10-03 NOTE — PROVIDER CONTACT NOTE (OTHER) - ACTION/TREATMENT ORDERED:
RN spoke with MD Chun MD aware patient is more lethargic, VS retaken, BG done and rectal temp taken. MD Mccollum told RN pt is ok for SURG 7.

## 2023-10-03 NOTE — ED ADULT TRIAGE NOTE - CHIEF COMPLAINT QUOTE
pt brought in by EMS from [facility] for weakness, fever, feeling tired, and complains of slight chest pain during inspiration . pt alert and answering questions. temp 99.1 in triage. asymmetrical smile is normal as per daughter  no code stroke as per MD agustin.

## 2023-10-04 LAB
ANION GAP SERPL CALC-SCNC: 5 MMOL/L — SIGNIFICANT CHANGE UP (ref 5–17)
BUN SERPL-MCNC: 34 MG/DL — HIGH (ref 7–23)
CALCIUM SERPL-MCNC: 9.1 MG/DL — SIGNIFICANT CHANGE UP (ref 8.5–10.1)
CHLORIDE SERPL-SCNC: 107 MMOL/L — SIGNIFICANT CHANGE UP (ref 96–108)
CO2 SERPL-SCNC: 27 MMOL/L — SIGNIFICANT CHANGE UP (ref 22–31)
CREAT SERPL-MCNC: 1.66 MG/DL — HIGH (ref 0.5–1.3)
EGFR: 30 ML/MIN/1.73M2 — LOW
GLUCOSE SERPL-MCNC: 102 MG/DL — HIGH (ref 70–99)
HCT VFR BLD CALC: 40.1 % — SIGNIFICANT CHANGE UP (ref 34.5–45)
HGB BLD-MCNC: 12.8 G/DL — SIGNIFICANT CHANGE UP (ref 11.5–15.5)
MCHC RBC-ENTMCNC: 30.9 PG — SIGNIFICANT CHANGE UP (ref 27–34)
MCHC RBC-ENTMCNC: 31.9 GM/DL — LOW (ref 32–36)
MCV RBC AUTO: 96.9 FL — SIGNIFICANT CHANGE UP (ref 80–100)
PLATELET # BLD AUTO: 164 K/UL — SIGNIFICANT CHANGE UP (ref 150–400)
POTASSIUM SERPL-MCNC: 3.9 MMOL/L — SIGNIFICANT CHANGE UP (ref 3.5–5.3)
POTASSIUM SERPL-SCNC: 3.9 MMOL/L — SIGNIFICANT CHANGE UP (ref 3.5–5.3)
RBC # BLD: 4.14 M/UL — SIGNIFICANT CHANGE UP (ref 3.8–5.2)
RBC # FLD: 13.2 % — SIGNIFICANT CHANGE UP (ref 10.3–14.5)
SODIUM SERPL-SCNC: 139 MMOL/L — SIGNIFICANT CHANGE UP (ref 135–145)
WBC # BLD: 11.21 K/UL — HIGH (ref 3.8–10.5)
WBC # FLD AUTO: 11.21 K/UL — HIGH (ref 3.8–10.5)

## 2023-10-04 PROCEDURE — 99223 1ST HOSP IP/OBS HIGH 75: CPT

## 2023-10-04 PROCEDURE — 71045 X-RAY EXAM CHEST 1 VIEW: CPT | Mod: 26

## 2023-10-04 RX ORDER — LANOLIN ALCOHOL/MO/W.PET/CERES
5 CREAM (GRAM) TOPICAL AT BEDTIME
Refills: 0 | Status: DISCONTINUED | OUTPATIENT
Start: 2023-10-04 | End: 2023-10-08

## 2023-10-04 RX ORDER — LANOLIN ALCOHOL/MO/W.PET/CERES
3 CREAM (GRAM) TOPICAL AT BEDTIME
Refills: 0 | Status: DISCONTINUED | OUTPATIENT
Start: 2023-10-04 | End: 2023-10-04

## 2023-10-04 RX ORDER — ALPRAZOLAM 0.25 MG
0.25 TABLET ORAL ONCE
Refills: 0 | Status: DISCONTINUED | OUTPATIENT
Start: 2023-10-04 | End: 2023-10-05

## 2023-10-04 RX ORDER — SERTRALINE 25 MG/1
50 TABLET, FILM COATED ORAL DAILY
Refills: 0 | Status: DISCONTINUED | OUTPATIENT
Start: 2023-10-04 | End: 2023-10-08

## 2023-10-04 RX ORDER — PANTOPRAZOLE SODIUM 20 MG/1
40 TABLET, DELAYED RELEASE ORAL
Refills: 0 | Status: DISCONTINUED | OUTPATIENT
Start: 2023-10-04 | End: 2023-10-08

## 2023-10-04 RX ORDER — APIXABAN 2.5 MG/1
2.5 TABLET, FILM COATED ORAL
Refills: 0 | Status: DISCONTINUED | OUTPATIENT
Start: 2023-10-04 | End: 2023-10-08

## 2023-10-04 RX ORDER — ACETAMINOPHEN 500 MG
650 TABLET ORAL EVERY 6 HOURS
Refills: 0 | Status: DISCONTINUED | OUTPATIENT
Start: 2023-10-04 | End: 2023-10-08

## 2023-10-04 RX ORDER — CEFTRIAXONE 500 MG/1
1000 INJECTION, POWDER, FOR SOLUTION INTRAMUSCULAR; INTRAVENOUS EVERY 24 HOURS
Refills: 0 | Status: DISCONTINUED | OUTPATIENT
Start: 2023-10-04 | End: 2023-10-06

## 2023-10-04 RX ORDER — AZITHROMYCIN 500 MG/1
500 TABLET, FILM COATED ORAL EVERY 24 HOURS
Refills: 0 | Status: DISCONTINUED | OUTPATIENT
Start: 2023-10-04 | End: 2023-10-04

## 2023-10-04 RX ORDER — AMIODARONE HYDROCHLORIDE 400 MG/1
200 TABLET ORAL DAILY
Refills: 0 | Status: DISCONTINUED | OUTPATIENT
Start: 2023-10-04 | End: 2023-10-08

## 2023-10-04 RX ORDER — METOPROLOL TARTRATE 50 MG
25 TABLET ORAL DAILY
Refills: 0 | Status: DISCONTINUED | OUTPATIENT
Start: 2023-10-04 | End: 2023-10-06

## 2023-10-04 RX ORDER — SODIUM CHLORIDE 9 MG/ML
1000 INJECTION INTRAMUSCULAR; INTRAVENOUS; SUBCUTANEOUS
Refills: 0 | Status: DISCONTINUED | OUTPATIENT
Start: 2023-10-04 | End: 2023-10-04

## 2023-10-04 RX ORDER — ONDANSETRON 8 MG/1
4 TABLET, FILM COATED ORAL EVERY 8 HOURS
Refills: 0 | Status: DISCONTINUED | OUTPATIENT
Start: 2023-10-04 | End: 2023-10-08

## 2023-10-04 RX ORDER — FUROSEMIDE 40 MG
20 TABLET ORAL DAILY
Refills: 0 | Status: DISCONTINUED | OUTPATIENT
Start: 2023-10-04 | End: 2023-10-04

## 2023-10-04 RX ADMIN — AMIODARONE HYDROCHLORIDE 200 MILLIGRAM(S): 400 TABLET ORAL at 10:24

## 2023-10-04 RX ADMIN — Medication 650 MILLIGRAM(S): at 15:30

## 2023-10-04 RX ADMIN — PANTOPRAZOLE SODIUM 40 MILLIGRAM(S): 20 TABLET, DELAYED RELEASE ORAL at 05:58

## 2023-10-04 RX ADMIN — Medication 25 MILLIGRAM(S): at 10:26

## 2023-10-04 RX ADMIN — APIXABAN 2.5 MILLIGRAM(S): 2.5 TABLET, FILM COATED ORAL at 10:25

## 2023-10-04 RX ADMIN — Medication 650 MILLIGRAM(S): at 14:49

## 2023-10-04 RX ADMIN — SERTRALINE 50 MILLIGRAM(S): 25 TABLET, FILM COATED ORAL at 10:26

## 2023-10-04 RX ADMIN — Medication 5 MILLIGRAM(S): at 21:37

## 2023-10-04 RX ADMIN — Medication 100 MILLIGRAM(S): at 12:09

## 2023-10-04 RX ADMIN — SODIUM CHLORIDE 50 MILLILITER(S): 9 INJECTION INTRAMUSCULAR; INTRAVENOUS; SUBCUTANEOUS at 01:21

## 2023-10-04 RX ADMIN — CEFTRIAXONE 1000 MILLIGRAM(S): 500 INJECTION, POWDER, FOR SOLUTION INTRAMUSCULAR; INTRAVENOUS at 14:45

## 2023-10-04 RX ADMIN — APIXABAN 2.5 MILLIGRAM(S): 2.5 TABLET, FILM COATED ORAL at 21:36

## 2023-10-04 NOTE — PHYSICAL THERAPY INITIAL EVALUATION ADULT - RANGE OF MOTION EXAMINATION, REHAB EVAL
hip flexion limited, left knee flexion 0-70 deg, arthritic changes to b/,l hands./bilateral upper extremity ROM was WFL (within functional limits)/bilateral lower extremity ROM was WFL (within functional limits)

## 2023-10-04 NOTE — H&P ADULT - HISTORY OF PRESENT ILLNESS
Chief Complaint: weakness.    · Chief Complaint: The patient is a 82y Female complaining of  · HPI Objective Statement: 83 y/o female with a PMHx of anxiety, Afib on Eliquis, CAD with stents, CHF, depression, HTN, left knee pain, OP, RA, renal cancer, Takotsubo syndrome, urge incontinence of urine presents to the ED for evaluation. Pt had a MARTINEZ yesterday. Today at the facility, pt was tachycardic to 120s and lethargic and sent to the ED for evaluation.   Chief Complaint: weakness.    The patient is a 82y morbidly obese Female, a resident of [facility] with significant PMH of Hypertension, Afib on Eliquis, CAD with stents, CHF, Anxiety and depression, left knee pain, OP, RA, renal cancer, Takotsubo syndrome, urge incontinence of urine, CKD stage IIIb, Hydrocephalus and others presented to the ED With complain of generalized weakness, low-grade fever, lethargic and change in her mental status for further evaluation and management. Patient is sleepy at this time, and was falling asleep during my encounter.  She doesn't seem to be any acute distress. She said that she had trouble breathing with cough and cold. Upon arrival in ED, she was tachycardic with heart rate in 120 , and was lethargic. She says that she feels cold and chills. She denies any chest pain, palpitations, abdominal pain, vomiting, diarrhea or dysuria.    CT chest shows left upper lobe peripheral consolidation.  CT head: Moderate hydrocephalus, unchanged. No obstructing mass.  V/Q scan: ordered by ED but pending.    Significant labs: C: 12,590 with neutrophil 82%, lactate 1.7, hemoglobin 14.1, BUN 35, Cr 2.04 (was 1.30 on 05/14/2022), As importantly and I are 1.77.    Urine analysis consistent with UTI showing LE moderate, WBC 36 and bacteria many.    Rocephin and Zithromax IV given in ED after blood culture draw.

## 2023-10-04 NOTE — PROGRESS NOTE ADULT - TIME BILLING
Time spent  coordinating the patient's care. This includes reviewing documentation pertinent to this admission, results and imaging. Further tests, medications, and procedures have been ordered as indicated. Laboratory results and the plan of care were communicated to the patient and daughter. Supporting documentation was completed and added to the patient's chart.

## 2023-10-04 NOTE — H&P ADULT - NSHPLABSRESULTS_GEN_ALL_CORE
LABS:                        14.1   12.59 )-----------( 189      ( 03 Oct 2023 11:36 )             43.0     10-03    138  |  112<H>  |  35<H>  ----------------------------<  131<H>  4.9   |  21<L>  |  2.04<H>    Ca    9.4      03 Oct 2023 11:36    TPro  7.8  /  Alb  3.0<L>  /  TBili  0.5  /  DBili  x   /  AST  32  /  ALT  31  /  AlkPhos  76  10-03    PT/INR - ( 03 Oct 2023 11:36 )   PT: 19.7 sec;   INR: 1.77 ratio         PTT - ( 03 Oct 2023 11:36 )  PTT:35.4 sec        < from: CT Chest No Cont (10.03.23 @ 14:57) >      IMPRESSION:    1.  Left upper lobe peripheral consolidation .  2.  CTA chest is suggested.  3.  The findings were discussed with Dr. Cooper.    --- End of Report ---    < end of copied text >      < from: CT Head No Cont (10.03.23 @ 12:06) >    IMPRESSION:  Moderate hydrocephalus with secondary findings raising possibility of   normal pressure hydrocephalus, grossly similar to comparison CT. No   obstructing mass identified.    No acute intracranial hemorrhage or midline shift.    --- End of Report ---    < end of copied text >

## 2023-10-04 NOTE — PHARMACOTHERAPY INTERVENTION NOTE - COMMENTS
Case discussed with Dr. Walsh. Given patient's QTc is 525, recommended switching from azithromycin to doxycycline 100 mg BID.

## 2023-10-04 NOTE — PHYSICAL THERAPY INITIAL EVALUATION ADULT - BED MOBILITY LIMITATIONS, REHAB EVAL
Pt unable to flex hips to sit upright safely at EOB./decreased ability to use arms for pushing/pulling/decreased ability to use legs for bridging/pushing/impaired ability to control trunk for mobility

## 2023-10-04 NOTE — PATIENT PROFILE ADULT - ..
Pt Hg stable.  Discharge order received, AVS reviewed with patient and wife, all questions answered.    Problem: Adult Inpatient Plan of Care  Goal: Plan of Care Review  Outcome: Adequate for Discharge  Goal: Patient-Specific Goal (Individualized)  Outcome: Adequate for Discharge  Goal: Absence of Hospital-Acquired Illness or Injury  Outcome: Adequate for Discharge  Intervention: Identify and Manage Fall Risk  Recent Flowsheet Documentation  Taken 11/1/2021 1300 by Darcy Schafer RN  Safety Promotion/Fall Prevention:   assistive device/personal items within reach   nonskid shoes/slippers when out of bed  Taken 11/1/2021 0900 by Darcy Schafer RN  Safety Promotion/Fall Prevention:   assistive device/personal items within reach   nonskid shoes/slippers when out of bed  Goal: Optimal Comfort and Wellbeing  Outcome: Adequate for Discharge  Goal: Readiness for Transition of Care  Outcome: Adequate for Discharge     Problem: Risk for Delirium  Goal: Optimal Coping  Outcome: Adequate for Discharge  Goal: Improved Behavioral Control  Outcome: Adequate for Discharge  Goal: Improved Attention and Thought Clarity  Outcome: Adequate for Discharge  Goal: Improved Sleep  Outcome: Adequate for Discharge     Problem: Diabetes Comorbidity  Goal: Blood Glucose Level Within Targeted Range  Outcome: Adequate for Discharge     Problem: Heart Failure Comorbidity  Goal: Maintenance of Heart Failure Symptom Control  Outcome: Adequate for Discharge     Problem: Hypertension Comorbidity  Goal: Blood Pressure in Desired Range  Outcome: Adequate for Discharge     Problem: Obstructive Sleep Apnea Risk or Actual (Comorbidity Management)  Goal: Unobstructed Breathing During Sleep  Outcome: Adequate for Discharge      25-Feb-2022 17:58:20

## 2023-10-04 NOTE — H&P ADULT - ASSESSMENT
The patient is a 82y morbidly obese Female, a resident of [facility] with significant PMH of Hypertension, Afib on Eliquis, CAD with stents, CHF, Anxiety and depression, left knee pain, OP, RA, renal cancer, Takotsubo syndrome, urge incontinence of urine, CKD stage IIIb, Hydrocephalus, and others presented to the ED With complain of generalized weakness, low-grade fever, lethargic and change in her mental status for further evaluation and management. Patient is sleepy at this time, and was falling asleep during my encounter.  She doesn't seem to be any acute distress. She said that she had trouble breathing with cough and cold. Upon arrival in ED, she was tachycardic with heart rate in 120 , and was lethargic. She says that she feels cold and chills. She denies any chest pain, palpitations, abdominal pain, vomiting, diarrhea or dysuria.      # Community acquired pneumonia left upper lobe.  CT chest as mentioned above.  V/Q scan pending and needs to follow.  Follow blood and sputum culture. Urinary strep and legionella antigens.  Rocephin and Zithromax IV empirically for now.  Needs follow up imaging to see the resolution of the CT chest finding.    # Acute UTI..  Urinalysis consistent with UTI.  Follow urine culture.  On Rocephin.    # Acute renal insufficiency likely due to dehydration and infectious process.  Cr 2.04 which was 1.30 in 05/2022.  Adequate hydration.  Check BMP the morning.    # Atrial fibrillation, hypertension, CAD with stent and history Takotsubo syndrome.  Continue her Toprol-XL, amiodarone, Lasix and Eliquis.  Will hold her Losartan due to MK.    # Anxiety and depression.  Continue her sertraline.    # GERD.  Continue her Protonix.    # DVT prophylaxis: on Eliquis.     The patient is a 82y morbidly obese Female, a resident of [facility] with significant PMH of Hypertension, Afib on Eliquis, CAD with stents, CHF, Anxiety and depression, left knee pain, OP, RA, renal cancer, Takotsubo syndrome, urge incontinence of urine, CKD stage IIIb, Hydrocephalus, and others presented to the ED With complain of generalized weakness, low-grade fever, lethargic and change in her mental status for further evaluation and management. Patient is sleepy at this time, and was falling asleep during my encounter.  She doesn't seem to be any acute distress. She said that she had trouble breathing with cough and cold. Upon arrival in ED, she was tachycardic with heart rate in 120 , and was lethargic. She says that she feels cold and chills. She denies any chest pain, palpitations, abdominal pain, vomiting, diarrhea or dysuria.      # Community acquired pneumonia left upper lobe.  CT chest as mentioned above.  V/Q scan pending and needs to follow.  Follow blood and sputum culture. Urinary strep and legionella antigens.  Rocephin and Zithromax IV empirically for now.  Needs follow up imaging to see the resolution of the CT chest finding.    # Acute UTI..  Urinalysis consistent with UTI.  Follow urine culture.  On Rocephin.    # Acute worsening of CKD stage IIIb likely due to dehydration and infectious process.  Cr 2.04 which was 1.30 in 05/2022.  Adequate hydration.  Check BMP the morning.    # Atrial fibrillation, hypertension, CAD with stent and history Takotsubo syndrome.  Continue her Toprol-XL, amiodarone, Lasix and Eliquis.  Will hold her Losartan due to MK.    # Anxiety and depression.  Continue her sertraline.    # GERD.  Continue her Protonix.    # DVT prophylaxis: on Eliquis.

## 2023-10-04 NOTE — PROGRESS NOTE ADULT - ASSESSMENT
82y morbidly obese Female, a resident of [facility] with significant PMH of Hypertension, Afib on Eliquis, CAD with stents, CHF, Anxiety and depression, left knee pain, OP, RA, renal cancer, Takotsubo syndrome, urge incontinence of urine, CKD stage IIIb, Hydrocephalus, and others presented to the ED With complain of generalized weakness, low-grade fever, lethargic and change in her mental status for further evaluation and management. Patient is sleepy at this time, and was falling asleep during my encounter.  She doesn't seem to be any acute distress. She said that she had trouble breathing with cough and cold. Upon arrival in ED, she was tachycardic with heart rate in 120 , and was lethargic. She says that she feels cold and chills. She denies any chest pain, palpitations, abdominal pain, vomiting, diarrhea or dysuria.    #Severe sepsis w MK, POA, 2/2 CAP, KATHIE, likely GNR and UTI  -wean O2  -lactate wnl  -BCx NG  -UA+, UCx pending  -CT chest w consolidation  -given sinus tach and CT chest noncon findings, pursuing eval for PE w VQ scan  -Doxy (prolonged QTc) +CTX# Community acquired pneumonia left upper lobe    #acute septic encephalopathy  -on b/l dementia  -MS improving, per daughter, seems like at b/l    #MK on CKD (b/l Cr 1.3?)  -trend  -improved w IVF  -lasix and losartan held    # Atrial fibrillation, hypertension, CAD with stent and history Takotsubo syndrome.  -amio, metop  -Eliquis    # Anxiety and depression.  -sertraline    #DVT ppx- full a/c w Eliquis    #MOLST confirmed, DNR/DNI    From [facility], possible return 10/5 pending O2, HR, cx, VQ.  Updated daughter, Madiha.

## 2023-10-04 NOTE — PROGRESS NOTE ADULT - SUBJECTIVE AND OBJECTIVE BOX
HOSPITALIST ATTENDING PROGRESS NOTE    Chart and meds reviewed.  Patient seen and examined.    CC: PNA    Subjective: PT reports feeling a bit better. Updated daughter, Madiha. Reports b/l MS is A&Ox1-2    All other systems reviewed and found to be negative with the exception of what has been described above.    MEDICATIONS  (STANDING):  aMIOdarone    Tablet 200 milliGRAM(s) Oral daily  apixaban 2.5 milliGRAM(s) Oral two times a day  cefTRIAXone Injectable. 1000 milliGRAM(s) IV Push every 24 hours  doxycycline monohydrate Capsule 100 milliGRAM(s) Oral every 12 hours  melatonin 5 milliGRAM(s) Oral at bedtime  metoprolol succinate ER 25 milliGRAM(s) Oral daily  pantoprazole    Tablet 40 milliGRAM(s) Oral before breakfast  sertraline 50 milliGRAM(s) Oral daily    MEDICATIONS  (PRN):  acetaminophen     Tablet .. 650 milliGRAM(s) Oral every 6 hours PRN Temp greater or equal to 38C (100.4F), Mild Pain (1 - 3)  ALPRAZolam 0.25 milliGRAM(s) Oral once PRN for VQ scan  aluminum hydroxide/magnesium hydroxide/simethicone Suspension 30 milliLiter(s) Oral every 4 hours PRN Dyspepsia  ondansetron Injectable 4 milliGRAM(s) IV Push every 8 hours PRN Nausea and/or Vomiting      VITALS:  T(F): 98.2 (10-04-23 @ 14:47), Max: 99.6 (10-03-23 @ 20:15)  HR: 116 (10-04-23 @ 14:47) (110 - 116)  BP: 147/91 (10-04-23 @ 14:47) (116/88 - 147/91)  RR: 16 (10-04-23 @ 14:47) (14 - 22)  SpO2: 97% (10-04-23 @ 14:47) (94% - 100%)  Wt(kg): --    I&O's Summary      CAPILLARY BLOOD GLUCOSE      POCT Blood Glucose.: 146 mg/dL (03 Oct 2023 20:00)      PHYSICAL EXAM:  Gen: NAD  HEENT:  pupils equal and reactive, EOMI, no oropharyngeal lesions, erythema, exudates, oral thrush  NECK:   supple, no carotid bruits, no palpable lymph nodes, no thyromegaly  CV:  +S1, +S2, regular, no murmurs or rubs  RESP:   lungs clear to auscultation bilaterally, no wheezing, rales, rhonchi, good air entry bilaterally  BREAST:  not examined  GI:  abdomen soft, non-tender, non-distended, normal BS, no bruits, no abdominal masses, no palpable masses  RECTAL:  not examined  :  not examined  MSK:   normal muscle tone, no atrophy, no rigidity, no contractions  EXT:  no clubbing, no cyanosis, no edema, no calf pain, swelling or erythema  VASCULAR:  pulses equal and symmetric in the upper and lower extremities  NEURO:  AAOX1.5, no focal neurological deficits, follows all commands, able to move extremities spontaneously  SKIN:  no ulcers, lesions or rashes    LABS:                            12.8   11.21 )-----------( 164      ( 04 Oct 2023 06:32 )             40.1     10-04    139  |  107  |  34<H>  ----------------------------<  102<H>  3.9   |  27  |  1.66<H>    Ca    9.1      04 Oct 2023 06:32    TPro  7.8  /  Alb  3.0<L>  /  TBili  0.5  /  DBili  x   /  AST  32  /  ALT  31  /  AlkPhos  76  10-03        LIVER FUNCTIONS - ( 03 Oct 2023 11:36 )  Alb: 3.0 g/dL / Pro: 7.8 gm/dL / ALK PHOS: 76 U/L / ALT: 31 U/L / AST: 32 U/L / GGT: x           PT/INR - ( 03 Oct 2023 11:36 )   PT: 19.7 sec;   INR: 1.77 ratio         PTT - ( 03 Oct 2023 11:36 )  PTT:35.4 sec  Urinalysis Basic - ( 04 Oct 2023 06:32 )    Color: x / Appearance: x / SG: x / pH: x  Gluc: 102 mg/dL / Ketone: x  / Bili: x / Urobili: x   Blood: x / Protein: x / Nitrite: x   Leuk Esterase: x / RBC: x / WBC x   Sq Epi: x / Non Sq Epi: x / Bacteria: x    CULTURES:  UCx pending  BCx NG    Additional results/Imaging, I have personally reviewed:  [facility] 10/3/23: Moderate hydrocephalus with secondary findings raising possibility of normal pressure hydrocephalus, grossly similar to comparison CT. No obstructing mass identified. No acute intracranial hemorrhage or midline shift.    CXR 10/3/23: Small vaguely defined densities now seen in the left mid lateral chest. Other findings stable.    CT chset noncon 10/3/23: 1.  Left upper lobe peripheral consolidation .2.  CTA chest is suggested.    Telemetry, personally reviewed:  10/4/23: sinus tach 100-120

## 2023-10-04 NOTE — H&P ADULT - NSICDXPASTMEDICALHX_GEN_ALL_CORE_FT
PAST MEDICAL HISTORY:  Anxiety disorder     Atrial fibrillation     CAD (coronary artery disease)     Depressive disorder     HTN (hypertension)     Knee pain, left     OP (osteoporosis)     RA (rheumatoid arthritis)     Renal cancer, right     Stented coronary artery     Takotsubo syndrome     Urge incontinence of urine      PAST MEDICAL HISTORY:  Anxiety disorder     Atrial fibrillation     CAD (coronary artery disease)     CKD (chronic kidney disease), stage III     Depressive disorder     H/O hydrocephalus     HTN (hypertension)     Knee pain, left     OP (osteoporosis)     RA (rheumatoid arthritis)     Renal cancer, right     Stented coronary artery     Takotsubo syndrome     Urge incontinence of urine

## 2023-10-04 NOTE — PHYSICAL THERAPY INITIAL EVALUATION ADULT - PERTINENT HX OF CURRENT PROBLEM, REHAB EVAL
82y morbidly obese Female, a resident of [facility] with significant PMH of Hypertension, Afib on Eliquis, CAD with stents, CHF, Anxiety and depression, left knee pain, OP, RA, renal cancer, Takotsubo syndrome, urge incontinence of urine, CKD stage IIIb, Hydrocephalus, and others presented to the ED With complain of generalized weakness, low-grade fever, lethargic and change in her mental status for further evaluation and management. Patient is sleepy at this time, and was falling asleep during my encounter.  She doesn't seem to be any acute distress. She said that she had trouble breathing with cough and cold. Upon arrival in ED, she was tachycardic with heart rate in 120 , and was lethargic. She says that she feels cold and chills.

## 2023-10-05 LAB
-  AMIKACIN: SIGNIFICANT CHANGE UP
-  AMOXICILLIN/CLAVULANIC ACID: SIGNIFICANT CHANGE UP
-  AMPICILLIN/SULBACTAM: SIGNIFICANT CHANGE UP
-  AMPICILLIN: SIGNIFICANT CHANGE UP
-  AZTREONAM: SIGNIFICANT CHANGE UP
-  CEFAZOLIN: SIGNIFICANT CHANGE UP
-  CEFEPIME: SIGNIFICANT CHANGE UP
-  CEFOXITIN: SIGNIFICANT CHANGE UP
-  CEFTRIAXONE: SIGNIFICANT CHANGE UP
-  CEFUROXIME: SIGNIFICANT CHANGE UP
-  CIPROFLOXACIN: SIGNIFICANT CHANGE UP
-  ERTAPENEM: SIGNIFICANT CHANGE UP
-  GENTAMICIN: SIGNIFICANT CHANGE UP
-  IMIPENEM: SIGNIFICANT CHANGE UP
-  LEVOFLOXACIN: SIGNIFICANT CHANGE UP
-  MEROPENEM: SIGNIFICANT CHANGE UP
-  NITROFURANTOIN: SIGNIFICANT CHANGE UP
-  PIPERACILLIN/TAZOBACTAM: SIGNIFICANT CHANGE UP
-  TOBRAMYCIN: SIGNIFICANT CHANGE UP
-  TRIMETHOPRIM/SULFAMETHOXAZOLE: SIGNIFICANT CHANGE UP
ANION GAP SERPL CALC-SCNC: 5 MMOL/L — SIGNIFICANT CHANGE UP (ref 5–17)
BUN SERPL-MCNC: 27 MG/DL — HIGH (ref 7–23)
CALCIUM SERPL-MCNC: 9.1 MG/DL — SIGNIFICANT CHANGE UP (ref 8.5–10.1)
CHLORIDE SERPL-SCNC: 107 MMOL/L — SIGNIFICANT CHANGE UP (ref 96–108)
CO2 SERPL-SCNC: 25 MMOL/L — SIGNIFICANT CHANGE UP (ref 22–31)
CREAT SERPL-MCNC: 1.43 MG/DL — HIGH (ref 0.5–1.3)
CULTURE RESULTS: SIGNIFICANT CHANGE UP
EGFR: 36 ML/MIN/1.73M2 — LOW
GLUCOSE SERPL-MCNC: 98 MG/DL — SIGNIFICANT CHANGE UP (ref 70–99)
HCT VFR BLD CALC: 38.5 % — SIGNIFICANT CHANGE UP (ref 34.5–45)
HGB BLD-MCNC: 12.5 G/DL — SIGNIFICANT CHANGE UP (ref 11.5–15.5)
MCHC RBC-ENTMCNC: 31.3 PG — SIGNIFICANT CHANGE UP (ref 27–34)
MCHC RBC-ENTMCNC: 32.5 GM/DL — SIGNIFICANT CHANGE UP (ref 32–36)
MCV RBC AUTO: 96.3 FL — SIGNIFICANT CHANGE UP (ref 80–100)
METHOD TYPE: SIGNIFICANT CHANGE UP
ORGANISM # SPEC MICROSCOPIC CNT: SIGNIFICANT CHANGE UP
ORGANISM # SPEC MICROSCOPIC CNT: SIGNIFICANT CHANGE UP
PLATELET # BLD AUTO: 169 K/UL — SIGNIFICANT CHANGE UP (ref 150–400)
POTASSIUM SERPL-MCNC: 3.9 MMOL/L — SIGNIFICANT CHANGE UP (ref 3.5–5.3)
POTASSIUM SERPL-SCNC: 3.9 MMOL/L — SIGNIFICANT CHANGE UP (ref 3.5–5.3)
RBC # BLD: 4 M/UL — SIGNIFICANT CHANGE UP (ref 3.8–5.2)
RBC # FLD: 12.8 % — SIGNIFICANT CHANGE UP (ref 10.3–14.5)
SODIUM SERPL-SCNC: 137 MMOL/L — SIGNIFICANT CHANGE UP (ref 135–145)
SPECIMEN SOURCE: SIGNIFICANT CHANGE UP
WBC # BLD: 7.53 K/UL — SIGNIFICANT CHANGE UP (ref 3.8–10.5)
WBC # FLD AUTO: 7.53 K/UL — SIGNIFICANT CHANGE UP (ref 3.8–10.5)

## 2023-10-05 PROCEDURE — 99233 SBSQ HOSP IP/OBS HIGH 50: CPT

## 2023-10-05 PROCEDURE — 78580 LUNG PERFUSION IMAGING: CPT | Mod: 26

## 2023-10-05 RX ORDER — ALPRAZOLAM 0.25 MG
0.25 TABLET ORAL EVERY 12 HOURS
Refills: 0 | Status: DISCONTINUED | OUTPATIENT
Start: 2023-10-05 | End: 2023-10-08

## 2023-10-05 RX ADMIN — PANTOPRAZOLE SODIUM 40 MILLIGRAM(S): 20 TABLET, DELAYED RELEASE ORAL at 05:48

## 2023-10-05 RX ADMIN — AMIODARONE HYDROCHLORIDE 200 MILLIGRAM(S): 400 TABLET ORAL at 13:30

## 2023-10-05 RX ADMIN — Medication 100 MILLIGRAM(S): at 13:31

## 2023-10-05 RX ADMIN — SERTRALINE 50 MILLIGRAM(S): 25 TABLET, FILM COATED ORAL at 13:31

## 2023-10-05 RX ADMIN — Medication 25 MILLIGRAM(S): at 13:31

## 2023-10-05 RX ADMIN — Medication 650 MILLIGRAM(S): at 20:57

## 2023-10-05 RX ADMIN — Medication 5 MILLIGRAM(S): at 20:57

## 2023-10-05 RX ADMIN — CEFTRIAXONE 1000 MILLIGRAM(S): 500 INJECTION, POWDER, FOR SOLUTION INTRAMUSCULAR; INTRAVENOUS at 15:40

## 2023-10-05 RX ADMIN — APIXABAN 2.5 MILLIGRAM(S): 2.5 TABLET, FILM COATED ORAL at 13:30

## 2023-10-05 RX ADMIN — Medication 0.25 MILLIGRAM(S): at 08:52

## 2023-10-05 RX ADMIN — APIXABAN 2.5 MILLIGRAM(S): 2.5 TABLET, FILM COATED ORAL at 20:57

## 2023-10-05 RX ADMIN — Medication 0.25 MILLIGRAM(S): at 20:58

## 2023-10-05 RX ADMIN — Medication 100 MILLIGRAM(S): at 20:57

## 2023-10-05 NOTE — PROGRESS NOTE ADULT - ASSESSMENT
82y morbidly obese Female, a resident of [facility] with significant PMH of Hypertension, Afib on Eliquis, CAD with stents, CHF, Anxiety and depression, left knee pain, OP, RA, renal cancer, Takotsubo syndrome, urge incontinence of urine, CKD stage IIIb, Hydrocephalus, and others presented to the ED With complain of generalized weakness, low-grade fever, lethargic and change in her mental status for further evaluation and management. Patient is sleepy at this time, and was falling asleep during my encounter.  She doesn't seem to be any acute distress. She said that she had trouble breathing with cough and cold. Upon arrival in ED, she was tachycardic with heart rate in 120 , and was lethargic. She says that she feels cold and chills. She denies any chest pain, palpitations, abdominal pain, vomiting, diarrhea or dysuria.    #Severe sepsis w MK, POA, 2/2 CAP, KATHIE, likely GNR and UTI  -wean O2  -lactate wnl  -BCx NG  -UA+, UCx >100K ecoli, pansens  -CT chest w consolidation  -given sinus tach and CT chest noncon findings, pursuing eval for PE, VQ scan indeterminate, CTA ordered to further eval (Cr improving). Pt refusing CTA, reports will agree if daughter agrees, unable to reach daughter.   -Doxy (prolonged QTc) +CTX    #acute septic encephalopathy  -on b/l dementia  -MS improving, per daughter, seems like at b/l    #MK on CKD (b/l Cr 1.3?)  -trend  -improved w IVF  -lasix and losartan held    # Atrial fibrillation, hypertension, CAD with stent and history Takotsubo syndrome.  -amio, metop  -Eliquis    # Anxiety and depression.  -sertraline    #DVT ppx- full a/c w Eliquis    #MOLST confirmed, DNR/DNI    From [facility], possible return 10/6  Attempted to update daughter today, unable to reach   82y morbidly obese Female, a resident of [facility] with significant PMH of Hypertension, Afib on Eliquis, CAD with stents, CHF, Anxiety and depression, left knee pain, OP, RA, renal cancer, Takotsubo syndrome, urge incontinence of urine, CKD stage IIIb, Hydrocephalus, and others presented to the ED With complain of generalized weakness, low-grade fever, lethargic and change in her mental status for further evaluation and management. Patient is sleepy at this time, and was falling asleep during my encounter.  She doesn't seem to be any acute distress. She said that she had trouble breathing with cough and cold. Upon arrival in ED, she was tachycardic with heart rate in 120 , and was lethargic. She says that she feels cold and chills. She denies any chest pain, palpitations, abdominal pain, vomiting, diarrhea or dysuria.    #Severe sepsis w MK, POA, 2/2 CAP, KATHIE, likely GNR and UTI  -wean O2  -lactate wnl  -BCx NG  -UA+, UCx >100K ecoli, pansens  -CT chest w consolidation  -given sinus tach and CT chest noncon findings, pursuing eval for PE, VQ scan indeterminate, CTA ordered to further eval (Cr improving). Pt refusing CTA, even after discussion of dghtr's support of getting test. Understands possibly missing dx of PE which is life threatening.   -Doxy (prolonged QTc) +CTX    #acute septic encephalopathy  -on b/l dementia  -MS improving, per daughter, seems like at b/l    #MK on CKD (b/l Cr 1.3?)  -trend  -improved w IVF  -lasix and losartan held    # Atrial fibrillation, hypertension, CAD with stent and history Takotsubo syndrome.  -amio, metop  -Eliquis    # Anxiety and depression.  -sertraline    #DVT ppx- full a/c w Eliquis    #MOLST confirmed, DNR/DNI    From [facility], possible return 10/6  Updated daughter.

## 2023-10-05 NOTE — PROGRESS NOTE ADULT - TIME BILLING
Time spent  coordinating the patient's care. This includes reviewing documentation pertinent to this admission, results and imaging. Further tests, medications, and procedures have been ordered as indicated. Laboratory results and the plan of care were communicated to the patient.  Supporting documentation was completed and added to the patient's chart. Time spent  coordinating the patient's care. This includes reviewing documentation pertinent to this admission, results and imaging. Further tests, medications, and procedures have been ordered as indicated. Laboratory results and the plan of care were communicated to the patient and daughter.  Supporting documentation was completed and added to the patient's chart.

## 2023-10-05 NOTE — PROGRESS NOTE ADULT - SUBJECTIVE AND OBJECTIVE BOX
HOSPITALIST ATTENDING PROGRESS NOTE    Chart and meds reviewed.  Patient seen and examined.    CC: PNA    Subjective: Refusing CTA. Reports has nothing wrong with her lungs. Attempted to reach daughter, unable.    All other systems reviewed and found to be negative with the exception of what has been described above.    MEDICATIONS  (STANDING):  aMIOdarone    Tablet 200 milliGRAM(s) Oral daily  apixaban 2.5 milliGRAM(s) Oral two times a day  cefTRIAXone Injectable. 1000 milliGRAM(s) IV Push every 24 hours  doxycycline monohydrate Capsule 100 milliGRAM(s) Oral every 12 hours  melatonin 5 milliGRAM(s) Oral at bedtime  metoprolol succinate ER 25 milliGRAM(s) Oral daily  pantoprazole    Tablet 40 milliGRAM(s) Oral before breakfast  sertraline 50 milliGRAM(s) Oral daily    MEDICATIONS  (PRN):  acetaminophen     Tablet .. 650 milliGRAM(s) Oral every 6 hours PRN Temp greater or equal to 38C (100.4F), Mild Pain (1 - 3)  aluminum hydroxide/magnesium hydroxide/simethicone Suspension 30 milliLiter(s) Oral every 4 hours PRN Dyspepsia  ondansetron Injectable 4 milliGRAM(s) IV Push every 8 hours PRN Nausea and/or Vomiting      VITALS:  T(F): 98.3 (10-05-23 @ 07:06), Max: 99 (10-04-23 @ 20:33)  HR: 100 (10-05-23 @ 07:06) (100 - 116)  BP: 131/76 (10-05-23 @ 07:06) (126/80 - 147/91)  RR: 18 (10-05-23 @ 07:06) (16 - 18)  SpO2: 96% (10-05-23 @ 07:06) (96% - 98%)  Wt(kg): --    I&O's Summary    04 Oct 2023 07:01  -  05 Oct 2023 07:00  --------------------------------------------------------  IN: 0 mL / OUT: 250 mL / NET: -250 mL        CAPILLARY BLOOD GLUCOSE          PHYSICAL EXAM:  Gen: NAD  HEENT:  pupils equal and reactive, EOMI, no oropharyngeal lesions, erythema, exudates, oral thrush  NECK:   supple, no carotid bruits, no palpable lymph nodes, no thyromegaly  CV:  +S1, +S2, regular, no murmurs or rubs  RESP:   lungs clear to auscultation bilaterally, no wheezing, rales, rhonchi, good air entry bilaterally  BREAST:  not examined  GI:  abdomen soft, non-tender, non-distended, normal BS, no bruits, no abdominal masses, no palpable masses  RECTAL:  not examined  :  not examined  MSK:   normal muscle tone, no atrophy, no rigidity, no contractions  EXT:  no clubbing, no cyanosis, no edema, no calf pain, swelling or erythema  VASCULAR:  pulses equal and symmetric in the upper and lower extremities  NEURO:  AAOX3, no focal neurological deficits, follows all commands, able to move extremities spontaneously  SKIN:  no ulcers, lesions or rashes    LABS:                            12.5   7.53  )-----------( 169      ( 05 Oct 2023 07:39 )             38.5     10-05    137  |  107  |  27<H>  ----------------------------<  98  3.9   |  25  |  1.43<H>    Ca    9.1      05 Oct 2023 07:39              Urinalysis Basic - ( 05 Oct 2023 07:39 )    Color: x / Appearance: x / SG: x / pH: x  Gluc: 98 mg/dL / Ketone: x  / Bili: x / Urobili: x   Blood: x / Protein: x / Nitrite: x   Leuk Esterase: x / RBC: x / WBC x   Sq Epi: x / Non Sq Epi: x / Bacteria: x    CULTURES:  UCx >100K ecoli, pansens  BCx NG    Additional results/Imaging, I have personally reviewed:  CTH 10/3/23: Moderate hydrocephalus with secondary findings raising possibility of normal pressure hydrocephalus, grossly similar to comparison CT. No obstructing mass identified. No acute intracranial hemorrhage or midline shift.    CXR 10/3/23: Small vaguely defined densities now seen in the left mid lateral chest. Other findings stable.    CT chest noncon 10/3/23: 1.  Left upper lobe peripheral consolidation .2.  CTA chest is suggested.    VQ scan 10/5/23:  Indeterminate probability of pulmonary embolus.    Telemetry, personally reviewed:  10/4/23: sinus tach 100-120  10/5/23: sinus 80-120s     HOSPITALIST ATTENDING PROGRESS NOTE    Chart and meds reviewed.  Patient seen and examined.    CC: PNA    Subjective: Refusing CTA. Reports has nothing wrong with her lungs.    All other systems reviewed and found to be negative with the exception of what has been described above.    MEDICATIONS  (STANDING):  aMIOdarone    Tablet 200 milliGRAM(s) Oral daily  apixaban 2.5 milliGRAM(s) Oral two times a day  cefTRIAXone Injectable. 1000 milliGRAM(s) IV Push every 24 hours  doxycycline monohydrate Capsule 100 milliGRAM(s) Oral every 12 hours  melatonin 5 milliGRAM(s) Oral at bedtime  metoprolol succinate ER 25 milliGRAM(s) Oral daily  pantoprazole    Tablet 40 milliGRAM(s) Oral before breakfast  sertraline 50 milliGRAM(s) Oral daily    MEDICATIONS  (PRN):  acetaminophen     Tablet .. 650 milliGRAM(s) Oral every 6 hours PRN Temp greater or equal to 38C (100.4F), Mild Pain (1 - 3)  aluminum hydroxide/magnesium hydroxide/simethicone Suspension 30 milliLiter(s) Oral every 4 hours PRN Dyspepsia  ondansetron Injectable 4 milliGRAM(s) IV Push every 8 hours PRN Nausea and/or Vomiting      VITALS:  T(F): 98.3 (10-05-23 @ 07:06), Max: 99 (10-04-23 @ 20:33)  HR: 100 (10-05-23 @ 07:06) (100 - 116)  BP: 131/76 (10-05-23 @ 07:06) (126/80 - 147/91)  RR: 18 (10-05-23 @ 07:06) (16 - 18)  SpO2: 96% (10-05-23 @ 07:06) (96% - 98%)  Wt(kg): --    I&O's Summary    04 Oct 2023 07:01  -  05 Oct 2023 07:00  --------------------------------------------------------  IN: 0 mL / OUT: 250 mL / NET: -250 mL        CAPILLARY BLOOD GLUCOSE          PHYSICAL EXAM:  Gen: NAD  HEENT:  pupils equal and reactive, EOMI, no oropharyngeal lesions, erythema, exudates, oral thrush  NECK:   supple, no carotid bruits, no palpable lymph nodes, no thyromegaly  CV:  +S1, +S2, regular, no murmurs or rubs  RESP:   lungs clear to auscultation bilaterally, no wheezing, rales, rhonchi, good air entry bilaterally  BREAST:  not examined  GI:  abdomen soft, non-tender, non-distended, normal BS, no bruits, no abdominal masses, no palpable masses  RECTAL:  not examined  :  not examined  MSK:   normal muscle tone, no atrophy, no rigidity, no contractions  EXT:  no clubbing, no cyanosis, no edema, no calf pain, swelling or erythema  VASCULAR:  pulses equal and symmetric in the upper and lower extremities  NEURO:  AAOX3, no focal neurological deficits, follows all commands, able to move extremities spontaneously  SKIN:  no ulcers, lesions or rashes    LABS:                            12.5   7.53  )-----------( 169      ( 05 Oct 2023 07:39 )             38.5     10-05    137  |  107  |  27<H>  ----------------------------<  98  3.9   |  25  |  1.43<H>    Ca    9.1      05 Oct 2023 07:39              Urinalysis Basic - ( 05 Oct 2023 07:39 )    Color: x / Appearance: x / SG: x / pH: x  Gluc: 98 mg/dL / Ketone: x  / Bili: x / Urobili: x   Blood: x / Protein: x / Nitrite: x   Leuk Esterase: x / RBC: x / WBC x   Sq Epi: x / Non Sq Epi: x / Bacteria: x    CULTURES:  UCx >100K ecoli, pansens  BCx NG    Additional results/Imaging, I have personally reviewed:  CTH 10/3/23: Moderate hydrocephalus with secondary findings raising possibility of normal pressure hydrocephalus, grossly similar to comparison CT. No obstructing mass identified. No acute intracranial hemorrhage or midline shift.    CXR 10/3/23: Small vaguely defined densities now seen in the left mid lateral chest. Other findings stable.    CT chest noncon 10/3/23: 1.  Left upper lobe peripheral consolidation .2.  CTA chest is suggested.    VQ scan 10/5/23:  Indeterminate probability of pulmonary embolus.    Telemetry, personally reviewed:  10/4/23: sinus tach 100-120  10/5/23: sinus 80-120s

## 2023-10-05 NOTE — PHARMACOTHERAPY INTERVENTION NOTE - COMMENTS
Modified penicillin allergy history to state that patient tolerated ceftriaxone during this admission.    Jessenia Toledo, PharmD   Clinical Pharmacy Specialist, Infectious Diseases  Tele-Antimicrobial Stewardship Program (Tele-ASP)  Tele-ASP Phone: [phone]

## 2023-10-05 NOTE — CDI QUERY NOTE - NSCDIOTHERTXTBX_GEN_ALL_CORE_HH
Clinical documentation indicates that this patient has atrial fibrillation.      Please further specify:    - Chronic atrial fibrillation  - Permanent atrial fibrillation   - Persistent atrial fibrillation   - Other (please specify) _________.  - Unable to determine     SUPPORTING DOCUMENTATION AND/OR CLINICAL EVIDENCE:    12 Lead ECG (10.03.23 @ 11:30) Atrial fibrillation    12 Lead ECG (05.19.22 @ 12:07) Atrial fibrillation with rapid ventricular response    12 Lead ECG (05.11.22 @ 07:49) Atrial fibrillation with rapid ventricular response    12 Lead ECG (02.26.22 @ 10:40) Atrial fibrillation    Adult-Hospitalist progress note 10/4/2023    Atrial fibrillation  -serafin [place]  -Eliquis

## 2023-10-06 ENCOUNTER — TRANSCRIPTION ENCOUNTER (OUTPATIENT)
Age: 83
End: 2023-10-06

## 2023-10-06 PROCEDURE — 71275 CT ANGIOGRAPHY CHEST: CPT | Mod: 26

## 2023-10-06 PROCEDURE — 99233 SBSQ HOSP IP/OBS HIGH 50: CPT

## 2023-10-06 RX ORDER — LOSARTAN POTASSIUM 100 MG/1
25 TABLET, FILM COATED ORAL DAILY
Refills: 0 | Status: DISCONTINUED | OUTPATIENT
Start: 2023-10-06 | End: 2023-10-08

## 2023-10-06 RX ORDER — SODIUM CHLORIDE 9 MG/ML
500 INJECTION INTRAMUSCULAR; INTRAVENOUS; SUBCUTANEOUS ONCE
Refills: 0 | Status: COMPLETED | OUTPATIENT
Start: 2023-10-06 | End: 2023-10-06

## 2023-10-06 RX ORDER — ALPRAZOLAM 0.25 MG
0.25 TABLET ORAL ONCE
Refills: 0 | Status: DISCONTINUED | OUTPATIENT
Start: 2023-10-06 | End: 2023-10-06

## 2023-10-06 RX ORDER — CEFUROXIME AXETIL 250 MG
250 TABLET ORAL EVERY 12 HOURS
Refills: 0 | Status: DISCONTINUED | OUTPATIENT
Start: 2023-10-06 | End: 2023-10-08

## 2023-10-06 RX ORDER — METOPROLOL TARTRATE 50 MG
50 TABLET ORAL DAILY
Refills: 0 | Status: DISCONTINUED | OUTPATIENT
Start: 2023-10-06 | End: 2023-10-08

## 2023-10-06 RX ADMIN — APIXABAN 2.5 MILLIGRAM(S): 2.5 TABLET, FILM COATED ORAL at 20:47

## 2023-10-06 RX ADMIN — Medication 5 MILLIGRAM(S): at 20:47

## 2023-10-06 RX ADMIN — Medication 100 MILLIGRAM(S): at 20:47

## 2023-10-06 RX ADMIN — Medication 0.25 MILLIGRAM(S): at 20:48

## 2023-10-06 RX ADMIN — Medication 50 MILLIGRAM(S): at 11:16

## 2023-10-06 RX ADMIN — SERTRALINE 50 MILLIGRAM(S): 25 TABLET, FILM COATED ORAL at 11:17

## 2023-10-06 RX ADMIN — Medication 100 MILLIGRAM(S): at 11:16

## 2023-10-06 RX ADMIN — Medication 650 MILLIGRAM(S): at 20:48

## 2023-10-06 RX ADMIN — SODIUM CHLORIDE 250 MILLILITER(S): 9 INJECTION INTRAMUSCULAR; INTRAVENOUS; SUBCUTANEOUS at 16:25

## 2023-10-06 RX ADMIN — AMIODARONE HYDROCHLORIDE 200 MILLIGRAM(S): 400 TABLET ORAL at 11:17

## 2023-10-06 RX ADMIN — APIXABAN 2.5 MILLIGRAM(S): 2.5 TABLET, FILM COATED ORAL at 11:17

## 2023-10-06 RX ADMIN — LOSARTAN POTASSIUM 25 MILLIGRAM(S): 100 TABLET, FILM COATED ORAL at 15:16

## 2023-10-06 RX ADMIN — Medication 250 MILLIGRAM(S): at 20:47

## 2023-10-06 RX ADMIN — Medication 0.25 MILLIGRAM(S): at 11:16

## 2023-10-06 NOTE — PROGRESS NOTE ADULT - SUBJECTIVE AND OBJECTIVE BOX
HOSPITALIST ATTENDING PROGRESS NOTE    Chart and meds reviewed.  Patient seen and examined.    CC: PNA    Subjective: Remains tachy, ?afib vs sinus. CTA done and neg for PE. Updated daughter at bedside. Remains confused.     All other systems reviewed and found to be negative with the exception of what has been described above.    MEDICATIONS  (STANDING):  aMIOdarone    Tablet 200 milliGRAM(s) Oral daily  apixaban 2.5 milliGRAM(s) Oral two times a day  cefuroxime   Tablet 250 milliGRAM(s) Oral every 12 hours  doxycycline monohydrate Capsule 100 milliGRAM(s) Oral every 12 hours  losartan 25 milliGRAM(s) Oral daily  melatonin 5 milliGRAM(s) Oral at bedtime  metoprolol succinate ER 50 milliGRAM(s) Oral daily  pantoprazole    Tablet 40 milliGRAM(s) Oral before breakfast  sertraline 50 milliGRAM(s) Oral daily  sodium chloride 0.9% Bolus 500 milliLiter(s) IV Bolus once    MEDICATIONS  (PRN):  acetaminophen     Tablet .. 650 milliGRAM(s) Oral every 6 hours PRN Temp greater or equal to 38C (100.4F), Mild Pain (1 - 3)  ALPRAZolam 0.25 milliGRAM(s) Oral every 12 hours PRN anxiety  ALPRAZolam 0.25 milliGRAM(s) Oral once PRN for CTA  aluminum hydroxide/magnesium hydroxide/simethicone Suspension 30 milliLiter(s) Oral every 4 hours PRN Dyspepsia  ondansetron Injectable 4 milliGRAM(s) IV Push every 8 hours PRN Nausea and/or Vomiting      VITALS:  T(F): 98.1 (10-06-23 @ 07:58), Max: 98.1 (10-06-23 @ 07:58)  HR: 64 (10-06-23 @ 07:58) (64 - 100)  BP: 147/84 (10-06-23 @ 07:58) (110/86 - 147/84)  RR: 18 (10-06-23 @ 07:58) (18 - 18)  SpO2: 99% (10-06-23 @ 07:58) (96% - 99%)  Wt(kg): --    I&O's Summary    05 Oct 2023 07:01  -  06 Oct 2023 07:00  --------------------------------------------------------  IN: 0 mL / OUT: 600 mL / NET: -600 mL        CAPILLARY BLOOD GLUCOSE          PHYSICAL EXAM:  Gen: NAD  HEENT:  pupils equal and reactive, EOMI, no oropharyngeal lesions, erythema, exudates, oral thrush  NECK:   supple, no carotid bruits, no palpable lymph nodes, no thyromegaly  CV:  +S1, +S2, regular, no murmurs or rubs  RESP:   lungs clear to auscultation bilaterally, no wheezing, rales, rhonchi, good air entry bilaterally  BREAST:  not examined  GI:  abdomen soft, non-tender, non-distended, normal BS, no bruits, no abdominal masses, no palpable masses  RECTAL:  not examined  :  not examined  MSK:   normal muscle tone, no atrophy, no rigidity, no contractions  EXT:  no clubbing, no cyanosis, no edema, no calf pain, swelling or erythema  VASCULAR:  pulses equal and symmetric in the upper and lower extremities  NEURO:  AAOX1-2, no focal neurological deficits, follows all commands, able to move extremities spontaneously  SKIN:  no ulcers, lesions or rashes    LABS:                            12.5   7.53  )-----------( 169      ( 05 Oct 2023 07:39 )             38.5     10-05    137  |  107  |  27<H>  ----------------------------<  98  3.9   |  25  |  1.43<H>    Ca    9.1      05 Oct 2023 07:39              Urinalysis Basic - ( 05 Oct 2023 07:39 )    Color: x / Appearance: x / SG: x / pH: x  Gluc: 98 mg/dL / Ketone: x  / Bili: x / Urobili: x   Blood: x / Protein: x / Nitrite: x   Leuk Esterase: x / RBC: x / WBC x   Sq Epi: x / Non Sq Epi: x / Bacteria: x    CULTURES:  UCx >100K ecoli, pansens  BCx NG    Additional results/Imaging, I have personally reviewed:  [facility] 10/3/23: Moderate hydrocephalus with secondary findings raising possibility of normal pressure hydrocephalus, grossly similar to comparison CT. No obstructing mass identified. No acute intracranial hemorrhage or midline shift.    CXR 10/3/23: Small vaguely defined densities now seen in the left mid lateral chest. Other findings stable.    CT chest noncon 10/3/23: 1.  Left upper lobe peripheral consolidation .2.  CTA chest is suggested.    VQ scan 10/5/23:  Indeterminate probability of pulmonary embolus.    CTA PE protocol 10/6/23: No pulmonary embolism. Peripheral left upper lobe consolidation, likely pneumonia. Small left pleural effusion.    Telemetry, personally reviewed:  10/4/23: sinus tach 100-120  10/5/23: sinus 80-120s

## 2023-10-06 NOTE — DISCHARGE NOTE NURSING/CASE MANAGEMENT/SOCIAL WORK - NSDCPEFALRISK_GEN_ALL_CORE
For information on Fall & Injury Prevention, visit: [email or web address] OR  [email or web address] OR  https://www.cdc.gov/steadi/patient.html

## 2023-10-06 NOTE — DISCHARGE NOTE NURSING/CASE MANAGEMENT/SOCIAL WORK - PATIENT PORTAL LINK FT
You can access the FollowMyHealth Patient Portal offered by [facility] by registering at the following website: [email or web address]. By joining [facility]’s FollowMyHealth portal, you will also be able to view your health information using other applications (apps) compatible with our system.

## 2023-10-06 NOTE — DISCHARGE NOTE NURSING/CASE MANAGEMENT/SOCIAL WORK - NSDCVIVACCINE_GEN_ALL_CORE_FT
Tdap; 01-Jun-2018 11:12; Alfonso Wick (RN); [employer]; [id number]; IntraMuscular; Deltoid Right.; 0.5 milliLiter(s); VIS (VIS Published: 09-May-2013, VIS Presented: 01-Jun-2018);

## 2023-10-06 NOTE — PROGRESS NOTE ADULT - ASSESSMENT
82y morbidly obese Female, a resident of [facility] with significant PMH of Hypertension, Afib on Eliquis, CAD with stents, CHF, Anxiety and depression, left knee pain, OP, RA, renal cancer, Takotsubo syndrome, urge incontinence of urine, CKD stage IIIb, Hydrocephalus, and others presented to the ED With complain of generalized weakness, low-grade fever, lethargic and change in her mental status for further evaluation and management. Patient is sleepy at this time, and was falling asleep during my encounter.  She doesn't seem to be any acute distress. She said that she had trouble breathing with cough and cold. Upon arrival in ED, she was tachycardic with heart rate in 120 , and was lethargic. She says that she feels cold and chills. She denies any chest pain, palpitations, abdominal pain, vomiting, diarrhea or dysuria.    #Severe sepsis w MK, POA, 2/2 CAP, KATHIE, likely GNR and UTI  -wean O2  -lactate wnl  -BCx NG  -UA+, UCx >100K ecoli, pansens  -CT chest w consolidation  -CTA neg for PE  -Doxy (prolonged QTc) +CTX    #sinus tach  -give bolus and re-eval    #acute septic encephalopathy  -on b/l dementia  -MS improving, per daughter, seems like at b/l    #MK on CKD (b/l Cr 1.3?)  -trend  -improved w IVF  -lasix held  -resume ARB    # Atrial fibrillation, hypertension, CAD with stent and history Takotsubo syndrome.  -amio, metop  -Eliquis    # Anxiety and depression.  -sertraline    #DVT ppx- full a/c w Eliquis    #MOLST confirmed, DNR/DNI    From [facility], possible return 10/7.   Updated daughter.

## 2023-10-07 ENCOUNTER — TRANSCRIPTION ENCOUNTER (OUTPATIENT)
Age: 83
End: 2023-10-07

## 2023-10-07 LAB
ANION GAP SERPL CALC-SCNC: 4 MMOL/L — LOW (ref 5–17)
BUN SERPL-MCNC: 24 MG/DL — HIGH (ref 7–23)
CALCIUM SERPL-MCNC: 9.2 MG/DL — SIGNIFICANT CHANGE UP (ref 8.5–10.1)
CHLORIDE SERPL-SCNC: 111 MMOL/L — HIGH (ref 96–108)
CO2 SERPL-SCNC: 25 MMOL/L — SIGNIFICANT CHANGE UP (ref 22–31)
CREAT SERPL-MCNC: 1.37 MG/DL — HIGH (ref 0.5–1.3)
EGFR: 38 ML/MIN/1.73M2 — LOW
GLUCOSE SERPL-MCNC: 103 MG/DL — HIGH (ref 70–99)
HCT VFR BLD CALC: 39.9 % — SIGNIFICANT CHANGE UP (ref 34.5–45)
HGB BLD-MCNC: 12.7 G/DL — SIGNIFICANT CHANGE UP (ref 11.5–15.5)
MCHC RBC-ENTMCNC: 30.7 PG — SIGNIFICANT CHANGE UP (ref 27–34)
MCHC RBC-ENTMCNC: 31.8 GM/DL — LOW (ref 32–36)
MCV RBC AUTO: 96.4 FL — SIGNIFICANT CHANGE UP (ref 80–100)
PLATELET # BLD AUTO: 208 K/UL — SIGNIFICANT CHANGE UP (ref 150–400)
POTASSIUM SERPL-MCNC: 3.6 MMOL/L — SIGNIFICANT CHANGE UP (ref 3.5–5.3)
POTASSIUM SERPL-SCNC: 3.6 MMOL/L — SIGNIFICANT CHANGE UP (ref 3.5–5.3)
RBC # BLD: 4.14 M/UL — SIGNIFICANT CHANGE UP (ref 3.8–5.2)
RBC # FLD: 12.7 % — SIGNIFICANT CHANGE UP (ref 10.3–14.5)
SARS-COV-2 RNA SPEC QL NAA+PROBE: SIGNIFICANT CHANGE UP
SODIUM SERPL-SCNC: 140 MMOL/L — SIGNIFICANT CHANGE UP (ref 135–145)
WBC # BLD: 6.81 K/UL — SIGNIFICANT CHANGE UP (ref 3.8–10.5)
WBC # FLD AUTO: 6.81 K/UL — SIGNIFICANT CHANGE UP (ref 3.8–10.5)

## 2023-10-07 PROCEDURE — 99232 SBSQ HOSP IP/OBS MODERATE 35: CPT

## 2023-10-07 RX ORDER — CEFUROXIME AXETIL 250 MG
1 TABLET ORAL
Qty: 4 | Refills: 0
Start: 2023-10-07 | End: 2023-10-08

## 2023-10-07 RX ORDER — METOPROLOL TARTRATE 50 MG
1 TABLET ORAL
Qty: 30 | Refills: 0
Start: 2023-10-07 | End: 2023-11-05

## 2023-10-07 RX ORDER — METOPROLOL TARTRATE 50 MG
1 TABLET ORAL
Refills: 0 | DISCHARGE

## 2023-10-07 RX ORDER — LOSARTAN POTASSIUM 100 MG/1
25 TABLET, FILM COATED ORAL ONCE
Refills: 0 | Status: COMPLETED | OUTPATIENT
Start: 2023-10-07 | End: 2023-10-07

## 2023-10-07 RX ADMIN — LOSARTAN POTASSIUM 25 MILLIGRAM(S): 100 TABLET, FILM COATED ORAL at 09:23

## 2023-10-07 RX ADMIN — Medication 250 MILLIGRAM(S): at 09:22

## 2023-10-07 RX ADMIN — Medication 100 MILLIGRAM(S): at 09:22

## 2023-10-07 RX ADMIN — Medication 50 MILLIGRAM(S): at 09:23

## 2023-10-07 RX ADMIN — APIXABAN 2.5 MILLIGRAM(S): 2.5 TABLET, FILM COATED ORAL at 21:58

## 2023-10-07 RX ADMIN — LOSARTAN POTASSIUM 25 MILLIGRAM(S): 100 TABLET, FILM COATED ORAL at 20:04

## 2023-10-07 RX ADMIN — SERTRALINE 50 MILLIGRAM(S): 25 TABLET, FILM COATED ORAL at 09:24

## 2023-10-07 RX ADMIN — AMIODARONE HYDROCHLORIDE 200 MILLIGRAM(S): 400 TABLET ORAL at 09:22

## 2023-10-07 RX ADMIN — Medication 5 MILLIGRAM(S): at 21:57

## 2023-10-07 RX ADMIN — Medication 250 MILLIGRAM(S): at 21:58

## 2023-10-07 RX ADMIN — Medication 100 MILLIGRAM(S): at 21:57

## 2023-10-07 RX ADMIN — APIXABAN 2.5 MILLIGRAM(S): 2.5 TABLET, FILM COATED ORAL at 09:23

## 2023-10-07 NOTE — DISCHARGE NOTE PROVIDER - CARE PROVIDER_API CALL
Marcio Lux  Internal Medicine  [address], [address]  [place] [id number]  Phone: [phone]  Fax: [phone]  Follow Up Time: 1-3 days

## 2023-10-07 NOTE — DISCHARGE NOTE PROVIDER - NSDCCAREPROVSEEN_GEN_ALL_CORE_FT
Shirley Walsh ([facility] medicine) Shirley Walsh (Medicine)  Angeline Figueroa (Medicine)  Pilo Steward (Medicine)

## 2023-10-07 NOTE — DISCHARGE NOTE PROVIDER - NSDCFUADDAPPT_GEN_ALL_CORE_FT
Called Dr. Lux's office.  Dr. Marcio Lux will see patient at [place] on Tuesday, October 10, 2023 Called Dr. Lux's office. Dr. Marcio Lux will see patient at [place] on Tuesday, October 10, 2023

## 2023-10-07 NOTE — DISCHARGE NOTE PROVIDER - HOSPITAL COURSE
CC: PNA  HPI and Hospital course: 82y morbidly obese Female, a resident of [facility] with significant PMH of Hypertension, Afib on Eliquis, CAD with stents, CHF, Anxiety and depression, left knee pain, OP, RA, renal cancer, Takotsubo syndrome, urge incontinence of urine, CKD stage IIIb, Hydrocephalus, and others presented to the ED With complain of generalized weakness, low-grade fever, lethargic and change in her mental status for further evaluation and management. Patient is sleepy at this time, and was falling asleep during my encounter.  She doesn't seem to be any acute distress. She said that she had trouble breathing with cough and cold. Upon arrival in ED, she was tachycardic with heart rate in 120 , and was lethargic. She says that she feels cold and chills. She denies any chest pain, palpitations, abdominal pain, vomiting, diarrhea or dysuria.    #Severe sepsis w MK, POA, 2/2 CAP, KATHIE, likely GNR and UTI  -wean O2  -lactate wnl  -BCx NG  -UA+, UCx >100K ecoli, pansens  -CT chest w consolidation  -CTA neg for PE  -Doxy (prolonged QTc) +CTX changed to ceftin, 2 more days on d/c to complete course    #sinus tach  -improved w bolus    #acute septic encephalopathy  -on b/l dementia  -MS improving, per daughter, seems like at b/l    #MK on CKD (b/l Cr 1.3?)  -trend  -improved w IVF  -lasix resume on d/c  -resume ARB    # Atrial fibrillation, hypertension, CAD with stent and history Takotsubo syndrome.  -amio, metop (dose increased)  -Eliquis    # Anxiety and depression.  -sertraline    #DVT ppx- full a/c w Eliquis    #MOLST confirmed, DNR/DNI    Return to [facility], F2F  I have spent 32 min on day of d/c coordinating care.            CC: PNA  HPI and Hospital course: 82y morbidly obese Female, a resident of [facility] with significant PMH of Hypertension, Afib on Eliquis, CAD with stents, CHF, Anxiety and depression, left knee pain, OP, RA, renal cancer, Takotsubo syndrome, urge incontinence of urine, CKD stage IIIb, Hydrocephalus, and others presented to the ED With complain of generalized weakness, low-grade fever, lethargic and change in her mental status for further evaluation and management. Patient is sleepy at this time, and was falling asleep during my encounter.  She doesn't seem to be any acute distress. She said that she had trouble breathing with cough and cold. Upon arrival in ED, she was tachycardic with heart rate in 120 , and was lethargic. She says that she feels cold and chills. She denies any chest pain, palpitations, abdominal pain, vomiting, diarrhea or dysuria.    #Severe sepsis w MK, POA, 2/2 CAP, KATHIE, likely GNR and UTI  -wean O2  -lactate wnl  -BCx NG  -UA+, UCx >100K ecoli, pansens  -CT chest w consolidation  -CTA neg for PE  -Doxy (prolonged QTc) +CTX changed to ceftin, 2 more days on d/c to complete course    #sinus tach  -improved w bolus    #acute septic encephalopathy  -on b/l dementia  -MS improving, per daughter, seems like at b/l    #MK on CKD (b/l Cr 1.3?)  -trend  -improved w IVF  -lasix resume on d/c  -resume ARB    # Atrial fibrillation, hypertension, CAD with stent and history Takotsubo syndrome.  -amio, metop (dose increased)  -Eliquis    # Anxiety and depression.  -sertraline    #DVT ppx- full a/c w Eliquis    #MOLST confirmed, DNR/DNI    Updated daughter on day of d/c.  Return to [facility], F2F  I have spent 32 min on day of d/c coordinating care.            82 year old woman with HTN, CAD, CHF, afib on Eliquis, hx of renal cancer, CKD III, hydrocephalus, dementia, depression, anxiety, sent from assisted living with generalized weakness, lethargy, low-grade fever, also dyspnea and cough on ROS, found to have severe sepsis.     Severe sepsis, present upon admission, due to community acquired pneumonia, unable to rule out Gram-negative organism, also unable to rule out UTI  Overall improved with IV antibiotics. Afebrile. Resting comfortably. Blood cultures negative to date. Urine culture did return positive for > 100,000 CFU/mL pan-S E.coli. Stable for discharge at this time on 2 more days of Ceftin and doxycycline to complete her course of antibiotics.    Sinus tachycardia  In setting of acute infection. Resolved with antibiotics and fluid resuscitation measures.     Acute metabolic encephalopathy  Due to infection, MK. Mental status now improved, likely at her recent baseline.     MK on CKD III  Likely pre renal in setting of infection, decreased PO intake. Held ARB and diuretic, provided IV fluids, antibiotics for infection. Kidney function has since improved. Back on diuretic and ARB. Would obtain repeat labs in 1-2 weeks.     HTN, CAD, hx of Takotsubo CM, afib  Stable. No angina. No CHF complaints. HR well controlled on metoprolol and amiodarone. On Eliquis for stroke risk reduction as it relates to afib hx.     Anxiety and depression  Stable. Continue sertraline      #MOLST confirmed, DNR/DNI  #Return to [facility], F2F      Discharge Exam:  T(F): 97.4 (08 Oct 2023 11:00), Max: 97.5 (08 Oct 2023 08:37)  HR: 98 (08 Oct 2023 16:42) (57 - 98)  BP: 130/77 (08 Oct 2023 16:42) (129/79 - 171/90)  RR: 16 (08 Oct 2023 11:00) (16 - 18)  SpO2: 100% (08 Oct 2023 11:00) (99% - 100%) on room air  Gen: Resting comfortably  HEENT: NCAT PERRL EOMI MMM clear oropharynx  Neck: Supple, no carotid bruits, no palpable lymph nodes, no thyromegaly  CVS: S1, S2 normal, RRR  Chest: Normal resp effort at rest, lungs clear to auscultation bilaterally  Abd: +Bs, soft, non-tender, non-distended  Ext: No edema, no calf tenderness, normal cap refill  Skin; Warm, dry  Neuro: AAOX1-2, no focal neurological deficits, follows all commands, able to move extremities spontaneously    Labs:  Reviewed. WBC 8.1  Hgb 12.7.  Plt 208.   Na, K, Cl, CO2, BUN WNL. Cr 1.37. Glucose 103.  Ca WNL.     Micro:  Urine Culture >100,000 CFU/mL E.coli, pan-S  Blood Culture negative    Imaging:  CTH 10/3/23: Stable moderate hydrocephalus. No obstructing mass identified. No acute intracranial hemorrhage or midline shift. White matter hypoattenuating foci are noted, compatible with age-indeterminate small vessel disease.     CXR 10/3/23: Small vaguely defined densities now seen in the left mid lateral chest. Other findings stable.    CT chest noncon 10/3/23: Left upper lobe is a peripheral area of consolidation. The remainder of the lungs are clear allowing for respiratory motion. The airways and pleura are unremarkable. No enlarged   chest lymph nodes. Hiatal hernia. Cardiomegaly. Coronary artery calcifications. Mitral annular calcification. No pericardial effusion. The aorta is ectatic. Aortic calcifications.    VQ scan 10/5/23:  Indeterminate probability of pulmonary embolus.    CTA PE protocol 10/6/23: No pulmonary embolism. Peripheral left upper lobe consolidation, likely pneumonia. Small left pleural effusion.

## 2023-10-07 NOTE — DISCHARGE NOTE PROVIDER - NSDCPNSUBOBJ_GEN_ALL_CORE
VITALS:  T(F): 97.3 (10-07-23 @ 08:55), Max: 98 (10-06-23 @ 21:44)  HR: 57 (10-07-23 @ 08:55) (57 - 115)  BP: 148/71 (10-07-23 @ 08:55) (148/71 - 150/101)  RR: 18 (10-07-23 @ 08:55) (18 - 18)  SpO2: 94% (10-07-23 @ 08:55) (94% - 96%)    PHYSICAL EXAM:  Gen: NAD  HEENT:  pupils equal and reactive, EOMI, no oropharyngeal lesions, erythema, exudates, oral thrush  NECK:   supple, no carotid bruits, no palpable lymph nodes, no thyromegaly  CV:  +S1, +S2, regular, no murmurs or rubs  RESP:   lungs clear to auscultation bilaterally, no wheezing, rales, rhonchi, good air entry bilaterally  BREAST:  not examined  GI:  abdomen soft, non-tender, non-distended, normal BS, no bruits, no abdominal masses, no palpable masses  RECTAL:  not examined  :  not examined  MSK:   normal muscle tone, no atrophy, no rigidity, no contractions  EXT:  no clubbing, no cyanosis, no edema, no calf pain, swelling or erythema  VASCULAR:  pulses equal and symmetric in the upper and lower extremities  NEURO:  AAOX1-2, no focal neurological deficits, follows all commands, able to move extremities spontaneously  SKIN:  no ulcers, lesions or rashes    UCx >100K ecoli, pansens  BCx NG    Additional results/Imaging, I have personally reviewed:  CTH 10/3/23: Moderate hydrocephalus with secondary findings raising possibility of normal pressure hydrocephalus, grossly similar to comparison CT. No obstructing mass identified. No acute intracranial hemorrhage or midline shift.    CXR 10/3/23: Small vaguely defined densities now seen in the left mid lateral chest. Other findings stable.    CT chest noncon 10/3/23: 1.  Left upper lobe peripheral consolidation .2.  CTA chest is suggested.    VQ scan 10/5/23:  Indeterminate probability of pulmonary embolus.    CTA PE protocol 10/6/23: No pulmonary embolism. Peripheral left upper lobe consolidation, likely pneumonia. Small left pleural effusion.

## 2023-10-07 NOTE — DISCHARGE NOTE PROVIDER - NSDCMRMEDTOKEN_GEN_ALL_CORE_FT
amiodarone 200 mg oral tablet: 1 tab(s) orally once a day  Artificial Tears ophthalmic solution: 1 drop(s) in each eye 2 times a day  cefuroxime 250 mg oral tablet: 1 tab(s) orally every 12 hours  dicyclomine 10 mg oral capsule: 1 cap(s) orally 2 times a day  doxycycline monohydrate 50 mg oral capsule: 2 cap(s) orally every 12 hours  Eliquis 2.5 mg oral tablet: 1 tab(s) orally 2 times a day  Lasix 20 mg oral tablet: 1 tab(s) orally once a day  losartan 25 mg oral tablet: 1 tab(s) orally once a day  Melatonin 5 mg oral tablet: 1 tab(s) orally once a day (at bedtime)  metoprolol succinate 50 mg oral tablet, extended release: 1 tab(s) orally once a day  pantoprazole 40 mg oral delayed release tablet: 1 tab(s) orally once a day (before a meal)  potassium chloride 20 mEq oral tablet, extended release: 1 tab(s) orally once a day  Salonpas Maximum Strength 4% topical film: Apply topically to affected area once a day   sertraline 50 mg oral tablet: 1 tab(s) orally once a day  Tums 500 oral tablet, chewable (obsolete): 1 tab(s) orally every 8 hours, As Needed - for heartburn  Tylenol 325 mg oral tablet: 2 tab(s) orally every 6 hours, As Needed - for moderate pain   amiodarone 200 mg oral tablet: 1 tab(s) orally once a day  Artificial Tears ophthalmic solution: 1 drop(s) in each eye 2 times a day  cefuroxime 250 mg oral tablet: 1 tab(s) orally every 12 hours  dicyclomine 10 mg oral capsule: 1 cap(s) orally 2 times a day  doxycycline monohydrate 50 mg oral capsule: 2 cap(s) orally every 12 hours  Eliquis 2.5 mg oral tablet: 1 tab(s) orally 2 times a day  Lasix 20 mg oral tablet: 1 tab(s) orally once a day  losartan 50 mg oral tablet: 1 tab(s) orally once a day  Melatonin 5 mg oral tablet: 1 tab(s) orally once a day (at bedtime)  metoprolol succinate 50 mg oral tablet, extended release: 1 tab(s) orally once a day  pantoprazole 40 mg oral delayed release tablet: 1 tab(s) orally once a day (before a meal)  potassium chloride 20 mEq oral tablet, extended release: 1 tab(s) orally once a day  Salonpas Maximum Strength 4% topical film: Apply topically to affected area once a day   sertraline 50 mg oral tablet: 1 tab(s) orally once a day  Tums 500 oral tablet, chewable (obsolete): 1 tab(s) orally every 8 hours, As Needed - for heartburn  Tylenol 325 mg oral tablet: 2 tab(s) orally every 6 hours, As Needed - for moderate pain

## 2023-10-07 NOTE — DISCHARGE NOTE PROVIDER - NSDCCPCAREPLAN_GEN_ALL_CORE_FT
PRINCIPAL DISCHARGE DIAGNOSIS  Diagnosis: Pneumonia  Assessment and Plan of Treatment: Take ceftin and doxycycline x 2 days.     PRINCIPAL DISCHARGE DIAGNOSIS  Diagnosis: Sepsis  Assessment and Plan of Treatment: You were admitted to the hospital for lethargy, weakness and low grade fever, found to have sepsis, likely due to community acquired pneumonia, unable to rule out urinary tract infection. You were treated with intravenous antibiotics and intravenous fluids and you have since improved. Now stable for discharge, will need to complete 2 more days of antibiotics by mouth, Ceftin and Doxycyline. Follow up with Primary Care within 1-2 weeks for routine post-discharge evaluation and health assessment.      SECONDARY DISCHARGE DIAGNOSES  Diagnosis: Acute metabolic encephalopathy  Assessment and Plan of Treatment: Due to infection, acute kidney injury. Mental status now improved, likely at recent baseline.    Diagnosis: Acute kidney injury superimposed on CKD  Assessment and Plan of Treatment: Likely dehydration in setting of infection, decreased oral intake. Held diuretic and losartan, provided intravenous fluid hydration, antibiotics for infection. Kidney function has since improved. You are now back on furosemide and losartan and doing well. Would obtain repeat chemistry labs (basic metabolic panel) in 1-2 weeks.

## 2023-10-08 VITALS — SYSTOLIC BLOOD PRESSURE: 130 MMHG | DIASTOLIC BLOOD PRESSURE: 77 MMHG | HEART RATE: 98 BPM

## 2023-10-08 PROCEDURE — 99239 HOSP IP/OBS DSCHRG MGMT >30: CPT

## 2023-10-08 RX ORDER — LOSARTAN POTASSIUM 100 MG/1
25 TABLET, FILM COATED ORAL ONCE
Refills: 0 | Status: COMPLETED | OUTPATIENT
Start: 2023-10-08 | End: 2023-10-08

## 2023-10-08 RX ORDER — LOSARTAN POTASSIUM 100 MG/1
50 TABLET, FILM COATED ORAL DAILY
Refills: 0 | Status: DISCONTINUED | OUTPATIENT
Start: 2023-10-09 | End: 2023-10-08

## 2023-10-08 RX ORDER — FUROSEMIDE 40 MG
20 TABLET ORAL DAILY
Refills: 0 | Status: DISCONTINUED | OUTPATIENT
Start: 2023-10-08 | End: 2023-10-08

## 2023-10-08 RX ADMIN — AMIODARONE HYDROCHLORIDE 200 MILLIGRAM(S): 400 TABLET ORAL at 09:09

## 2023-10-08 RX ADMIN — LOSARTAN POTASSIUM 25 MILLIGRAM(S): 100 TABLET, FILM COATED ORAL at 09:24

## 2023-10-08 RX ADMIN — SERTRALINE 50 MILLIGRAM(S): 25 TABLET, FILM COATED ORAL at 09:09

## 2023-10-08 RX ADMIN — Medication 20 MILLIGRAM(S): at 09:09

## 2023-10-08 RX ADMIN — PANTOPRAZOLE SODIUM 40 MILLIGRAM(S): 20 TABLET, DELAYED RELEASE ORAL at 05:48

## 2023-10-08 RX ADMIN — LOSARTAN POTASSIUM 25 MILLIGRAM(S): 100 TABLET, FILM COATED ORAL at 07:02

## 2023-10-08 RX ADMIN — Medication 100 MILLIGRAM(S): at 09:09

## 2023-10-08 RX ADMIN — Medication 50 MILLIGRAM(S): at 07:02

## 2023-10-08 RX ADMIN — APIXABAN 2.5 MILLIGRAM(S): 2.5 TABLET, FILM COATED ORAL at 09:09

## 2023-10-08 RX ADMIN — Medication 250 MILLIGRAM(S): at 09:09

## 2023-10-09 ENCOUNTER — TRANSCRIPTION ENCOUNTER (OUTPATIENT)
Age: 83
End: 2023-10-09

## 2023-10-09 RX ORDER — LOSARTAN POTASSIUM 100 MG/1
1 TABLET, FILM COATED ORAL
Qty: 30 | Refills: 0
Start: 2023-10-09

## 2023-10-10 PROBLEM — Z86.69 PERSONAL HISTORY OF OTHER DISEASES OF THE NERVOUS SYSTEM AND SENSE ORGANS: Chronic | Status: ACTIVE | Noted: 2023-10-04

## 2023-10-10 PROBLEM — N18.30 CHRONIC KIDNEY DISEASE, STAGE 3 UNSPECIFIED: Chronic | Status: ACTIVE | Noted: 2023-10-04

## 2023-10-11 ENCOUNTER — APPOINTMENT (OUTPATIENT)
Dept: CARE COORDINATION | Facility: HOME HEALTH | Age: 83
End: 2023-10-11
Payer: MEDICARE

## 2023-10-11 VITALS
DIASTOLIC BLOOD PRESSURE: 60 MMHG | RESPIRATION RATE: 18 BRPM | OXYGEN SATURATION: 95 % | SYSTOLIC BLOOD PRESSURE: 112 MMHG | HEART RATE: 57 BPM

## 2023-10-11 DIAGNOSIS — F03.90 UNSPECIFIED DEMENTIA W/OUT BEHAVIORAL DISTURBANCE: ICD-10-CM

## 2023-10-11 DIAGNOSIS — I48.91 UNSPECIFIED ATRIAL FIBRILLATION: ICD-10-CM

## 2023-10-11 DIAGNOSIS — I10 ESSENTIAL (PRIMARY) HYPERTENSION: ICD-10-CM

## 2023-10-11 DIAGNOSIS — I50.22 CHRONIC SYSTOLIC (CONGESTIVE) HEART FAILURE: ICD-10-CM

## 2023-10-11 PROCEDURE — 99495 TRANSJ CARE MGMT MOD F2F 14D: CPT

## 2023-10-12 DIAGNOSIS — M81.0 AGE-RELATED OSTEOPOROSIS WITHOUT CURRENT PATHOLOGICAL FRACTURE: ICD-10-CM

## 2023-10-12 DIAGNOSIS — N17.9 ACUTE KIDNEY FAILURE, UNSPECIFIED: ICD-10-CM

## 2023-10-12 DIAGNOSIS — N39.0 URINARY TRACT INFECTION, SITE NOT SPECIFIED: ICD-10-CM

## 2023-10-12 DIAGNOSIS — Z88.0 ALLERGY STATUS TO PENICILLIN: ICD-10-CM

## 2023-10-12 DIAGNOSIS — F32.A DEPRESSION, UNSPECIFIED: ICD-10-CM

## 2023-10-12 DIAGNOSIS — N39.41 URGE INCONTINENCE: ICD-10-CM

## 2023-10-12 DIAGNOSIS — Z79.01 LONG TERM (CURRENT) USE OF ANTICOAGULANTS: ICD-10-CM

## 2023-10-12 DIAGNOSIS — I48.0 PAROXYSMAL ATRIAL FIBRILLATION: ICD-10-CM

## 2023-10-12 DIAGNOSIS — G93.41 METABOLIC ENCEPHALOPATHY: ICD-10-CM

## 2023-10-12 DIAGNOSIS — Z90.5 ACQUIRED ABSENCE OF KIDNEY: ICD-10-CM

## 2023-10-12 DIAGNOSIS — I50.9 HEART FAILURE, UNSPECIFIED: ICD-10-CM

## 2023-10-12 DIAGNOSIS — E86.0 DEHYDRATION: ICD-10-CM

## 2023-10-12 DIAGNOSIS — Z95.5 PRESENCE OF CORONARY ANGIOPLASTY IMPLANT AND GRAFT: ICD-10-CM

## 2023-10-12 DIAGNOSIS — K21.9 GASTRO-ESOPHAGEAL REFLUX DISEASE WITHOUT ESOPHAGITIS: ICD-10-CM

## 2023-10-12 DIAGNOSIS — I25.10 ATHEROSCLEROTIC HEART DISEASE OF NATIVE CORONARY ARTERY WITHOUT ANGINA PECTORIS: ICD-10-CM

## 2023-10-12 DIAGNOSIS — Z66 DO NOT RESUSCITATE: ICD-10-CM

## 2023-10-12 DIAGNOSIS — A41.9 SEPSIS, UNSPECIFIED ORGANISM: ICD-10-CM

## 2023-10-12 DIAGNOSIS — F41.9 ANXIETY DISORDER, UNSPECIFIED: ICD-10-CM

## 2023-10-12 DIAGNOSIS — R65.20 SEVERE SEPSIS WITHOUT SEPTIC SHOCK: ICD-10-CM

## 2023-10-12 DIAGNOSIS — M06.9 RHEUMATOID ARTHRITIS, UNSPECIFIED: ICD-10-CM

## 2023-10-12 DIAGNOSIS — Z85.528 PERSONAL HISTORY OF OTHER MALIGNANT NEOPLASM OF KIDNEY: ICD-10-CM

## 2023-10-12 DIAGNOSIS — Z88.8 ALLERGY STATUS TO OTHER DRUGS, MEDICAMENTS AND BIOLOGICAL SUBSTANCES STATUS: ICD-10-CM

## 2023-10-12 DIAGNOSIS — I13.0 HYPERTENSIVE HEART AND CHRONIC KIDNEY DISEASE WITH HEART FAILURE AND STAGE 1 THROUGH STAGE 4 CHRONIC KIDNEY DISEASE, OR UNSPECIFIED CHRONIC KIDNEY DISEASE: ICD-10-CM

## 2023-10-12 DIAGNOSIS — N18.32 CHRONIC KIDNEY DISEASE, STAGE 3B: ICD-10-CM

## 2023-10-12 DIAGNOSIS — J18.9 PNEUMONIA, UNSPECIFIED ORGANISM: ICD-10-CM

## 2023-10-13 ENCOUNTER — TRANSCRIPTION ENCOUNTER (OUTPATIENT)
Age: 83
End: 2023-10-13

## 2023-10-18 ENCOUNTER — TRANSCRIPTION ENCOUNTER (OUTPATIENT)
Age: 83
End: 2023-10-18

## 2023-10-19 ENCOUNTER — TRANSCRIPTION ENCOUNTER (OUTPATIENT)
Age: 83
End: 2023-10-19

## 2023-10-27 ENCOUNTER — TRANSCRIPTION ENCOUNTER (OUTPATIENT)
Age: 83
End: 2023-10-27

## 2023-11-01 ENCOUNTER — TRANSCRIPTION ENCOUNTER (OUTPATIENT)
Age: 83
End: 2023-11-01

## 2023-12-21 NOTE — H&P ADULT - NSHPPOADEEPVENOUSTHROMB_GEN_A_CORE
Request all vaccines from [facility] in [place] pt stated he up to date all vaccines at this time. no

## 2023-12-29 ENCOUNTER — EMERGENCY (EMERGENCY)
Facility: HOSPITAL | Age: 83
LOS: 0 days | Discharge: HOME | End: 2023-12-30
Attending: EMERGENCY MEDICINE
Payer: MEDICARE

## 2023-12-29 VITALS
TEMPERATURE: 99 F | RESPIRATION RATE: 15 BRPM | DIASTOLIC BLOOD PRESSURE: 63 MMHG | OXYGEN SATURATION: 96 % | WEIGHT: 134.92 LBS | SYSTOLIC BLOOD PRESSURE: 104 MMHG | HEART RATE: 57 BPM

## 2023-12-29 DIAGNOSIS — Z20.822 CONTACT WITH AND (SUSPECTED) EXPOSURE TO COVID-19: ICD-10-CM

## 2023-12-29 DIAGNOSIS — Z66 DO NOT RESUSCITATE: ICD-10-CM

## 2023-12-29 DIAGNOSIS — Z98.62 PERIPHERAL VASCULAR ANGIOPLASTY STATUS: Chronic | ICD-10-CM

## 2023-12-29 DIAGNOSIS — Z88.0 ALLERGY STATUS TO PENICILLIN: ICD-10-CM

## 2023-12-29 DIAGNOSIS — R53.83 OTHER FATIGUE: ICD-10-CM

## 2023-12-29 DIAGNOSIS — R50.9 FEVER, UNSPECIFIED: ICD-10-CM

## 2023-12-29 DIAGNOSIS — F03.90 UNSPECIFIED DEMENTIA WITHOUT BEHAVIORAL DISTURBANCE: ICD-10-CM

## 2023-12-29 DIAGNOSIS — M06.9 RHEUMATOID ARTHRITIS, UNSPECIFIED: ICD-10-CM

## 2023-12-29 DIAGNOSIS — I25.10 ATHEROSCLEROTIC HEART DISEASE OF NATIVE CORONARY ARTERY WITHOUT ANGINA PECTORIS: ICD-10-CM

## 2023-12-29 DIAGNOSIS — Z98.890 OTHER SPECIFIED POSTPROCEDURAL STATES: Chronic | ICD-10-CM

## 2023-12-29 DIAGNOSIS — Z88.8 ALLERGY STATUS TO OTHER DRUGS, MEDICAMENTS AND BIOLOGICAL SUBSTANCES: ICD-10-CM

## 2023-12-29 DIAGNOSIS — K21.9 GASTRO-ESOPHAGEAL REFLUX DISEASE WITHOUT ESOPHAGITIS: ICD-10-CM

## 2023-12-29 DIAGNOSIS — I48.91 UNSPECIFIED ATRIAL FIBRILLATION: ICD-10-CM

## 2023-12-29 DIAGNOSIS — R63.0 ANOREXIA: ICD-10-CM

## 2023-12-29 DIAGNOSIS — Z98.89 OTHER SPECIFIED POSTPROCEDURAL STATES: Chronic | ICD-10-CM

## 2023-12-29 DIAGNOSIS — Z90.5 ACQUIRED ABSENCE OF KIDNEY: Chronic | ICD-10-CM

## 2023-12-29 DIAGNOSIS — Z90.89 ACQUIRED ABSENCE OF OTHER ORGANS: Chronic | ICD-10-CM

## 2023-12-29 DIAGNOSIS — Z98.49 CATARACT EXTRACTION STATUS, UNSPECIFIED EYE: Chronic | ICD-10-CM

## 2023-12-29 LAB
ALBUMIN SERPL ELPH-MCNC: 3.1 G/DL — LOW (ref 3.3–5)
ALBUMIN SERPL ELPH-MCNC: 3.1 G/DL — LOW (ref 3.3–5)
ALP SERPL-CCNC: 67 U/L — SIGNIFICANT CHANGE UP (ref 40–120)
ALP SERPL-CCNC: 67 U/L — SIGNIFICANT CHANGE UP (ref 40–120)
ALT FLD-CCNC: 29 U/L — SIGNIFICANT CHANGE UP (ref 12–78)
ALT FLD-CCNC: 29 U/L — SIGNIFICANT CHANGE UP (ref 12–78)
ANION GAP SERPL CALC-SCNC: 8 MMOL/L — SIGNIFICANT CHANGE UP (ref 5–17)
ANION GAP SERPL CALC-SCNC: 8 MMOL/L — SIGNIFICANT CHANGE UP (ref 5–17)
AST SERPL-CCNC: 23 U/L — SIGNIFICANT CHANGE UP (ref 15–37)
AST SERPL-CCNC: 23 U/L — SIGNIFICANT CHANGE UP (ref 15–37)
BASOPHILS # BLD AUTO: 0.03 K/UL — SIGNIFICANT CHANGE UP (ref 0–0.2)
BASOPHILS # BLD AUTO: 0.03 K/UL — SIGNIFICANT CHANGE UP (ref 0–0.2)
BASOPHILS NFR BLD AUTO: 0.3 % — SIGNIFICANT CHANGE UP (ref 0–2)
BASOPHILS NFR BLD AUTO: 0.3 % — SIGNIFICANT CHANGE UP (ref 0–2)
BILIRUB SERPL-MCNC: 0.7 MG/DL — SIGNIFICANT CHANGE UP (ref 0.2–1.2)
BILIRUB SERPL-MCNC: 0.7 MG/DL — SIGNIFICANT CHANGE UP (ref 0.2–1.2)
BUN SERPL-MCNC: 35 MG/DL — HIGH (ref 7–23)
BUN SERPL-MCNC: 35 MG/DL — HIGH (ref 7–23)
CALCIUM SERPL-MCNC: 9.1 MG/DL — SIGNIFICANT CHANGE UP (ref 8.5–10.1)
CALCIUM SERPL-MCNC: 9.1 MG/DL — SIGNIFICANT CHANGE UP (ref 8.5–10.1)
CHLORIDE SERPL-SCNC: 107 MMOL/L — SIGNIFICANT CHANGE UP (ref 96–108)
CHLORIDE SERPL-SCNC: 107 MMOL/L — SIGNIFICANT CHANGE UP (ref 96–108)
CO2 SERPL-SCNC: 25 MMOL/L — SIGNIFICANT CHANGE UP (ref 22–31)
CO2 SERPL-SCNC: 25 MMOL/L — SIGNIFICANT CHANGE UP (ref 22–31)
CREAT SERPL-MCNC: 1.89 MG/DL — HIGH (ref 0.5–1.3)
CREAT SERPL-MCNC: 1.89 MG/DL — HIGH (ref 0.5–1.3)
EGFR: 26 ML/MIN/1.73M2 — LOW
EGFR: 26 ML/MIN/1.73M2 — LOW
EOSINOPHIL # BLD AUTO: 0.06 K/UL — SIGNIFICANT CHANGE UP (ref 0–0.5)
EOSINOPHIL # BLD AUTO: 0.06 K/UL — SIGNIFICANT CHANGE UP (ref 0–0.5)
EOSINOPHIL NFR BLD AUTO: 0.6 % — SIGNIFICANT CHANGE UP (ref 0–6)
EOSINOPHIL NFR BLD AUTO: 0.6 % — SIGNIFICANT CHANGE UP (ref 0–6)
FLUAV AG NPH QL: SIGNIFICANT CHANGE UP
FLUAV AG NPH QL: SIGNIFICANT CHANGE UP
FLUBV AG NPH QL: SIGNIFICANT CHANGE UP
FLUBV AG NPH QL: SIGNIFICANT CHANGE UP
GLUCOSE SERPL-MCNC: 109 MG/DL — HIGH (ref 70–99)
GLUCOSE SERPL-MCNC: 109 MG/DL — HIGH (ref 70–99)
HCT VFR BLD CALC: 39 % — SIGNIFICANT CHANGE UP (ref 34.5–45)
HCT VFR BLD CALC: 39 % — SIGNIFICANT CHANGE UP (ref 34.5–45)
HGB BLD-MCNC: 12.6 G/DL — SIGNIFICANT CHANGE UP (ref 11.5–15.5)
HGB BLD-MCNC: 12.6 G/DL — SIGNIFICANT CHANGE UP (ref 11.5–15.5)
IMM GRANULOCYTES NFR BLD AUTO: 0.5 % — SIGNIFICANT CHANGE UP (ref 0–0.9)
IMM GRANULOCYTES NFR BLD AUTO: 0.5 % — SIGNIFICANT CHANGE UP (ref 0–0.9)
LACTATE SERPL-SCNC: 1 MMOL/L — SIGNIFICANT CHANGE UP (ref 0.7–2)
LACTATE SERPL-SCNC: 1 MMOL/L — SIGNIFICANT CHANGE UP (ref 0.7–2)
LYMPHOCYTES # BLD AUTO: 1.38 K/UL — SIGNIFICANT CHANGE UP (ref 1–3.3)
LYMPHOCYTES # BLD AUTO: 1.38 K/UL — SIGNIFICANT CHANGE UP (ref 1–3.3)
LYMPHOCYTES # BLD AUTO: 13.2 % — SIGNIFICANT CHANGE UP (ref 13–44)
LYMPHOCYTES # BLD AUTO: 13.2 % — SIGNIFICANT CHANGE UP (ref 13–44)
MCHC RBC-ENTMCNC: 31.3 PG — SIGNIFICANT CHANGE UP (ref 27–34)
MCHC RBC-ENTMCNC: 31.3 PG — SIGNIFICANT CHANGE UP (ref 27–34)
MCHC RBC-ENTMCNC: 32.3 GM/DL — SIGNIFICANT CHANGE UP (ref 32–36)
MCHC RBC-ENTMCNC: 32.3 GM/DL — SIGNIFICANT CHANGE UP (ref 32–36)
MCV RBC AUTO: 96.8 FL — SIGNIFICANT CHANGE UP (ref 80–100)
MCV RBC AUTO: 96.8 FL — SIGNIFICANT CHANGE UP (ref 80–100)
MONOCYTES # BLD AUTO: 1.4 K/UL — HIGH (ref 0–0.9)
MONOCYTES # BLD AUTO: 1.4 K/UL — HIGH (ref 0–0.9)
MONOCYTES NFR BLD AUTO: 13.4 % — SIGNIFICANT CHANGE UP (ref 2–14)
MONOCYTES NFR BLD AUTO: 13.4 % — SIGNIFICANT CHANGE UP (ref 2–14)
NEUTROPHILS # BLD AUTO: 7.54 K/UL — HIGH (ref 1.8–7.4)
NEUTROPHILS # BLD AUTO: 7.54 K/UL — HIGH (ref 1.8–7.4)
NEUTROPHILS NFR BLD AUTO: 72 % — SIGNIFICANT CHANGE UP (ref 43–77)
NEUTROPHILS NFR BLD AUTO: 72 % — SIGNIFICANT CHANGE UP (ref 43–77)
PLATELET # BLD AUTO: 165 K/UL — SIGNIFICANT CHANGE UP (ref 150–400)
PLATELET # BLD AUTO: 165 K/UL — SIGNIFICANT CHANGE UP (ref 150–400)
POTASSIUM SERPL-MCNC: 4.4 MMOL/L — SIGNIFICANT CHANGE UP (ref 3.5–5.3)
POTASSIUM SERPL-MCNC: 4.4 MMOL/L — SIGNIFICANT CHANGE UP (ref 3.5–5.3)
POTASSIUM SERPL-SCNC: 4.4 MMOL/L — SIGNIFICANT CHANGE UP (ref 3.5–5.3)
POTASSIUM SERPL-SCNC: 4.4 MMOL/L — SIGNIFICANT CHANGE UP (ref 3.5–5.3)
PROT SERPL-MCNC: 7 GM/DL — SIGNIFICANT CHANGE UP (ref 6–8.3)
PROT SERPL-MCNC: 7 GM/DL — SIGNIFICANT CHANGE UP (ref 6–8.3)
RBC # BLD: 4.03 M/UL — SIGNIFICANT CHANGE UP (ref 3.8–5.2)
RBC # BLD: 4.03 M/UL — SIGNIFICANT CHANGE UP (ref 3.8–5.2)
RBC # FLD: 14.2 % — SIGNIFICANT CHANGE UP (ref 10.3–14.5)
RBC # FLD: 14.2 % — SIGNIFICANT CHANGE UP (ref 10.3–14.5)
RSV RNA NPH QL NAA+NON-PROBE: SIGNIFICANT CHANGE UP
RSV RNA NPH QL NAA+NON-PROBE: SIGNIFICANT CHANGE UP
SARS-COV-2 RNA SPEC QL NAA+PROBE: SIGNIFICANT CHANGE UP
SARS-COV-2 RNA SPEC QL NAA+PROBE: SIGNIFICANT CHANGE UP
SODIUM SERPL-SCNC: 140 MMOL/L — SIGNIFICANT CHANGE UP (ref 135–145)
SODIUM SERPL-SCNC: 140 MMOL/L — SIGNIFICANT CHANGE UP (ref 135–145)
TROPONIN I, HIGH SENSITIVITY RESULT: 22.64 NG/L — SIGNIFICANT CHANGE UP
TROPONIN I, HIGH SENSITIVITY RESULT: 22.64 NG/L — SIGNIFICANT CHANGE UP
WBC # BLD: 10.46 K/UL — SIGNIFICANT CHANGE UP (ref 3.8–10.5)
WBC # BLD: 10.46 K/UL — SIGNIFICANT CHANGE UP (ref 3.8–10.5)
WBC # FLD AUTO: 10.46 K/UL — SIGNIFICANT CHANGE UP (ref 3.8–10.5)
WBC # FLD AUTO: 10.46 K/UL — SIGNIFICANT CHANGE UP (ref 3.8–10.5)

## 2023-12-29 PROCEDURE — 99285 EMERGENCY DEPT VISIT HI MDM: CPT | Mod: 25

## 2023-12-29 PROCEDURE — 93005 ELECTROCARDIOGRAM TRACING: CPT

## 2023-12-29 PROCEDURE — 83605 ASSAY OF LACTIC ACID: CPT

## 2023-12-29 PROCEDURE — 71045 X-RAY EXAM CHEST 1 VIEW: CPT

## 2023-12-29 PROCEDURE — 71045 X-RAY EXAM CHEST 1 VIEW: CPT | Mod: 26

## 2023-12-29 PROCEDURE — 99285 EMERGENCY DEPT VISIT HI MDM: CPT

## 2023-12-29 PROCEDURE — 93010 ELECTROCARDIOGRAM REPORT: CPT

## 2023-12-29 PROCEDURE — 87040 BLOOD CULTURE FOR BACTERIA: CPT

## 2023-12-29 PROCEDURE — 36415 COLL VENOUS BLD VENIPUNCTURE: CPT

## 2023-12-29 PROCEDURE — 85025 COMPLETE CBC W/AUTO DIFF WBC: CPT

## 2023-12-29 PROCEDURE — 80053 COMPREHEN METABOLIC PANEL: CPT

## 2023-12-29 PROCEDURE — 84484 ASSAY OF TROPONIN QUANT: CPT

## 2023-12-29 PROCEDURE — 0241U: CPT

## 2023-12-29 RX ORDER — SODIUM CHLORIDE 9 MG/ML
1000 INJECTION INTRAMUSCULAR; INTRAVENOUS; SUBCUTANEOUS ONCE
Refills: 0 | Status: COMPLETED | OUTPATIENT
Start: 2023-12-29 | End: 2023-12-29

## 2023-12-29 RX ADMIN — SODIUM CHLORIDE 2000 MILLILITER(S): 9 INJECTION INTRAMUSCULAR; INTRAVENOUS; SUBCUTANEOUS at 21:35

## 2023-12-29 NOTE — ED PROVIDER NOTE - NSICDXPASTMEDICALHX_GEN_ALL_CORE_FT
PAST MEDICAL HISTORY:  Anxiety disorder     Atrial fibrillation     CAD (coronary artery disease)     CKD (chronic kidney disease), stage III     Depressive disorder     H/O hydrocephalus     HTN (hypertension)     Knee pain, left     OP (osteoporosis)     RA (rheumatoid arthritis)     Renal cancer, right     Stented coronary artery     Takotsubo syndrome     Urge incontinence of urine

## 2023-12-29 NOTE — ED PROVIDER NOTE - OBJECTIVE STATEMENT
82 y/o F with a PMHx of dementia, UTI, heart failure, afib atherosclerotic heart disease, CAD, GERD, RA, and metabolic encephalopathy presents to the ED BIB [facility] for fever, lethargy, and poor PO intake x 24 hours. The pt does not have a fever in the ED despite being sent in. It is not documented when the pt last received tylenol. As per MOL, pt is DNR.

## 2023-12-29 NOTE — ED ADULT NURSE NOTE - NSFALLHARMRISKINTERV_ED_ALL_ED
Assistance OOB with selected safe patient handling equipment if applicable/Assistance with ambulation/Communicate risk of Fall with Harm to all staff, patient, and family/Provide patient with walking aids/Provide visual cue: red socks, yellow wristband, yellow gown, etc/Reinforce activity limits and safety measures with patient and family/Bed in lowest position, wheels locked, appropriate side rails in place/Call bell, personal items and telephone in reach/Instruct patient to call for assistance before getting out of bed/chair/stretcher/Non-slip footwear applied when patient is off stretcher/[place] to call system/Physically safe environment - no spills, clutter or unnecessary equipment/Purposeful Proactive Rounding/Room/bathroom lighting operational, light cord in reach Assistance OOB with selected safe patient handling equipment if applicable/Assistance with ambulation/Communicate risk of Fall with Harm to all staff, patient, and family/Provide patient with walking aids/Provide visual cue: red socks, yellow wristband, yellow gown, etc/Reinforce activity limits and safety measures with patient and family/Bed in lowest position, wheels locked, appropriate side rails in place/Call bell, personal items and telephone in reach/Instruct patient to call for assistance before getting out of bed/chair/stretcher/Non-slip footwear applied when patient is off stretcher/[place] to call system/Physically safe environment - no spills, clutter or unnecessary equipment/Purposeful Proactive Rounding/Room/bathroom lighting operational, light cord in reach

## 2023-12-29 NOTE — ED PROVIDER NOTE - PROGRESS NOTE DETAILS
results dw pt daughter, pt on levaquin today, had tylenol around 4 pm as per daughter, will repeat temp if negative will michael Cabrera DO

## 2023-12-29 NOTE — ED PROVIDER NOTE - PHYSICAL EXAMINATION
Gen:  Well appearing in NAD  Head:  NC/AT  HEENT: pupils perrl,no pharyngeal erythema, uvula midline  Cardiac: S1S2, RRR  Abd: Soft, non tender  Resp: No distress, CTA   musculoskeletal:: no deformities, no swelling, strength +5/+5  Skin: warm and dry as visualized, no rashes  Neuro: MYLES, dementia  Psych:, appropriate mood and affect for situation

## 2023-12-29 NOTE — ED ADULT NURSE NOTE - OBJECTIVE STATEMENT
pt presents to the ED for fevers of unknown origin. tmax 103. as per pt daughter she has a hx of UTI and pneumonia. pt denies any pain at this time. hx of dementia and pt noted to be a poor historian. pt A&Ox2, well appearing, and ambulatory with assistance at baseline with no further complaints or discomforts reported at this time.

## 2023-12-29 NOTE — ED ADULT TRIAGE NOTE - CHIEF COMPLAINT QUOTE
fevers of unknown source. hx of dementia. started on levaquin PO yesterday. poor Po intake as per nursing facility

## 2023-12-29 NOTE — ED PROVIDER NOTE - PATIENT PORTAL LINK FT
You can access the FollowMyHealth Patient Portal offered by [facility] by registering at the following website: [email or web address]. By joining [facility]’s FollowMyHealth portal, you will also be able to view your health information using other applications (apps) compatible with our system. You can access the FollowMyHealth Patient Portal offered by [facility] by registering at the following website: [email or web address]. By joining [facility]’s FollowMyHealth portal, you will also be able to view your health information using other applications (apps) compatible with our system.

## 2023-12-30 VITALS — TEMPERATURE: 98 F

## 2024-01-04 LAB
CULTURE RESULTS: SIGNIFICANT CHANGE UP
SPECIMEN SOURCE: SIGNIFICANT CHANGE UP

## 2024-04-04 NOTE — DISCHARGE NOTE NURSING/CASE MANAGEMENT/SOCIAL WORK - BRAND OF COVID-19 VACCINATION
Rx muscle relaxant as prescribed-you may cause drowsiness and dizziness, do not drive or operate heavy machinery while on this medication    Rx naproxen 500 mg twice daily over the next 10 days, take with food, take scheduled for anti-inflammatory properties.  Avoid any over-the-counter Aleve/Advil/Motrin/ibuprofen while on this medication.    No work/no lifting, pushing or pulling, no climbing ladders, avoid excessive strenuous walking/exertional activities.    Recommend warm compresses followed by stretches as discussed and icing to close it off.  Perform this for 10 minutes in total each time and do this at least 3 times a day.      You will need to follow-up with your primary care provider in the next 3 to 5 days to be reassessed to be released back to work.      If any worsening of symptoms such as altered urination, bowel movements, numbness, tingling, weakness, radiation of pain or worsening pain go to the ER immediately.    
[] : Resident
[employer] dose 1 and 2

## 2024-05-17 ENCOUNTER — RX RENEWAL (OUTPATIENT)
Age: 84
End: 2024-05-17

## 2024-06-17 NOTE — ED PROVIDER NOTE - CROS ED ROS STATEMENT
Pt of Dr. Nunez. - Has a question on when she should resume taking her Trulicity. Her last dose was 6/9. She didn't take on 6/16 because she had a colonoscopy today that she was prepping for. She wants to be advised when she should resume the medication.   all other ROS negative except as per HPI

## 2024-07-09 ENCOUNTER — EMERGENCY (EMERGENCY)
Facility: HOSPITAL | Age: 84
LOS: 0 days | Discharge: HOME | End: 2024-07-10
Attending: HOSPITALIST
Payer: MEDICARE

## 2024-07-09 VITALS
SYSTOLIC BLOOD PRESSURE: 138 MMHG | DIASTOLIC BLOOD PRESSURE: 67 MMHG | OXYGEN SATURATION: 94 % | HEART RATE: 96 BPM | RESPIRATION RATE: 16 BRPM | TEMPERATURE: 98 F

## 2024-07-09 DIAGNOSIS — I48.91 UNSPECIFIED ATRIAL FIBRILLATION: ICD-10-CM

## 2024-07-09 DIAGNOSIS — Z98.89 OTHER SPECIFIED POSTPROCEDURAL STATES: Chronic | ICD-10-CM

## 2024-07-09 DIAGNOSIS — M79.605 PAIN IN LEFT LEG: ICD-10-CM

## 2024-07-09 DIAGNOSIS — I25.10 ATHEROSCLEROTIC HEART DISEASE OF NATIVE CORONARY ARTERY WITHOUT ANGINA PECTORIS: ICD-10-CM

## 2024-07-09 DIAGNOSIS — N18.9 CHRONIC KIDNEY DISEASE, UNSPECIFIED: ICD-10-CM

## 2024-07-09 DIAGNOSIS — F32.A DEPRESSION, UNSPECIFIED: ICD-10-CM

## 2024-07-09 DIAGNOSIS — Z90.5 ACQUIRED ABSENCE OF KIDNEY: ICD-10-CM

## 2024-07-09 DIAGNOSIS — Z88.8 ALLERGY STATUS TO OTHER DRUGS, MEDICAMENTS AND BIOLOGICAL SUBSTANCES: ICD-10-CM

## 2024-07-09 DIAGNOSIS — I12.9 HYPERTENSIVE CHRONIC KIDNEY DISEASE WITH STAGE 1 THROUGH STAGE 4 CHRONIC KIDNEY DISEASE, OR UNSPECIFIED CHRONIC KIDNEY DISEASE: ICD-10-CM

## 2024-07-09 DIAGNOSIS — Z98.890 OTHER SPECIFIED POSTPROCEDURAL STATES: Chronic | ICD-10-CM

## 2024-07-09 DIAGNOSIS — F41.9 ANXIETY DISORDER, UNSPECIFIED: ICD-10-CM

## 2024-07-09 DIAGNOSIS — Z98.49 CATARACT EXTRACTION STATUS, UNSPECIFIED EYE: Chronic | ICD-10-CM

## 2024-07-09 DIAGNOSIS — M06.9 RHEUMATOID ARTHRITIS, UNSPECIFIED: ICD-10-CM

## 2024-07-09 DIAGNOSIS — Z90.5 ACQUIRED ABSENCE OF KIDNEY: Chronic | ICD-10-CM

## 2024-07-09 DIAGNOSIS — M81.0 AGE-RELATED OSTEOPOROSIS WITHOUT CURRENT PATHOLOGICAL FRACTURE: ICD-10-CM

## 2024-07-09 DIAGNOSIS — Z98.62 PERIPHERAL VASCULAR ANGIOPLASTY STATUS: Chronic | ICD-10-CM

## 2024-07-09 DIAGNOSIS — Z90.89 ACQUIRED ABSENCE OF OTHER ORGANS: Chronic | ICD-10-CM

## 2024-07-09 DIAGNOSIS — Z88.0 ALLERGY STATUS TO PENICILLIN: ICD-10-CM

## 2024-07-09 PROCEDURE — 93971 EXTREMITY STUDY: CPT | Mod: LT

## 2024-07-09 PROCEDURE — 73501 X-RAY EXAM HIP UNI 1 VIEW: CPT | Mod: LT

## 2024-07-09 PROCEDURE — 93005 ELECTROCARDIOGRAM TRACING: CPT

## 2024-07-09 PROCEDURE — 99285 EMERGENCY DEPT VISIT HI MDM: CPT

## 2024-07-09 PROCEDURE — 93010 ELECTROCARDIOGRAM REPORT: CPT

## 2024-07-09 PROCEDURE — 99284 EMERGENCY DEPT VISIT MOD MDM: CPT | Mod: 25

## 2024-07-10 VITALS
HEART RATE: 59 BPM | DIASTOLIC BLOOD PRESSURE: 60 MMHG | SYSTOLIC BLOOD PRESSURE: 125 MMHG | RESPIRATION RATE: 16 BRPM | TEMPERATURE: 98 F | OXYGEN SATURATION: 98 %

## 2024-07-10 PROCEDURE — 93971 EXTREMITY STUDY: CPT | Mod: 26,LT

## 2024-07-10 PROCEDURE — 73501 X-RAY EXAM HIP UNI 1 VIEW: CPT | Mod: 26,LT

## 2024-09-02 NOTE — PATIENT PROFILE ADULT - NSPROGENSOURCEINFO_GEN_A_NUR
Continue home Trulicity and pioglitazone  Hold home insulin injections with meals  Hold home sliding scale  Dr. Murphy will call you for follow-up instructions   patient

## 2024-09-30 NOTE — ED ADULT NURSE NOTE - NSFALLRSKASSESSDT_ED_ALL_ED
HPI   Chief Complaint   Patient presents with    Abdominal Pain   This is a 30-year-old female who denies any significant past medical history who presents to the ED with vaginal bleeding. Patient states that the bleeding began 9 days ago, around the time of her normal menstrual cycle, and was initially just light spotting. She states that this past Friday, the bleeding became heavier and she was soaking through 2 pads an hour and passing small blood clots.  She reports that today the bleeding is very light and she has only needed a single panty liner.  Patient states that she has never had a menstrual cycle last this long. She also endorses cramping pains in her lower abdomen, rated 6/10, states that they feel like her typical menstrual cramps. She reports intermittent episodes of dizziness over the past few days. Patient is requesting STD testing. She declined empiric treatment, states she prefers to wait for her results.  Denies any vaginal discharge, pain or pruritus.  Denies any urinary frequency, urgency, dysuria or hematuria    Denies fevers, chills, headaches, chest pain, shortness of breath, N/V/D    Limitations to history: None  Independent Historians: Patient  External Records Reviewed: None    Patient History   No past medical history on file.  No past surgical history on file.  No family history on file.  Social History     Tobacco Use    Smoking status: Not on file    Smokeless tobacco: Not on file   Substance Use Topics    Alcohol use: Not on file    Drug use: Not on file       Physical Exam   ED Triage Vitals [09/30/24 1044]   Temperature Heart Rate Respirations BP   36.5 °C (97.7 °F) 73 16 139/87      Pulse Ox Temp Source Heart Rate Source Patient Position   100 % Temporal Monitor Sitting      BP Location FiO2 (%)     Left arm --       Physical Exam  Exam conducted with a chaperone present.   Constitutional:       General: She is not in acute distress.     Appearance: She is not ill-appearing.    Cardiovascular:      Rate and Rhythm: Normal rate and regular rhythm.      Heart sounds: Normal heart sounds.   Pulmonary:      Effort: Pulmonary effort is normal. No respiratory distress.      Breath sounds: Normal breath sounds.   Abdominal:      General: Bowel sounds are normal. There is no distension.      Palpations: Abdomen is soft.      Tenderness: There is no abdominal tenderness. There is no guarding or rebound.   Genitourinary:     Vagina: Normal. No signs of injury. No lesions.      Cervix: Normal. No cervical motion tenderness, discharge, friability, lesion or erythema.      Uterus: Normal. Not deviated, not enlarged, not tender and no uterine prolapse.       Adnexa: Right adnexa normal and left adnexa normal.        Right: No mass or tenderness.          Left: No mass or tenderness.        Comments: ELIECER Cabral RN chaperoned pelvic exam  Cervical os is closed. Scant amount of dark red blood present in the vaginal vault, no clots visible  Musculoskeletal:         General: No deformity or signs of injury.   Skin:     General: Skin is warm and dry.      Coloration: Skin is not pale.   Neurological:      General: No focal deficit present.      Mental Status: She is alert and oriented to person, place, and time.      Motor: No weakness.      Coordination: Coordination normal.      Gait: Gait normal.         ED Course & MDM   ED Course as of 09/30/24 1321   Mon Sep 30, 2024   1053 POCT pregnancy, urine  Negative [LH]      ED Course User Index  [LH] Sarah Ball PA-C         Diagnoses as of 09/30/24 1321   Bacterial vaginosis   Menorrhagia with regular cycle       Medical Decision Making  This is a 30-year-old female who denies any significant past medical history who presents to the ED with vaginal bleeding. Patient states that the bleeding began 9 days ago and was initially just light spotting. Patient is requesting STD treatment. She declined empiric     On physical exam, patient is non-ill-appearing  and in no acute distress.  No active vomiting or retching.  Abdomen is soft, nondistended, nontender with normal bowel sounds heard throughout.  There is no guarding, rebound or evidence of peritonitis.  On pelvic exam, there is a scant amount of dark red blood present to the vaginal vault, no clots visible.  The cervical os is closed.  No cervical motion tenderness, erythema, lesions or friability.  Uterus is normal, without enlargement, tenderness or prolapse.  No adnexal masses or tenderness. Vitals are stable.    CBC did not show any anemia or leukocytosis.  CMP showed normal renal and hepatic functions without critical electrolyte disturbances.  Urinalysis was nonconcerning for UTI, no pyuria, bacteriuria or nitrites.  HIV, syphilis and hepatitis C screening was negative.  Wet prep was positive for clue cells, no trichomonas or yeast.     Administered Tylenol and Motrin.Patient was informed that she will be notified of any positive STD results in the next few days.  Prescribed Flagyl for bacterial vaginosis coverage.  Upon reassessment, patient states that her pain has improved and that she needs to leave as her ride is here.  She states she has the [employer] linsey and will get her ultrasound results to there.  Counseled patient to follow-up with GYN for menorrhagia, provided the contact information for  OB/GYN clinic.  Discussed ED return precautions. Patient verbalized understanding was agreeable to plan of care.  Discharged in stable condition.      US PELVIS TRANSABDOMINAL WITH TRANSVAGINAL   Final Result   1. Left avascular complex cystic adnexal lesion that is favored to   represent a hemorrhagic cyst.   2. Small mount of simple free fluid.        I personally reviewed the images/study and I agree with the findings   as stated by Helio Aquino MD. This study was interpreted at   [facility], [place], [place].        MACRO:   None        Signed by: Florian Joel  9/30/2024 2:20 PM   Dictation workstation:   [id number]             Labs Reviewed   COMPREHENSIVE METABOLIC PANEL - Abnormal       Result Value    Glucose 91      Sodium 141      Potassium 4.1      Chloride 105      Bicarbonate 33 (*)     Anion Gap 7 (*)     Urea Nitrogen 12      Creatinine 0.72      eGFR >90      Calcium 9.0      Albumin 4.0      Alkaline Phosphatase 47      Total Protein 7.0      AST 13      Bilirubin, Total 0.7      ALT 10     URINALYSIS WITH REFLEX CULTURE AND MICROSCOPIC - Abnormal    Color, Urine Light-Yellow      Appearance, Urine Clear      Specific Gravity, Urine 1.017      pH, Urine 8.0      Protein, Urine NEGATIVE      Glucose, Urine Normal      Blood, Urine 1.0 (3+) (*)     Ketones, Urine NEGATIVE      Bilirubin, Urine NEGATIVE      Urobilinogen, Urine Normal      Nitrite, Urine NEGATIVE      Leukocyte Esterase, Urine NEGATIVE     WET PREP, GENITAL - Abnormal    Trichomonas None Seen      Clue Cells Present (*)     Yeast None Seen      WBC 21-50     SYPHILIS SCREENING WITH REFLEX - Normal    Syphilis Total Ab Nonreactive     HIV 1/2 ANTIGEN/ANTIBODY SCREEN WIH REFLEX TO CONFIRMATION - Normal    HIV 1/2 Antigen/Antibody Screen with Reflex to Confirmation Nonreactive      Narrative:     HIV Ag/Ab screen is performed using the Siemens Litesprite HIV Ag/Ab Combo assay which detects the presence of HIV p24 antigen as well as antibodies to HIV-1 (Group M and O) and HIV-2.  No laboratory evidence of HIV infection. If acute HIV infection is suspected, consider testing for HIV RNA by PCR (viral load).   HEPATITIS C ANTIBODY - Normal    Hepatitis C AB Nonreactive     CBC WITH AUTO DIFFERENTIAL    WBC 5.6      nRBC 0.0      RBC 4.16      Hemoglobin 12.6      Hematocrit 37.3      MCV 90      MCH 30.3      MCHC 33.8      RDW 13.3      Platelets 341      Neutrophils % 52.6      Immature Granulocytes %, Automated 0.2      Lymphocytes % 31.5      Monocytes % 9.4      Eosinophils % 5.4      Basophils %  0.9      Neutrophils Absolute 2.93      Immature Granulocytes Absolute, Automated 0.01      Lymphocytes Absolute 1.75      Monocytes Absolute 0.52      Eosinophils Absolute 0.30      Basophils Absolute 0.05     URINALYSIS WITH REFLEX CULTURE AND MICROSCOPIC    Narrative:     The following orders were created for panel order Urinalysis with Reflex Culture and Microscopic.  Procedure                               Abnormality         Status                     ---------                               -----------         ------                     Urinalysis with Reflex C...[[id number]]  Abnormal            Final result               Extra Urine Gray Tube[[id number]]                            In process                   Please view results for these tests on the individual orders.   EXTRA URINE GRAY TUBE   C. TRACHOMATIS + N. GONORRHOEAE, AMPLIFIED   POCT PREGNANCY, URINE    Preg Test, Ur Negative     URINALYSIS MICROSCOPIC WITH REFLEX CULTURE    WBC, Urine 1-5      RBC, Urine NONE      Squamous Epithelial Cells, Urine 1-9 (SPARSE)      Mucus, Urine FEW               Sarah Ball PA-C  09/30/24 8033     07-Nov-2018 11:10

## 2024-12-19 NOTE — H&P ADULT - SKIN COMMENTS
Carafate (Sucralfate) 1 g has been prescribed today to help coat upper GI tract and heal any residual inflammation in the stomach. This medication can be taken up to 2-3 times per day prior to meals and or at bedtime. It is important not to take other maintenance medications within 1 hour of the Carafate or for at least 2 hours after taking to ensure absorption of other medications. This is due to the medication coating the stomach lining thus reducing how well the medications that can be absorbed.      GERD is a chronic disease that occurs when stomach acid flows back into the tube that connects your mouth and stomach. Warning of worsening symptoms may include: Hoarseness, trouble swallowing, too much throat mucus, a lump in the throat, chronic cough, and heartburn. Some conservative measures or treatment may help, which include:  Diet and lifestyle modification: avoid greasy foods or any foods that will cause heartburn for example chocolate, mint, spicy foods, tomato based products, and citrus. Avoid alcohol, tobacco, and caffeine. Avoid late night heavy meals. Avoid bending forward or stooping after eating. Sleep with head of your bed raised 6-8 inches.  You may also try Apple Cider Vinegar, 2 teaspoons in 8 ounces of water 3 times a day. Take 30 minutes before meals or after meals and rinse mouth after each use.   If difficulty swallowing occur, I recommend other supportive care measures, including thicken liquids to avoid aspiration. Eat smaller meals at different time intervals and cut into smaller pieces, take sips of water in between. If the above symptoms do not improve, please contact our office for further evaluation or as plan has discussed.   Continue PPI therapy at this time as it has been effective for GERD symptoms control. Be aware that potential risks associate with long-term (>6 months) PPI therapy may include but not limited to vitamin deficiencies or alteration in vitamin metabolism, such as  iron, b12, d, calcium, and magnesium, as well as risks for gastric polyps and C. Difficile. Take additional vitamin supplements as we've discussed today.    no rash

## 2025-02-12 ENCOUNTER — EMERGENCY (EMERGENCY)
Facility: HOSPITAL | Age: 85
LOS: 0 days | Discharge: HOME | End: 2025-02-12
Attending: EMERGENCY MEDICINE
Payer: MEDICARE

## 2025-02-12 VITALS
OXYGEN SATURATION: 96 % | RESPIRATION RATE: 16 BRPM | HEART RATE: 61 BPM | TEMPERATURE: 98 F | SYSTOLIC BLOOD PRESSURE: 156 MMHG | DIASTOLIC BLOOD PRESSURE: 82 MMHG

## 2025-02-12 VITALS
SYSTOLIC BLOOD PRESSURE: 155 MMHG | DIASTOLIC BLOOD PRESSURE: 80 MMHG | HEART RATE: 65 BPM | OXYGEN SATURATION: 100 % | TEMPERATURE: 98 F | RESPIRATION RATE: 16 BRPM

## 2025-02-12 DIAGNOSIS — I48.91 UNSPECIFIED ATRIAL FIBRILLATION: ICD-10-CM

## 2025-02-12 DIAGNOSIS — L89.899 PRESSURE ULCER OF OTHER SITE, UNSPECIFIED STAGE: ICD-10-CM

## 2025-02-12 DIAGNOSIS — Z79.01 LONG TERM (CURRENT) USE OF ANTICOAGULANTS: ICD-10-CM

## 2025-02-12 DIAGNOSIS — I51.81 TAKOTSUBO SYNDROME: ICD-10-CM

## 2025-02-12 DIAGNOSIS — M06.9 RHEUMATOID ARTHRITIS, UNSPECIFIED: ICD-10-CM

## 2025-02-12 DIAGNOSIS — F41.9 ANXIETY DISORDER, UNSPECIFIED: ICD-10-CM

## 2025-02-12 DIAGNOSIS — Z98.89 OTHER SPECIFIED POSTPROCEDURAL STATES: Chronic | ICD-10-CM

## 2025-02-12 DIAGNOSIS — G91.9 HYDROCEPHALUS, UNSPECIFIED: ICD-10-CM

## 2025-02-12 DIAGNOSIS — Z88.8 ALLERGY STATUS TO OTHER DRUGS, MEDICAMENTS AND BIOLOGICAL SUBSTANCES: ICD-10-CM

## 2025-02-12 DIAGNOSIS — N18.30 CHRONIC KIDNEY DISEASE, STAGE 3 UNSPECIFIED: ICD-10-CM

## 2025-02-12 DIAGNOSIS — I12.9 HYPERTENSIVE CHRONIC KIDNEY DISEASE WITH STAGE 1 THROUGH STAGE 4 CHRONIC KIDNEY DISEASE, OR UNSPECIFIED CHRONIC KIDNEY DISEASE: ICD-10-CM

## 2025-02-12 DIAGNOSIS — Z99.3 DEPENDENCE ON WHEELCHAIR: ICD-10-CM

## 2025-02-12 DIAGNOSIS — I73.9 PERIPHERAL VASCULAR DISEASE, UNSPECIFIED: ICD-10-CM

## 2025-02-12 DIAGNOSIS — I25.10 ATHEROSCLEROTIC HEART DISEASE OF NATIVE CORONARY ARTERY WITHOUT ANGINA PECTORIS: ICD-10-CM

## 2025-02-12 DIAGNOSIS — Z88.0 ALLERGY STATUS TO PENICILLIN: ICD-10-CM

## 2025-02-12 DIAGNOSIS — Z98.62 PERIPHERAL VASCULAR ANGIOPLASTY STATUS: Chronic | ICD-10-CM

## 2025-02-12 DIAGNOSIS — Z90.5 ACQUIRED ABSENCE OF KIDNEY: Chronic | ICD-10-CM

## 2025-02-12 DIAGNOSIS — Z98.890 OTHER SPECIFIED POSTPROCEDURAL STATES: Chronic | ICD-10-CM

## 2025-02-12 DIAGNOSIS — Z98.49 CATARACT EXTRACTION STATUS, UNSPECIFIED EYE: Chronic | ICD-10-CM

## 2025-02-12 DIAGNOSIS — M81.0 AGE-RELATED OSTEOPOROSIS WITHOUT CURRENT PATHOLOGICAL FRACTURE: ICD-10-CM

## 2025-02-12 DIAGNOSIS — Z95.5 PRESENCE OF CORONARY ANGIOPLASTY IMPLANT AND GRAFT: ICD-10-CM

## 2025-02-12 DIAGNOSIS — Z90.89 ACQUIRED ABSENCE OF OTHER ORGANS: Chronic | ICD-10-CM

## 2025-02-12 LAB
ALBUMIN SERPL ELPH-MCNC: 2.8 G/DL — LOW (ref 3.3–5)
ALP SERPL-CCNC: 94 U/L — SIGNIFICANT CHANGE UP (ref 40–120)
ALT FLD-CCNC: 36 U/L — SIGNIFICANT CHANGE UP (ref 12–78)
ANION GAP SERPL CALC-SCNC: 7 MMOL/L — SIGNIFICANT CHANGE UP (ref 5–17)
APTT BLD: 36.3 SEC — HIGH (ref 24.5–35.6)
AST SERPL-CCNC: 31 U/L — SIGNIFICANT CHANGE UP (ref 15–37)
BASOPHILS # BLD AUTO: 0.04 K/UL — SIGNIFICANT CHANGE UP (ref 0–0.2)
BASOPHILS NFR BLD AUTO: 0.3 % — SIGNIFICANT CHANGE UP (ref 0–2)
BILIRUB SERPL-MCNC: 0.5 MG/DL — SIGNIFICANT CHANGE UP (ref 0.2–1.2)
BUN SERPL-MCNC: 57 MG/DL — HIGH (ref 7–23)
CALCIUM SERPL-MCNC: 10.2 MG/DL — HIGH (ref 8.5–10.1)
CHLORIDE SERPL-SCNC: 111 MMOL/L — HIGH (ref 96–108)
CO2 SERPL-SCNC: 27 MMOL/L — SIGNIFICANT CHANGE UP (ref 22–31)
CREAT SERPL-MCNC: 1.9 MG/DL — HIGH (ref 0.5–1.3)
EGFR: 25 ML/MIN/1.73M2 — LOW
EOSINOPHIL # BLD AUTO: 0.03 K/UL — SIGNIFICANT CHANGE UP (ref 0–0.5)
EOSINOPHIL NFR BLD AUTO: 0.2 % — SIGNIFICANT CHANGE UP (ref 0–6)
GLUCOSE SERPL-MCNC: 126 MG/DL — HIGH (ref 70–99)
HCT VFR BLD CALC: 46.7 % — HIGH (ref 34.5–45)
HGB BLD-MCNC: 14.6 G/DL — SIGNIFICANT CHANGE UP (ref 11.5–15.5)
IMM GRANULOCYTES # BLD AUTO: 0.05 K/UL — SIGNIFICANT CHANGE UP (ref 0–0.07)
IMM GRANULOCYTES NFR BLD AUTO: 0.4 % — SIGNIFICANT CHANGE UP (ref 0–0.9)
INR BLD: 1.76 RATIO — HIGH (ref 0.85–1.16)
LYMPHOCYTES # BLD AUTO: 2.59 K/UL — SIGNIFICANT CHANGE UP (ref 1–3.3)
LYMPHOCYTES NFR BLD AUTO: 19.8 % — SIGNIFICANT CHANGE UP (ref 13–44)
MCHC RBC-ENTMCNC: 29.7 PG — SIGNIFICANT CHANGE UP (ref 27–34)
MCHC RBC-ENTMCNC: 31.3 G/DL — LOW (ref 32–36)
MCV RBC AUTO: 95.1 FL — SIGNIFICANT CHANGE UP (ref 80–100)
MONOCYTES # BLD AUTO: 1.19 K/UL — HIGH (ref 0–0.9)
MONOCYTES NFR BLD AUTO: 9.1 % — SIGNIFICANT CHANGE UP (ref 2–14)
NEUTROPHILS # BLD AUTO: 9.18 K/UL — HIGH (ref 1.8–7.4)
NEUTROPHILS NFR BLD AUTO: 70.2 % — SIGNIFICANT CHANGE UP (ref 43–77)
NRBC # BLD AUTO: 0 K/UL — SIGNIFICANT CHANGE UP (ref 0–0)
NRBC # FLD: 0 K/UL — SIGNIFICANT CHANGE UP (ref 0–0)
NRBC BLD AUTO-RTO: 0 /100 WBCS — SIGNIFICANT CHANGE UP (ref 0–0)
PLATELET # BLD AUTO: 278 K/UL — SIGNIFICANT CHANGE UP (ref 150–400)
PMV BLD: 10.8 FL — SIGNIFICANT CHANGE UP (ref 7–13)
POTASSIUM SERPL-MCNC: 3.9 MMOL/L — SIGNIFICANT CHANGE UP (ref 3.5–5.3)
POTASSIUM SERPL-SCNC: 3.9 MMOL/L — SIGNIFICANT CHANGE UP (ref 3.5–5.3)
PROT SERPL-MCNC: 7.8 GM/DL — SIGNIFICANT CHANGE UP (ref 6–8.3)
PROTHROM AB SERPL-ACNC: 20.7 SEC — HIGH (ref 9.9–13.4)
RBC # BLD: 4.91 M/UL — SIGNIFICANT CHANGE UP (ref 3.8–5.2)
RBC # FLD: 14.1 % — SIGNIFICANT CHANGE UP (ref 10.3–14.5)
SODIUM SERPL-SCNC: 145 MMOL/L — SIGNIFICANT CHANGE UP (ref 135–145)
WBC # BLD: 13.08 K/UL — HIGH (ref 3.8–10.5)
WBC # FLD AUTO: 13.08 K/UL — HIGH (ref 3.8–10.5)

## 2025-02-12 PROCEDURE — 99284 EMERGENCY DEPT VISIT MOD MDM: CPT | Mod: 25

## 2025-02-12 PROCEDURE — 36000 PLACE NEEDLE IN VEIN: CPT

## 2025-02-12 PROCEDURE — 85730 THROMBOPLASTIN TIME PARTIAL: CPT

## 2025-02-12 PROCEDURE — 36415 COLL VENOUS BLD VENIPUNCTURE: CPT

## 2025-02-12 PROCEDURE — 99285 EMERGENCY DEPT VISIT HI MDM: CPT

## 2025-02-12 PROCEDURE — 93925 LOWER EXTREMITY STUDY: CPT

## 2025-02-12 PROCEDURE — 85610 PROTHROMBIN TIME: CPT

## 2025-02-12 PROCEDURE — 93925 LOWER EXTREMITY STUDY: CPT | Mod: 26

## 2025-02-12 PROCEDURE — 80053 COMPREHEN METABOLIC PANEL: CPT

## 2025-02-12 PROCEDURE — 85025 COMPLETE CBC W/AUTO DIFF WBC: CPT

## 2025-02-12 RX ORDER — BACTERIOSTATIC SODIUM CHLORIDE 0.9 %
3 VIAL (ML) INJECTION ONCE
Refills: 0 | Status: COMPLETED | OUTPATIENT
Start: 2025-02-12 | End: 2025-02-12

## 2025-02-12 NOTE — ED ADULT NURSE REASSESSMENT NOTE - NS ED NURSE REASSESS COMMENT FT1
Pt back from US with daughter at bedside, pt calm and cooperative, A&O1 disoriented to time and place which is baseline as per daughter. plan of care discussed, pt and daughter have no concerns at this time

## 2025-02-12 NOTE — ED PROVIDER NOTE - CLINICAL SUMMARY MEDICAL DECISION MAKING FREE TEXT BOX
83 y/o F with PMHx of dementia, CAD s/p stents, CKD, Takotsubo syndrome, hydrocephalus, afib on Eliquis, anxiety, depression, HTN, osteoporosis, R renal CA s/p partial nephrectomy, RA, CKD stage 3 presents to the ED BIBA from [facility] for Vascular surgery consult. Per [facility] paperwork, pt with new acute vascular arterial ulcers. Per paperwork, [facility] requesting vascular surgery consult and angiography for arterial occlusion/PVD.     Plan: labs, b/l arterial doppler, Vascular consult, consider CTA b/l LE, monitor, observe, reassess. 85 y/o F with PMHx of dementia, CAD s/p stents, CKD, Takotsubo syndrome, hydrocephalus, afib on Eliquis, anxiety, depression, HTN, osteoporosis, R renal CA s/p partial nephrectomy, RA, CKD stage 3 presents to the ED BIBA from [facility] for Vascular surgery consult. Per [facility] paperwork, pt with new acute vascular arterial ulcers. Per paperwork, [facility] requesting vascular surgery consult and angiography for arterial occlusion/PVD.     Plan: labs, b/l arterial doppler, Vascular consult, consider CTA b/l LE, monitor, observe, reassess.      2000 - Lesliibjairo Copeland for Dr. Lema - b/l ALBERTO arterial doppler reports: no evidence of stenosis or occlusion, nonvisualization of dp arteries b/l. Labs notable for WBC 13, CKD unchanged from prior with GFR 25. Pt not candidate for CTA studies of LEs. Surgery resident aware of vascular consult, but has to do an OR case first. 83 y/o F with PMHx of dementia, CAD s/p stents, CKD, Takotsubo syndrome, hydrocephalus, afib on Eliquis, anxiety, depression, HTN, osteoporosis, R renal CA s/p partial nephrectomy, RA, CKD stage 3 presents to the ED BIBA from [facility] for Vascular surgery consult. Per [facility] paperwork, pt with new acute vascular arterial ulcers. Per paperwork, [facility] requesting vascular surgery consult and angiography for arterial occlusion/PVD.     Plan: labs, b/l arterial doppler, Vascular consult, consider CTA b/l LE, monitor, observe, reassess.      2000 - Lesliibe Marylin Copeland for Dr. Lema - b/l LE arterial doppler reports: no evidence of stenosis or occlusion, nonvisualization of dp arteries b/l. Labs notable for WBC 13, CKD not much changed from prior with GFR 25. Pt not candidate for CTA studies of LEs. Surgery resident aware of vascular consult, but has to do an OR case first.    JEN Lema MD:  Vascular consult appreciated: + cleared from their perspective, no acute intervention indicated, DP pulses palpated/dopplerable, advises outpt f/u Dr. Jordan.  Study results & vascular consult recommendations d/w daughter at bedside, who expressed her understanding & agrees with pt DC back to facility.  ED  arranging for transport. 83 y/o F with PMHx incl: dementia, CAD s/p stents, CKD, A-Fib on Eliquis, HTN, R renal CA s/p partial nephrectomy, RA, CKD stage 3 BIBA from [facility] to ED, [facility] paperwork "new  vascular arterial ulcers", [facility] requesting Vascular Surgery consult and angiography for r/o arterial occlusion/PVD.     Plan: labs, B/L arterial doppler, Vascular consult, consider CTA B/L LE if GFR allows & Vascular requests. monitor, observe, reassess.      2000 - Lesliibe Marylin Copeland for Dr. Lema - B/L LE arterial doppler reports: no evidence of stenosis nor occlusion, + mild PVD, nonvisualization of DP arteries B/L. Labs notable for WBC 13, CKD changed slightly from prior, GFR 25: consequently, pt not candidate for CTA studies of LEs. Surgery resident aware of Vascular consult, but has to do an OR case first.    JEN Lema MD:  Vascular consult appreciated: + cleared from their perspective, no acute intervention indicated, DP pulses palpated & dopplerable, advises outpt f/u Dr. Jordan.  Study results & Vascular consult recommendations d/w daughter at bedside, who expressed her understanding & agrees with pt DC back to facility.  ED  arranging for transport.

## 2025-02-12 NOTE — ED PROVIDER NOTE - PHYSICAL EXAMINATION
Gen: Elderly WF, alert, no respiratory discomfort, NAD  Head: NC/AT  Eyes: PERRL, EOMI  ENT: oropharynx clear, mucous membranes slightly dry, nose clear rhinorrhea, nostrils patent  Card: regular rate and rhythm, normal radial pulse  Resp: Breath sounds clear bilaterally, respirations normal  Abd: soft, non-tender + bowel sounds, no rebound, guarding or mass   Msk: MAEx4 without focal deformities, swelling. Pt unable to SLR b/l, moves UE b/l okay. Hands with chronic RA arthritic changes. Posterior R calf with skin ulceration with small surrounding erythema, no bleeding, mildly tender, no discharge. b/l heel ulcers, bandages in place. b/l dp pulses present, somewhat diminished. b/l toes cool with CR > 2 seconds. Chronic L foot dorsal surface chronic abrasion, not acutely infected. Distal L posterior calf erythema without skin breakdown, mildly tender.   Skin: no tactile warmth, no rash,   Neuro: Alert and oriented, no focal deficits, no motor or sensory deficits Gen: Elderly WF, alert, no respiratory discomfort, NAD  Head: NC/AT  Eyes: PERRL, EOMI  ENT: oropharynx clear, mucous membranes slightly dry, nose clear rhinorrhea, nostrils patent  Card: regular rate and rhythm, normal radial pulse  Resp: Breath sounds clear bilaterally, respirations normal  Abd: soft, non-tender + bowel sounds, no rebound, guarding nor mass   Msk: MAEx4 without focal deformities, swelling. Pt unable to SLR B/L, moves UE B/L okay. Hands with chronic RA arthritic changes. Posterior R calf + skin ulceration with small surrounding erythema, no bleeding, mildly tender, no discharge. B/L heel ulcers w/ bandages in place. B/L DP pulses present, somewhat diminished. B/L toes cool with CR > 2 seconds. Chronic L foot dorsal surface chronic abrasion, not acutely infected. Distal L posterior calf erythema without skin breakdown, mildly tender.   Skin: no tactile warmth, no rash.  See MSK section for B/L LEs skin abnls.   Neuro: Alert, + demented, no focal deficits, no motor or sensory deficits (other than chronic B/L proximal LE weakness), CNs intact, normal speech

## 2025-02-12 NOTE — ED PROVIDER NOTE - NSFOLLOWUPINSTRUCTIONS_ED_ALL_ED_FT
Kidney function does not allow for IV radiographic study.  Bilateral LE arterial dopplers studies: no occlusion nor stenosis.  Pt cleared by Vascular Surgery consult: no intervention indicated, advise outpatient follow up Dr. Jordan.  Continue regular medications as per routine.  Local care to bilateral heel pressure sores.  Daily dressing changes to posterior calf pressure sores.  Follow up with facility doctor. Kidney function does not allow for IV radiographic study.  Bilateral LE arterial dopplers studies: no occlusion nor stenosis.  Pt cleared by Vascular Surgery consult: no intervention indicated, advise outpatient follow up Dr. Jordan or with Vascular doctor affiliated with your facility..  Continue regular medications as per routine.  Local care to bilateral heel pressure sores.  Daily dressing changes to posterior calf pressure sores.  Please rotate pt/ change her position 3 x a day.  Follow up with facility doctor.

## 2025-02-12 NOTE — ED PROVIDER NOTE - OBJECTIVE STATEMENT
85 y/o F with PMHx of dementia, CAD s/p stents, CKD, Takotsubo syndrome, hydrocephalus, afib on Eliquis, anxiety, depression, HTN, osteoporosis, R renal CA s/p partial nephrectomy, RA, CKD stage 3 presents to the ED BIBA from [facility] for Vascular surgery consult. Per [facility] paperwork, pt with new acute vascular arterial ulcers. Per paperwork, [facility] requesting vascular surgery consult and angiography for arterial occlusion/PVD. Per daughter at bedside, pt has b/l heel pressure sores since November and has been following with wound care. Daughter states the wound care nurse found more wounds on the pt's legs today, that were not there yesterday. Endorses chronic heel pain. Pt denies fevers, recent falls, SOB, pain/numbness/tingling in her toes or top of her feet. Pt has been eating normally. Pt is wheelchair bound at baseline. 85 y/o F with PMHx of dementia, CAD s/p stents, CKD, Takotsubo syndrome, hydrocephalus, A-Fib on Eliquis, anxiety, depression, HTN, osteoporosis, R renal CA s/p partial nephrectomy, RA, CKD stage 3 presents to the ED BIBA from [facility] for B/L LE angiography & Vascular surgery consult. Per [facility] paperwork, pt with new acute vascular arterial ulcers. Per paperwork, [facility] requesting vascular surgery consult and angiography for arterial occlusion/PVD. Per daughter at bedside, pt has b/l heel pressure sores since November and has been following with wound care. Daughter states the wound care nurse found more wounds on the pt's legs today, that were not there yesterday. Endorses chronic heel pain. Pt denies fevers, recent falls, SOB, pain/numbness/tingling in her toes or top of her feet. Pt has been eating normally. Pt is wheelchair bound at baseline. 83 y/o F with PMHx of dementia, CAD s/p stents, CKD, Takotsubo syndrome, hydrocephalus, A-Fib on Eliquis, anxiety, depression, HTN, osteoporosis, R renal CA s/p partial nephrectomy, RA, CKD stage 3; BIBA from [facility] to ED requesting B/L LE angiography & Vascular surgery consult. Per [facility] paperwork, pt with "new vascular arterial ulcers". Per paperwork, [facility] requesting vascular surgery consult and angiography for r/o arterial occlusion/PVD. Per daughter at bedside, pt has B/L heel pressure sores since November and has been following with local wound care. Daughter states the wound care nurse today found more skin sores on post calves that were not noticed yesterday. Pt endorses chronic heel pain but no pain elsewhere. Pt denies fevers, recent falls, SOB, pain/numbness/tingling in her toes or top of her feet. Pt has been eating normally. Pt is wheelchair bound at baseline.

## 2025-02-12 NOTE — ED ADULT TRIAGE NOTE - CHIEF COMPLAINT QUOTE
Pt presents to ER from [facility]. Facility called EMS to send pt to the hospital for "vascular surgery evaluation". EMS was not given report from facility. Paper work from facilty reports "New Acute Vascular-Arterial Ulcers". Soft boots on b/l feet with dressings on feet. Pt denies any complaints.

## 2025-02-12 NOTE — ED ADULT NURSE NOTE - NSFALLHARMRISKINTERV_ED_ALL_ED
Assistance OOB with selected safe patient handling equipment if applicable/Assistance with ambulation/Communicate risk of Fall with Harm to all staff, patient, and family/Monitor gait and stability/Monitor for mental status changes and reorient to person, place, and time, as needed/Move patient closer to nursing station/within visual sight of ED staff/Provide patient with walking aids/Provide visual cue: red socks, yellow wristband, yellow gown, etc/Reinforce activity limits and safety measures with patient and family/Toileting schedule using arm’s reach rule for commode and bathroom/Use of alarms - bed, stretcher, chair and/or video monitoring/Bed in lowest position, wheels locked, appropriate side rails in place/Call bell, personal items and telephone in reach/Instruct patient to call for assistance before getting out of bed/chair/stretcher/Non-slip footwear applied when patient is off stretcher/[place] to call system/Physically safe environment - no spills, clutter or unnecessary equipment/Purposeful Proactive Rounding/Room/bathroom lighting operational, light cord in reach

## 2025-02-12 NOTE — ED ADULT NURSE NOTE - CHIEF COMPLAINT QUOTE
Pt presents to ER from [facility]. Facility called EMS to send pt to the hospital for "vascular surgery evaluation". EMS was not given report from facility. Paper work from facilty reports "New Acute Vascular-Arterial Ulcers". Soft boots on b/l feet with dressings on feet. Pt denies any complaints.

## 2025-02-12 NOTE — ED PROVIDER NOTE - CARE PROVIDER_API CALL
Jean Carlos Jordan  Vascular Surgery  [address], [address]  [place] [address]  Phone: [phone]  Fax: [phone]  Follow Up Time:

## 2025-02-12 NOTE — ED ADULT NURSE NOTE - CAS EDP DISCH TYPE
Wear AFO with shoes while ambulating. Be sure to ambulate with shoes on both feet while ambulating. Contact primary physician for rolling walker order.   
Nursing home

## 2025-02-12 NOTE — ED PROVIDER NOTE - CARE PLAN
1 Principal Discharge DX:	PVD (peripheral vascular disease)  Secondary Diagnosis:	Pressure ulcer of right leg  Secondary Diagnosis:	Pressure ulcer of left leg   Principal Discharge DX:	PVD (peripheral vascular disease)  Secondary Diagnosis:	Pressure ulcer of right leg  Secondary Diagnosis:	Pressure ulcer of left leg  Secondary Diagnosis:	Dementia

## 2025-02-12 NOTE — ED ADULT NURSE NOTE - OBJECTIVE STATEMENT
patient brought in by EMS from [facility] for vascular consult.  hx chronic heel wounds.  daughter at bedside states wound care nurse noticed more wounds on patients legs today.  hx dementia, CAD, CKD,   afib on eliquis.  wheelchair bound at baseline.

## 2025-02-12 NOTE — ED PROVIDER NOTE - PATIENT PORTAL LINK FT
You can access the FollowMyHealth Patient Portal offered by [facility] by registering at the following website: [email or web address]. By joining [facility]’s FollowMyHealth portal, you will also be able to view your health information using other applications (apps) compatible with our system.

## 2025-02-12 NOTE — ED ADULT NURSE REASSESSMENT NOTE - NS ED NURSE REASSESS COMMENT FT1
Assumed care at 1900 from [facility], pt at US at time of shift change. As per RN, waiting for results of US prior to decision on admission or d/c.

## 2025-02-14 NOTE — GOALS OF CARE CONVERSATION - ADVANCED CARE PLANNING - CONVERSATION DETAILS
[facility], [facility], and [facility]. No further questions from patient or Autumn at this time. Patient and Autumn getting ready to meet with Spiritual Care. Will continue to follow.    Skylar Arzola RN     Discussed prior WINIFRED thurston daughter, Madiha. Confirms DNR/DNI. Winifred reviewed/renewed. Order placed.

## 2025-02-23 ENCOUNTER — INPATIENT (INPATIENT)
Facility: HOSPITAL | Age: 85
LOS: 5 days | Discharge: HOSPICE | DRG: 872 | End: 2025-03-01
Attending: INTERNAL MEDICINE | Admitting: STUDENT IN AN ORGANIZED HEALTH CARE EDUCATION/TRAINING PROGRAM
Payer: MEDICARE

## 2025-02-23 VITALS
DIASTOLIC BLOOD PRESSURE: 76 MMHG | RESPIRATION RATE: 17 BRPM | OXYGEN SATURATION: 96 % | TEMPERATURE: 100 F | HEART RATE: 111 BPM | SYSTOLIC BLOOD PRESSURE: 98 MMHG

## 2025-02-23 DIAGNOSIS — Z98.49 CATARACT EXTRACTION STATUS, UNSPECIFIED EYE: Chronic | ICD-10-CM

## 2025-02-23 DIAGNOSIS — A41.9 SEPSIS, UNSPECIFIED ORGANISM: ICD-10-CM

## 2025-02-23 DIAGNOSIS — Z90.5 ACQUIRED ABSENCE OF KIDNEY: Chronic | ICD-10-CM

## 2025-02-23 DIAGNOSIS — Z98.89 OTHER SPECIFIED POSTPROCEDURAL STATES: Chronic | ICD-10-CM

## 2025-02-23 DIAGNOSIS — Z98.890 OTHER SPECIFIED POSTPROCEDURAL STATES: Chronic | ICD-10-CM

## 2025-02-23 DIAGNOSIS — Z90.89 ACQUIRED ABSENCE OF OTHER ORGANS: Chronic | ICD-10-CM

## 2025-02-23 DIAGNOSIS — Z98.62 PERIPHERAL VASCULAR ANGIOPLASTY STATUS: Chronic | ICD-10-CM

## 2025-02-23 LAB
ADD ON TEST-SPECIMEN IN LAB: SIGNIFICANT CHANGE UP
ALBUMIN SERPL ELPH-MCNC: 2.4 G/DL — LOW (ref 3.3–5)
ALP SERPL-CCNC: 75 U/L — SIGNIFICANT CHANGE UP (ref 40–120)
ALT FLD-CCNC: 70 U/L — SIGNIFICANT CHANGE UP (ref 12–78)
ANION GAP SERPL CALC-SCNC: 7 MMOL/L — SIGNIFICANT CHANGE UP (ref 5–17)
APPEARANCE UR: CLEAR — SIGNIFICANT CHANGE UP
APTT BLD: 32.5 SEC — SIGNIFICANT CHANGE UP (ref 24.5–35.6)
AST SERPL-CCNC: 88 U/L — HIGH (ref 15–37)
BACTERIA # UR AUTO: ABNORMAL /HPF
BASOPHILS # BLD AUTO: 0.02 K/UL — SIGNIFICANT CHANGE UP (ref 0–0.2)
BASOPHILS NFR BLD AUTO: 0.1 % — SIGNIFICANT CHANGE UP (ref 0–2)
BILIRUB SERPL-MCNC: 0.5 MG/DL — SIGNIFICANT CHANGE UP (ref 0.2–1.2)
BILIRUB UR-MCNC: NEGATIVE — SIGNIFICANT CHANGE UP
BUN SERPL-MCNC: 53 MG/DL — HIGH (ref 7–23)
CALCIUM SERPL-MCNC: 9.4 MG/DL — SIGNIFICANT CHANGE UP (ref 8.5–10.1)
CHLORIDE SERPL-SCNC: 108 MMOL/L — SIGNIFICANT CHANGE UP (ref 96–108)
CO2 SERPL-SCNC: 26 MMOL/L — SIGNIFICANT CHANGE UP (ref 22–31)
COLOR SPEC: YELLOW — SIGNIFICANT CHANGE UP
CREAT SERPL-MCNC: 1.65 MG/DL — HIGH (ref 0.5–1.3)
DIFF PNL FLD: ABNORMAL
EGFR: 30 ML/MIN/1.73M2 — LOW
EGFR: 30 ML/MIN/1.73M2 — LOW
EOSINOPHIL # BLD AUTO: 0 K/UL — SIGNIFICANT CHANGE UP (ref 0–0.5)
EOSINOPHIL NFR BLD AUTO: 0 % — SIGNIFICANT CHANGE UP (ref 0–6)
FLUAV AG NPH QL: SIGNIFICANT CHANGE UP
FLUBV AG NPH QL: SIGNIFICANT CHANGE UP
GLUCOSE SERPL-MCNC: 139 MG/DL — HIGH (ref 70–99)
GLUCOSE UR QL: NEGATIVE MG/DL — SIGNIFICANT CHANGE UP
HCT VFR BLD CALC: 45.2 % — HIGH (ref 34.5–45)
HGB BLD-MCNC: 14.3 G/DL — SIGNIFICANT CHANGE UP (ref 11.5–15.5)
IMM GRANULOCYTES # BLD AUTO: 0.12 K/UL — HIGH (ref 0–0.07)
IMM GRANULOCYTES NFR BLD AUTO: 0.8 % — SIGNIFICANT CHANGE UP (ref 0–0.9)
INR BLD: 1.7 RATIO — HIGH (ref 0.85–1.16)
KETONES UR-MCNC: NEGATIVE MG/DL — SIGNIFICANT CHANGE UP
LACTATE SERPL-SCNC: 3.4 MMOL/L — HIGH (ref 0.7–2)
LACTATE SERPL-SCNC: 3.6 MMOL/L — HIGH (ref 0.7–2)
LACTATE SERPL-SCNC: 4.1 MMOL/L — CRITICAL HIGH (ref 0.7–2)
LEUKOCYTE ESTERASE UR-ACNC: ABNORMAL
LYMPHOCYTES # BLD AUTO: 0.79 K/UL — LOW (ref 1–3.3)
LYMPHOCYTES NFR BLD AUTO: 5.5 % — LOW (ref 13–44)
MCHC RBC-ENTMCNC: 30 PG — SIGNIFICANT CHANGE UP (ref 27–34)
MCHC RBC-ENTMCNC: 31.6 G/DL — LOW (ref 32–36)
MCV RBC AUTO: 95 FL — SIGNIFICANT CHANGE UP (ref 80–100)
MONOCYTES # BLD AUTO: 0.96 K/UL — HIGH (ref 0–0.9)
MONOCYTES NFR BLD AUTO: 6.7 % — SIGNIFICANT CHANGE UP (ref 2–14)
NEUTROPHILS # BLD AUTO: 12.43 K/UL — HIGH (ref 1.8–7.4)
NEUTROPHILS NFR BLD AUTO: 86.9 % — HIGH (ref 43–77)
NITRITE UR-MCNC: NEGATIVE — SIGNIFICANT CHANGE UP
NRBC # BLD AUTO: 0 K/UL — SIGNIFICANT CHANGE UP (ref 0–0)
NRBC # FLD: 0 K/UL — SIGNIFICANT CHANGE UP (ref 0–0)
NRBC BLD AUTO-RTO: 0 /100 WBCS — SIGNIFICANT CHANGE UP (ref 0–0)
PH UR: 5.5 — SIGNIFICANT CHANGE UP (ref 5–8)
PLATELET # BLD AUTO: 262 K/UL — SIGNIFICANT CHANGE UP (ref 150–400)
PMV BLD: 11.4 FL — SIGNIFICANT CHANGE UP (ref 7–13)
POTASSIUM SERPL-MCNC: 4.4 MMOL/L — SIGNIFICANT CHANGE UP (ref 3.5–5.3)
POTASSIUM SERPL-SCNC: 4.4 MMOL/L — SIGNIFICANT CHANGE UP (ref 3.5–5.3)
PROT SERPL-MCNC: 7.3 GM/DL — SIGNIFICANT CHANGE UP (ref 6–8.3)
PROT UR-MCNC: SIGNIFICANT CHANGE UP MG/DL
PROTHROM AB SERPL-ACNC: 19.5 SEC — HIGH (ref 9.9–13.4)
RBC # BLD: 4.76 M/UL — SIGNIFICANT CHANGE UP (ref 3.8–5.2)
RBC # FLD: 14.2 % — SIGNIFICANT CHANGE UP (ref 10.3–14.5)
RBC CASTS # UR COMP ASSIST: 0 /HPF — SIGNIFICANT CHANGE UP (ref 0–4)
RSV RNA NPH QL NAA+NON-PROBE: SIGNIFICANT CHANGE UP
SARS-COV-2 RNA SPEC QL NAA+PROBE: SIGNIFICANT CHANGE UP
SODIUM SERPL-SCNC: 141 MMOL/L — SIGNIFICANT CHANGE UP (ref 135–145)
SP GR SPEC: 1.01 — SIGNIFICANT CHANGE UP (ref 1–1.03)
SQUAMOUS # UR AUTO: 2 /HPF — SIGNIFICANT CHANGE UP (ref 0–5)
TROPONIN I, HIGH SENSITIVITY RESULT: 89.8 NG/L — HIGH
TROPONIN I, HIGH SENSITIVITY RESULT: 95.19 NG/L — HIGH
UROBILINOGEN FLD QL: 0.2 MG/DL — SIGNIFICANT CHANGE UP (ref 0.2–1)
WBC # BLD: 14.32 K/UL — HIGH (ref 3.8–10.5)
WBC # FLD AUTO: 14.32 K/UL — HIGH (ref 3.8–10.5)
WBC UR QL: 75 /HPF — HIGH (ref 0–5)

## 2025-02-23 PROCEDURE — 86140 C-REACTIVE PROTEIN: CPT

## 2025-02-23 PROCEDURE — 73620 X-RAY EXAM OF FOOT: CPT | Mod: 26,LT

## 2025-02-23 PROCEDURE — 84100 ASSAY OF PHOSPHORUS: CPT

## 2025-02-23 PROCEDURE — 82962 GLUCOSE BLOOD TEST: CPT

## 2025-02-23 PROCEDURE — 92610 EVALUATE SWALLOWING FUNCTION: CPT | Mod: GN

## 2025-02-23 PROCEDURE — 73590 X-RAY EXAM OF LOWER LEG: CPT | Mod: 26,LT

## 2025-02-23 PROCEDURE — 80048 BASIC METABOLIC PNL TOTAL CA: CPT

## 2025-02-23 PROCEDURE — 92523 SPEECH SOUND LANG COMPREHEN: CPT | Mod: GN

## 2025-02-23 PROCEDURE — 93010 ELECTROCARDIOGRAM REPORT: CPT

## 2025-02-23 PROCEDURE — 84145 PROCALCITONIN (PCT): CPT

## 2025-02-23 PROCEDURE — 93005 ELECTROCARDIOGRAM TRACING: CPT

## 2025-02-23 PROCEDURE — 83735 ASSAY OF MAGNESIUM: CPT

## 2025-02-23 PROCEDURE — 93306 TTE W/DOPPLER COMPLETE: CPT

## 2025-02-23 PROCEDURE — 85027 COMPLETE CBC AUTOMATED: CPT

## 2025-02-23 PROCEDURE — 80053 COMPREHEN METABOLIC PANEL: CPT

## 2025-02-23 PROCEDURE — 85025 COMPLETE CBC W/AUTO DIFF WBC: CPT

## 2025-02-23 PROCEDURE — 83605 ASSAY OF LACTIC ACID: CPT

## 2025-02-23 PROCEDURE — 99285 EMERGENCY DEPT VISIT HI MDM: CPT | Mod: FS

## 2025-02-23 PROCEDURE — 74176 CT ABD & PELVIS W/O CONTRAST: CPT | Mod: 26

## 2025-02-23 PROCEDURE — 84484 ASSAY OF TROPONIN QUANT: CPT

## 2025-02-23 PROCEDURE — 71045 X-RAY EXAM CHEST 1 VIEW: CPT | Mod: 26

## 2025-02-23 PROCEDURE — 85652 RBC SED RATE AUTOMATED: CPT

## 2025-02-23 PROCEDURE — 70450 CT HEAD/BRAIN W/O DYE: CPT | Mod: 26

## 2025-02-23 PROCEDURE — 36415 COLL VENOUS BLD VENIPUNCTURE: CPT

## 2025-02-23 RX ORDER — ACETAMINOPHEN 500 MG/5ML
650 LIQUID (ML) ORAL EVERY 6 HOURS
Refills: 0 | Status: DISCONTINUED | OUTPATIENT
Start: 2025-02-23 | End: 2025-03-01

## 2025-02-23 RX ORDER — ONDANSETRON HCL/PF 4 MG/2 ML
4 VIAL (ML) INJECTION ONCE
Refills: 0 | Status: COMPLETED | OUTPATIENT
Start: 2025-02-23 | End: 2025-02-23

## 2025-02-23 RX ORDER — CEFTRIAXONE 500 MG/1
1000 INJECTION, POWDER, FOR SOLUTION INTRAMUSCULAR; INTRAVENOUS ONCE
Refills: 0 | Status: COMPLETED | OUTPATIENT
Start: 2025-02-23 | End: 2025-02-23

## 2025-02-23 RX ORDER — ACETAMINOPHEN 500 MG/5ML
1000 LIQUID (ML) ORAL ONCE
Refills: 0 | Status: COMPLETED | OUTPATIENT
Start: 2025-02-23 | End: 2025-02-23

## 2025-02-23 RX ORDER — GABAPENTIN 400 MG/1
2 CAPSULE ORAL
Refills: 0 | DISCHARGE

## 2025-02-23 RX ORDER — DOXYCYCLINE HYCLATE 100 MG
100 TABLET ORAL ONCE
Refills: 0 | Status: COMPLETED | OUTPATIENT
Start: 2025-02-23 | End: 2025-02-23

## 2025-02-23 RX ORDER — SODIUM CHLORIDE 9 G/1000ML
1000 INJECTION, SOLUTION INTRAVENOUS ONCE
Refills: 0 | Status: COMPLETED | OUTPATIENT
Start: 2025-02-23 | End: 2025-02-23

## 2025-02-23 RX ADMIN — Medication 400 MILLIGRAM(S): at 16:14

## 2025-02-23 RX ADMIN — Medication 110 MILLIGRAM(S): at 17:33

## 2025-02-23 RX ADMIN — Medication 2000 MILLILITER(S): at 16:13

## 2025-02-23 RX ADMIN — CEFTRIAXONE 1000 MILLIGRAM(S): 500 INJECTION, POWDER, FOR SOLUTION INTRAMUSCULAR; INTRAVENOUS at 16:15

## 2025-02-23 RX ADMIN — Medication 4 MILLIGRAM(S): at 16:14

## 2025-02-23 RX ADMIN — SODIUM CHLORIDE 1000 MILLILITER(S): 9 INJECTION, SOLUTION INTRAVENOUS at 18:50

## 2025-02-23 RX ADMIN — Medication 75 MILLILITER(S): at 23:38

## 2025-02-24 ENCOUNTER — RESULT REVIEW (OUTPATIENT)
Age: 85
End: 2025-02-24

## 2025-02-24 LAB
ALBUMIN SERPL ELPH-MCNC: 2.1 G/DL — LOW (ref 3.3–5)
ALP SERPL-CCNC: 63 U/L — SIGNIFICANT CHANGE UP (ref 40–120)
ALT FLD-CCNC: 53 U/L — SIGNIFICANT CHANGE UP (ref 12–78)
ANION GAP SERPL CALC-SCNC: 6 MMOL/L — SIGNIFICANT CHANGE UP (ref 5–17)
AST SERPL-CCNC: 59 U/L — HIGH (ref 15–37)
BILIRUB SERPL-MCNC: 0.4 MG/DL — SIGNIFICANT CHANGE UP (ref 0.2–1.2)
BUN SERPL-MCNC: 37 MG/DL — HIGH (ref 7–23)
CALCIUM SERPL-MCNC: 8.4 MG/DL — LOW (ref 8.5–10.1)
CHLORIDE SERPL-SCNC: 113 MMOL/L — HIGH (ref 96–108)
CO2 SERPL-SCNC: 23 MMOL/L — SIGNIFICANT CHANGE UP (ref 22–31)
CREAT SERPL-MCNC: 1.19 MG/DL — SIGNIFICANT CHANGE UP (ref 0.5–1.3)
CRP SERPL-MCNC: 228 MG/ML — HIGH (ref 0–5)
EGFR: 45 ML/MIN/1.73M2 — LOW
EGFR: 45 ML/MIN/1.73M2 — LOW
ERYTHROCYTE [SEDIMENTATION RATE] IN BLOOD: 67 MM/HR — HIGH (ref 0–20)
GLUCOSE SERPL-MCNC: 111 MG/DL — HIGH (ref 70–99)
HCT VFR BLD CALC: 40.4 % — SIGNIFICANT CHANGE UP (ref 34.5–45)
HGB BLD-MCNC: 12.6 G/DL — SIGNIFICANT CHANGE UP (ref 11.5–15.5)
LACTATE SERPL-SCNC: 1.4 MMOL/L — SIGNIFICANT CHANGE UP (ref 0.7–2)
LACTATE SERPL-SCNC: 2.7 MMOL/L — HIGH (ref 0.7–2)
MAGNESIUM SERPL-MCNC: 2.2 MG/DL — SIGNIFICANT CHANGE UP (ref 1.6–2.6)
MCHC RBC-ENTMCNC: 29.6 PG — SIGNIFICANT CHANGE UP (ref 27–34)
MCHC RBC-ENTMCNC: 31.2 G/DL — LOW (ref 32–36)
MCV RBC AUTO: 95.1 FL — SIGNIFICANT CHANGE UP (ref 80–100)
NRBC # BLD AUTO: 0 K/UL — SIGNIFICANT CHANGE UP (ref 0–0)
NRBC # FLD: 0 K/UL — SIGNIFICANT CHANGE UP (ref 0–0)
NRBC BLD AUTO-RTO: 0 /100 WBCS — SIGNIFICANT CHANGE UP (ref 0–0)
PHOSPHATE SERPL-MCNC: 2.3 MG/DL — LOW (ref 2.5–4.5)
PLATELET # BLD AUTO: 228 K/UL — SIGNIFICANT CHANGE UP (ref 150–400)
PMV BLD: 11.2 FL — SIGNIFICANT CHANGE UP (ref 7–13)
POTASSIUM SERPL-MCNC: 3.7 MMOL/L — SIGNIFICANT CHANGE UP (ref 3.5–5.3)
POTASSIUM SERPL-SCNC: 3.7 MMOL/L — SIGNIFICANT CHANGE UP (ref 3.5–5.3)
PROCALCITONIN SERPL-MCNC: 0.35 NG/ML — HIGH (ref 0.02–0.1)
PROT SERPL-MCNC: 6.4 GM/DL — SIGNIFICANT CHANGE UP (ref 6–8.3)
RBC # BLD: 4.25 M/UL — SIGNIFICANT CHANGE UP (ref 3.8–5.2)
RBC # FLD: 14.4 % — SIGNIFICANT CHANGE UP (ref 10.3–14.5)
SODIUM SERPL-SCNC: 142 MMOL/L — SIGNIFICANT CHANGE UP (ref 135–145)
TROPONIN I, HIGH SENSITIVITY RESULT: 80.91 NG/L — HIGH
WBC # BLD: 10.76 K/UL — HIGH (ref 3.8–10.5)
WBC # FLD AUTO: 10.76 K/UL — HIGH (ref 3.8–10.5)

## 2025-02-24 PROCEDURE — 93306 TTE W/DOPPLER COMPLETE: CPT | Mod: 26

## 2025-02-24 PROCEDURE — 99223 1ST HOSP IP/OBS HIGH 75: CPT

## 2025-02-24 RX ORDER — METOPROLOL SUCCINATE 50 MG/1
50 TABLET, EXTENDED RELEASE ORAL DAILY
Refills: 0 | Status: DISCONTINUED | OUTPATIENT
Start: 2025-02-24 | End: 2025-02-28

## 2025-02-24 RX ORDER — POLYETHYLENE GLYCOL 3350 17 G/17G
17 POWDER, FOR SOLUTION ORAL ONCE
Refills: 0 | Status: COMPLETED | OUTPATIENT
Start: 2025-02-24 | End: 2025-02-24

## 2025-02-24 RX ORDER — GABAPENTIN 400 MG/1
100 CAPSULE ORAL THREE TIMES A DAY
Refills: 0 | Status: DISCONTINUED | OUTPATIENT
Start: 2025-02-24 | End: 2025-03-01

## 2025-02-24 RX ORDER — ALPRAZOLAM 0.5 MG
0.25 TABLET, EXTENDED RELEASE 24 HR ORAL EVERY 24 HOURS
Refills: 0 | Status: DISCONTINUED | OUTPATIENT
Start: 2025-02-24 | End: 2025-03-01

## 2025-02-24 RX ORDER — APIXABAN 2.5 MG/1
2.5 TABLET, FILM COATED ORAL EVERY 12 HOURS
Refills: 0 | Status: DISCONTINUED | OUTPATIENT
Start: 2025-02-24 | End: 2025-03-01

## 2025-02-24 RX ORDER — SOD PHOS DI, MONO/K PHOS MONO 250 MG
1 TABLET ORAL
Refills: 0 | Status: COMPLETED | OUTPATIENT
Start: 2025-02-24 | End: 2025-02-24

## 2025-02-24 RX ORDER — CEFTRIAXONE 500 MG/1
1000 INJECTION, POWDER, FOR SOLUTION INTRAMUSCULAR; INTRAVENOUS EVERY 24 HOURS
Refills: 0 | Status: DISCONTINUED | OUTPATIENT
Start: 2025-02-24 | End: 2025-02-25

## 2025-02-24 RX ORDER — DOXYCYCLINE HYCLATE 100 MG
100 TABLET ORAL EVERY 12 HOURS
Refills: 0 | Status: DISCONTINUED | OUTPATIENT
Start: 2025-02-24 | End: 2025-03-01

## 2025-02-24 RX ORDER — POLYETHYLENE GLYCOL 3350 17 G/17G
17 POWDER, FOR SOLUTION ORAL DAILY
Refills: 0 | Status: DISCONTINUED | OUTPATIENT
Start: 2025-02-24 | End: 2025-03-01

## 2025-02-24 RX ORDER — AMIODARONE HYDROCHLORIDE 50 MG/ML
200 INJECTION, SOLUTION INTRAVENOUS DAILY
Refills: 0 | Status: DISCONTINUED | OUTPATIENT
Start: 2025-02-24 | End: 2025-03-01

## 2025-02-24 RX ORDER — SERTRALINE 100 MG/1
50 TABLET, FILM COATED ORAL DAILY
Refills: 0 | Status: DISCONTINUED | OUTPATIENT
Start: 2025-02-24 | End: 2025-03-01

## 2025-02-24 RX ORDER — BISACODYL 5 MG
10 TABLET, DELAYED RELEASE (ENTERIC COATED) ORAL ONCE
Refills: 0 | Status: COMPLETED | OUTPATIENT
Start: 2025-02-24 | End: 2025-02-24

## 2025-02-24 RX ORDER — SENNA 187 MG
2 TABLET ORAL AT BEDTIME
Refills: 0 | Status: DISCONTINUED | OUTPATIENT
Start: 2025-02-24 | End: 2025-03-01

## 2025-02-24 RX ORDER — SENNA 187 MG
2 TABLET ORAL AT BEDTIME
Refills: 0 | Status: DISCONTINUED | OUTPATIENT
Start: 2025-02-24 | End: 2025-02-24

## 2025-02-24 RX ADMIN — Medication 1 TABLET(S): at 18:50

## 2025-02-24 RX ADMIN — METOPROLOL SUCCINATE 50 MILLIGRAM(S): 50 TABLET, EXTENDED RELEASE ORAL at 10:28

## 2025-02-24 RX ADMIN — Medication 1 TABLET(S): at 10:29

## 2025-02-24 RX ADMIN — SERTRALINE 50 MILLIGRAM(S): 100 TABLET, FILM COATED ORAL at 10:28

## 2025-02-24 RX ADMIN — POLYETHYLENE GLYCOL 3350 17 GRAM(S): 17 POWDER, FOR SOLUTION ORAL at 10:29

## 2025-02-24 RX ADMIN — GABAPENTIN 100 MILLIGRAM(S): 400 CAPSULE ORAL at 21:14

## 2025-02-24 RX ADMIN — AMIODARONE HYDROCHLORIDE 200 MILLIGRAM(S): 50 INJECTION, SOLUTION INTRAVENOUS at 10:28

## 2025-02-24 RX ADMIN — Medication 110 MILLIGRAM(S): at 21:15

## 2025-02-24 RX ADMIN — Medication 1 TABLET(S): at 15:39

## 2025-02-24 RX ADMIN — APIXABAN 2.5 MILLIGRAM(S): 2.5 TABLET, FILM COATED ORAL at 10:29

## 2025-02-24 RX ADMIN — Medication 2 TABLET(S): at 21:15

## 2025-02-24 RX ADMIN — Medication 650 MILLIGRAM(S): at 21:14

## 2025-02-24 RX ADMIN — GABAPENTIN 100 MILLIGRAM(S): 400 CAPSULE ORAL at 15:39

## 2025-02-24 RX ADMIN — Medication 75 MILLILITER(S): at 13:01

## 2025-02-24 RX ADMIN — CEFTRIAXONE 1000 MILLIGRAM(S): 500 INJECTION, POWDER, FOR SOLUTION INTRAMUSCULAR; INTRAVENOUS at 15:39

## 2025-02-24 RX ADMIN — Medication 2 TABLET(S): at 06:20

## 2025-02-24 RX ADMIN — APIXABAN 2.5 MILLIGRAM(S): 2.5 TABLET, FILM COATED ORAL at 21:15

## 2025-02-24 RX ADMIN — GABAPENTIN 100 MILLIGRAM(S): 400 CAPSULE ORAL at 06:20

## 2025-02-24 RX ADMIN — Medication 110 MILLIGRAM(S): at 10:33

## 2025-02-25 LAB
-  AMPICILLIN/SULBACTAM: SIGNIFICANT CHANGE UP
-  AMPICILLIN: SIGNIFICANT CHANGE UP
-  AZTREONAM: SIGNIFICANT CHANGE UP
-  CEFAZOLIN: SIGNIFICANT CHANGE UP
-  CEFEPIME: SIGNIFICANT CHANGE UP
-  CEFTRIAXONE: SIGNIFICANT CHANGE UP
-  CEFUROXIME: SIGNIFICANT CHANGE UP
-  CIPROFLOXACIN: SIGNIFICANT CHANGE UP
-  ERTAPENEM: SIGNIFICANT CHANGE UP
-  GENTAMICIN: SIGNIFICANT CHANGE UP
-  IMIPENEM: SIGNIFICANT CHANGE UP
-  LEVOFLOXACIN: SIGNIFICANT CHANGE UP
-  MEROPENEM: SIGNIFICANT CHANGE UP
-  NITROFURANTOIN: SIGNIFICANT CHANGE UP
-  PIPERACILLIN/TAZOBACTAM: SIGNIFICANT CHANGE UP
-  TOBRAMYCIN: SIGNIFICANT CHANGE UP
-  TRIMETHOPRIM/SULFAMETHOXAZOLE: SIGNIFICANT CHANGE UP
ANION GAP SERPL CALC-SCNC: 5 MMOL/L — SIGNIFICANT CHANGE UP (ref 5–17)
BASOPHILS # BLD AUTO: 0.01 K/UL — SIGNIFICANT CHANGE UP (ref 0–0.2)
BASOPHILS NFR BLD AUTO: 0.1 % — SIGNIFICANT CHANGE UP (ref 0–2)
BUN SERPL-MCNC: 30 MG/DL — HIGH (ref 7–23)
CALCIUM SERPL-MCNC: 8.3 MG/DL — LOW (ref 8.5–10.1)
CHLORIDE SERPL-SCNC: 115 MMOL/L — HIGH (ref 96–108)
CO2 SERPL-SCNC: 22 MMOL/L — SIGNIFICANT CHANGE UP (ref 22–31)
CREAT SERPL-MCNC: 1.02 MG/DL — SIGNIFICANT CHANGE UP (ref 0.5–1.3)
CULTURE RESULTS: ABNORMAL
EGFR: 54 ML/MIN/1.73M2 — LOW
EGFR: 54 ML/MIN/1.73M2 — LOW
EOSINOPHIL # BLD AUTO: 0.12 K/UL — SIGNIFICANT CHANGE UP (ref 0–0.5)
EOSINOPHIL NFR BLD AUTO: 1.4 % — SIGNIFICANT CHANGE UP (ref 0–6)
GLUCOSE SERPL-MCNC: 111 MG/DL — HIGH (ref 70–99)
HCT VFR BLD CALC: 35.3 % — SIGNIFICANT CHANGE UP (ref 34.5–45)
HGB BLD-MCNC: 11.1 G/DL — LOW (ref 11.5–15.5)
IMM GRANULOCYTES # BLD AUTO: 0.1 K/UL — HIGH (ref 0–0.07)
IMM GRANULOCYTES NFR BLD AUTO: 1.2 % — HIGH (ref 0–0.9)
LYMPHOCYTES # BLD AUTO: 0.93 K/UL — LOW (ref 1–3.3)
LYMPHOCYTES NFR BLD AUTO: 10.9 % — LOW (ref 13–44)
MCHC RBC-ENTMCNC: 30.4 PG — SIGNIFICANT CHANGE UP (ref 27–34)
MCHC RBC-ENTMCNC: 31.4 G/DL — LOW (ref 32–36)
MCV RBC AUTO: 96.7 FL — SIGNIFICANT CHANGE UP (ref 80–100)
METHOD TYPE: SIGNIFICANT CHANGE UP
MONOCYTES # BLD AUTO: 0.79 K/UL — SIGNIFICANT CHANGE UP (ref 0–0.9)
MONOCYTES NFR BLD AUTO: 9.2 % — SIGNIFICANT CHANGE UP (ref 2–14)
NEUTROPHILS # BLD AUTO: 6.6 K/UL — SIGNIFICANT CHANGE UP (ref 1.8–7.4)
NEUTROPHILS NFR BLD AUTO: 77.2 % — HIGH (ref 43–77)
NRBC # BLD AUTO: 0 K/UL — SIGNIFICANT CHANGE UP (ref 0–0)
NRBC # FLD: 0 K/UL — SIGNIFICANT CHANGE UP (ref 0–0)
NRBC BLD AUTO-RTO: 0 /100 WBCS — SIGNIFICANT CHANGE UP (ref 0–0)
ORGANISM # SPEC MICROSCOPIC CNT: ABNORMAL
ORGANISM # SPEC MICROSCOPIC CNT: SIGNIFICANT CHANGE UP
PLATELET # BLD AUTO: 224 K/UL — SIGNIFICANT CHANGE UP (ref 150–400)
PMV BLD: 10.8 FL — SIGNIFICANT CHANGE UP (ref 7–13)
POTASSIUM SERPL-MCNC: 3.6 MMOL/L — SIGNIFICANT CHANGE UP (ref 3.5–5.3)
POTASSIUM SERPL-SCNC: 3.6 MMOL/L — SIGNIFICANT CHANGE UP (ref 3.5–5.3)
RBC # BLD: 3.65 M/UL — LOW (ref 3.8–5.2)
RBC # FLD: 14.6 % — HIGH (ref 10.3–14.5)
SODIUM SERPL-SCNC: 142 MMOL/L — SIGNIFICANT CHANGE UP (ref 135–145)
SPECIMEN SOURCE: SIGNIFICANT CHANGE UP
WBC # BLD: 8.55 K/UL — SIGNIFICANT CHANGE UP (ref 3.8–10.5)
WBC # FLD AUTO: 8.55 K/UL — SIGNIFICANT CHANGE UP (ref 3.8–10.5)

## 2025-02-25 PROCEDURE — 99233 SBSQ HOSP IP/OBS HIGH 50: CPT

## 2025-02-25 RX ORDER — ACETAMINOPHEN 500 MG/5ML
1000 LIQUID (ML) ORAL ONCE
Refills: 0 | Status: COMPLETED | OUTPATIENT
Start: 2025-02-25 | End: 2025-02-25

## 2025-02-25 RX ORDER — MEROPENEM 1 G/30ML
1000 INJECTION INTRAVENOUS EVERY 12 HOURS
Refills: 0 | Status: COMPLETED | OUTPATIENT
Start: 2025-02-25 | End: 2025-02-28

## 2025-02-25 RX ADMIN — Medication 75 MILLILITER(S): at 02:37

## 2025-02-25 RX ADMIN — Medication 2 TABLET(S): at 21:54

## 2025-02-25 RX ADMIN — AMIODARONE HYDROCHLORIDE 200 MILLIGRAM(S): 50 INJECTION, SOLUTION INTRAVENOUS at 11:04

## 2025-02-25 RX ADMIN — Medication 110 MILLIGRAM(S): at 21:54

## 2025-02-25 RX ADMIN — GABAPENTIN 100 MILLIGRAM(S): 400 CAPSULE ORAL at 21:54

## 2025-02-25 RX ADMIN — Medication 110 MILLIGRAM(S): at 11:08

## 2025-02-25 RX ADMIN — MEROPENEM 1000 MILLIGRAM(S): 1 INJECTION INTRAVENOUS at 15:51

## 2025-02-25 RX ADMIN — APIXABAN 2.5 MILLIGRAM(S): 2.5 TABLET, FILM COATED ORAL at 21:54

## 2025-02-25 RX ADMIN — GABAPENTIN 100 MILLIGRAM(S): 400 CAPSULE ORAL at 15:51

## 2025-02-25 RX ADMIN — SERTRALINE 50 MILLIGRAM(S): 100 TABLET, FILM COATED ORAL at 11:02

## 2025-02-25 RX ADMIN — METOPROLOL SUCCINATE 50 MILLIGRAM(S): 50 TABLET, EXTENDED RELEASE ORAL at 11:04

## 2025-02-25 RX ADMIN — APIXABAN 2.5 MILLIGRAM(S): 2.5 TABLET, FILM COATED ORAL at 11:04

## 2025-02-25 RX ADMIN — POLYETHYLENE GLYCOL 3350 17 GRAM(S): 17 POWDER, FOR SOLUTION ORAL at 11:04

## 2025-02-26 PROCEDURE — 99233 SBSQ HOSP IP/OBS HIGH 50: CPT

## 2025-02-26 RX ADMIN — APIXABAN 2.5 MILLIGRAM(S): 2.5 TABLET, FILM COATED ORAL at 10:25

## 2025-02-26 RX ADMIN — MEROPENEM 1000 MILLIGRAM(S): 1 INJECTION INTRAVENOUS at 17:57

## 2025-02-26 RX ADMIN — MEROPENEM 1000 MILLIGRAM(S): 1 INJECTION INTRAVENOUS at 05:42

## 2025-02-26 RX ADMIN — SERTRALINE 50 MILLIGRAM(S): 100 TABLET, FILM COATED ORAL at 10:25

## 2025-02-26 RX ADMIN — AMIODARONE HYDROCHLORIDE 200 MILLIGRAM(S): 50 INJECTION, SOLUTION INTRAVENOUS at 10:25

## 2025-02-26 RX ADMIN — POLYETHYLENE GLYCOL 3350 17 GRAM(S): 17 POWDER, FOR SOLUTION ORAL at 10:25

## 2025-02-26 RX ADMIN — GABAPENTIN 100 MILLIGRAM(S): 400 CAPSULE ORAL at 14:11

## 2025-02-26 RX ADMIN — Medication 50 MILLILITER(S): at 14:13

## 2025-02-26 RX ADMIN — Medication 400 MILLIGRAM(S): at 00:05

## 2025-02-26 RX ADMIN — APIXABAN 2.5 MILLIGRAM(S): 2.5 TABLET, FILM COATED ORAL at 21:48

## 2025-02-26 RX ADMIN — METOPROLOL SUCCINATE 50 MILLIGRAM(S): 50 TABLET, EXTENDED RELEASE ORAL at 10:25

## 2025-02-26 RX ADMIN — Medication 110 MILLIGRAM(S): at 21:49

## 2025-02-26 RX ADMIN — Medication 1000 MILLIGRAM(S): at 00:05

## 2025-02-26 RX ADMIN — Medication 110 MILLIGRAM(S): at 10:24

## 2025-02-26 RX ADMIN — GABAPENTIN 100 MILLIGRAM(S): 400 CAPSULE ORAL at 21:48

## 2025-02-27 LAB
ANION GAP SERPL CALC-SCNC: 5 MMOL/L — SIGNIFICANT CHANGE UP (ref 5–17)
BUN SERPL-MCNC: 30 MG/DL — HIGH (ref 7–23)
CALCIUM SERPL-MCNC: 8.5 MG/DL — SIGNIFICANT CHANGE UP (ref 8.5–10.1)
CHLORIDE SERPL-SCNC: 117 MMOL/L — HIGH (ref 96–108)
CO2 SERPL-SCNC: 20 MMOL/L — LOW (ref 22–31)
CREAT SERPL-MCNC: 0.91 MG/DL — SIGNIFICANT CHANGE UP (ref 0.5–1.3)
EGFR: 61 ML/MIN/1.73M2 — SIGNIFICANT CHANGE UP
EGFR: 61 ML/MIN/1.73M2 — SIGNIFICANT CHANGE UP
GLUCOSE SERPL-MCNC: 106 MG/DL — HIGH (ref 70–99)
MAGNESIUM SERPL-MCNC: 1.8 MG/DL — SIGNIFICANT CHANGE UP (ref 1.6–2.6)
PHOSPHATE SERPL-MCNC: 1.7 MG/DL — LOW (ref 2.5–4.5)
POTASSIUM SERPL-MCNC: 3.5 MMOL/L — SIGNIFICANT CHANGE UP (ref 3.5–5.3)
POTASSIUM SERPL-SCNC: 3.5 MMOL/L — SIGNIFICANT CHANGE UP (ref 3.5–5.3)
SODIUM SERPL-SCNC: 142 MMOL/L — SIGNIFICANT CHANGE UP (ref 135–145)

## 2025-02-27 PROCEDURE — 99232 SBSQ HOSP IP/OBS MODERATE 35: CPT

## 2025-02-27 RX ORDER — ACETAMINOPHEN 500 MG/5ML
1000 LIQUID (ML) ORAL ONCE
Refills: 0 | Status: COMPLETED | OUTPATIENT
Start: 2025-02-27 | End: 2025-02-27

## 2025-02-27 RX ORDER — SOD PHOS DI, MONO/K PHOS MONO 250 MG
1 TABLET ORAL
Refills: 0 | Status: COMPLETED | OUTPATIENT
Start: 2025-02-27 | End: 2025-02-28

## 2025-02-27 RX ADMIN — Medication 400 MILLIGRAM(S): at 01:43

## 2025-02-27 RX ADMIN — Medication 1000 MILLIGRAM(S): at 13:02

## 2025-02-27 RX ADMIN — GABAPENTIN 100 MILLIGRAM(S): 400 CAPSULE ORAL at 05:27

## 2025-02-27 RX ADMIN — MEROPENEM 1000 MILLIGRAM(S): 1 INJECTION INTRAVENOUS at 05:27

## 2025-02-27 RX ADMIN — Medication 400 MILLIGRAM(S): at 12:32

## 2025-02-27 RX ADMIN — Medication 110 MILLIGRAM(S): at 23:22

## 2025-02-27 RX ADMIN — GABAPENTIN 100 MILLIGRAM(S): 400 CAPSULE ORAL at 23:21

## 2025-02-27 RX ADMIN — Medication 1 PACKET(S): at 12:31

## 2025-02-27 RX ADMIN — Medication 2 TABLET(S): at 23:22

## 2025-02-27 RX ADMIN — APIXABAN 2.5 MILLIGRAM(S): 2.5 TABLET, FILM COATED ORAL at 10:21

## 2025-02-27 RX ADMIN — Medication 110 MILLIGRAM(S): at 10:20

## 2025-02-27 RX ADMIN — APIXABAN 2.5 MILLIGRAM(S): 2.5 TABLET, FILM COATED ORAL at 23:21

## 2025-02-27 RX ADMIN — MEROPENEM 1000 MILLIGRAM(S): 1 INJECTION INTRAVENOUS at 17:11

## 2025-02-28 DIAGNOSIS — J18.9 PNEUMONIA, UNSPECIFIED ORGANISM: ICD-10-CM

## 2025-02-28 LAB
ADD ON TEST-SPECIMEN IN LAB: SIGNIFICANT CHANGE UP
ANION GAP SERPL CALC-SCNC: 7 MMOL/L — SIGNIFICANT CHANGE UP (ref 5–17)
BUN SERPL-MCNC: 28 MG/DL — HIGH (ref 7–23)
CALCIUM SERPL-MCNC: 8.7 MG/DL — SIGNIFICANT CHANGE UP (ref 8.5–10.1)
CHLORIDE SERPL-SCNC: 116 MMOL/L — HIGH (ref 96–108)
CO2 SERPL-SCNC: 19 MMOL/L — LOW (ref 22–31)
CREAT SERPL-MCNC: 0.88 MG/DL — SIGNIFICANT CHANGE UP (ref 0.5–1.3)
CULTURE RESULTS: SIGNIFICANT CHANGE UP
CULTURE RESULTS: SIGNIFICANT CHANGE UP
EGFR: 64 ML/MIN/1.73M2 — SIGNIFICANT CHANGE UP
EGFR: 64 ML/MIN/1.73M2 — SIGNIFICANT CHANGE UP
GLUCOSE SERPL-MCNC: 107 MG/DL — HIGH (ref 70–99)
HCT VFR BLD CALC: 34.5 % — SIGNIFICANT CHANGE UP (ref 34.5–45)
HGB BLD-MCNC: 11 G/DL — LOW (ref 11.5–15.5)
MAGNESIUM SERPL-MCNC: 1.7 MG/DL — SIGNIFICANT CHANGE UP (ref 1.6–2.6)
MCHC RBC-ENTMCNC: 29.7 PG — SIGNIFICANT CHANGE UP (ref 27–34)
MCHC RBC-ENTMCNC: 31.9 G/DL — LOW (ref 32–36)
MCV RBC AUTO: 93.2 FL — SIGNIFICANT CHANGE UP (ref 80–100)
NRBC # BLD AUTO: 0 K/UL — SIGNIFICANT CHANGE UP (ref 0–0)
NRBC # FLD: 0 K/UL — SIGNIFICANT CHANGE UP (ref 0–0)
NRBC BLD AUTO-RTO: 0 /100 WBCS — SIGNIFICANT CHANGE UP (ref 0–0)
PHOSPHATE SERPL-MCNC: 2 MG/DL — LOW (ref 2.5–4.5)
PLATELET # BLD AUTO: 268 K/UL — SIGNIFICANT CHANGE UP (ref 150–400)
PMV BLD: 10.4 FL — SIGNIFICANT CHANGE UP (ref 7–13)
POTASSIUM SERPL-MCNC: 3.5 MMOL/L — SIGNIFICANT CHANGE UP (ref 3.5–5.3)
POTASSIUM SERPL-SCNC: 3.5 MMOL/L — SIGNIFICANT CHANGE UP (ref 3.5–5.3)
RBC # BLD: 3.7 M/UL — LOW (ref 3.8–5.2)
RBC # FLD: 14.6 % — HIGH (ref 10.3–14.5)
SODIUM SERPL-SCNC: 142 MMOL/L — SIGNIFICANT CHANGE UP (ref 135–145)
SPECIMEN SOURCE: SIGNIFICANT CHANGE UP
SPECIMEN SOURCE: SIGNIFICANT CHANGE UP
WBC # BLD: 13.92 K/UL — HIGH (ref 3.8–10.5)
WBC # FLD AUTO: 13.92 K/UL — HIGH (ref 3.8–10.5)

## 2025-02-28 PROCEDURE — 99223 1ST HOSP IP/OBS HIGH 75: CPT

## 2025-02-28 PROCEDURE — 99233 SBSQ HOSP IP/OBS HIGH 50: CPT

## 2025-02-28 PROCEDURE — 93010 ELECTROCARDIOGRAM REPORT: CPT

## 2025-02-28 RX ORDER — LORAZEPAM 4 MG/ML
0.25 VIAL (ML) INJECTION EVERY 4 HOURS
Refills: 0 | Status: DISCONTINUED | OUTPATIENT
Start: 2025-02-28 | End: 2025-03-01

## 2025-02-28 RX ORDER — FLUCONAZOLE 150 MG
100 TABLET ORAL EVERY 24 HOURS
Refills: 0 | Status: DISCONTINUED | OUTPATIENT
Start: 2025-03-01 | End: 2025-03-01

## 2025-02-28 RX ORDER — METOPROLOL SUCCINATE 50 MG/1
5 TABLET, EXTENDED RELEASE ORAL EVERY 8 HOURS
Refills: 0 | Status: DISCONTINUED | OUTPATIENT
Start: 2025-02-28 | End: 2025-03-01

## 2025-02-28 RX ORDER — FUROSEMIDE 10 MG/ML
40 INJECTION INTRAMUSCULAR; INTRAVENOUS ONCE
Refills: 0 | Status: COMPLETED | OUTPATIENT
Start: 2025-02-28 | End: 2025-02-28

## 2025-02-28 RX ORDER — FLUCONAZOLE 150 MG
200 TABLET ORAL ONCE
Refills: 0 | Status: COMPLETED | OUTPATIENT
Start: 2025-02-28 | End: 2025-02-28

## 2025-02-28 RX ORDER — METOPROLOL SUCCINATE 50 MG/1
5 TABLET, EXTENDED RELEASE ORAL EVERY 8 HOURS
Refills: 0 | Status: DISCONTINUED | OUTPATIENT
Start: 2025-02-28 | End: 2025-02-28

## 2025-02-28 RX ORDER — MEROPENEM 1 G/30ML
1000 INJECTION INTRAVENOUS EVERY 12 HOURS
Refills: 0 | Status: DISCONTINUED | OUTPATIENT
Start: 2025-02-28 | End: 2025-02-28

## 2025-02-28 RX ADMIN — Medication 0.25 MILLIGRAM(S): at 22:35

## 2025-02-28 RX ADMIN — AMIODARONE HYDROCHLORIDE 200 MILLIGRAM(S): 50 INJECTION, SOLUTION INTRAVENOUS at 10:11

## 2025-02-28 RX ADMIN — Medication 50 MILLIGRAM(S): at 12:26

## 2025-02-28 RX ADMIN — METOPROLOL SUCCINATE 5 MILLIGRAM(S): 50 TABLET, EXTENDED RELEASE ORAL at 12:44

## 2025-02-28 RX ADMIN — MEROPENEM 1000 MILLIGRAM(S): 1 INJECTION INTRAVENOUS at 06:10

## 2025-02-28 RX ADMIN — Medication 2 MILLIGRAM(S): at 14:47

## 2025-02-28 RX ADMIN — Medication 110 MILLIGRAM(S): at 22:40

## 2025-02-28 RX ADMIN — GABAPENTIN 100 MILLIGRAM(S): 400 CAPSULE ORAL at 06:10

## 2025-02-28 RX ADMIN — APIXABAN 2.5 MILLIGRAM(S): 2.5 TABLET, FILM COATED ORAL at 22:34

## 2025-02-28 RX ADMIN — FUROSEMIDE 40 MILLIGRAM(S): 10 INJECTION INTRAMUSCULAR; INTRAVENOUS at 12:26

## 2025-02-28 RX ADMIN — Medication 2 MILLIGRAM(S): at 18:29

## 2025-02-28 RX ADMIN — APIXABAN 2.5 MILLIGRAM(S): 2.5 TABLET, FILM COATED ORAL at 10:11

## 2025-02-28 RX ADMIN — Medication 50 MILLILITER(S): at 01:33

## 2025-02-28 RX ADMIN — METOPROLOL SUCCINATE 5 MILLIGRAM(S): 50 TABLET, EXTENDED RELEASE ORAL at 22:34

## 2025-02-28 RX ADMIN — GABAPENTIN 100 MILLIGRAM(S): 400 CAPSULE ORAL at 22:35

## 2025-02-28 RX ADMIN — Medication 2 TABLET(S): at 22:33

## 2025-02-28 RX ADMIN — Medication 2 MILLIGRAM(S): at 14:00

## 2025-02-28 RX ADMIN — Medication 1 PACKET(S): at 06:10

## 2025-02-28 RX ADMIN — Medication 110 MILLIGRAM(S): at 09:37

## 2025-03-01 ENCOUNTER — TRANSCRIPTION ENCOUNTER (OUTPATIENT)
Age: 85
End: 2025-03-01

## 2025-03-01 VITALS — DIASTOLIC BLOOD PRESSURE: 91 MMHG | HEART RATE: 79 BPM | SYSTOLIC BLOOD PRESSURE: 149 MMHG

## 2025-03-01 PROCEDURE — 99239 HOSP IP/OBS DSCHRG MGMT >30: CPT

## 2025-03-01 RX ORDER — CEFPODOXIME PROXETIL 200 MG/1
1 TABLET, FILM COATED ORAL
Refills: 0 | DISCHARGE

## 2025-03-01 RX ORDER — POLYETHYLENE GLYCOL 3350 17 G/17G
17 POWDER, FOR SOLUTION ORAL
Qty: 0 | Refills: 0 | DISCHARGE
Start: 2025-03-01

## 2025-03-01 RX ORDER — SENNA 187 MG
2 TABLET ORAL
Qty: 0 | Refills: 0 | DISCHARGE
Start: 2025-03-01

## 2025-03-01 RX ORDER — ALPRAZOLAM 0.5 MG
1 TABLET, EXTENDED RELEASE 24 HR ORAL
Refills: 0 | DISCHARGE

## 2025-03-01 RX ORDER — METHENAMINE MANDELATE 1 G
1 TABLET ORAL
Refills: 0 | DISCHARGE

## 2025-03-01 RX ADMIN — METOPROLOL SUCCINATE 5 MILLIGRAM(S): 50 TABLET, EXTENDED RELEASE ORAL at 06:36

## 2025-03-01 RX ADMIN — Medication 110 MILLIGRAM(S): at 10:27

## 2025-03-01 RX ADMIN — AMIODARONE HYDROCHLORIDE 200 MILLIGRAM(S): 50 INJECTION, SOLUTION INTRAVENOUS at 10:28

## 2025-03-01 RX ADMIN — POLYETHYLENE GLYCOL 3350 17 GRAM(S): 17 POWDER, FOR SOLUTION ORAL at 10:28

## 2025-03-01 RX ADMIN — Medication 50 MILLIGRAM(S): at 12:02

## 2025-03-01 RX ADMIN — SERTRALINE 50 MILLIGRAM(S): 100 TABLET, FILM COATED ORAL at 10:27

## 2025-03-01 RX ADMIN — APIXABAN 2.5 MILLIGRAM(S): 2.5 TABLET, FILM COATED ORAL at 10:28

## 2025-03-05 DIAGNOSIS — E78.5 HYPERLIPIDEMIA, UNSPECIFIED: ICD-10-CM

## 2025-03-05 DIAGNOSIS — I27.20 PULMONARY HYPERTENSION, UNSPECIFIED: ICD-10-CM

## 2025-03-05 DIAGNOSIS — I08.1 RHEUMATIC DISORDERS OF BOTH MITRAL AND TRICUSPID VALVES: ICD-10-CM

## 2025-03-05 DIAGNOSIS — A41.9 SEPSIS, UNSPECIFIED ORGANISM: ICD-10-CM

## 2025-03-05 DIAGNOSIS — J18.9 PNEUMONIA, UNSPECIFIED ORGANISM: ICD-10-CM

## 2025-03-05 DIAGNOSIS — L03.116 CELLULITIS OF LEFT LOWER LIMB: ICD-10-CM

## 2025-03-05 DIAGNOSIS — I42.9 CARDIOMYOPATHY, UNSPECIFIED: ICD-10-CM

## 2025-03-05 DIAGNOSIS — Z95.5 PRESENCE OF CORONARY ANGIOPLASTY IMPLANT AND GRAFT: ICD-10-CM

## 2025-03-05 DIAGNOSIS — M81.0 AGE-RELATED OSTEOPOROSIS WITHOUT CURRENT PATHOLOGICAL FRACTURE: ICD-10-CM

## 2025-03-05 DIAGNOSIS — N17.9 ACUTE KIDNEY FAILURE, UNSPECIFIED: ICD-10-CM

## 2025-03-05 DIAGNOSIS — N18.30 CHRONIC KIDNEY DISEASE, STAGE 3 UNSPECIFIED: ICD-10-CM

## 2025-03-05 DIAGNOSIS — F32.A DEPRESSION, UNSPECIFIED: ICD-10-CM

## 2025-03-05 DIAGNOSIS — Z79.01 LONG TERM (CURRENT) USE OF ANTICOAGULANTS: ICD-10-CM

## 2025-03-05 DIAGNOSIS — Z88.0 ALLERGY STATUS TO PENICILLIN: ICD-10-CM

## 2025-03-05 DIAGNOSIS — Z88.8 ALLERGY STATUS TO OTHER DRUGS, MEDICAMENTS AND BIOLOGICAL SUBSTANCES: ICD-10-CM

## 2025-03-05 DIAGNOSIS — I50.21 ACUTE SYSTOLIC (CONGESTIVE) HEART FAILURE: ICD-10-CM

## 2025-03-05 DIAGNOSIS — I24.89 OTHER FORMS OF ACUTE ISCHEMIC HEART DISEASE: ICD-10-CM

## 2025-03-05 DIAGNOSIS — E44.0 MODERATE PROTEIN-CALORIE MALNUTRITION: ICD-10-CM

## 2025-03-05 DIAGNOSIS — I25.10 ATHEROSCLEROTIC HEART DISEASE OF NATIVE CORONARY ARTERY WITHOUT ANGINA PECTORIS: ICD-10-CM

## 2025-03-05 DIAGNOSIS — F41.9 ANXIETY DISORDER, UNSPECIFIED: ICD-10-CM

## 2025-03-05 DIAGNOSIS — I48.20 CHRONIC ATRIAL FIBRILLATION, UNSPECIFIED: ICD-10-CM

## 2025-03-05 DIAGNOSIS — Z85.528 PERSONAL HISTORY OF OTHER MALIGNANT NEOPLASM OF KIDNEY: ICD-10-CM

## 2025-03-05 DIAGNOSIS — N39.0 URINARY TRACT INFECTION, SITE NOT SPECIFIED: ICD-10-CM

## 2025-03-05 DIAGNOSIS — B96.20 UNSPECIFIED ESCHERICHIA COLI [E. COLI] AS THE CAUSE OF DISEASES CLASSIFIED ELSEWHERE: ICD-10-CM

## 2025-03-05 DIAGNOSIS — Z66 DO NOT RESUSCITATE: ICD-10-CM

## 2025-03-05 DIAGNOSIS — G93.41 METABOLIC ENCEPHALOPATHY: ICD-10-CM

## 2025-04-15 NOTE — INPATIENT CERTIFICATION FOR MEDICARE PATIENTS - PHYSICIAN CONCUR
I concur with the Admission Order and I certify that services are provided in accordance with Section 42 CFR § 412.3 hide

## 2025-05-27 NOTE — PATIENT PROFILE ADULT - PHONE #
Date: 25  Adamaris Brian   MRN# [id number]  : 1976      Chief Complaint: Right Ankle Pain    Assessment and Plan:  The patient verbalized understanding of exam findings and treatment plan. We engaged in the shared decision-making process and treatment options were discussed at length with the patient. Surgical and conservative management discussed today along with risks and benefits. Patient was agreeable with the plan and all questions were answered to satisfaction.     Right ankle and midfoot arthritis  Patient has a history, physical exam, and radiologic evaluation consistent with right ankle and midfoot arthritis    -WBAT  -Add PT for ankle conditioning  -Recommend Sanchez or Hoka shoes. Try a shoe with more rigidity to provide midfoot support  -Use NSAIDs sparingly due to being on a blood thinner. Continue Tylenol 3000 mg daily scheduled for pain control. Do not exceed 3000mg daily.  - Apply ice/heat as needed  -Follow up as needed    Subjective:     Ankle Pain  Patient presents to the clinic today with complaints of chronic right ankle pain localized to the anterior ankle and dorsal midfoot. Patient states pain started over 1.5 years ago without any injury/trauma. She was initially evaluated by Brett Daugherty PA-C on 24 where x-rays were obtained and the patient was referred to PT, which she is only currently attending for er low back. She describes the symptoms as aching and sharp with walking. Symptoms improve with rest, medication: Motrin, Naprosyn, Tylenol used but not effective. The symptoms are worse with activity, weight bearing. The ankle has not given out or felt unstable.  Treatment to date has been Tylenol, NSAID's, without significant relief.     External Records Reviewed: office notes and x-ray reports    Prior treatment:  NSAIDs Yes    Bracing No   Physical Therapy Yes   Cortisone Injections No     Allergy:  Allergies[1]  Medications:  All current active meds have been  "reviewed   Past Medical History:  Past Medical History[2]  Past Surgical History:  Past Surgical History[3]  Family History:  Family History[4]  Social History:  Social History     Substance and Sexual Activity   Alcohol Use No     Social History     Substance and Sexual Activity   Drug Use No     Tobacco Use History[5]        Review of Systems:  General- denies fever/chills  HEENT- denies hearing loss or sore throat  Eyes- denies eye pain or visual disturbances, denies red eyes  Respiratory- denies cough or SOB  Cardio- denies chest pain or palpitations  GI- denies abdominal pain  Endocrine- denies urinary frequency  Urinary- denies pain with urination  Musculoskeletal- Negative except noted above  Skin- denies rashes or wounds  Neurological- denies dizziness or headache  Psychiatric- denies anxiety or difficulty concentrating      Objective:   BP Readings from Last 1 Encounters:   05/01/25 133/73      Wt Readings from Last 1 Encounters:   05/27/25 97.5 kg (215 lb)      Pulse Readings from Last 1 Encounters:   05/01/25 82        BMI: Estimated body mass index is 35.78 kg/m² as calculated from the following:    Height as of this encounter: 5' 5\" (1.651 m).    Weight as of this encounter: 97.5 kg (215 lb).      Physical Exam  Ht 5' 5\" (1.651 m)   Wt 97.5 kg (215 lb)   LMP  (LMP Unknown)   BMI 35.78 kg/m²   General/Constitutional: No apparent distress: well-nourished and well developed.  Eyes: normal ocular motion  Cardio: RRR, Normal S1S2, No m/r/g.   Lymphatic: No appreciable lymphadenopathy  Respiratory: Non-labored breathing, CTA b/l no w/c/r  Vascular: No edema, swelling or tenderness, except as noted in detailed exam. Extremities well perfused. No LE edema  Integumentary: No impressive skin lesions present, except as noted in detailed exam.  Neuro: No ataxia or tremors noted  Psych: Normal mood and affect, oriented to person, place and time. Appropriate affect.  Musculoskeletal: Normal, except as noted in " detailed exam and in HPI.    Gait and Station:   normal    Right Foot & Ankle Exam  Alignment:  Normal ankle alignment.  Inspection:  No swelling. No ecchymosis. No deformity.  Palpation:  ATFL and midfoot tenderness. No warmth. No clicking, catching, or snapping.  ROM:  Normal ankle ROM.  Strength:  5/5 AT, GSC, PT, and peroneals.  Stability:  No objective ankle instablity. (-) Anterior  neutral and PF. (-) Talar Tilt.  Neurovascular: 2+ DP pulse. Brisk Cap refill. Toes are warm and well-perfused. SILT intact throughout RLE.      Images:  I personally reviewed relevant images in the PACS system and my interpretation is as follows:    X-rays of the right ankle reviewed and interpreted from 12/7/24, which demonstrate: no acute osseous abnormalities. Mild degenerative changes at the anterior tibiotalar joint.           Scribe Attestation      I,:  Miesha Horta PA-C am acting as a scribe while in the presence of the attending physician.:       I,:  Douglas Hinson MD personally performed the services described in this documentation    as scribed in my presence.:               Douglas Hinson MD  Adult Reconstruction Specialist   [facility]          [1]   Allergies  Allergen Reactions    Pregabalin      Other reaction(s): blurred vision    Shellfish-Derived Products - Food Allergy Anaphylaxis and Hives    Sumatriptan      Other reaction(s): Altered Heart Rate    Triptans Shortness Of Breath and Palpitations    Latex Swelling and Rash     Other reaction(s): Hives/Skin Rash  Other reaction(s): Hives/Skin Rash    Sulfa Antibiotics Swelling and Rash     Other reaction(s): Hives/Skin Rash  Other reaction(s): Hives/Skin Rash    Monistat [Miconazole] Swelling   [2]   Past Medical History:  Diagnosis Date    Arthritis     Asthma     Bipolar 1 disorder (HCC)     Chronic narcotic dependence (HCC)     Cluster headache     COVID     2021    CTS (carpal tunnel syndrome)     left hand     Difficulty walking     Disease of thyroid gland     Fibromyalgia     Fibromyalgia, primary     Gout     Headache, tension-type     History of DVT in adulthood     Hyperlipidemia     Hypertension     Interstitial cystitis     Lumbar herniated disc     Lupus     Memory loss     Migraine     Mild sleep apnea     no CPAP    Peripheral neuropathy     PONV (postoperative nausea and vomiting)     Vision loss    [3]   Past Surgical History:  Procedure Laterality Date    COLONOSCOPY      DISTAL CLAVICLE EXCISION Right 4/24/2024    Procedure: DISTAL CLAVICLE EXCISION, acromioplasty;  Surgeon: Luke Gamez MD;  Location: [facility];  Service: Orthopedics    GALLBLADDER SURGERY  2008    HYSTERECTOMY  2014    LAPAROSCOPIC COLON RESECTION  2001    VT NDSC WRST SURG W/RLS TRANSVRS CARPL LIGM Right 12/06/2023    Procedure: Right endoscopic carpal tunnel release;  Surgeon: Alek Richards MD;  [facility]: [facility];  Service: Orthopedics    VT SURGICAL ARTHROSCOPY SHOULDER W/ROTATOR CUFF RPR Right 4/24/2024    Procedure: REPAIR ROTATOR CUFF  ARTHROSCOPIC;  Surgeon: Luke Gamez MD;  Location: [facility];  Service: Orthopedics    VT TENDON SHEATH INCISION Right 12/06/2023    Procedure: Right ring trigger finger release;  Surgeon: Alek Richards MD;  [facility];  Service: Orthopedics   [4]   Family History  Problem Relation Name Age of Onset    Lupus Mother      Osteoporosis Mother      Hypotension Mother      Hypertension Father Geovani malloy     Diabetes Father Geovani malloy     Heart disease Father Geovani malloy     Migraines Father Geovani malloy     Stroke Father Geovani malloy     Diabetes Sister      Schizophrenia Sister      Hypertension Sister      No Known Problems Daughter      No Known Problems Daughter      Diabetes Maternal Grandmother Adamaris atwood     Rheum arthritis Maternal Grandmother Adamaris atwood     Hypertension Maternal Grandmother Adamaris atwood     Neuropathy Maternal Grandmother Adamaris  atwood     Seizures Maternal Grandmother Adamaris elenaios     No Known Problems Maternal Grandfather      No Known Problems Paternal Grandmother      No Known Problems Paternal Grandfather      Breast cancer Maternal Aunt  40    No Known Problems Maternal Aunt      No Known Problems Maternal Aunt      No Known Problems Maternal Aunt      Endometrial cancer Paternal Aunt  60   [5]   Social History  Tobacco Use   Smoking Status Former    Current packs/day: 1.00    Average packs/day: 1 pack/day for 5.0 years (5.0 ttl pk-yrs)    Types: Cigarettes   Smokeless Tobacco Never   Tobacco Comments    quit at 16 years old      [phone]

## 2025-05-28 NOTE — ED PROVIDER NOTE - DATE/TIME 1
ENDO RECONCILIATION  Verify source of procedure(s): Nurse/MA direct access 05/2025  If other, please list source: ER referral for GERD  TIME OUT-CONFIRM CORRECT PROCEDURE: EGD  Cardiology: Dr Parr  Clearance Received  Pulmonology: none  Blood thinner:  none  GLP-1: none  Additional DX/indication for procedure: GERD  Please include any other notes relevant to endo reconciliation:  N/A    
24-Feb-2019 12:43

## 2025-06-10 NOTE — PHYSICAL THERAPY INITIAL EVALUATION ADULT - ASSISTIVE DEVICE: SCOOT/BRIDGE, REHAB EVAL
PT is calling she would like Kelli to go over her test results of her endometrial biopsy.   
bed rails

## 2025-06-19 NOTE — ED ADULT TRIAGE NOTE - BMI (KG/M2)
I called and spoke to patient.  Please see documentation.  
I called and spoke to patient. Please see documentation  
21